# Patient Record
Sex: MALE | Race: WHITE | NOT HISPANIC OR LATINO | Employment: UNEMPLOYED | URBAN - NONMETROPOLITAN AREA
[De-identification: names, ages, dates, MRNs, and addresses within clinical notes are randomized per-mention and may not be internally consistent; named-entity substitution may affect disease eponyms.]

---

## 2017-12-13 ENCOUNTER — APPOINTMENT (OUTPATIENT)
Dept: CT IMAGING | Facility: HOSPITAL | Age: 52
End: 2017-12-13

## 2017-12-13 ENCOUNTER — HOSPITAL ENCOUNTER (EMERGENCY)
Facility: HOSPITAL | Age: 52
Discharge: HOME | End: 2017-12-14
Attending: EMERGENCY MEDICINE | Admitting: EMERGENCY MEDICINE

## 2017-12-13 DIAGNOSIS — G43.009 MIGRAINE WITHOUT AURA AND WITHOUT STATUS MIGRAINOSUS, NOT INTRACTABLE: Primary | ICD-10-CM

## 2017-12-13 LAB
ALBUMIN SERPL-MCNC: 4.4 G/DL (ref 3.5–5)
ALBUMIN/GLOB SERPL: 1.6 G/DL (ref 1.5–2.5)
ALP SERPL-CCNC: 85 U/L (ref 40–129)
ALT SERPL W P-5'-P-CCNC: 19 U/L (ref 10–44)
ANION GAP SERPL CALCULATED.3IONS-SCNC: 6.4 MMOL/L (ref 3.6–11.2)
AST SERPL-CCNC: 19 U/L (ref 10–34)
BASOPHILS # BLD AUTO: 0.06 10*3/MM3 (ref 0–0.3)
BASOPHILS NFR BLD AUTO: 0.5 % (ref 0–2)
BILIRUB SERPL-MCNC: 0.4 MG/DL (ref 0.2–1.8)
BILIRUB UR QL STRIP: NEGATIVE
BUN BLD-MCNC: 6 MG/DL (ref 7–21)
BUN/CREAT SERPL: 5.9 (ref 7–25)
CALCIUM SPEC-SCNC: 9.4 MG/DL (ref 7.7–10)
CHLORIDE SERPL-SCNC: 107 MMOL/L (ref 99–112)
CLARITY UR: CLEAR
CO2 SERPL-SCNC: 26.6 MMOL/L (ref 24.3–31.9)
COLOR UR: YELLOW
CREAT BLD-MCNC: 1.02 MG/DL (ref 0.43–1.29)
DEPRECATED RDW RBC AUTO: 41.5 FL (ref 37–54)
EOSINOPHIL # BLD AUTO: 0.25 10*3/MM3 (ref 0–0.7)
EOSINOPHIL NFR BLD AUTO: 2 % (ref 0–5)
ERYTHROCYTE [DISTWIDTH] IN BLOOD BY AUTOMATED COUNT: 13.4 % (ref 11.5–14.5)
GFR SERPL CREATININE-BSD FRML MDRD: 76 ML/MIN/1.73
GLOBULIN UR ELPH-MCNC: 2.8 GM/DL
GLUCOSE BLD-MCNC: 134 MG/DL (ref 70–110)
GLUCOSE UR STRIP-MCNC: NEGATIVE MG/DL
HCT VFR BLD AUTO: 39.1 % (ref 42–52)
HGB BLD-MCNC: 13.4 G/DL (ref 14–18)
HGB UR QL STRIP.AUTO: NEGATIVE
IMM GRANULOCYTES # BLD: 0.05 10*3/MM3 (ref 0–0.03)
IMM GRANULOCYTES NFR BLD: 0.4 % (ref 0–0.5)
KETONES UR QL STRIP: NEGATIVE
LEUKOCYTE ESTERASE UR QL STRIP.AUTO: NEGATIVE
LYMPHOCYTES # BLD AUTO: 1.83 10*3/MM3 (ref 1–3)
LYMPHOCYTES NFR BLD AUTO: 14.4 % (ref 21–51)
MCH RBC QN AUTO: 29.1 PG (ref 27–33)
MCHC RBC AUTO-ENTMCNC: 34.3 G/DL (ref 33–37)
MCV RBC AUTO: 85 FL (ref 80–94)
MONOCYTES # BLD AUTO: 0.59 10*3/MM3 (ref 0.1–0.9)
MONOCYTES NFR BLD AUTO: 4.6 % (ref 0–10)
NEUTROPHILS # BLD AUTO: 9.95 10*3/MM3 (ref 1.4–6.5)
NEUTROPHILS NFR BLD AUTO: 78.1 % (ref 30–70)
NITRITE UR QL STRIP: NEGATIVE
OSMOLALITY SERPL CALC.SUM OF ELEC: 279 MOSM/KG (ref 273–305)
PH UR STRIP.AUTO: 6.5 [PH] (ref 5–8)
PLATELET # BLD AUTO: 361 10*3/MM3 (ref 130–400)
PMV BLD AUTO: 10.1 FL (ref 6–10)
POTASSIUM BLD-SCNC: 3.1 MMOL/L (ref 3.5–5.3)
PROT SERPL-MCNC: 7.2 G/DL (ref 6–8)
PROT UR QL STRIP: NEGATIVE
RBC # BLD AUTO: 4.6 10*6/MM3 (ref 4.7–6.1)
SODIUM BLD-SCNC: 140 MMOL/L (ref 135–153)
SP GR UR STRIP: 1.01 (ref 1–1.03)
UROBILINOGEN UR QL STRIP: NORMAL
WBC NRBC COR # BLD: 12.73 10*3/MM3 (ref 4.5–12.5)

## 2017-12-13 PROCEDURE — 80053 COMPREHEN METABOLIC PANEL: CPT | Performed by: PHYSICIAN ASSISTANT

## 2017-12-13 PROCEDURE — 96375 TX/PRO/DX INJ NEW DRUG ADDON: CPT

## 2017-12-13 PROCEDURE — 81003 URINALYSIS AUTO W/O SCOPE: CPT | Performed by: PHYSICIAN ASSISTANT

## 2017-12-13 PROCEDURE — 99283 EMERGENCY DEPT VISIT LOW MDM: CPT

## 2017-12-13 PROCEDURE — 96361 HYDRATE IV INFUSION ADD-ON: CPT

## 2017-12-13 PROCEDURE — 25010000002 DEXAMETHASONE PER 1 MG: Performed by: PHYSICIAN ASSISTANT

## 2017-12-13 PROCEDURE — 70450 CT HEAD/BRAIN W/O DYE: CPT | Performed by: RADIOLOGY

## 2017-12-13 PROCEDURE — 70450 CT HEAD/BRAIN W/O DYE: CPT

## 2017-12-13 PROCEDURE — 85025 COMPLETE CBC W/AUTO DIFF WBC: CPT | Performed by: PHYSICIAN ASSISTANT

## 2017-12-13 PROCEDURE — 25010000002 KETOROLAC TROMETHAMINE PER 15 MG: Performed by: PHYSICIAN ASSISTANT

## 2017-12-13 PROCEDURE — 25010000002 PROCHLORPERAZINE EDISYLATE PER 10 MG: Performed by: PHYSICIAN ASSISTANT

## 2017-12-13 PROCEDURE — 25010000002 DIPHENHYDRAMINE PER 50 MG: Performed by: PHYSICIAN ASSISTANT

## 2017-12-13 PROCEDURE — 96365 THER/PROPH/DIAG IV INF INIT: CPT

## 2017-12-13 RX ORDER — ERGOCALCIFEROL (VITAMIN D2) 10 MCG
400 TABLET ORAL DAILY
COMMUNITY
End: 2018-05-18

## 2017-12-13 RX ORDER — TAMSULOSIN HYDROCHLORIDE 0.4 MG/1
1 CAPSULE ORAL NIGHTLY
COMMUNITY
End: 2018-05-18

## 2017-12-13 RX ORDER — FINASTERIDE 5 MG/1
5 TABLET, FILM COATED ORAL DAILY
COMMUNITY
End: 2018-05-18

## 2017-12-13 RX ORDER — LISINOPRIL 10 MG/1
10 TABLET ORAL DAILY
COMMUNITY
End: 2018-05-18

## 2017-12-13 RX ORDER — LABETALOL 100 MG/1
100 TABLET, FILM COATED ORAL 2 TIMES DAILY
COMMUNITY
End: 2018-05-18

## 2017-12-13 RX ORDER — FERROUS SULFATE 325(65) MG
325 TABLET ORAL
COMMUNITY
End: 2018-05-18

## 2017-12-13 RX ORDER — PANTOPRAZOLE SODIUM 40 MG/1
40 TABLET, DELAYED RELEASE ORAL DAILY
COMMUNITY

## 2017-12-13 RX ORDER — KETOROLAC TROMETHAMINE 30 MG/ML
30 INJECTION, SOLUTION INTRAMUSCULAR; INTRAVENOUS ONCE
Status: COMPLETED | OUTPATIENT
Start: 2017-12-13 | End: 2017-12-13

## 2017-12-13 RX ORDER — SPIRONOLACTONE 25 MG/1
25 TABLET ORAL DAILY
COMMUNITY
End: 2018-05-18

## 2017-12-13 RX ORDER — DIPHENHYDRAMINE HYDROCHLORIDE 50 MG/ML
12.5 INJECTION INTRAMUSCULAR; INTRAVENOUS ONCE
Status: COMPLETED | OUTPATIENT
Start: 2017-12-13 | End: 2017-12-13

## 2017-12-13 RX ORDER — POTASSIUM CHLORIDE 20 MEQ/1
20 TABLET, EXTENDED RELEASE ORAL ONCE
Status: COMPLETED | OUTPATIENT
Start: 2017-12-13 | End: 2017-12-13

## 2017-12-13 RX ORDER — SODIUM CHLORIDE 0.9 % (FLUSH) 0.9 %
10 SYRINGE (ML) INJECTION AS NEEDED
Status: DISCONTINUED | OUTPATIENT
Start: 2017-12-13 | End: 2017-12-14 | Stop reason: HOSPADM

## 2017-12-13 RX ORDER — MELOXICAM 7.5 MG/1
7.5 TABLET ORAL DAILY
COMMUNITY
End: 2018-05-18

## 2017-12-13 RX ADMIN — PROCHLORPERAZINE EDISYLATE 10 MG: 5 INJECTION INTRAMUSCULAR; INTRAVENOUS at 21:44

## 2017-12-13 RX ADMIN — SODIUM CHLORIDE 1000 ML: 9 INJECTION, SOLUTION INTRAVENOUS at 21:43

## 2017-12-13 RX ADMIN — POTASSIUM CHLORIDE 20 MEQ: 1500 TABLET, EXTENDED RELEASE ORAL at 23:52

## 2017-12-13 RX ADMIN — DEXAMETHASONE SODIUM PHOSPHATE 10 MG: 4 INJECTION, SOLUTION INTRAMUSCULAR; INTRAVENOUS at 22:23

## 2017-12-13 RX ADMIN — KETOROLAC TROMETHAMINE 30 MG: 30 INJECTION, SOLUTION INTRAMUSCULAR at 21:43

## 2017-12-13 RX ADMIN — DIPHENHYDRAMINE HYDROCHLORIDE 12.5 MG: 50 INJECTION INTRAMUSCULAR; INTRAVENOUS at 21:45

## 2017-12-14 VITALS
RESPIRATION RATE: 18 BRPM | WEIGHT: 175 LBS | OXYGEN SATURATION: 99 % | HEIGHT: 66 IN | HEART RATE: 74 BPM | SYSTOLIC BLOOD PRESSURE: 142 MMHG | DIASTOLIC BLOOD PRESSURE: 80 MMHG | TEMPERATURE: 97.1 F | BODY MASS INDEX: 28.12 KG/M2

## 2017-12-14 NOTE — ED PROVIDER NOTES
Subjective   HPI Comments: This is a 52-year-old male presents with chief complaint headache for the past 2 days.  Patient describes headache as by lateral temporal and frontal region described as sharp, stabbing pain. Patient has had nausea without vomiting. No reported fever, chills.  Patient does have history of migraine headaches, states that this is worse than his usual headaches.  He does report having numbness and tingling down to his fingertips.    Patient is a 52 y.o. male presenting with headaches.   History provided by:  Patient   used: No    Headache   Pain location:  L temporal and R temporal  Quality:  Sharp  Severity at highest:  9/10  Onset quality:  Sudden  Duration:  2 days  Timing:  Constant  Progression:  Worsening  Similar to prior headaches: no    Context: bright light    Context: not activity, not defecating, not eating, not intercourse and not loud noise    Relieved by:  Nothing  Worsened by:  Nothing  Associated symptoms: numbness and weakness    Associated symptoms: no abdominal pain, no back pain, no eye pain, no facial pain, no near-syncope, no neck pain, no neck stiffness, no sore throat, no swollen glands and no syncope        Review of Systems   HENT: Negative for sore throat.    Eyes: Negative for pain.   Cardiovascular: Negative for syncope and near-syncope.   Gastrointestinal: Negative for abdominal pain.   Musculoskeletal: Negative for back pain, neck pain and neck stiffness.   Skin: Negative for rash.   Neurological: Positive for weakness, numbness and headaches.   All other systems reviewed and are negative.      Past Medical History:   Diagnosis Date   • Hypertension        No Known Allergies    History reviewed. No pertinent surgical history.    History reviewed. No pertinent family history.    Social History     Social History   • Marital status:      Spouse name: N/A   • Number of children: N/A   • Years of education: N/A     Social History Main  Topics   • Smoking status: Current Every Day Smoker     Packs/day: 1.50     Types: Cigarettes   • Smokeless tobacco: None   • Alcohol use Yes      Comment: occasionally   • Drug use: No   • Sexual activity: Not Asked     Other Topics Concern   • None     Social History Narrative   • None           Objective   Physical Exam   Constitutional: He is oriented to person, place, and time. He appears well-developed and well-nourished.   HENT:   Head: Normocephalic and atraumatic.   Right Ear: External ear normal.   Left Ear: External ear normal.   Nose: Nose normal.   Mouth/Throat: Oropharynx is clear and moist.   Eyes: Conjunctivae and EOM are normal. Pupils are equal, round, and reactive to light.   Neck: Normal range of motion. Neck supple.   Cardiovascular: Normal rate, regular rhythm and normal heart sounds.    Pulmonary/Chest: Effort normal and breath sounds normal.   Abdominal: Soft. Normal appearance and bowel sounds are normal. He exhibits no mass. There is no tenderness. There is no guarding.   Musculoskeletal: Normal range of motion.   Neurological: He is alert and oriented to person, place, and time.   Skin: Skin is warm and dry.   Psychiatric: He has a normal mood and affect. His behavior is normal. Judgment and thought content normal.   Nursing note and vitals reviewed.      Procedures         ED Course  ED Course   Comment By Time   Endorsed to Felipe Elise PA-C 12/13 2158   CT head interpreted by virtual radiology: Normal head brain CT. FELIPE Kenyon 12/13 8901                  MDM  Number of Diagnoses or Management Options  established and worsening     Amount and/or Complexity of Data Reviewed  Clinical lab tests: reviewed  Decide to obtain previous medical records or to obtain history from someone other than the patient: yes    Risk of Complications, Morbidity, and/or Mortality  Presenting problems: moderate  Diagnostic procedures: moderate  Management options: low    Patient  Progress  Patient progress: stable      Final diagnoses:   Migraine without aura and without status migrainosus, not intractable            FELIPE Kenyon  12/13/17 6376

## 2018-05-12 ENCOUNTER — HOSPITAL ENCOUNTER (EMERGENCY)
Facility: HOSPITAL | Age: 53
Discharge: HOME | End: 2018-05-12
Attending: EMERGENCY MEDICINE | Admitting: EMERGENCY MEDICINE

## 2018-05-12 VITALS
TEMPERATURE: 98 F | BODY MASS INDEX: 28.12 KG/M2 | SYSTOLIC BLOOD PRESSURE: 139 MMHG | HEART RATE: 89 BPM | DIASTOLIC BLOOD PRESSURE: 90 MMHG | RESPIRATION RATE: 18 BRPM | WEIGHT: 175 LBS | HEIGHT: 66 IN | OXYGEN SATURATION: 97 %

## 2018-05-12 DIAGNOSIS — L02.419 CELLULITIS AND ABSCESS OF LEG: Primary | ICD-10-CM

## 2018-05-12 DIAGNOSIS — L03.119 CELLULITIS AND ABSCESS OF LEG: Primary | ICD-10-CM

## 2018-05-12 LAB
ALBUMIN SERPL-MCNC: 4.1 G/DL (ref 3.5–5)
ALBUMIN/GLOB SERPL: 1.5 G/DL (ref 1.5–2.5)
ALP SERPL-CCNC: 78 U/L (ref 40–129)
ALT SERPL W P-5'-P-CCNC: 20 U/L (ref 10–44)
ANION GAP SERPL CALCULATED.3IONS-SCNC: 5 MMOL/L (ref 3.6–11.2)
AST SERPL-CCNC: 14 U/L (ref 10–34)
BASOPHILS # BLD AUTO: 0.07 10*3/MM3 (ref 0–0.3)
BASOPHILS NFR BLD AUTO: 0.6 % (ref 0–2)
BILIRUB SERPL-MCNC: 0.3 MG/DL (ref 0.2–1.8)
BUN BLD-MCNC: 11 MG/DL (ref 7–21)
BUN/CREAT SERPL: 12 (ref 7–25)
CALCIUM SPEC-SCNC: 9.3 MG/DL (ref 7.7–10)
CHLORIDE SERPL-SCNC: 109 MMOL/L (ref 99–112)
CO2 SERPL-SCNC: 29 MMOL/L (ref 24.3–31.9)
CREAT BLD-MCNC: 0.92 MG/DL (ref 0.43–1.29)
CRP SERPL-MCNC: 7.01 MG/DL (ref 0–0.99)
D-LACTATE SERPL-SCNC: 1.2 MMOL/L (ref 0.5–2)
DEPRECATED RDW RBC AUTO: 41.8 FL (ref 37–54)
EOSINOPHIL # BLD AUTO: 0.48 10*3/MM3 (ref 0–0.7)
EOSINOPHIL NFR BLD AUTO: 4.2 % (ref 0–5)
ERYTHROCYTE [DISTWIDTH] IN BLOOD BY AUTOMATED COUNT: 13.4 % (ref 11.5–14.5)
ERYTHROCYTE [SEDIMENTATION RATE] IN BLOOD: 26 MM/HR (ref 0–20)
GFR SERPL CREATININE-BSD FRML MDRD: 86 ML/MIN/1.73
GLOBULIN UR ELPH-MCNC: 2.8 GM/DL
GLUCOSE BLD-MCNC: 89 MG/DL (ref 70–110)
HCT VFR BLD AUTO: 40.1 % (ref 42–52)
HGB BLD-MCNC: 13.6 G/DL (ref 14–18)
IMM GRANULOCYTES # BLD: 0.06 10*3/MM3 (ref 0–0.03)
IMM GRANULOCYTES NFR BLD: 0.5 % (ref 0–0.5)
LYMPHOCYTES # BLD AUTO: 2.89 10*3/MM3 (ref 1–3)
LYMPHOCYTES NFR BLD AUTO: 25.6 % (ref 21–51)
MCH RBC QN AUTO: 29 PG (ref 27–33)
MCHC RBC AUTO-ENTMCNC: 33.9 G/DL (ref 33–37)
MCV RBC AUTO: 85.5 FL (ref 80–94)
MONOCYTES # BLD AUTO: 1.19 10*3/MM3 (ref 0.1–0.9)
MONOCYTES NFR BLD AUTO: 10.5 % (ref 0–10)
NEUTROPHILS # BLD AUTO: 6.62 10*3/MM3 (ref 1.4–6.5)
NEUTROPHILS NFR BLD AUTO: 58.6 % (ref 30–70)
OSMOLALITY SERPL CALC.SUM OF ELEC: 283.9 MOSM/KG (ref 273–305)
PLATELET # BLD AUTO: 399 10*3/MM3 (ref 130–400)
PMV BLD AUTO: 9 FL (ref 6–10)
POTASSIUM BLD-SCNC: 3.7 MMOL/L (ref 3.5–5.3)
PROT SERPL-MCNC: 6.9 G/DL (ref 6–8)
RBC # BLD AUTO: 4.69 10*6/MM3 (ref 4.7–6.1)
SODIUM BLD-SCNC: 143 MMOL/L (ref 135–153)
WBC NRBC COR # BLD: 11.31 10*3/MM3 (ref 4.5–12.5)

## 2018-05-12 PROCEDURE — 83605 ASSAY OF LACTIC ACID: CPT | Performed by: NURSE PRACTITIONER

## 2018-05-12 PROCEDURE — 87070 CULTURE OTHR SPECIMN AEROBIC: CPT | Performed by: NURSE PRACTITIONER

## 2018-05-12 PROCEDURE — 96365 THER/PROPH/DIAG IV INF INIT: CPT

## 2018-05-12 PROCEDURE — 85025 COMPLETE CBC W/AUTO DIFF WBC: CPT | Performed by: NURSE PRACTITIONER

## 2018-05-12 PROCEDURE — 80053 COMPREHEN METABOLIC PANEL: CPT | Performed by: NURSE PRACTITIONER

## 2018-05-12 PROCEDURE — 87147 CULTURE TYPE IMMUNOLOGIC: CPT | Performed by: NURSE PRACTITIONER

## 2018-05-12 PROCEDURE — 87186 SC STD MICRODIL/AGAR DIL: CPT | Performed by: NURSE PRACTITIONER

## 2018-05-12 PROCEDURE — 87040 BLOOD CULTURE FOR BACTERIA: CPT | Performed by: NURSE PRACTITIONER

## 2018-05-12 PROCEDURE — 87205 SMEAR GRAM STAIN: CPT | Performed by: NURSE PRACTITIONER

## 2018-05-12 PROCEDURE — 86140 C-REACTIVE PROTEIN: CPT | Performed by: NURSE PRACTITIONER

## 2018-05-12 PROCEDURE — 87077 CULTURE AEROBIC IDENTIFY: CPT | Performed by: NURSE PRACTITIONER

## 2018-05-12 PROCEDURE — 99283 EMERGENCY DEPT VISIT LOW MDM: CPT

## 2018-05-12 PROCEDURE — 85652 RBC SED RATE AUTOMATED: CPT | Performed by: NURSE PRACTITIONER

## 2018-05-12 PROCEDURE — 87150 DNA/RNA AMPLIFIED PROBE: CPT | Performed by: NURSE PRACTITIONER

## 2018-05-12 RX ORDER — SODIUM CHLORIDE 0.9 % (FLUSH) 0.9 %
10 SYRINGE (ML) INJECTION AS NEEDED
Status: DISCONTINUED | OUTPATIENT
Start: 2018-05-12 | End: 2018-05-13 | Stop reason: HOSPADM

## 2018-05-12 RX ORDER — DOXYCYCLINE 100 MG/1
100 CAPSULE ORAL 2 TIMES DAILY
Qty: 20 CAPSULE | Refills: 0 | Status: SHIPPED | OUTPATIENT
Start: 2018-05-12 | End: 2018-05-18

## 2018-05-12 RX ADMIN — CEFAZOLIN 1 G: 1 INJECTION, POWDER, FOR SOLUTION INTRAMUSCULAR; INTRAVENOUS; PARENTERAL at 22:46

## 2018-05-13 NOTE — ED NOTES
Abscess cleansed with soap and water. Dressed with non-adherent, 4x4, and secured with cloth tape.      Honey Garcia RN  05/12/18 5697

## 2018-05-13 NOTE — ED NOTES
Pt unable to provide urine sample at this time. Provider made aware. Will continue to monitor.      Layo De Jesus  05/12/18 8363

## 2018-05-13 NOTE — ED PROVIDER NOTES
Subjective     History provided by:  Patient  Abscess   Location:  Leg  Leg abscess location:  R upper leg  Size:  2cm x 2cm   Abscess quality: fluctuance, painful, redness and warmth    Red streaking: yes    Progression:  Worsening  Pain details:     Quality:  Throbbing and burning    Timing:  Constant    Progression:  Worsening  Context: not insect bite/sting    Relieved by:  None tried  Worsened by:  Nothing  Ineffective treatments:  None tried  Associated symptoms: no fever        Review of Systems   Constitutional: Negative.  Negative for fever.   HENT: Negative.    Respiratory: Negative.    Cardiovascular: Negative.  Negative for chest pain.   Gastrointestinal: Negative.  Negative for abdominal pain.   Endocrine: Negative.    Genitourinary: Negative.  Negative for dysuria.   Skin: Negative.    Neurological: Negative.    Psychiatric/Behavioral: Negative.    All other systems reviewed and are negative.      Past Medical History:   Diagnosis Date   • Hypertension        No Known Allergies    History reviewed. No pertinent surgical history.    History reviewed. No pertinent family history.    Social History     Social History   • Marital status:      Social History Main Topics   • Smoking status: Current Every Day Smoker     Packs/day: 1.50     Types: Cigarettes   • Alcohol use Yes      Comment: occasionally   • Drug use: No     Other Topics Concern   • Not on file           Objective   Physical Exam   Constitutional: He is oriented to person, place, and time. He appears well-developed and well-nourished. No distress.   HENT:   Head: Normocephalic and atraumatic.   Right Ear: External ear normal.   Left Ear: External ear normal.   Nose: Nose normal.   Eyes: Conjunctivae and EOM are normal. Pupils are equal, round, and reactive to light.   Neck: Normal range of motion. Neck supple. No JVD present. No tracheal deviation present.   Cardiovascular: Normal rate, regular rhythm and normal heart sounds.    No  murmur heard.  Pulmonary/Chest: Effort normal and breath sounds normal. No respiratory distress. He has no wheezes.   Abdominal: Soft. Bowel sounds are normal. There is no tenderness.   Musculoskeletal: Normal range of motion. He exhibits no edema or deformity.   Neurological: He is alert and oriented to person, place, and time. No cranial nerve deficit.   Skin: Skin is warm and dry. No rash noted. He is not diaphoretic. There is erythema. No pallor.   Psychiatric: He has a normal mood and affect. His behavior is normal. Thought content normal.   Nursing note and vitals reviewed.      Procedures           ED Course  ED Course                  MDM  Number of Diagnoses or Management Options  Cellulitis and abscess of leg: new and requires workup     Amount and/or Complexity of Data Reviewed  Clinical lab tests: reviewed    Risk of Complications, Morbidity, and/or Mortality  Presenting problems: low  Diagnostic procedures: low  Management options: low    Patient Progress  Patient progress: stable        Final diagnoses:   Cellulitis and abscess of leg            Marybeth Ralph, JACOBO  05/13/18 0014

## 2018-05-14 LAB — BACTERIA BLD CULT: ABNORMAL

## 2018-05-15 LAB
BACTERIA SPEC AEROBE CULT: ABNORMAL
BACTERIA SPEC AEROBE CULT: ABNORMAL
GRAM STN SPEC: ABNORMAL

## 2018-05-17 ENCOUNTER — TELEPHONE (OUTPATIENT)
Dept: EMERGENCY DEPT | Facility: HOSPITAL | Age: 53
End: 2018-05-17

## 2018-05-17 LAB
BACTERIA SPEC AEROBE CULT: ABNORMAL
BACTERIA SPEC AEROBE CULT: ABNORMAL
GRAM STN SPEC: ABNORMAL
GRAM STN SPEC: ABNORMAL
ISOLATED FROM: ABNORMAL
ISOLATED FROM: ABNORMAL

## 2018-05-18 ENCOUNTER — HOSPITAL ENCOUNTER (INPATIENT)
Facility: HOSPITAL | Age: 53
LOS: 3 days | Discharge: HOME HEALTH | End: 2018-05-21
Attending: EMERGENCY MEDICINE | Admitting: INTERNAL MEDICINE

## 2018-05-18 DIAGNOSIS — R78.81 BACTEREMIA: Primary | ICD-10-CM

## 2018-05-18 LAB
ALBUMIN SERPL-MCNC: 4.4 G/DL (ref 3.5–5)
ALBUMIN/GLOB SERPL: 1.7 G/DL (ref 1.5–2.5)
ALP SERPL-CCNC: 80 U/L (ref 40–129)
ALT SERPL W P-5'-P-CCNC: 19 U/L (ref 10–44)
ANION GAP SERPL CALCULATED.3IONS-SCNC: 4.6 MMOL/L (ref 3.6–11.2)
AST SERPL-CCNC: 16 U/L (ref 10–34)
BASOPHILS # BLD AUTO: 0.07 10*3/MM3 (ref 0–0.3)
BASOPHILS NFR BLD AUTO: 0.7 % (ref 0–2)
BILIRUB SERPL-MCNC: 0.3 MG/DL (ref 0.2–1.8)
BUN BLD-MCNC: 7 MG/DL (ref 7–21)
BUN/CREAT SERPL: 6.8 (ref 7–25)
CALCIUM SPEC-SCNC: 9.5 MG/DL (ref 7.7–10)
CHLORIDE SERPL-SCNC: 107 MMOL/L (ref 99–112)
CO2 SERPL-SCNC: 31.4 MMOL/L (ref 24.3–31.9)
CREAT BLD-MCNC: 1.03 MG/DL (ref 0.43–1.29)
CRP SERPL-MCNC: 0.53 MG/DL (ref 0–0.99)
CRP SERPL-MCNC: 0.76 MG/DL (ref 0–0.99)
D-LACTATE SERPL-SCNC: 0.9 MMOL/L (ref 0.5–2)
D-LACTATE SERPL-SCNC: 1.8 MMOL/L (ref 0.5–2)
D-LACTATE SERPL-SCNC: 2.2 MMOL/L (ref 0.5–2)
D-LACTATE SERPL-SCNC: 2.6 MMOL/L (ref 0.5–2)
DEPRECATED RDW RBC AUTO: 40.9 FL (ref 37–54)
EOSINOPHIL # BLD AUTO: 0.32 10*3/MM3 (ref 0–0.7)
EOSINOPHIL NFR BLD AUTO: 3 % (ref 0–5)
ERYTHROCYTE [DISTWIDTH] IN BLOOD BY AUTOMATED COUNT: 13.3 % (ref 11.5–14.5)
ERYTHROCYTE [SEDIMENTATION RATE] IN BLOOD: 26 MM/HR (ref 0–20)
GFR SERPL CREATININE-BSD FRML MDRD: 75 ML/MIN/1.73
GLOBULIN UR ELPH-MCNC: 2.6 GM/DL
GLUCOSE BLD-MCNC: 94 MG/DL (ref 70–110)
HCT VFR BLD AUTO: 42.6 % (ref 42–52)
HGB BLD-MCNC: 14.8 G/DL (ref 14–18)
HOLD SPECIMEN: NORMAL
IMM GRANULOCYTES # BLD: 0.07 10*3/MM3 (ref 0–0.03)
IMM GRANULOCYTES NFR BLD: 0.7 % (ref 0–0.5)
LYMPHOCYTES # BLD AUTO: 2.33 10*3/MM3 (ref 1–3)
LYMPHOCYTES NFR BLD AUTO: 22 % (ref 21–51)
MCH RBC QN AUTO: 29.5 PG (ref 27–33)
MCHC RBC AUTO-ENTMCNC: 34.7 G/DL (ref 33–37)
MCV RBC AUTO: 84.9 FL (ref 80–94)
MONOCYTES # BLD AUTO: 0.72 10*3/MM3 (ref 0.1–0.9)
MONOCYTES NFR BLD AUTO: 6.8 % (ref 0–10)
NEUTROPHILS # BLD AUTO: 7.07 10*3/MM3 (ref 1.4–6.5)
NEUTROPHILS NFR BLD AUTO: 66.8 % (ref 30–70)
OSMOLALITY SERPL CALC.SUM OF ELEC: 282.7 MOSM/KG (ref 273–305)
PLATELET # BLD AUTO: 472 10*3/MM3 (ref 130–400)
PMV BLD AUTO: 9.3 FL (ref 6–10)
POTASSIUM BLD-SCNC: 3 MMOL/L (ref 3.5–5.3)
PROT SERPL-MCNC: 7 G/DL (ref 6–8)
RBC # BLD AUTO: 5.02 10*6/MM3 (ref 4.7–6.1)
SODIUM BLD-SCNC: 143 MMOL/L (ref 135–153)
WBC NRBC COR # BLD: 10.58 10*3/MM3 (ref 4.5–12.5)

## 2018-05-18 PROCEDURE — 86140 C-REACTIVE PROTEIN: CPT | Performed by: NURSE PRACTITIONER

## 2018-05-18 PROCEDURE — 25010000002 MORPHINE PER 10 MG: Performed by: EMERGENCY MEDICINE

## 2018-05-18 PROCEDURE — 25010000003 POTASSIUM CHLORIDE 10 MEQ/100ML SOLUTION: Performed by: INTERNAL MEDICINE

## 2018-05-18 PROCEDURE — 99222 1ST HOSP IP/OBS MODERATE 55: CPT | Performed by: INTERNAL MEDICINE

## 2018-05-18 PROCEDURE — 85025 COMPLETE CBC W/AUTO DIFF WBC: CPT | Performed by: PHYSICIAN ASSISTANT

## 2018-05-18 PROCEDURE — 80053 COMPREHEN METABOLIC PANEL: CPT | Performed by: PHYSICIAN ASSISTANT

## 2018-05-18 PROCEDURE — 87040 BLOOD CULTURE FOR BACTERIA: CPT | Performed by: PHYSICIAN ASSISTANT

## 2018-05-18 PROCEDURE — 93005 ELECTROCARDIOGRAM TRACING: CPT | Performed by: NURSE PRACTITIONER

## 2018-05-18 PROCEDURE — 83605 ASSAY OF LACTIC ACID: CPT | Performed by: INTERNAL MEDICINE

## 2018-05-18 PROCEDURE — 25010000002 HEPARIN (PORCINE) PER 1000 UNITS: Performed by: NURSE PRACTITIONER

## 2018-05-18 PROCEDURE — 83605 ASSAY OF LACTIC ACID: CPT | Performed by: NURSE PRACTITIONER

## 2018-05-18 PROCEDURE — 99285 EMERGENCY DEPT VISIT HI MDM: CPT

## 2018-05-18 PROCEDURE — 25010000002 VANCOMYCIN PER 500 MG: Performed by: PHYSICIAN ASSISTANT

## 2018-05-18 PROCEDURE — 86140 C-REACTIVE PROTEIN: CPT | Performed by: PHYSICIAN ASSISTANT

## 2018-05-18 PROCEDURE — 94799 UNLISTED PULMONARY SVC/PX: CPT

## 2018-05-18 PROCEDURE — 83605 ASSAY OF LACTIC ACID: CPT | Performed by: PHYSICIAN ASSISTANT

## 2018-05-18 PROCEDURE — 36415 COLL VENOUS BLD VENIPUNCTURE: CPT

## 2018-05-18 PROCEDURE — 85652 RBC SED RATE AUTOMATED: CPT | Performed by: PHYSICIAN ASSISTANT

## 2018-05-18 RX ORDER — POTASSIUM CHLORIDE 750 MG/1
40 CAPSULE, EXTENDED RELEASE ORAL AS NEEDED
Status: DISCONTINUED | OUTPATIENT
Start: 2018-05-18 | End: 2018-05-21 | Stop reason: HOSPADM

## 2018-05-18 RX ORDER — ASPIRIN 325 MG
325 TABLET, DELAYED RELEASE (ENTERIC COATED) ORAL DAILY
Status: DISCONTINUED | OUTPATIENT
Start: 2018-05-19 | End: 2018-05-21 | Stop reason: HOSPADM

## 2018-05-18 RX ORDER — ERGOCALCIFEROL 1.25 MG/1
50000 CAPSULE ORAL WEEKLY
COMMUNITY
End: 2019-10-03

## 2018-05-18 RX ORDER — POTASSIUM CHLORIDE 7.45 MG/ML
10 INJECTION INTRAVENOUS
Status: COMPLETED | OUTPATIENT
Start: 2018-05-18 | End: 2018-05-19

## 2018-05-18 RX ORDER — CHLORAL HYDRATE 500 MG
1000 CAPSULE ORAL
COMMUNITY
End: 2019-12-03

## 2018-05-18 RX ORDER — MELOXICAM 15 MG/1
15 TABLET ORAL DAILY
COMMUNITY
End: 2018-10-10

## 2018-05-18 RX ORDER — CETIRIZINE HYDROCHLORIDE, PSEUDOEPHEDRINE HYDROCHLORIDE 5; 120 MG/1; MG/1
1 TABLET, FILM COATED, EXTENDED RELEASE ORAL 2 TIMES DAILY PRN
Status: CANCELLED | OUTPATIENT
Start: 2018-05-18

## 2018-05-18 RX ORDER — HEPARIN SODIUM 5000 [USP'U]/ML
5000 INJECTION, SOLUTION INTRAVENOUS; SUBCUTANEOUS EVERY 12 HOURS SCHEDULED
Status: DISCONTINUED | OUTPATIENT
Start: 2018-05-18 | End: 2018-05-21 | Stop reason: HOSPADM

## 2018-05-18 RX ORDER — LISINOPRIL 10 MG/1
10 TABLET ORAL DAILY
Status: DISCONTINUED | OUTPATIENT
Start: 2018-05-19 | End: 2018-05-21 | Stop reason: HOSPADM

## 2018-05-18 RX ORDER — SODIUM CHLORIDE 0.9 % (FLUSH) 0.9 %
10 SYRINGE (ML) INJECTION AS NEEDED
Status: DISCONTINUED | OUTPATIENT
Start: 2018-05-18 | End: 2018-05-21 | Stop reason: HOSPADM

## 2018-05-18 RX ORDER — LISINOPRIL 20 MG/1
10 TABLET ORAL DAILY
COMMUNITY
End: 2019-10-03

## 2018-05-18 RX ORDER — MELOXICAM 7.5 MG/1
15 TABLET ORAL DAILY
Status: DISCONTINUED | OUTPATIENT
Start: 2018-05-19 | End: 2018-05-21 | Stop reason: HOSPADM

## 2018-05-18 RX ORDER — PANTOPRAZOLE SODIUM 40 MG/1
40 TABLET, DELAYED RELEASE ORAL
Status: DISCONTINUED | OUTPATIENT
Start: 2018-05-19 | End: 2018-05-21 | Stop reason: HOSPADM

## 2018-05-18 RX ORDER — MORPHINE SULFATE 2 MG/ML
2 INJECTION, SOLUTION INTRAMUSCULAR; INTRAVENOUS ONCE
Status: COMPLETED | OUTPATIENT
Start: 2018-05-18 | End: 2018-05-18

## 2018-05-18 RX ORDER — LABETALOL 300 MG/1
300 TABLET, FILM COATED ORAL 2 TIMES DAILY
COMMUNITY

## 2018-05-18 RX ORDER — SODIUM CHLORIDE 0.9 % (FLUSH) 0.9 %
1-10 SYRINGE (ML) INJECTION AS NEEDED
Status: DISCONTINUED | OUTPATIENT
Start: 2018-05-18 | End: 2018-05-21 | Stop reason: HOSPADM

## 2018-05-18 RX ORDER — POTASSIUM CHLORIDE 7.45 MG/ML
10 INJECTION INTRAVENOUS
Status: DISCONTINUED | OUTPATIENT
Start: 2018-05-18 | End: 2018-05-21 | Stop reason: HOSPADM

## 2018-05-18 RX ORDER — FEXOFENADINE HCL AND PSEUDOEPHEDRINE HCI 180; 240 MG/1; MG/1
1 TABLET, EXTENDED RELEASE ORAL DAILY PRN
COMMUNITY
End: 2018-05-21 | Stop reason: HOSPADM

## 2018-05-18 RX ORDER — LABETALOL 100 MG/1
100 TABLET, FILM COATED ORAL EVERY 12 HOURS SCHEDULED
Status: DISCONTINUED | OUTPATIENT
Start: 2018-05-18 | End: 2018-05-21 | Stop reason: HOSPADM

## 2018-05-18 RX ORDER — POTASSIUM CHLORIDE 1.5 G/1.77G
40 POWDER, FOR SOLUTION ORAL AS NEEDED
Status: DISCONTINUED | OUTPATIENT
Start: 2018-05-18 | End: 2018-05-21 | Stop reason: HOSPADM

## 2018-05-18 RX ORDER — ASPIRIN 325 MG
325 TABLET, DELAYED RELEASE (ENTERIC COATED) ORAL DAILY
COMMUNITY
End: 2018-08-22 | Stop reason: DRUGHIGH

## 2018-05-18 RX ADMIN — POTASSIUM CHLORIDE 10 MEQ: 10 INJECTION, SOLUTION INTRAVENOUS at 18:17

## 2018-05-18 RX ADMIN — POTASSIUM CHLORIDE 10 MEQ: 10 INJECTION, SOLUTION INTRAVENOUS at 23:35

## 2018-05-18 RX ADMIN — LABETALOL HCL 100 MG: 100 TABLET, FILM COATED ORAL at 22:33

## 2018-05-18 RX ADMIN — POTASSIUM CHLORIDE 10 MEQ: 10 INJECTION, SOLUTION INTRAVENOUS at 19:26

## 2018-05-18 RX ADMIN — VANCOMYCIN HYDROCHLORIDE 1500 MG: 5 INJECTION, POWDER, LYOPHILIZED, FOR SOLUTION INTRAVENOUS at 12:06

## 2018-05-18 RX ADMIN — POTASSIUM CHLORIDE 10 MEQ: 10 INJECTION, SOLUTION INTRAVENOUS at 21:44

## 2018-05-18 RX ADMIN — MORPHINE SULFATE 2 MG: 2 INJECTION, SOLUTION INTRAMUSCULAR; INTRAVENOUS at 16:27

## 2018-05-18 RX ADMIN — SODIUM CHLORIDE 1000 ML: 9 INJECTION, SOLUTION INTRAVENOUS at 13:28

## 2018-05-18 RX ADMIN — POTASSIUM CHLORIDE 10 MEQ: 10 INJECTION, SOLUTION INTRAVENOUS at 22:32

## 2018-05-18 RX ADMIN — HEPARIN SODIUM 5000 UNITS: 5000 INJECTION, SOLUTION INTRAVENOUS; SUBCUTANEOUS at 20:26

## 2018-05-18 RX ADMIN — POTASSIUM CHLORIDE 10 MEQ: 10 INJECTION, SOLUTION INTRAVENOUS at 20:26

## 2018-05-18 NOTE — H&P
Patient Identification:  Name:  Marcus Bobo  Age:  52 y.o.  Sex:  male  :  1965  MRN:  [id number]   Visit Number:  [id number]  Primary Care Physician:  JACOBO Grimes    I have seen the patient in conjunction with JACOBO Sutton, and I agree with the following statements:    Assisted by:Kristin CABRERA     Chief complaint: abnormal labs    History of presenting illness:  52 y.o. male, Mr. Bobo was brought back to the ER today for positive blood cultures. He was seen in the ER on 18 for an abscess that had developed 3-4 days prior with erythema and edema of the area. He was started on doxycyline and has been taking that since 18. He reports it is looking and feeling much better, erythema and edema is significantly reduced since being seen in the ER. He denies fever since starting PO antibiotics. He was also found to have a positive lactic acid on todays work up. Will admit to the medical floor for IV antibiotics and further monitoring and treatment.  He states the redness has gone down significantly and he is having really no pain around the right thigh abscess site now.  This spontaneously drained a slight amount.    His medical history includes GERD, BPH, Vitamin D Deficiency, hypertension, and arthritis. He is a current tobacco user, he states he smokes 1-2 ppd for the last 30 years or more.   ---------------------------------------------------------------------------------------------------------------------   Review of Systems   Constitutional: Negative for chills, fatigue and fever.   HENT: Negative for congestion and rhinorrhea.    Eyes: Negative for photophobia and visual disturbance.   Respiratory: Negative for cough, shortness of breath and wheezing.    Cardiovascular: Negative for chest pain and leg swelling.   Gastrointestinal: Negative for abdominal distention and abdominal pain.   Endocrine: Negative for cold intolerance and heat intolerance.   Genitourinary: Negative  for difficulty urinating.   Musculoskeletal: Negative for arthralgias and myalgias.   Skin: Positive for wound (right middle thigh). Negative for color change and pallor.   Neurological: Negative for dizziness, weakness and light-headedness.   Hematological: Negative for adenopathy.   Psychiatric/Behavioral: Negative for agitation, behavioral problems and confusion.      ---------------------------------------------------------------------------------------------------------------------   Past Medical History:   Diagnosis Date   • Hypertension      History reviewed. No pertinent surgical history.  History reviewed. No pertinent family history.  Social History     Social History   • Marital status:      Social History Main Topics   • Smoking status: Current Every Day Smoker     Packs/day: 1.50     Types: Cigarettes   • Alcohol use Yes      Comment: occasionally   • Drug use: No     Other Topics Concern   • Not on file     ---------------------------------------------------------------------------------------------------------------------   Allergies:  Patient has no known allergies.  ---------------------------------------------------------------------------------------------------------------------   Prior to Admission Medications     Prescriptions Last Dose Informant Patient Reported? Taking?    Desloratadine-Pseudoephedrine (CLARINEX-D 12 HOUR PO)   Yes No    Take  by mouth.    doxycycline (MONODOX) 100 MG capsule   No No    Take 1 capsule by mouth 2 (Two) Times a Day for 10 days.    ferrous sulfate 325 (65 FE) MG tablet   Yes No    Take 325 mg by mouth Daily With Breakfast.    finasteride (PROSCAR) 5 MG tablet   Yes No    Take 5 mg by mouth Daily.    labetalol (NORMODYNE) 100 MG tablet   Yes No    Take 100 mg by mouth 2 (Two) Times a Day.    lisinopril (PRINIVIL,ZESTRIL) 10 MG tablet   Yes No    Take 10 mg by mouth Daily.    meloxicam (MOBIC) 7.5 MG tablet   Yes No    Take 7.5 mg by mouth Daily.     pantoprazole (PROTONIX) 40 MG EC tablet   Yes No    Take 40 mg by mouth Daily.    spironolactone (ALDACTONE) 25 MG tablet   Yes No    Take 25 mg by mouth Daily.    tamsulosin (FLOMAX) 0.4 MG capsule 24 hr capsule   Yes No    Take 1 capsule by mouth Every Night.    Vitamin D, Cholecalciferol, (CHOLECALCIFEROL) 400 units tablet   Yes No    Take 400 Units by mouth Daily.        Hospital Scheduled Meds:    sodium chloride 1,000 mL Intravenous Once   vancomycin 20 mg/kg Intravenous Once        ---------------------------------------------------------------------------------------------------------------------   Vital Signs:  Temp:  [97.8 °F (36.6 °C)-98.3 °F (36.8 °C)] 98.3 °F (36.8 °C)  Heart Rate:  [80-82] 80  Resp:  [18] 18  BP: (146-148)/(82-83) 146/83  1    05/18/18  1134   Weight: 79.4 kg (175 lb)     Body mass index is 28.25 kg/m².  ---------------------------------------------------------------------------------------------------------------------       Physical Exam    Physical Exam:  Constitutional:  Well-developed and well-nourished.   No distress  HENT:  Head: Normocephalic and atraumatic.  Mouth:  Moist mucous membranes.    Eyes:  Conjunctivae and EOM are normal.  Pupils are equal, round, and reactive to light.  No scleral icterus.  Neck:  Neck supple.  No JVD present.    Cardiovascular:  Normal rate, regular rhythm.  with no murmur.  Pulmonary/Chest:  No respiratory distress, no wheezes, no crackles, with normal breath sounds and good air movement.  Abdominal:  Soft.  Bowel sounds are present.  No distension and no tenderness.   Musculoskeletal:  No edema, no tenderness, and no deformity.  No red or swollen joints anywhere.    Neurological:  Alert and oriented to person, place, and time.  No cranial nerve deficit.  No tongue deviation.  No facial droop.  No slurred speech.   Skin:  Right middle thigh with abscess, some minimal erythema about the size of a $0.50 piece and slightly open on top about the size  of an eraser head, has been draining at home, non currently, is non-fluctuant    Psychiatric:  Normal mood and affect.   Peripheral vascular:  No edema and strong pulses on all 4 extremities.  ---------------------------------------------------------------------------------------------------------------------  EKG:  Normal, image reviewed    ---------------------------------------------------------------------------------------------------------------------     Results from last 7 days  Lab Units 05/18/18  1207 05/18/18  1155 05/12/18  2245   CRP mg/dL  --  0.76 7.01*   LACTATE mmol/L 2.6*  --  1.2   WBC 10*3/mm3  --  10.58 11.31   HEMOGLOBIN g/dL  --  14.8 13.6*   HEMATOCRIT %  --  42.6 40.1*   MCV fL  --  84.9 85.5   MCHC g/dL  --  34.7 33.9   PLATELETS 10*3/mm3  --  472* 399           Results from last 7 days  Lab Units 05/18/18  1155 05/12/18  2245   SODIUM mmol/L 143 143   POTASSIUM mmol/L 3.0* 3.7   CHLORIDE mmol/L 107 109   CO2 mmol/L 31.4 29.0   BUN mg/dL 7 11   CREATININE mg/dL 1.03 0.92   EGFR IF NONAFRICN AM mL/min/1.73 75 86   CALCIUM mg/dL 9.5 9.3   GLUCOSE mg/dL 94 89   ALBUMIN g/dL 4.40 4.10   BILIRUBIN mg/dL 0.3 0.3   ALK PHOS U/L 80 78   AST (SGOT) U/L 16 14   ALT (SGPT) U/L 19 20   Estimated Creatinine Clearance: 81.2 mL/min (by C-G formula based on SCr of 1.03 mg/dL).  No results found for: AMMONIA          No results found for: HGBA1C  Lab Results   Component Value Date    TSH 1.162 01/20/2015    FREET4 1.19 06/10/2014     No results found for: PREGTESTUR, PREGSERUM, HCG, HCGQUANT  Pain Management Panel     Pain Management Panel Latest Ref Rng & Units 9/6/2014    AMPHETAMINES SCREEN, URINE NEGATIVE Negative    BARBITURATES SCREEN NEGATIVE Negative    BENZODIAZEPINE SCREEN, URINE NEGATIVE Negative    COCAINE SCREEN, URINE NEGATIVE Negative    METHADONE SCREEN, URINE NEGATIVE Negative        Blood Culture   Date Value Ref Range Status   05/12/2018 Staphylococcus epidermidis (A)  Final     Comment:      This isolate is presumed to be clindamyin resistant based on detection of inducible clindamycin resistance. Clindamycin may still be effective in some patients.     05/12/2018 Staphylococcus epidermidis (A)  Final     Comment:     This isolate is presumed to be clindamyin resistant based on detection of inducible clindamycin resistance. Clindamycin may still be effective in some patients.          Wound Culture   Date Value Ref Range Status   05/12/2018 Heavy growth (4+) Staphylococcus aureus (A)  Final     Comment:     This isolate does not demonstrate inducible clindamycin resistance in vitro.            ---------------------------------------------------------------------------------------------------------------------  Imaging Results (last 7 days)     ** No results found for the last 168 hours. **          Cultures:  Blood Culture   Date Value Ref Range Status   05/12/2018 Staphylococcus epidermidis (A)  Final     Comment:     This isolate is presumed to be clindamyin resistant based on detection of inducible clindamycin resistance. Clindamycin may still be effective in some patients.     05/12/2018 Staphylococcus epidermidis (A)  Final     Comment:     This isolate is presumed to be clindamyin resistant based on detection of inducible clindamycin resistance. Clindamycin may still be effective in some patients.       Wound Culture   Date Value Ref Range Status   05/12/2018 Heavy growth (4+) Staphylococcus aureus (A)  Final     Comment:     This isolate does not demonstrate inducible clindamycin resistance in vitro.         I have personally reviewed the radiology images and read the final radiology report.  ---------------------------------------------------------------------------------------------------------------------  Assessment and Plan:    -Bacteremia r/t right middle thigh abscess  -Lactic acidosis   -Acute Hypokalemia  -Hypertension  -Tobacco user    Will admit to the medical floor for IV  antibiotics. ID consulted, blood cultures repeated in the ED today. Vancomycin started in the ED, pharmacy consulted to dose. Lactic acid was 2.6, Now improved.   WBC is 10.58, neutrophils are 66.8, CRP is 0.76, no fevers, no tachypnea or hypotensive episodes. Repeat labs in AM. Potassium level is 3.0, potassium protocol ordered.  Overall the abscess appears be getting better and will improve the abscess certainly is not the same organism that is in the blood.  Overall this would most likely be a contaminant with a low inflammatory markers however it was 2/2.  He has no permanent lines in place no pacemaker.  We have asked infectious disease for his input on whether this should be treated but will continue vancomycin until that time.    Hypertension, will review home medications and adjust as needed    DVT prophylaxis: Heparin SQ BID, SCDs    Tobacco use: patient educated on the dangers of tobacco use and urged to quit, he states he does not need Nicoderm patch at this time.     Irena Garcia, JACOBO  05/18/18  1:37 PM  ---------------------------------------------------------------------------------------------------------------------

## 2018-05-18 NOTE — PLAN OF CARE
Problem: Patient Care Overview  Goal: Plan of Care Review  Outcome: Ongoing (interventions implemented as appropriate)   05/18/18 1645 05/18/18 1744   Plan of Care Review   Progress --  no change   Coping/Psychosocial   Plan of Care Reviewed With patient --      Goal: Individualization and Mutuality  Outcome: Ongoing (interventions implemented as appropriate)    Goal: Discharge Needs Assessment  Outcome: Ongoing (interventions implemented as appropriate)    Goal: Interprofessional Rounds/Family Conf  Outcome: Ongoing (interventions implemented as appropriate)      Problem: Wound (Includes Pressure Injury) (Adult)  Goal: Signs and Symptoms of Listed Potential Problems Will be Absent, Minimized or Managed (Wound)  Outcome: Ongoing (interventions implemented as appropriate)   05/18/18 1744   Goal/Outcome Evaluation   Problems Assessed (Wound) all   Problems Present (Wound) situational response;infection       Problem: Infection, Risk/Actual (Adult)  Goal: Identify Related Risk Factors and Signs and Symptoms  Outcome: Ongoing (interventions implemented as appropriate)   05/18/18 1744   Infection, Risk/Actual (Adult)   Related Risk Factors (Infection, Risk/Actual) exposure to microbes;skin integrity impairment   Signs and Symptoms (Infection, Risk/Actual) cultures positive;weakness     Goal: Infection Prevention/Resolution  Outcome: Ongoing (interventions implemented as appropriate)

## 2018-05-18 NOTE — ED PROVIDER NOTES
Subjective   Pt presented to ED x5 days ago and had blood work and given dose of ancef, d/c home on doxycycline for a right upper leg cellulitis/abscess. Pt was called yesterday d/t having two sets of positive blood culture and informed he needed to return to ED for admission for IV abx. PT states his abscess is improving actually. He has had no chills or fever. No other associated symptoms.         History provided by:  Patient   used: No    Abscess   Location:  Leg  Leg abscess location:  R upper leg  Abscess quality: induration and painful    Red streaking: no    Duration:  6 days  Progression:  Improving  Pain details:     Quality:  Dull    Severity:  Moderate    Timing:  Constant    Progression:  Improving  Chronicity:  New  Context: not injected drug use    Relieved by:  None tried  Worsened by:  Nothing  Ineffective treatments:  None tried  Associated symptoms: no fever, no headaches, no nausea and no vomiting    Risk factors: prior abscess        Review of Systems   Constitutional: Negative for activity change and fever.   HENT: Negative for congestion and sore throat.    Eyes: Negative for pain.   Respiratory: Negative for cough, shortness of breath and wheezing.    Cardiovascular: Negative for chest pain.   Gastrointestinal: Negative for abdominal distention, diarrhea, nausea and vomiting.   Genitourinary: Negative for difficulty urinating and dysuria.   Musculoskeletal: Negative for arthralgias and myalgias.   Skin: Negative for rash and wound.   Neurological: Negative for dizziness and headaches.   Psychiatric/Behavioral: Negative for agitation.   All other systems reviewed and are negative.      Past Medical History:   Diagnosis Date   • Hypertension        No Known Allergies    History reviewed. No pertinent surgical history.    History reviewed. No pertinent family history.    Social History     Social History   • Marital status:      Social History Main Topics   • Smoking  status: Current Every Day Smoker     Packs/day: 1.50     Types: Cigarettes   • Alcohol use Yes      Comment: occasionally   • Drug use: No     Other Topics Concern   • Not on file           Objective   Physical Exam   Constitutional: He is oriented to person, place, and time. He appears well-developed and well-nourished.   HENT:   Head: Normocephalic and atraumatic.   Eyes: EOM are normal. Pupils are equal, round, and reactive to light.   Neck: Normal range of motion. Neck supple.   Cardiovascular: Normal rate, regular rhythm and normal heart sounds.    Pulmonary/Chest: Effort normal and breath sounds normal.   Abdominal: Soft. Bowel sounds are normal.   Musculoskeletal: Normal range of motion.   Neurological: He is alert and oriented to person, place, and time.   Skin: Skin is warm and dry.        Psychiatric: He has a normal mood and affect. His behavior is normal. Judgment and thought content normal.   Nursing note and vitals reviewed.      Procedures           ED Course  ED Course as of May 18 1526   Fri May 18, 2018   1317 Paged hospitalist  [ROSS]   1332 Spoke with Dr. Carr, accepts pt for admission.   [ROSS]      ED Course User Index  [ROSS] FELIPE Lubin                  MDM  Number of Diagnoses or Management Options  Bacteremia:      Amount and/or Complexity of Data Reviewed  Clinical lab tests: ordered and reviewed  Tests in the radiology section of CPT®: ordered and reviewed  Tests in the medicine section of CPT®: reviewed and ordered  Decide to obtain previous medical records or to obtain history from someone other than the patient: yes    Patient Progress  Patient progress: stable        Final diagnoses:   Bacteremia            FELIPE Lubin  05/18/18 4636

## 2018-05-18 NOTE — PROGRESS NOTES
Smoking Cessation Counseling  Pharmacy was consulted for smoking cessation counseling.  Irena Garcia NP noted in a progess note that she educated the dangers of tobacco and urded him to quit.  He stated that does not need a Nicoderm patch at this time.    Estelle Lopez, [facility]  5/18/2018  6:35 PM

## 2018-05-19 LAB
ALBUMIN SERPL-MCNC: 3.4 G/DL (ref 3.5–5)
ALBUMIN/GLOB SERPL: 1.4 G/DL (ref 1.5–2.5)
ALP SERPL-CCNC: 67 U/L (ref 40–129)
ALT SERPL W P-5'-P-CCNC: 15 U/L (ref 10–44)
ANION GAP SERPL CALCULATED.3IONS-SCNC: 2.1 MMOL/L (ref 3.6–11.2)
AST SERPL-CCNC: 14 U/L (ref 10–34)
BASOPHILS # BLD AUTO: 0.12 10*3/MM3 (ref 0–0.3)
BASOPHILS NFR BLD AUTO: 0.9 % (ref 0–2)
BILIRUB SERPL-MCNC: 0.2 MG/DL (ref 0.2–1.8)
BUN BLD-MCNC: 5 MG/DL (ref 7–21)
BUN/CREAT SERPL: 5.7 (ref 7–25)
CALCIUM SPEC-SCNC: 8.5 MG/DL (ref 7.7–10)
CHLORIDE SERPL-SCNC: 112 MMOL/L (ref 99–112)
CO2 SERPL-SCNC: 26.9 MMOL/L (ref 24.3–31.9)
CREAT BLD-MCNC: 0.88 MG/DL (ref 0.43–1.29)
CRP SERPL-MCNC: <0.5 MG/DL (ref 0–0.99)
DEPRECATED RDW RBC AUTO: 42.6 FL (ref 37–54)
EOSINOPHIL # BLD AUTO: 0.58 10*3/MM3 (ref 0–0.7)
EOSINOPHIL NFR BLD AUTO: 4.4 % (ref 0–5)
ERYTHROCYTE [DISTWIDTH] IN BLOOD BY AUTOMATED COUNT: 13.6 % (ref 11.5–14.5)
GFR SERPL CREATININE-BSD FRML MDRD: 90 ML/MIN/1.73
GLOBULIN UR ELPH-MCNC: 2.4 GM/DL
GLUCOSE BLD-MCNC: 106 MG/DL (ref 70–110)
HCT VFR BLD AUTO: 39.2 % (ref 42–52)
HGB BLD-MCNC: 13 G/DL (ref 14–18)
IMM GRANULOCYTES # BLD: 0.12 10*3/MM3 (ref 0–0.03)
IMM GRANULOCYTES NFR BLD: 0.9 % (ref 0–0.5)
LYMPHOCYTES # BLD AUTO: 3.81 10*3/MM3 (ref 1–3)
LYMPHOCYTES NFR BLD AUTO: 28.8 % (ref 21–51)
MCH RBC QN AUTO: 29 PG (ref 27–33)
MCHC RBC AUTO-ENTMCNC: 33.2 G/DL (ref 33–37)
MCV RBC AUTO: 87.5 FL (ref 80–94)
MONOCYTES # BLD AUTO: 1.24 10*3/MM3 (ref 0.1–0.9)
MONOCYTES NFR BLD AUTO: 9.4 % (ref 0–10)
NEUTROPHILS # BLD AUTO: 7.37 10*3/MM3 (ref 1.4–6.5)
NEUTROPHILS NFR BLD AUTO: 55.6 % (ref 30–70)
OSMOLALITY SERPL CALC.SUM OF ELEC: 278.9 MOSM/KG (ref 273–305)
PLATELET # BLD AUTO: 386 10*3/MM3 (ref 130–400)
PMV BLD AUTO: 9.5 FL (ref 6–10)
POTASSIUM BLD-SCNC: 3.4 MMOL/L (ref 3.5–5.3)
POTASSIUM BLD-SCNC: 3.6 MMOL/L (ref 3.5–5.3)
POTASSIUM BLD-SCNC: 3.6 MMOL/L (ref 3.5–5.3)
PROT SERPL-MCNC: 5.8 G/DL (ref 6–8)
RBC # BLD AUTO: 4.48 10*6/MM3 (ref 4.7–6.1)
SODIUM BLD-SCNC: 141 MMOL/L (ref 135–153)
WBC NRBC COR # BLD: 13.24 10*3/MM3 (ref 4.5–12.5)

## 2018-05-19 PROCEDURE — 86666 EHRLICHIA ANTIBODY: CPT | Performed by: PHYSICIAN ASSISTANT

## 2018-05-19 PROCEDURE — 86140 C-REACTIVE PROTEIN: CPT | Performed by: NURSE PRACTITIONER

## 2018-05-19 PROCEDURE — 86618 LYME DISEASE ANTIBODY: CPT | Performed by: PHYSICIAN ASSISTANT

## 2018-05-19 PROCEDURE — 84132 ASSAY OF SERUM POTASSIUM: CPT | Performed by: INTERNAL MEDICINE

## 2018-05-19 PROCEDURE — 99232 SBSQ HOSP IP/OBS MODERATE 35: CPT | Performed by: INTERNAL MEDICINE

## 2018-05-19 PROCEDURE — 94799 UNLISTED PULMONARY SVC/PX: CPT

## 2018-05-19 PROCEDURE — 86757 RICKETTSIA ANTIBODY: CPT | Performed by: PHYSICIAN ASSISTANT

## 2018-05-19 PROCEDURE — 85025 COMPLETE CBC W/AUTO DIFF WBC: CPT | Performed by: NURSE PRACTITIONER

## 2018-05-19 PROCEDURE — 25010000002 VANCOMYCIN PER 500 MG

## 2018-05-19 PROCEDURE — 80053 COMPREHEN METABOLIC PANEL: CPT | Performed by: NURSE PRACTITIONER

## 2018-05-19 PROCEDURE — 86753 PROTOZOA ANTIBODY NOS: CPT | Performed by: PHYSICIAN ASSISTANT

## 2018-05-19 PROCEDURE — 25010000002 HEPARIN (PORCINE) PER 1000 UNITS: Performed by: NURSE PRACTITIONER

## 2018-05-19 RX ORDER — POTASSIUM CHLORIDE 20 MEQ/1
40 TABLET, EXTENDED RELEASE ORAL EVERY 4 HOURS
Status: COMPLETED | OUTPATIENT
Start: 2018-05-19 | End: 2018-05-19

## 2018-05-19 RX ORDER — NICOTINE 21 MG/24HR
1 PATCH, TRANSDERMAL 24 HOURS TRANSDERMAL
Status: DISCONTINUED | OUTPATIENT
Start: 2018-05-19 | End: 2018-05-21 | Stop reason: HOSPADM

## 2018-05-19 RX ADMIN — LABETALOL HCL 100 MG: 100 TABLET, FILM COATED ORAL at 08:50

## 2018-05-19 RX ADMIN — LISINOPRIL 10 MG: 10 TABLET ORAL at 08:50

## 2018-05-19 RX ADMIN — HEPARIN SODIUM 5000 UNITS: 5000 INJECTION, SOLUTION INTRAVENOUS; SUBCUTANEOUS at 20:20

## 2018-05-19 RX ADMIN — POTASSIUM CHLORIDE 40 MEQ: 1500 TABLET, EXTENDED RELEASE ORAL at 08:50

## 2018-05-19 RX ADMIN — VANCOMYCIN HYDROCHLORIDE 1000 MG: 5 INJECTION, POWDER, LYOPHILIZED, FOR SOLUTION INTRAVENOUS at 00:35

## 2018-05-19 RX ADMIN — LABETALOL HCL 100 MG: 100 TABLET, FILM COATED ORAL at 20:20

## 2018-05-19 RX ADMIN — HEPARIN SODIUM 5000 UNITS: 5000 INJECTION, SOLUTION INTRAVENOUS; SUBCUTANEOUS at 08:50

## 2018-05-19 RX ADMIN — PANTOPRAZOLE SODIUM 40 MG: 40 TABLET, DELAYED RELEASE ORAL at 06:05

## 2018-05-19 RX ADMIN — POTASSIUM CHLORIDE 40 MEQ: 1500 TABLET, EXTENDED RELEASE ORAL at 23:34

## 2018-05-19 RX ADMIN — MELOXICAM 15 MG: 7.5 TABLET ORAL at 08:50

## 2018-05-19 RX ADMIN — POTASSIUM CHLORIDE 40 MEQ: 1500 TABLET, EXTENDED RELEASE ORAL at 11:35

## 2018-05-19 RX ADMIN — VANCOMYCIN HYDROCHLORIDE 1000 MG: 5 INJECTION, POWDER, LYOPHILIZED, FOR SOLUTION INTRAVENOUS at 11:35

## 2018-05-19 RX ADMIN — POTASSIUM CHLORIDE 40 MEQ: 1500 TABLET, EXTENDED RELEASE ORAL at 21:00

## 2018-05-19 RX ADMIN — ASPIRIN 325 MG: 325 TABLET, COATED ORAL at 08:50

## 2018-05-19 RX ADMIN — VANCOMYCIN HYDROCHLORIDE 1000 MG: 5 INJECTION, POWDER, LYOPHILIZED, FOR SOLUTION INTRAVENOUS at 23:18

## 2018-05-19 RX ADMIN — NICOTINE 1 PATCH: 21 PATCH TRANSDERMAL at 16:20

## 2018-05-19 NOTE — PROGRESS NOTES
I have seen the patient in conjunction with Courtney CABRERA and wife    History of presenting illness:  Patient states he is doing well he really has no complaints.  States the abscess is healing.  No chest pain or shortness of breath, he is tolerating his diet.    Vital Signs  Temp:  [97.9 °F (36.6 °C)-98.9 °F (37.2 °C)] 98.9 °F (37.2 °C)  Heart Rate:  [72-77] 77  Resp:  [16-18] 18  BP: (116-172)/(67-92) 116/70  Body mass index is 26.62 kg/m².      Intake/Output Summary (Last 24 hours) at 05/19/18 1435  Last data filed at 05/19/18 1426   Gross per 24 hour   Intake             2920 ml   Output              950 ml   Net             1970 ml     Intake & Output (last 3 days)       05/16 0701 - 05/17 0700 05/17 0701 - 05/18 0700 05/18 0701 - 05/19 0700 05/19 0701 - 05/20 0700    P.O.   360 960    IV Piggyback   1600     Total Intake(mL/kg)   1960 (26.2) 960 (12.8)    Urine (mL/kg/hr)   950     Stool   0     Total Output     950      Net     +1010 +960            Unmeasured Stool Occurrence   0 x           Physical exam:  Physical Exam   Constitutional: Well-developed, well-nourished.  Pleasant.  No distress  Head: Normocephalic and atraumatic.  Hearing is intact, mucous membranes are moist.   Eyes:  Pupils are equal, round, and reactive to light. No scleral icterus.   Cardiovascular: Normal rate, regular rhythm and normal heart sounds.  No murmur rub or gallop  Pulmonary/Chest: Bilateral breath sounds no rhonchus rales or wheezing heard  Abdominal: Soft, flat, bowel sounds are active, nondistended nontender.  No peritoneal signs   Musculoskeletal: Right thigh abscess is healing  Neurological: Nonfocal, alert.    Skin: Skin is warm and dry.   Psychiatric:  Normal mood and affect.     Results Review:  Lab Results   Component Value Date    WBC 13.24 (H) 05/19/2018    HGB 13.0 (L) 05/19/2018    HCT 39.2 (L) 05/19/2018    MCV 87.5 05/19/2018     05/19/2018     Lab Results   Component Value Date    GLUCOSE 106 05/19/2018     BUN 5 (L) 05/19/2018    CREATININE 0.88 05/19/2018    EGFRIFNONA 90 05/19/2018    BCR 5.7 (L) 05/19/2018    CO2 26.9 05/19/2018    CALCIUM 8.5 05/19/2018    ALBUMIN 3.40 (L) 05/19/2018    LABIL2 1.4 (L) 05/19/2018    AST 14 05/19/2018    ALT 15 05/19/2018       Imaging Results (last 72 hours)     ** No results found for the last 72 hours. **         Discussed with ID PC      Assessment/Plan     Active Problems:  Bacteremia, Staph epidermidis, 2/2, recommendations for infectious disease noted, will treat with IV vancomycin for 2 weeks.  We'll plan PICC line placement when available and possibly outpatient antibiotics.  Patient's wife who no longer lives with him brought up that he may use drugs, will need to discuss this further with him.  Tick titers pending.  Lactic acid resolved.    Abscess MSSA vancomycin certainly should cover this as well.    Hypokalemia being supplemented by protocol, check magnesium in the a.m.    DVT prophylaxis subcutaneous heparin    Hypertension, controlled    Tobacco abuse, counseled to quit, nicotine patch added      Dusty Carr MD  05/19/18  2:35 PM

## 2018-05-19 NOTE — PLAN OF CARE
Problem: Wound (Includes Pressure Injury) (Adult)  Goal: Signs and Symptoms of Listed Potential Problems Will be Absent, Minimized or Managed (Wound)  Outcome: Ongoing (interventions implemented as appropriate)      Problem: Infection, Risk/Actual (Adult)  Goal: Identify Related Risk Factors and Signs and Symptoms  Outcome: Ongoing (interventions implemented as appropriate)    Goal: Infection Prevention/Resolution  Outcome: Ongoing (interventions implemented as appropriate)

## 2018-05-19 NOTE — PROGRESS NOTES
Discharge Planning Assessment   [place]     Patient Name: Marcus Bobo  MRN: [id number]  Today's Date: 5/19/2018    Admit Date: 5/18/2018          Discharge Needs Assessment     Row Name 05/19/18 1630       Living Environment    Lives With alone    Unique Family Situation Pt stated he is  from his wife.     Current Living Arrangements home/apartment/condo    Primary Care Provided by self    Able to Return to Prior Arrangements yes       Resource/Environmental Concerns    Transportation Concerns car, none       Transition Planning    Patient/Family Anticipates Transition to home    Patient/Family Anticipated Services at Transition home health care    Transportation Anticipated family or friend will provide   His wife Vivien will provide transportation at d/c.        Discharge Needs Assessment    Readmission Within the Last 30 Days no previous admission in last 30 days    Equipment Currently Used at Home none    Anticipated Changes Related to Illness none    Equipment Needed After Discharge none    Outpatient/Agency/Support Group Needs homecare agency   Pt stated he will need Home health for IV antibiotics.     Discharge Facility/Level of Care Needs home with home health            Discharge Plan     Row Name 05/19/18 6530       Plan    Plan He lives at home alone.  He is independent with his care.  He stated he is  from his wife but she will provide transportation at d/c.  His PCP is Sonia CENTENO.  He gets Rx filled at [facility] in [place].  He may need  for long term IV antibiotics.  S/S referral.      Patient/Family in Agreement with Plan yes                     Demographic Summary     Row Name 05/19/18 162       General Information    Admission Type inpatient    Referral Source admission list    Reason for Consult discharge planning     Used During This Interaction yes    General Information Comments PCP is Sonia CENTENO       Contact Information    Contact Information  Comments --            Functional Status     Row Name 05/19/18 1628       Functional Status    Functional Status Comments Pt stated he is independent with his care at home.        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            Legal     Row Name 05/19/18 9072       Financial/Legal    Finance Comments He has [place] [employer] and denies any issues with coverage.  He gets Rx filled at [facility].                   Birgit Yepez RN

## 2018-05-19 NOTE — PLAN OF CARE
Problem: Patient Care Overview  Goal: Plan of Care Review  Outcome: Ongoing (interventions implemented as appropriate)      Problem: Wound (Includes Pressure Injury) (Adult)  Goal: Signs and Symptoms of Listed Potential Problems Will be Absent, Minimized or Managed (Wound)  Outcome: Ongoing (interventions implemented as appropriate)      Problem: Infection, Risk/Actual (Adult)  Goal: Identify Related Risk Factors and Signs and Symptoms  Outcome: Ongoing (interventions implemented as appropriate)

## 2018-05-19 NOTE — PROGRESS NOTES
Patient evaluated for vancomycin dosing to treat bacteremia. Based on his demographics and estimated renal function, will dose empirically at 1000 mg q 12 hrs to target trough of 15-20 mg/L. Pharmacy will follow and obtain trough level when steady state is reached to assess need for dose adjustment. Thank you for consulting.    Andie Faustin [facility]  05/19/18  11:19 AM

## 2018-05-19 NOTE — CONSULTS
INFECTIOUS DISEASE CONSULTATION REPORT      Referring Provider: Dr. Carr  Reason for Consultation: Bacteremia      Principal problem: <principal problem not specified>    Subjective .     History of present illness:    As you well know Dr. Carr, Mr. Marcus Bobo is a 52 y.o. years old male with past medical history significant for hypertension , who initially presented to the ED on 5/12/2018 for an abscess to the right thigh.  Patient was prescribed doxycycline and discharged home.  Brought back to the ED as is found to have 2 sets of positive blood cultures showing growth of staph epi out of 2 from 5/12/2018.  Has any fever or chills within the last few days.  Leukocytosis with normal CRP level and elevated lactic acid of 2.6 now normalized.  Case discussed with patient and his wife at bedside.    Infectious Disease consultation was requested for antimicrobial management.     History taken from: patient chart family RN    Case was discussed with patient, family and nursing staff    Review of Systems     Constitutional: no fever, chills and night sweats. No appetite change or unexpected weight change. No fatigue.  Eyes: no eye drainage, itching or redness.  HEENT: no mouth sores, dysphagia or nose bleed.  Respiratory: no for shortness of breath, cough or production of sputum.  Cardiovascular: no chest pain, no palpitations, no orthopnea.  Gastrointestinal: no nausea, vomiting or diarrhea. No abdominal pain, hematemesis or rectal bleeding.  Genitourinary: no dysuria or polyuria.  Hematologic/lymphatic: no lymph node abnormalities, no easy bruising or easy bleeding.  Musculoskeletal: no muscle or joint pain.  Skin: See HPI  Neurological: no loss of consciousness, no seizure, no headache.  Behavioral/Psych: no depression or suicidal ideation.  Endocrine: no hot flashes.  Immunologic: negative.    Past Medical History    Past Medical History:   Diagnosis Date   • Arthritis    • Elevated cholesterol    • GERD  "(gastroesophageal reflux disease)    • Hypertension        Past Surgical History    Past Surgical History:   Procedure Laterality Date   • VASECTOMY  1993       Family History    History reviewed. No pertinent family history.      Social History    Social History   Substance Use Topics   • Smoking status: Current Every Day Smoker     Packs/day: 1.50     Years: 30.00     Types: Cigarettes   • Smokeless tobacco: Never Used   • Alcohol use No       Allergies    Patient has no known allergies.    Objective     /70 (BP Location: Right arm, Patient Position: Lying)   Pulse 77   Temp 98.9 °F (37.2 °C) (Axillary)   Resp 18   Ht 167.6 cm (66\")   Wt 74.8 kg (164 lb 14.4 oz)   SpO2 97%   BMI 26.62 kg/m²     Temp:  [97.9 °F (36.6 °C)-98.9 °F (37.2 °C)] 98.9 °F (37.2 °C)        Intake/Output Summary (Last 24 hours) at 05/19/18 1226  Last data filed at 05/19/18 0400   Gross per 24 hour   Intake             1960 ml   Output              950 ml   Net             1010 ml         Physical Exam:      General Appearance:    Alert, cooperative, in no acute distress   Head:    Normocephalic, without obvious abnormality, atraumatic   Eyes:            Lids and lashes normal, conjunctivae and sclerae normal, no   icterus, no pallor, corneas clear, PERRLA   Ears:    Ears appear intact with no abnormalities noted   Throat:   No oral lesions, no thrush, oral mucosa moist   Neck:   No adenopathy, supple, trachea midline, no thyromegaly, no   carotid bruit, no JVD   Back:     No tenderness to percussion or palpation, range of motion   normal   Lungs:     Clear to auscultation,respirations regular, even and unlabored. No wheezing, no ronchi and no crackles.    Heart:    Regular rhythm and normal rate, normal S1 and S2, no            murmur, no gallop, no rub, no click   Chest Wall:    No abnormalities observed   Abdomen:     Normal bowel sounds, no masses, no organomegaly, soft        non-tender, non-distended, no guarding, no " rebound                tenderness   Rectal:     Deferred   Extremities:   Moves all extremities well, no edema, no cyanosis, no             redness   Pulses:   Pulses palpable and equal bilaterally   Skin:   Right middle thigh with abscess with very small amount of redness but no drainage or tenderness    Lymph nodes:   No palpable adenopathy   Neurologic:   Awake, alert and oriented x 3. Following commands.       Results:      Results from last 7 days  Lab Units 05/19/18  0232 05/18/18  1155 05/12/18  2245   WBC 10*3/mm3 13.24* 10.58 11.31     Lab Results   Component Value Date    NEUTROABS 7.37 (H) 05/19/2018         Results from last 7 days  Lab Units 05/19/18  0232   CREATININE mg/dL 0.88         Results from last 7 days  Lab Units 05/19/18  0232 05/18/18  1749 05/18/18  1155   CRP mg/dL <0.50 0.53 0.76       Cultures:    Blood Culture   Date Value Ref Range Status   05/18/2018 No growth at less than 24 hours  Preliminary   05/18/2018 No growth at less than 24 hours  Preliminary   05/12/2018 Staphylococcus epidermidis (A)  Final     Comment:     This isolate is presumed to be clindamyin resistant based on detection of inducible clindamycin resistance. Clindamycin may still be effective in some patients.     05/12/2018 Staphylococcus epidermidis (A)  Final     Comment:     This isolate is presumed to be clindamyin resistant based on detection of inducible clindamycin resistance. Clindamycin may still be effective in some patients.       Wound Culture   Date Value Ref Range Status   05/12/2018 Heavy growth (4+) Staphylococcus aureus (A)  Final     Comment:     This isolate does not demonstrate inducible clindamycin resistance in vitro.       Results Review:    I have personally reviewed laboratory data, culture results, radiology studies and antimicrobial therapy.    Hospital Medications (active)       Dose Frequency Start End    aspirin EC tablet 325 mg 325 mg Daily 5/19/2018     Sig - Route: Take 1 tablet by  "mouth Daily. - Oral    cholecalciferol (VITAMIN D3) capsule 50,000 Units 50,000 Units Weekly 5/20/2018     Sig - Route: Take 1 capsule by mouth 1 (One) Time Per Week. - Oral    heparin (porcine) 5000 UNIT/ML injection 5,000 Units 5,000 Units Every 12 Hours Scheduled 5/18/2018     Sig - Route: Inject 1 mL under the skin Every 12 (Twelve) Hours. - Subcutaneous    labetalol (NORMODYNE) tablet 100 mg 100 mg Every 12 Hours Scheduled 5/18/2018     Sig - Route: Take 1 tablet by mouth Every 12 (Twelve) Hours. - Oral    lisinopril (PRINIVIL,ZESTRIL) tablet 10 mg 10 mg Daily 5/19/2018     Sig - Route: Take 1 tablet by mouth Daily. - Oral    meloxicam (MOBIC) tablet 15 mg 15 mg Daily 5/19/2018     Sig - Route: Take 2 tablets by mouth Daily. - Oral    morphine injection 2 mg 2 mg Once 5/18/2018 5/18/2018    Sig - Route: Infuse 1 mL into a venous catheter 1 (One) Time. - Intravenous    pantoprazole (PROTONIX) EC tablet 40 mg 40 mg Every Early Morning 5/19/2018     Sig - Route: Take 1 tablet by mouth Every Morning. - Oral    potassium chloride (K-DUR,KLOR-CON) CR tablet 40 mEq 40 mEq Every 4 Hours 5/19/2018 5/19/2018    Sig - Route: Take 2 tablets by mouth Every 4 (Four) Hours. - Oral    potassium chloride (KLOR-CON) packet 40 mEq 40 mEq As Needed 5/18/2018     Sig - Route: Take 40 mEq by mouth As Needed (potassium replacement, see admin instructions). - Oral    Linked Group 1:  \"Or\" Linked Group Details        potassium chloride (MICRO-K) CR capsule 40 mEq 40 mEq As Needed 5/18/2018     Sig - Route: Take 4 capsules by mouth As Needed (potassium replacement.  see admin instructions). - Oral    Linked Group 1:  \"Or\" Linked Group Details        potassium chloride 10 mEq in 100 mL IVPB 10 mEq Every 1 Hour PRN 5/18/2018     Sig - Route: Infuse 100 mL into a venous catheter Every 1 (One) Hour As Needed (potassium protocol PERIPHERAL - see admin instructions). - Intravenous    Linked Group 1:  \"Or\" Linked Group Details        potassium " "chloride 10 mEq in 100 mL IVPB 10 mEq Every 1 Hour 5/18/2018 5/19/2018    Sig - Route: Infuse 100 mL into a venous catheter Every 1 (One) Hour. - Intravenous    sodium chloride 0.9 % bolus 1,000 mL 1,000 mL Once 5/18/2018 5/18/2018    Sig - Route: Infuse 1,000 mL into a venous catheter 1 (One) Time. - Intravenous    Cosign for Ordering: Required by Jah Guzman MD    sodium chloride 0.9 % flush 1-10 mL 1-10 mL As Needed 5/18/2018     Sig - Route: Infuse 1-10 mL into a venous catheter As Needed for Line Care. - Intravenous    sodium chloride 0.9 % flush 10 mL 10 mL As Needed 5/18/2018     Sig - Route: Infuse 10 mL into a venous catheter As Needed for Line Care. - Intravenous    Cosign for Ordering: Required by Jah Guzman MD    Linked Group 2:  \"And\" Linked Group Details        vancomycin (VANCOCIN) 1,000 mg in sodium chloride 0.9 % 250 mL IVPB 1,000 mg Every 12 Hours 5/19/2018 5/25/2018    Sig - Route: Infuse 1,000 mg into a venous catheter Every 12 (Twelve) Hours. - Intravenous    vancomycin (VANCOCIN) 1,500 mg in sodium chloride 0.9 % 500 mL IVPB 20 mg/kg × 79.4 kg Once 5/18/2018 5/18/2018    Sig - Route: Infuse 1,500 mg into a venous catheter 1 (One) Time. - Intravenous    Cosign for Ordering: Required by Jah Guzman MD    Pharmacy to dose vancomycin (Discontinued)  Continuous PRN 5/18/2018 5/18/2018    Sig - Route: Continuous As Needed for Consult. - Does not apply    Reason for Discontinue: *Therapy completed              Assessment/Plan     ASSESSMENT:    1. Bacteremia    PLAN:    In the setting of 2 sets out of 2 positive blood cultures for staph epi would recommend to continue vancomycin therapy for now.  Patient's culture from his abscess finalizes MSSA.  We will follow repeat blood cultures and adjust antibiotic therapy appropriately. CRP ordered for am. Abscess site with no erythema or drainage currently.  Rickettsial serologies ordered as patient reports he " believes the initial cause of the abscess was from a tick bite that he found a few days prior.       Patient's findings and recommendations were discussed with patient, family and nursing staff    Code Status: Full Code    Mackenzie Platt PA-C  05/19/18  12:26 PM

## 2018-05-20 LAB
ANION GAP SERPL CALCULATED.3IONS-SCNC: 0.8 MMOL/L (ref 3.6–11.2)
BASOPHILS # BLD AUTO: 0.08 10*3/MM3 (ref 0–0.3)
BASOPHILS NFR BLD AUTO: 0.6 % (ref 0–2)
BUN BLD-MCNC: 6 MG/DL (ref 7–21)
BUN/CREAT SERPL: 6.7 (ref 7–25)
CALCIUM SPEC-SCNC: 9 MG/DL (ref 7.7–10)
CHLORIDE SERPL-SCNC: 112 MMOL/L (ref 99–112)
CO2 SERPL-SCNC: 28.2 MMOL/L (ref 24.3–31.9)
CREAT BLD-MCNC: 0.9 MG/DL (ref 0.43–1.29)
DEPRECATED RDW RBC AUTO: 42.2 FL (ref 37–54)
EOSINOPHIL # BLD AUTO: 0.5 10*3/MM3 (ref 0–0.7)
EOSINOPHIL NFR BLD AUTO: 3.9 % (ref 0–5)
ERYTHROCYTE [DISTWIDTH] IN BLOOD BY AUTOMATED COUNT: 13.5 % (ref 11.5–14.5)
GFR SERPL CREATININE-BSD FRML MDRD: 88 ML/MIN/1.73
GLUCOSE BLD-MCNC: 106 MG/DL (ref 70–110)
HCT VFR BLD AUTO: 38.8 % (ref 42–52)
HGB BLD-MCNC: 12.7 G/DL (ref 14–18)
IMM GRANULOCYTES # BLD: 0.13 10*3/MM3 (ref 0–0.03)
IMM GRANULOCYTES NFR BLD: 1 % (ref 0–0.5)
LYMPHOCYTES # BLD AUTO: 4.35 10*3/MM3 (ref 1–3)
LYMPHOCYTES NFR BLD AUTO: 33.7 % (ref 21–51)
MAGNESIUM SERPL-MCNC: 1.8 MG/DL (ref 1.7–2.6)
MCH RBC QN AUTO: 28.6 PG (ref 27–33)
MCHC RBC AUTO-ENTMCNC: 32.7 G/DL (ref 33–37)
MCV RBC AUTO: 87.4 FL (ref 80–94)
MONOCYTES # BLD AUTO: 1.07 10*3/MM3 (ref 0.1–0.9)
MONOCYTES NFR BLD AUTO: 8.3 % (ref 0–10)
NEUTROPHILS # BLD AUTO: 6.76 10*3/MM3 (ref 1.4–6.5)
NEUTROPHILS NFR BLD AUTO: 52.5 % (ref 30–70)
OSMOLALITY SERPL CALC.SUM OF ELEC: 279.3 MOSM/KG (ref 273–305)
PLATELET # BLD AUTO: 390 10*3/MM3 (ref 130–400)
PMV BLD AUTO: 9.2 FL (ref 6–10)
POTASSIUM BLD-SCNC: 4.1 MMOL/L (ref 3.5–5.3)
POTASSIUM BLD-SCNC: 4.1 MMOL/L (ref 3.5–5.3)
RBC # BLD AUTO: 4.44 10*6/MM3 (ref 4.7–6.1)
SODIUM BLD-SCNC: 141 MMOL/L (ref 135–153)
VANCOMYCIN TROUGH SERPL-MCNC: 12.5 MCG/ML (ref 5–15)
WBC NRBC COR # BLD: 12.89 10*3/MM3 (ref 4.5–12.5)

## 2018-05-20 PROCEDURE — 85025 COMPLETE CBC W/AUTO DIFF WBC: CPT | Performed by: INTERNAL MEDICINE

## 2018-05-20 PROCEDURE — 80202 ASSAY OF VANCOMYCIN: CPT

## 2018-05-20 PROCEDURE — 83735 ASSAY OF MAGNESIUM: CPT | Performed by: INTERNAL MEDICINE

## 2018-05-20 PROCEDURE — 99232 SBSQ HOSP IP/OBS MODERATE 35: CPT | Performed by: INTERNAL MEDICINE

## 2018-05-20 PROCEDURE — 25010000002 VANCOMYCIN PER 500 MG

## 2018-05-20 PROCEDURE — 25010000002 HEPARIN (PORCINE) PER 1000 UNITS: Performed by: NURSE PRACTITIONER

## 2018-05-20 PROCEDURE — 84132 ASSAY OF SERUM POTASSIUM: CPT | Performed by: INTERNAL MEDICINE

## 2018-05-20 PROCEDURE — 80048 BASIC METABOLIC PNL TOTAL CA: CPT | Performed by: INTERNAL MEDICINE

## 2018-05-20 PROCEDURE — 94799 UNLISTED PULMONARY SVC/PX: CPT

## 2018-05-20 RX ADMIN — LISINOPRIL 10 MG: 10 TABLET ORAL at 08:15

## 2018-05-20 RX ADMIN — PANTOPRAZOLE SODIUM 40 MG: 40 TABLET, DELAYED RELEASE ORAL at 05:32

## 2018-05-20 RX ADMIN — OFLOXACIN 50000 UNITS: 300 TABLET, COATED ORAL at 08:15

## 2018-05-20 RX ADMIN — LABETALOL HCL 100 MG: 100 TABLET, FILM COATED ORAL at 08:15

## 2018-05-20 RX ADMIN — VANCOMYCIN HYDROCHLORIDE 1250 MG: 5 INJECTION, POWDER, LYOPHILIZED, FOR SOLUTION INTRAVENOUS at 20:20

## 2018-05-20 RX ADMIN — LABETALOL HCL 100 MG: 100 TABLET, FILM COATED ORAL at 20:18

## 2018-05-20 RX ADMIN — ASPIRIN 325 MG: 325 TABLET, COATED ORAL at 08:15

## 2018-05-20 RX ADMIN — HEPARIN SODIUM 5000 UNITS: 5000 INJECTION, SOLUTION INTRAVENOUS; SUBCUTANEOUS at 08:15

## 2018-05-20 RX ADMIN — NICOTINE 1 PATCH: 21 PATCH TRANSDERMAL at 08:15

## 2018-05-20 RX ADMIN — HEPARIN SODIUM 5000 UNITS: 5000 INJECTION, SOLUTION INTRAVENOUS; SUBCUTANEOUS at 20:18

## 2018-05-20 RX ADMIN — MELOXICAM 15 MG: 7.5 TABLET ORAL at 08:15

## 2018-05-20 RX ADMIN — VANCOMYCIN HYDROCHLORIDE 1000 MG: 5 INJECTION, POWDER, LYOPHILIZED, FOR SOLUTION INTRAVENOUS at 13:48

## 2018-05-20 NOTE — PROGRESS NOTES
I have seen the patient in conjunction with Breann and wife    History of presenting illness:  Patient states he is doing well .  No chest pain or shortness breath no abdominal pain.  He states his abscess is healing.  He denies any IV drug use.  Risk of using align outside of its intended use discussed.    Vital Signs  Temp:  [98 °F (36.7 °C)-98.9 °F (37.2 °C)] 98 °F (36.7 °C)  Heart Rate:  [69-85] 85  Resp:  [18-20] 20  BP: (123-150)/(69-77) 150/74  Body mass index is 26.62 kg/m².      Intake/Output Summary (Last 24 hours) at 05/20/18 1455  Last data filed at 05/20/18 1332   Gross per 24 hour   Intake             1200 ml   Output             2500 ml   Net            -1300 ml     Intake & Output (last 3 days)       05/17 0701 - 05/18 0700 05/18 0701 - 05/19 0700 05/19 0701 - 05/20 0700 05/20 0701 - 05/21 0700    P.O.  360 1320 840    IV Piggyback  1600      Total Intake(mL/kg)  1960 (26.2) 1320 (17.6) 840 (11.2)    Urine (mL/kg/hr)  950 1000 (0.6) 1500 (2.5)    Stool  0      Total Output   950 1000 1500    Net   +1010 +320 -660            Unmeasured Urine Occurrence   3 x     Unmeasured Stool Occurrence  0 x 0 x           Physical exam:  Physical Exam   Constitutional: Well-developed, well-nourished.  Pleasant.  No distress  Head: Normocephalic and atraumatic.  Hearing is intact, mucous membranes are moist.   Cardiovascular: Normal rate, regular rhythm and normal heart sounds.  No murmur rub or gallop  Pulmonary/Chest: Bilateral breath sounds no rhonchus rales or wheezing heard  Abdominal: Soft, flat, bowel sounds are active, nondistended nontender.  No peritoneal signs   Musculoskeletal: Right thigh abscess No change, healing  Neurological: Nonfocal, alert.    Skin: Skin is warm and dry.   Psychiatric:  Normal mood and affect.     Results Review:  Lab Results   Component Value Date    WBC 12.89 (H) 05/20/2018    HGB 12.7 (L) 05/20/2018    HCT 38.8 (L) 05/20/2018    MCV 87.4 05/20/2018     05/20/2018      Lab Results   Component Value Date    GLUCOSE 106 05/20/2018    BUN 6 (L) 05/20/2018    CREATININE 0.90 05/20/2018    EGFRIFNONA 88 05/20/2018    BCR 6.7 (L) 05/20/2018    CO2 28.2 05/20/2018    CALCIUM 9.0 05/20/2018    ALBUMIN 3.40 (L) 05/19/2018    LABIL2 1.4 (L) 05/19/2018    AST 14 05/19/2018    ALT 15 05/19/2018       Imaging Results (last 72 hours)     ** No results found for the last 72 hours. **            Assessment/Plan     Active Problems:  Bacteremia, Staph epidermidis, 2/2, recommendations for infectious disease noted, will treat with IV vancomycin for 2 weeks.  We'll plan PICC line placement when available and possibly outpatient antibiotics.  Patient's wife who no longer lives with him brought up that he may use drugs,He however denies this.  CRP has normalized, white count is coming down.    Abscess MSSA vancomycin will cover this as well    Hypokalemia improved    DVT prophylaxis subcutaneous heparin    Hypertension, overall controlled    Tobacco abuse, counseled to quit, nicotine patch       Dusty Carr MD  05/20/18  2:55 PM

## 2018-05-20 NOTE — PLAN OF CARE
Problem: Patient Care Overview  Goal: Plan of Care Review  Outcome: Ongoing (interventions implemented as appropriate)   05/18/18 1744 05/19/18 2000   Plan of Care Review   Progress no change --    Coping/Psychosocial   Plan of Care Reviewed With --  patient     Goal: Discharge Needs Assessment  Outcome: Ongoing (interventions implemented as appropriate)   05/18/18 1744 05/19/18 1630   Discharge Needs Assessment   Readmission Within the Last 30 Days --  no previous admission in last 30 days   Concerns to be Addressed no discharge needs identified --    Patient/Family Anticipates Transition to --  home   Patient/Family Anticipated Services at Transition --  home health care   Transportation Concerns --  car, none   Transportation Anticipated --  family or friend will provide  (His wife Vivien will provide transportation at d/c. )   Anticipated Changes Related to Illness --  none   Equipment Needed After Discharge --  none   Outpatient/Agency/Support Group Needs --  homecare agency  (Pt stated he will need Home health for IV antibiotics. )   Discharge Facility/Level of Care Needs --  home with home health   Disability   Equipment Currently Used at Home --  none     Goal: Interprofessional Rounds/Family Conf  Outcome: Ongoing (interventions implemented as appropriate)   05/19/18 2222   Interdisciplinary Rounds/Family Conf   Participants nursing       Problem: Wound (Includes Pressure Injury) (Adult)  Goal: Signs and Symptoms of Listed Potential Problems Will be Absent, Minimized or Managed (Wound)  Outcome: Ongoing (interventions implemented as appropriate)   05/18/18 1744   Goal/Outcome Evaluation   Problems Assessed (Wound) all   Problems Present (Wound) situational response;infection       Problem: Infection, Risk/Actual (Adult)  Goal: Identify Related Risk Factors and Signs and Symptoms  Outcome: Ongoing (interventions implemented as appropriate)   05/18/18 1744   Infection, Risk/Actual (Adult)   Related Risk  Factors (Infection, Risk/Actual) exposure to microbes;skin integrity impairment   Signs and Symptoms (Infection, Risk/Actual) cultures positive;weakness     Goal: Infection Prevention/Resolution  Outcome: Ongoing (interventions implemented as appropriate)   05/18/18 2894   Infection, Risk/Actual (Adult)   Infection Prevention/Resolution making progress toward outcome

## 2018-05-20 NOTE — PROGRESS NOTES
Vancomycin trough was subtherapeutic at 12.5 mg/L. Dose has been increased to 1250 mg q 12 hrs to target trough of 15-20 mg/L. Pharmacy will follow and repeat trough at steady state of new dose. Thank you.    Andie Faustin, [facility]  05/20/18  2:55 PM

## 2018-05-20 NOTE — PROGRESS NOTES
"  I have personally seen and examined the patient today and discussed overnight interval progress and pertinent issues with nursing staff.    Subjective:    Leukocytosis showing improvement. No change to right thigh abscess. No fever or diarrhea. Patient reports he feels fine today with no complaints. Wife at bedside and case discussed at length.       History taken from: patient chart family RN      Vital Signs    /71 (BP Location: Right arm, Patient Position: Lying)   Pulse 75   Temp 98.7 °F (37.1 °C) (Axillary)   Resp 20   Ht 167.6 cm (66\")   Wt 74.8 kg (164 lb 14.4 oz)   SpO2 96%   BMI 26.62 kg/m²     Temp:  [98.2 °F (36.8 °C)-98.9 °F (37.2 °C)] 98.7 °F (37.1 °C)      Intake/Output Summary (Last 24 hours) at 05/20/18 1138  Last data filed at 05/20/18 0900   Gross per 24 hour   Intake             1320 ml   Output             1000 ml   Net              320 ml     Intake & Output (last 3 days)       05/17 0701 - 05/18 0700 05/18 0701 - 05/19 0700 05/19 0701 - 05/20 0700 05/20 0701 - 05/21 0700    P.O.  360 1320 480    IV Piggyback  1600      Total Intake(mL/kg)  1960 (26.2) 1320 (17.6) 480 (6.4)    Urine (mL/kg/hr)  950 1000 (0.6)     Stool  0      Total Output   950 1000      Net   +1010 +320 +480            Unmeasured Urine Occurrence   3 x     Unmeasured Stool Occurrence  0 x 0 x           Physical Exam:                 General Appearance:    Alert, cooperative, in no acute distress   Head:    Normocephalic, without obvious abnormality, atraumatic   Eyes:            Lids and lashes normal, conjunctivae and sclerae normal, no   icterus, no pallor, corneas clear, PERRLA   Ears:    Ears appear intact with no abnormalities noted   Throat:   No oral lesions, no thrush, oral mucosa moist   Neck:   No adenopathy, supple, trachea midline, no thyromegaly, no   carotid bruit, no JVD   Back:     No tenderness to percussion or palpation, range of motion   normal   Lungs:     Clear to auscultation,respirations " regular, even and unlabored. No wheezing, no ronchi and no crackles.    Heart:    Regular rhythm and normal rate, normal S1 and S2, no            murmur, no gallop, no rub, no click   Chest Wall:    No abnormalities observed   Abdomen:     Normal bowel sounds, no masses, no organomegaly, soft        non-tender, non-distended, no guarding, no rebound                tenderness   Rectal:     Deferred   Extremities:   Moves all extremities well, no edema, no cyanosis, no             redness   Pulses:   Pulses palpable and equal bilaterally   Skin:   Right middle thigh with abscess with very small amount of redness but no drainage or tenderness    Lymph nodes:   No palpable adenopathy   Neurologic:   Awake, alert and oriented x 3. Following commands.         Results:      Results from last 7 days  Lab Units 05/20/18  0335 05/19/18  0232 05/18/18  1155   WBC 10*3/mm3 12.89* 13.24* 10.58     Lab Results   Component Value Date    NEUTROABS 6.76 (H) 05/20/2018         Results from last 7 days  Lab Units 05/20/18  0335   CREATININE mg/dL 0.90         Results from last 7 days  Lab Units 05/19/18  0232 05/18/18  1749 05/18/18  1155   CRP mg/dL <0.50 0.53 0.76     Results Review:    I have personally reviewed laboratory data, culture results, radiology studies and antimicrobial therapy.    Hospital Medications (active)       Dose Frequency Start End    aspirin EC tablet 325 mg 325 mg Daily 5/19/2018     Sig - Route: Take 1 tablet by mouth Daily. - Oral    cholecalciferol (VITAMIN D3) capsule 50,000 Units 50,000 Units Weekly 5/20/2018     Sig - Route: Take 1 capsule by mouth 1 (One) Time Per Week. - Oral    heparin (porcine) 5000 UNIT/ML injection 5,000 Units 5,000 Units Every 12 Hours Scheduled 5/18/2018     Sig - Route: Inject 1 mL under the skin Every 12 (Twelve) Hours. - Subcutaneous    labetalol (NORMODYNE) tablet 100 mg 100 mg Every 12 Hours Scheduled 5/18/2018     Sig - Route: Take 1 tablet by mouth Every 12 (Twelve)  "Hours. - Oral    lisinopril (PRINIVIL,ZESTRIL) tablet 10 mg 10 mg Daily 5/19/2018     Sig - Route: Take 1 tablet by mouth Daily. - Oral    meloxicam (MOBIC) tablet 15 mg 15 mg Daily 5/19/2018     Sig - Route: Take 2 tablets by mouth Daily. - Oral    nicotine (NICODERM CQ) 21 MG/24HR patch 1 patch 1 patch Every 24 Hours Scheduled 5/19/2018     Sig - Route: Place 1 patch on the skin Daily. - Transdermal    pantoprazole (PROTONIX) EC tablet 40 mg 40 mg Every Early Morning 5/19/2018     Sig - Route: Take 1 tablet by mouth Every Morning. - Oral    potassium chloride (K-DUR,KLOR-CON) CR tablet 40 mEq 40 mEq Every 4 Hours 5/19/2018 5/19/2018    Sig - Route: Take 2 tablets by mouth Every 4 (Four) Hours. - Oral    potassium chloride (KLOR-CON) packet 40 mEq 40 mEq As Needed 5/18/2018     Sig - Route: Take 40 mEq by mouth As Needed (potassium replacement, see admin instructions). - Oral    Linked Group 1:  \"Or\" Linked Group Details        potassium chloride (MICRO-K) CR capsule 40 mEq 40 mEq As Needed 5/18/2018     Sig - Route: Take 4 capsules by mouth As Needed (potassium replacement.  see admin instructions). - Oral    Linked Group 1:  \"Or\" Linked Group Details        potassium chloride 10 mEq in 100 mL IVPB 10 mEq Every 1 Hour PRN 5/18/2018     Sig - Route: Infuse 100 mL into a venous catheter Every 1 (One) Hour As Needed (potassium protocol PERIPHERAL - see admin instructions). - Intravenous    Linked Group 1:  \"Or\" Linked Group Details        sodium chloride 0.9 % flush 1-10 mL 1-10 mL As Needed 5/18/2018     Sig - Route: Infuse 1-10 mL into a venous catheter As Needed for Line Care. - Intravenous    sodium chloride 0.9 % flush 10 mL 10 mL As Needed 5/18/2018     Sig - Route: Infuse 10 mL into a venous catheter As Needed for Line Care. - Intravenous    Cosign for Ordering: Required by Jah Guzman MD    Linked Group 2:  \"And\" Linked Group Details        vancomycin (VANCOCIN) 1,000 mg in sodium chloride 0.9 " % 250 mL IVPB 1,000 mg Every 12 Hours 5/19/2018 5/25/2018    Sig - Route: Infuse 1,000 mg into a venous catheter Every 12 (Twelve) Hours. - Intravenous            Cultures:    Blood Culture   Date Value Ref Range Status   05/18/2018 No growth at 24 hours  Preliminary   05/18/2018 No growth at 24 hours  Preliminary           Assessment/Plan     ASSESSMENT:    1. Bacteremia     PLAN:     Leukocytosis showing improvement. No change to right thigh abscess. No fever or diarrhea. Patient reports he feels fine today with no complaints. Wife at bedside and case discussed at length. Rickettsial serologies pending. Repeat blood cultures with no growth so far.     In the setting of 2 sets out of 2 positive blood cultures for staph epi would recommend to continue vancomycin therapy for a two weeks course.  Patient's culture from his abscess finalizes MSSA.     Current Antimicrobials:    Vancomycin per pharmacy dosing x 2 weeks    Patient's findings and recommendations were discussed with patient and nursing staff    Code Status: Full Code    Mackenzie Platt PA-C  05/20/18  11:38 AM

## 2018-05-21 VITALS
OXYGEN SATURATION: 100 % | HEART RATE: 80 BPM | WEIGHT: 164.9 LBS | BODY MASS INDEX: 26.5 KG/M2 | DIASTOLIC BLOOD PRESSURE: 74 MMHG | SYSTOLIC BLOOD PRESSURE: 130 MMHG | RESPIRATION RATE: 20 BRPM | HEIGHT: 66 IN | TEMPERATURE: 97.8 F

## 2018-05-21 PROBLEM — R78.81 BACTEREMIA: Status: RESOLVED | Noted: 2018-05-18 | Resolved: 2018-05-21

## 2018-05-21 LAB
ANION GAP SERPL CALCULATED.3IONS-SCNC: 4.8 MMOL/L (ref 3.6–11.2)
B BURGDOR IGG+IGM SER-ACNC: <0.91 ISR (ref 0–0.9)
B BURGDOR IGM SER-ACNC: <0.8 INDEX (ref 0–0.79)
BASOPHILS # BLD AUTO: 0.11 10*3/MM3 (ref 0–0.3)
BASOPHILS NFR BLD AUTO: 0.9 % (ref 0–2)
BUN BLD-MCNC: 7 MG/DL (ref 7–21)
BUN/CREAT SERPL: 7.6 (ref 7–25)
CALCIUM SPEC-SCNC: 9 MG/DL (ref 7.7–10)
CHLORIDE SERPL-SCNC: 109 MMOL/L (ref 99–112)
CO2 SERPL-SCNC: 27.2 MMOL/L (ref 24.3–31.9)
CREAT BLD-MCNC: 0.92 MG/DL (ref 0.43–1.29)
CRP SERPL-MCNC: <0.5 MG/DL (ref 0–0.99)
DEPRECATED RDW RBC AUTO: 41.5 FL (ref 37–54)
EOSINOPHIL # BLD AUTO: 0.4 10*3/MM3 (ref 0–0.7)
EOSINOPHIL NFR BLD AUTO: 3.3 % (ref 0–5)
ERYTHROCYTE [DISTWIDTH] IN BLOOD BY AUTOMATED COUNT: 13.3 % (ref 11.5–14.5)
GFR SERPL CREATININE-BSD FRML MDRD: 86 ML/MIN/1.73
GLUCOSE BLD-MCNC: 86 MG/DL (ref 70–110)
HCT VFR BLD AUTO: 38.3 % (ref 42–52)
HGB BLD-MCNC: 12.6 G/DL (ref 14–18)
IMM GRANULOCYTES # BLD: 0.21 10*3/MM3 (ref 0–0.03)
IMM GRANULOCYTES NFR BLD: 1.7 % (ref 0–0.5)
LYMPHOCYTES # BLD AUTO: 3.79 10*3/MM3 (ref 1–3)
LYMPHOCYTES NFR BLD AUTO: 30.9 % (ref 21–51)
MCH RBC QN AUTO: 28.6 PG (ref 27–33)
MCHC RBC AUTO-ENTMCNC: 32.9 G/DL (ref 33–37)
MCV RBC AUTO: 86.8 FL (ref 80–94)
MONOCYTES # BLD AUTO: 1.12 10*3/MM3 (ref 0.1–0.9)
MONOCYTES NFR BLD AUTO: 9.1 % (ref 0–10)
NEUTROPHILS # BLD AUTO: 6.64 10*3/MM3 (ref 1.4–6.5)
NEUTROPHILS NFR BLD AUTO: 54.1 % (ref 30–70)
OSMOLALITY SERPL CALC.SUM OF ELEC: 278.5 MOSM/KG (ref 273–305)
PLATELET # BLD AUTO: 406 10*3/MM3 (ref 130–400)
PMV BLD AUTO: 9.4 FL (ref 6–10)
POTASSIUM BLD-SCNC: 3.8 MMOL/L (ref 3.5–5.3)
RBC # BLD AUTO: 4.41 10*6/MM3 (ref 4.7–6.1)
SODIUM BLD-SCNC: 141 MMOL/L (ref 135–153)
WBC NRBC COR # BLD: 12.27 10*3/MM3 (ref 4.5–12.5)

## 2018-05-21 PROCEDURE — 25010000002 VANCOMYCIN PER 500 MG

## 2018-05-21 PROCEDURE — 99239 HOSP IP/OBS DSCHRG MGMT >30: CPT | Performed by: INTERNAL MEDICINE

## 2018-05-21 PROCEDURE — 85025 COMPLETE CBC W/AUTO DIFF WBC: CPT | Performed by: INTERNAL MEDICINE

## 2018-05-21 PROCEDURE — C1751 CATH, INF, PER/CENT/MIDLINE: HCPCS

## 2018-05-21 PROCEDURE — 86140 C-REACTIVE PROTEIN: CPT | Performed by: INTERNAL MEDICINE

## 2018-05-21 PROCEDURE — 25010000002 HEPARIN (PORCINE) PER 1000 UNITS: Performed by: NURSE PRACTITIONER

## 2018-05-21 PROCEDURE — 02HV33Z INSERTION OF INFUSION DEVICE INTO SUPERIOR VENA CAVA, PERCUTANEOUS APPROACH: ICD-10-PCS | Performed by: INTERNAL MEDICINE

## 2018-05-21 PROCEDURE — 80048 BASIC METABOLIC PNL TOTAL CA: CPT | Performed by: INTERNAL MEDICINE

## 2018-05-21 RX ORDER — SODIUM CHLORIDE 0.9 % (FLUSH) 0.9 %
10 SYRINGE (ML) INJECTION AS NEEDED
Status: DISCONTINUED | OUTPATIENT
Start: 2018-05-21 | End: 2018-05-21 | Stop reason: HOSPADM

## 2018-05-21 RX ORDER — SODIUM CHLORIDE 0.9 % (FLUSH) 0.9 %
10 SYRINGE (ML) INJECTION EVERY 12 HOURS SCHEDULED
Status: DISCONTINUED | OUTPATIENT
Start: 2018-05-21 | End: 2018-05-21 | Stop reason: HOSPADM

## 2018-05-21 RX ORDER — NICOTINE 21 MG/24HR
PATCH, TRANSDERMAL 24 HOURS TRANSDERMAL
Qty: 58 PATCH | Refills: 0 | Status: SHIPPED | OUTPATIENT
Start: 2018-05-21 | End: 2018-08-22 | Stop reason: ALTCHOICE

## 2018-05-21 RX ADMIN — NICOTINE 1 PATCH: 21 PATCH TRANSDERMAL at 08:26

## 2018-05-21 RX ADMIN — LABETALOL HCL 100 MG: 100 TABLET, FILM COATED ORAL at 08:26

## 2018-05-21 RX ADMIN — HEPARIN SODIUM 5000 UNITS: 5000 INJECTION, SOLUTION INTRAVENOUS; SUBCUTANEOUS at 08:26

## 2018-05-21 RX ADMIN — PANTOPRAZOLE SODIUM 40 MG: 40 TABLET, DELAYED RELEASE ORAL at 05:17

## 2018-05-21 RX ADMIN — VANCOMYCIN HYDROCHLORIDE 1250 MG: 5 INJECTION, POWDER, LYOPHILIZED, FOR SOLUTION INTRAVENOUS at 09:14

## 2018-05-21 RX ADMIN — ASPIRIN 325 MG: 325 TABLET, COATED ORAL at 08:26

## 2018-05-21 RX ADMIN — MELOXICAM 15 MG: 7.5 TABLET ORAL at 08:26

## 2018-05-21 RX ADMIN — LISINOPRIL 10 MG: 10 TABLET ORAL at 08:26

## 2018-05-21 NOTE — PROGRESS NOTES
Discharge Planning Assessment  [facility]     Patient Name: Marcus Bobo  MRN: [id number]  Today's Date: 5/21/2018    Admit Date: 5/18/2018      Discharge Plan     Row Name 05/21/18 1139       Plan    Plan SS received consult Patient will need home vancomycin as recommended by infectious disease.  PICC line being placed on Monday.  Need to find some way to arrange his home antibiotics.  Wife is willing to give the antibiotics if he goes home to live with her.  This may be a s. SS spoke to pt who has no preference for home health agency or infusion pharmacy. SS contacted [facility] [phone] per Allison. SS faxed information including order to [facility] [phone]. [facility] to call SS with copay for IV Vanco. SS contacted [facility] [phone] per Rika. SS faxed referral including order to [facility] [phone]. Nurse to call report to [facility] at discharge. SS to follow.     12:30 Pt is being discharged home today. SS received call from [facility] per Allison who states pt's insurance will have to billed before copay will be determined. Pt has an out of pocket amount that will have to be met before IV Vanco will be covered at 100%. SS faxed AVS to [facility] and [facility]. Pt has a order for PICC line. No other needs identified.               Dee Whitney

## 2018-05-21 NOTE — DISCHARGE PLACEMENT REQUEST
"Marcus Noguera (52 y.o. Male)     Date of Birth Social Security Number Address Home Phone MRN    1965  [address] [place], [place] [id number] [phone] [id number]    Catholic Marital Status          None        Admission Date Admission Type Admitting Provider Attending Provider Department, Room/Bed    18 Emergency Dusty Carr MD Grace, Georgia Gonzalez DO [address], 3347/1S    Discharge Date Discharge Disposition Discharge Destination                       Attending Provider:  Georgia Newell DO    Allergies:  No Known Allergies    Isolation:  None   Infection:  None   Code Status:  FULL    Ht:  167.6 cm (66\")   Wt:  74.8 kg (164 lb 14.4 oz)    Admission Cmt:  None   Principal Problem:  None                Active Insurance as of 2018     Primary Coverage     Payor Plan Insurance Group Employer/Plan Group    ANTHEM BLUE CROSS ANTHEM BLUE CROSS BLUE SHIELD PPO [id number]     Payor Plan Address Payor Plan Phone Number Effective From Effective To    [address] [phone] 2016     [facility] [address]       Subscriber Name Subscriber Birth Date Member ID       VIVIEN NOGUERA 1969 [id number]                 Emergency Contacts      (Rel.) Home Phone Work Phone Mobile Phone    Vivien Noguera (Spouse) [phone] -- --           [address] [address]  Phone:  [phone]  Fax:          Patient:     Marcus Noguera MRN:  [id number]   [address] [facility] [address] :  1965  SSN:    Phone: [phone] Sex:  M      INSURANCE PAYOR PLAN GROUP # SUBSCRIBER ID   Primary:    ANTHEM BLUE CROSS [id number]   Admitting Diagnosis: Bacteremia [R78.81]  Order Date:  May 20, 2018         Inpatient Case Management  Consult       (Order ID: [id number])     Diagnosis:         Priority:  Routine Expected Date:   Expiration Date:        Interval:   " Count:    Reason for Consult? Patient will need home vancomycin as recommended by infectious disease.  PICC line being placed on Monday.  Need to find some way to arrange his home antibiotics.  Wife is willing to give the antibiotics if he goes home to live with her.  This may be a s     Specimen Type:   Specimen Source:   Specimen Taken Date:   Specimen Taken Time:                   Authorizing Provider:Dusty aCrr MD  Authorizing Provider's NPI: [id number]  Order Entered By: Dusty Carr MD 2018  2:57 PM     Electronically signed by: Dusty Carr MD 2018  2:57 PM         [facility] Pharmacist Signed Pharmacy  Progress Notes Date of Service: 2018  2:55 PM         ManualTemplate   Copied  Vancomycin trough was subtherapeutic at 12.5 mg/L. Dose has been increased to 1250 mg q 12 hrs to target trough of 15-20 mg/L. Pharmacy will follow and repeat trough at steady state of new dose. Thank you.     [facility]ust[facility]  18  2:55 PM                     History & Physical      Dusty Carr MD at 2018  1:37 PM          Patient Identification:  Name:  Marcus Bobo  Age:  52 y.o.  Sex:  male  :  1965  MRN:  [id number]   Visit Number:  [id number]  Primary Care Physician:  JACOBO Grimes    I have seen the patient in conjunction with JACOBO Sutton, and I agree with the following statements:    Assisted by:Kristin CABRERA     Chief complaint: abnormal labs    History of presenting illness:  52 y.o. male, Mr. Bobo was brought back to the ER today for positive blood cultures. He was seen in the ER on 18 for an abscess that had developed 3-4 days prior with erythema and edema of the area. He was started on doxycyline and has been taking that since 18. He reports it is looking and feeling much better, erythema and edema is significantly reduced since being seen in the ER. He denies fever since starting PO antibiotics. He was also found to  have a positive lactic acid on todays work up. Will admit to the medical floor for IV antibiotics and further monitoring and treatment.  He states the redness has gone down significantly and he is having really no pain around the right thigh abscess site now.  This spontaneously drained a slight amount.    His medical history includes GERD, BPH, Vitamin D Deficiency, hypertension, and arthritis. He is a current tobacco user, he states he smokes 1-2 ppd for the last 30 years or more.   ---------------------------------------------------------------------------------------------------------------------   Review of Systems   Constitutional: Negative for chills, fatigue and fever.   HENT: Negative for congestion and rhinorrhea.    Eyes: Negative for photophobia and visual disturbance.   Respiratory: Negative for cough, shortness of breath and wheezing.    Cardiovascular: Negative for chest pain and leg swelling.   Gastrointestinal: Negative for abdominal distention and abdominal pain.   Endocrine: Negative for cold intolerance and heat intolerance.   Genitourinary: Negative for difficulty urinating.   Musculoskeletal: Negative for arthralgias and myalgias.   Skin: Positive for wound (right middle thigh). Negative for color change and pallor.   Neurological: Negative for dizziness, weakness and light-headedness.   Hematological: Negative for adenopathy.   Psychiatric/Behavioral: Negative for agitation, behavioral problems and confusion.      ---------------------------------------------------------------------------------------------------------------------   Past Medical History:   Diagnosis Date   • Hypertension      History reviewed. No pertinent surgical history.  History reviewed. No pertinent family history.  Social History     Social History   • Marital status:      Social History Main Topics   • Smoking status: Current Every Day Smoker     Packs/day: 1.50     Types: Cigarettes   • Alcohol use Yes       Comment: occasionally   • Drug use: No     Other Topics Concern   • Not on file     ---------------------------------------------------------------------------------------------------------------------   Allergies:  Patient has no known allergies.  ---------------------------------------------------------------------------------------------------------------------   Prior to Admission Medications     Prescriptions Last Dose Informant Patient Reported? Taking?    Desloratadine-Pseudoephedrine (CLARINEX-D 12 HOUR PO)   Yes No    Take  by mouth.    doxycycline (MONODOX) 100 MG capsule   No No    Take 1 capsule by mouth 2 (Two) Times a Day for 10 days.    ferrous sulfate 325 (65 FE) MG tablet   Yes No    Take 325 mg by mouth Daily With Breakfast.    finasteride (PROSCAR) 5 MG tablet   Yes No    Take 5 mg by mouth Daily.    labetalol (NORMODYNE) 100 MG tablet   Yes No    Take 100 mg by mouth 2 (Two) Times a Day.    lisinopril (PRINIVIL,ZESTRIL) 10 MG tablet   Yes No    Take 10 mg by mouth Daily.    meloxicam (MOBIC) 7.5 MG tablet   Yes No    Take 7.5 mg by mouth Daily.    pantoprazole (PROTONIX) 40 MG EC tablet   Yes No    Take 40 mg by mouth Daily.    spironolactone (ALDACTONE) 25 MG tablet   Yes No    Take 25 mg by mouth Daily.    tamsulosin (FLOMAX) 0.4 MG capsule 24 hr capsule   Yes No    Take 1 capsule by mouth Every Night.    Vitamin D, Cholecalciferol, (CHOLECALCIFEROL) 400 units tablet   Yes No    Take 400 Units by mouth Daily.        Hospital Scheduled Meds:    sodium chloride 1,000 mL Intravenous Once   vancomycin 20 mg/kg Intravenous Once        ---------------------------------------------------------------------------------------------------------------------   Vital Signs:  Temp:  [97.8 °F (36.6 °C)-98.3 °F (36.8 °C)] 98.3 °F (36.8 °C)  Heart Rate:  [80-82] 80  Resp:  [18] 18  BP: (146-148)/(82-83) 146/83  1    05/18/18  1134   Weight: 79.4 kg (175 lb)     Body mass index is 28.25  kg/m².  ---------------------------------------------------------------------------------------------------------------------       Physical Exam    Physical Exam:  Constitutional:  Well-developed and well-nourished.   No distress  HENT:  Head: Normocephalic and atraumatic.  Mouth:  Moist mucous membranes.    Eyes:  Conjunctivae and EOM are normal.  Pupils are equal, round, and reactive to light.  No scleral icterus.  Neck:  Neck supple.  No JVD present.    Cardiovascular:  Normal rate, regular rhythm.  with no murmur.  Pulmonary/Chest:  No respiratory distress, no wheezes, no crackles, with normal breath sounds and good air movement.  Abdominal:  Soft.  Bowel sounds are present.  No distension and no tenderness.   Musculoskeletal:  No edema, no tenderness, and no deformity.  No red or swollen joints anywhere.    Neurological:  Alert and oriented to person, place, and time.  No cranial nerve deficit.  No tongue deviation.  No facial droop.  No slurred speech.   Skin:  Right middle thigh with abscess, some minimal erythema about the size of a $0.50 piece and slightly open on top about the size of an eraser head, has been draining at home, non currently, is non-fluctuant    Psychiatric:  Normal mood and affect.   Peripheral vascular:  No edema and strong pulses on all 4 extremities.  ---------------------------------------------------------------------------------------------------------------------  EKG:  Normal, image reviewed    ---------------------------------------------------------------------------------------------------------------------     Results from last 7 days  Lab Units 05/18/18  1207 05/18/18  1155 05/12/18  2245   CRP mg/dL  --  0.76 7.01*   LACTATE mmol/L 2.6*  --  1.2   WBC 10*3/mm3  --  10.58 11.31   HEMOGLOBIN g/dL  --  14.8 13.6*   HEMATOCRIT %  --  42.6 40.1*   MCV fL  --  84.9 85.5   MCHC g/dL  --  34.7 33.9   PLATELETS 10*3/mm3  --  472* 399           Results from last 7 days  Lab Units  05/18/18  1155 05/12/18  2245   SODIUM mmol/L 143 143   POTASSIUM mmol/L 3.0* 3.7   CHLORIDE mmol/L 107 109   CO2 mmol/L 31.4 29.0   BUN mg/dL 7 11   CREATININE mg/dL 1.03 0.92   EGFR IF NONAFRICN AM mL/min/1.73 75 86   CALCIUM mg/dL 9.5 9.3   GLUCOSE mg/dL 94 89   ALBUMIN g/dL 4.40 4.10   BILIRUBIN mg/dL 0.3 0.3   ALK PHOS U/L 80 78   AST (SGOT) U/L 16 14   ALT (SGPT) U/L 19 20   Estimated Creatinine Clearance: 81.2 mL/min (by C-G formula based on SCr of 1.03 mg/dL).  No results found for: AMMONIA          No results found for: HGBA1C  Lab Results   Component Value Date    TSH 1.162 01/20/2015    FREET4 1.19 06/10/2014     No results found for: PREGTESTUR, PREGSERUM, HCG, HCGQUANT  Pain Management Panel     Pain Management Panel Latest Ref Rng & Units 9/6/2014    AMPHETAMINES SCREEN, URINE NEGATIVE Negative    BARBITURATES SCREEN NEGATIVE Negative    BENZODIAZEPINE SCREEN, URINE NEGATIVE Negative    COCAINE SCREEN, URINE NEGATIVE Negative    METHADONE SCREEN, URINE NEGATIVE Negative        Blood Culture   Date Value Ref Range Status   05/12/2018 Staphylococcus epidermidis (A)  Final     Comment:     This isolate is presumed to be clindamyin resistant based on detection of inducible clindamycin resistance. Clindamycin may still be effective in some patients.     05/12/2018 Staphylococcus epidermidis (A)  Final     Comment:     This isolate is presumed to be clindamyin resistant based on detection of inducible clindamycin resistance. Clindamycin may still be effective in some patients.          Wound Culture   Date Value Ref Range Status   05/12/2018 Heavy growth (4+) Staphylococcus aureus (A)  Final     Comment:     This isolate does not demonstrate inducible clindamycin resistance in vitro.            ---------------------------------------------------------------------------------------------------------------------  Imaging Results (last 7 days)     ** No results found for the last 168 hours. **           Cultures:  Blood Culture   Date Value Ref Range Status   05/12/2018 Staphylococcus epidermidis (A)  Final     Comment:     This isolate is presumed to be clindamyin resistant based on detection of inducible clindamycin resistance. Clindamycin may still be effective in some patients.     05/12/2018 Staphylococcus epidermidis (A)  Final     Comment:     This isolate is presumed to be clindamyin resistant based on detection of inducible clindamycin resistance. Clindamycin may still be effective in some patients.       Wound Culture   Date Value Ref Range Status   05/12/2018 Heavy growth (4+) Staphylococcus aureus (A)  Final     Comment:     This isolate does not demonstrate inducible clindamycin resistance in vitro.         I have personally reviewed the radiology images and read the final radiology report.  ---------------------------------------------------------------------------------------------------------------------  Assessment and Plan:    -Bacteremia r/t right middle thigh abscess  -Lactic acidosis   -Acute Hypokalemia  -Hypertension  -Tobacco user    Will admit to the medical floor for IV antibiotics. ID consulted, blood cultures repeated in the ED today. Vancomycin started in the ED, pharmacy consulted to dose. Lactic acid was 2.6, Now improved.   WBC is 10.58, neutrophils are 66.8, CRP is 0.76, no fevers, no tachypnea or hypotensive episodes. Repeat labs in AM. Potassium level is 3.0, potassium protocol ordered.  Overall the abscess appears be getting better and will improve the abscess certainly is not the same organism that is in the blood.  Overall this would most likely be a contaminant with a low inflammatory markers however it was 2/2.  He has no permanent lines in place no pacemaker.  We have asked infectious disease for his input on whether this should be treated but will continue vancomycin until that time.    Hypertension, will review home medications and adjust as needed    DVT  "prophylaxis: Heparin SQ BID, SCDs    Tobacco use: patient educated on the dangers of tobacco use and urged to quit, he states he does not need Nicoderm patch at this time.     Irena HORN Radha, APRN  05/18/18  1:37 PM  ---------------------------------------------------------------------------------------------------------------------       Electronically signed by Dusty Carr MD at 5/18/2018  6:49 PM       Hospital Medications (active)       Dose Frequency Start End    aspirin EC tablet 325 mg 325 mg Daily 5/19/2018     Sig - Route: Take 1 tablet by mouth Daily. - Oral    cholecalciferol (VITAMIN D3) capsule 50,000 Units 50,000 Units Weekly 5/20/2018     Sig - Route: Take 1 capsule by mouth 1 (One) Time Per Week. - Oral    heparin (porcine) 5000 UNIT/ML injection 5,000 Units 5,000 Units Every 12 Hours Scheduled 5/18/2018     Sig - Route: Inject 1 mL under the skin Every 12 (Twelve) Hours. - Subcutaneous    labetalol (NORMODYNE) tablet 100 mg 100 mg Every 12 Hours Scheduled 5/18/2018     Sig - Route: Take 1 tablet by mouth Every 12 (Twelve) Hours. - Oral    lisinopril (PRINIVIL,ZESTRIL) tablet 10 mg 10 mg Daily 5/19/2018     Sig - Route: Take 1 tablet by mouth Daily. - Oral    meloxicam (MOBIC) tablet 15 mg 15 mg Daily 5/19/2018     Sig - Route: Take 2 tablets by mouth Daily. - Oral    nicotine (NICODERM CQ) 21 MG/24HR patch 1 patch 1 patch Every 24 Hours Scheduled 5/19/2018     Sig - Route: Place 1 patch on the skin Daily. - Transdermal    pantoprazole (PROTONIX) EC tablet 40 mg 40 mg Every Early Morning 5/19/2018     Sig - Route: Take 1 tablet by mouth Every Morning. - Oral    potassium chloride (KLOR-CON) packet 40 mEq 40 mEq As Needed 5/18/2018     Sig - Route: Take 40 mEq by mouth As Needed (potassium replacement, see admin instructions). - Oral    Linked Group 1:  \"Or\" Linked Group Details        potassium chloride (MICRO-K) CR capsule 40 mEq 40 mEq As Needed 5/18/2018     Sig - Route: Take 4 capsules by " "mouth As Needed (potassium replacement.  see admin instructions). - Oral    Linked Group 1:  \"Or\" Linked Group Details        potassium chloride 10 mEq in 100 mL IVPB 10 mEq Every 1 Hour PRN 5/18/2018     Sig - Route: Infuse 100 mL into a venous catheter Every 1 (One) Hour As Needed (potassium protocol PERIPHERAL - see admin instructions). - Intravenous    Linked Group 1:  \"Or\" Linked Group Details        sodium chloride 0.9 % flush 1-10 mL 1-10 mL As Needed 5/18/2018     Sig - Route: Infuse 1-10 mL into a venous catheter As Needed for Line Care. - Intravenous    sodium chloride 0.9 % flush 10 mL 10 mL As Needed 5/18/2018     Sig - Route: Infuse 10 mL into a venous catheter As Needed for Line Care. - Intravenous    Cosign for Ordering: Accepted by Jah Guzman MD on 5/20/2018  4:33 PM    Linked Group 2:  \"And\" Linked Group Details        vancomycin (VANCOCIN) 1,250 mg in sodium chloride 0.9 % 250 mL IVPB 1,250 mg Every 12 Hours 5/20/2018 6/1/2018    Sig - Route: Infuse 1,250 mg into a venous catheter Every 12 (Twelve) Hours. - Intravenous    vancomycin (VANCOCIN) 1,000 mg in sodium chloride 0.9 % 250 mL IVPB (Discontinued) 1,000 mg Every 12 Hours 5/19/2018 5/20/2018    Sig - Route: Infuse 1,000 mg into a venous catheter Every 12 (Twelve) Hours. - Intravenous            Lab Results (last 24 hours)     Procedure Component Value Units Date/Time    Osmolality, Calculated [[id number]]  (Normal) Collected:  05/21/18 0506    Specimen:  Blood Updated:  05/21/18 0618     Osmolality Calc 278.5 mOsm/kg     Basic Metabolic Panel [[id number]]  (Normal) Collected:  05/21/18 0506    Specimen:  Blood Updated:  05/21/18 0613     Glucose 86 mg/dL      BUN 7 mg/dL      Creatinine 0.92 mg/dL      Sodium 141 mmol/L      Potassium 3.8 mmol/L      Chloride 109 mmol/L      CO2 27.2 mmol/L      Calcium 9.0 mg/dL      eGFR Non African Amer 86 mL/min/1.73      BUN/Creatinine Ratio 7.6     Anion Gap 4.8 mmol/L     Narrative:       " GFR Normal >60  Chronic Kidney Disease <60  Kidney Failure <15    C-reactive Protein [[id number]]  (Normal) Collected:  05/21/18 0506    Specimen:  Blood Updated:  05/21/18 0613     C-Reactive Protein <0.50 mg/dL     CBC & Differential [[id number]] Collected:  05/21/18 0506    Specimen:  Blood Updated:  05/21/18 0541    Narrative:       The following orders were created for panel order CBC & Differential.  Procedure                               Abnormality         Status                     ---------                               -----------         ------                     CBC Auto Differential[[id number]]        Abnormal            Final result                 Please view results for these tests on the individual orders.    CBC Auto Differential [[id number]]  (Abnormal) Collected:  05/21/18 0506    Specimen:  Blood Updated:  05/21/18 0541     WBC 12.27 10*3/mm3      RBC 4.41 (L) 10*6/mm3      Hemoglobin 12.6 (L) g/dL      Hematocrit 38.3 (L) %      MCV 86.8 fL      MCH 28.6 pg      MCHC 32.9 (L) g/dL      RDW 13.3 %      RDW-SD 41.5 fl      MPV 9.4 fL      Platelets 406 (H) 10*3/mm3      Neutrophil % 54.1 %      Lymphocyte % 30.9 %      Monocyte % 9.1 %      Eosinophil % 3.3 %      Basophil % 0.9 %      Immature Grans % 1.7 (H) %      Neutrophils, Absolute 6.64 (H) 10*3/mm3      Lymphocytes, Absolute 3.79 (H) 10*3/mm3      Monocytes, Absolute 1.12 (H) 10*3/mm3      Eosinophils, Absolute 0.40 10*3/mm3      Basophils, Absolute 0.11 10*3/mm3      Immature Grans, Absolute 0.21 (H) 10*3/mm3     Blood Culture - Blood, [[id number]]  (Normal) Collected:  05/18/18 1155    Specimen:  Blood from Arm, Right Updated:  05/20/18 1230     Blood Culture No growth at 2 days    Blood Culture - Blood, [[id number]]  (Normal) Collected:  05/18/18 1207    Specimen:  Blood from Arm, Left Updated:  05/20/18 1230     Blood Culture No growth at 2 days    Vancomycin, Trough [[id number]]  (Normal) Collected:  05/20/18 1141    Specimen:  Blood  Updated:  05/20/18 1228     Vancomycin Trough 12.50 mcg/mL         Operative/Procedure Notes (most recent note)     No notes of this type exist for this encounter.           Physician Progress Notes (most recent note)      Dusty Carr MD at 5/20/2018  2:55 PM            I have seen the patient in conjunction with Breann and wife    History of presenting illness:  Patient states he is doing well .  No chest pain or shortness breath no abdominal pain.  He states his abscess is healing.  He denies any IV drug use.  Risk of using align outside of its intended use discussed.    Vital Signs  Temp:  [98 °F (36.7 °C)-98.9 °F (37.2 °C)] 98 °F (36.7 °C)  Heart Rate:  [69-85] 85  Resp:  [18-20] 20  BP: (123-150)/(69-77) 150/74  Body mass index is 26.62 kg/m².      Intake/Output Summary (Last 24 hours) at 05/20/18 1455  Last data filed at 05/20/18 1332   Gross per 24 hour   Intake             1200 ml   Output             2500 ml   Net            -1300 ml     Intake & Output (last 3 days)       05/17 0701 - 05/18 0700 05/18 0701 - 05/19 0700 05/19 0701 - 05/20 0700 05/20 0701 - 05/21 0700    P.O.  360 1320 840    IV Piggyback  1600      Total Intake(mL/kg)  1960 (26.2) 1320 (17.6) 840 (11.2)    Urine (mL/kg/hr)  950 1000 (0.6) 1500 (2.5)    Stool  0      Total Output   950 1000 1500    Net   +1010 +320 -660            Unmeasured Urine Occurrence   3 x     Unmeasured Stool Occurrence  0 x 0 x           Physical exam:  Physical Exam   Constitutional: Well-developed, well-nourished.  Pleasant.  No distress  Head: Normocephalic and atraumatic.  Hearing is intact, mucous membranes are moist.   Cardiovascular: Normal rate, regular rhythm and normal heart sounds.  No murmur rub or gallop  Pulmonary/Chest: Bilateral breath sounds no rhonchus rales or wheezing heard  Abdominal: Soft, flat, bowel sounds are active, nondistended nontender.  No peritoneal signs   Musculoskeletal: Right thigh abscess No change, healing  Neurological:  Nonfocal, alert.    Skin: Skin is warm and dry.   Psychiatric:  Normal mood and affect.     Results Review:  Lab Results   Component Value Date    WBC 12.89 (H) 05/20/2018    HGB 12.7 (L) 05/20/2018    HCT 38.8 (L) 05/20/2018    MCV 87.4 05/20/2018     05/20/2018     Lab Results   Component Value Date    GLUCOSE 106 05/20/2018    BUN 6 (L) 05/20/2018    CREATININE 0.90 05/20/2018    EGFRIFNONA 88 05/20/2018    BCR 6.7 (L) 05/20/2018    CO2 28.2 05/20/2018    CALCIUM 9.0 05/20/2018    ALBUMIN 3.40 (L) 05/19/2018    LABIL2 1.4 (L) 05/19/2018    AST 14 05/19/2018    ALT 15 05/19/2018       Imaging Results (last 72 hours)     ** No results found for the last 72 hours. **            Assessment/Plan     Active Problems:  Bacteremia, Staph epidermidis, 2/2, recommendations for infectious disease noted, will treat with IV vancomycin for 2 weeks.  We'll plan PICC line placement when available and possibly outpatient antibiotics.  Patient's wife who no longer lives with him brought up that he may use drugs,He however denies this.  CRP has normalized, white count is coming down.    Abscess MSSA vancomycin will cover this as well    Hypokalemia improved    DVT prophylaxis subcutaneous heparin    Hypertension, overall controlled    Tobacco abuse, counseled to quit, nicotine patch       Dusty Carr MD  05/20/18  2:55 PM      Electronically signed by Dusty Carr MD at 5/20/2018  3:00 PM

## 2018-05-21 NOTE — DISCHARGE PLACEMENT REQUEST
"Marcus Noguera (52 y.o. Male)     Date of Birth Social Security Number Address Home Phone MRN    1965  [address] [place] [id number] [phone] [id number]    Sikh Marital Status          None        Admission Date Admission Type Admitting Provider Attending Provider Department, Room/Bed    18 Emergency Dusty Carr MD Grace, Georgia Gonzalez DO [address], 3347/1S    Discharge Date Discharge Disposition Discharge Destination                       Attending Provider:  Georgia Newell DO    Allergies:  No Known Allergies    Isolation:  None   Infection:  None   Code Status:  FULL    Ht:  167.6 cm (66\")   Wt:  74.8 kg (164 lb 14.4 oz)    Admission Cmt:  None   Principal Problem:  None                Active Insurance as of 2018     Primary Coverage     Payor Plan Insurance Group Employer/Plan Group    [employer]HE[employer] BLUE CROSS ANTHEM BLUE CROSS BLUE SHIELD PPO [id number]     Payor Plan Address Payor Plan Phone Number Effective From Effective To    [address] [phone] 2016     [facility] [address]       Subscriber Name Subscriber Birth Date Member ID       VIVIEN NOGUERA 1969 [id number]                 Emergency Contacts      (Rel.) Home Phone Work Phone Mobile Phone    Vivien Noguera (Spouse) [phone] -- --        [address] [address]  Phone:  [phone]  Fax:   Date: May 21, 2018      Ambulatory Referral to Home Health     Patient:  Marcus Noguera MRN:  [id number]   [address] [facility] [address] :  1965  SSN:    Phone: [phone] Sex:  M      INSURANCE PAYOR PLAN GROUP # SUBSCRIBER ID   Primary:    [employer] BLUE CROSS [id number]      Referring Provider Information:  CAREN NUNEZ Phone: [phone] Fax:       Referral Information:   # Visits:  1 Referral Type: Home Health [42]   Urgency:  Routine Referral Reason: " Specialty Services Required   Start Date: May 21, 2018 End Date:  To be determined by Insurer   Diagnosis: Bacteremia (R78.81 [ICD-10-CM] 790.7 [ICD-9-CM])      Refer to Dept:   Refer to Provider:   Refer to Facility:       Face to Face Visit Date: 2018  Follow-up Provider for Plan of Care? I treated the patient in an acute care facility and will not continue treatment after discharge.  Follow-up Provider: ELVIRA PUCKETT [[id number]]  Reason/Clinical Findings: IV antibiotics for positive blood cultures needing vanco infusion  Describe mobility limitations that make leaving home difficult: Frequent IV antibiotics infusion  Nursing/Therapeutic Services Requested: Skilled Nursing  Skilled nursing orders: Infusion therapy  Skilled nursing orders: PICC line care/instruction  Frequency: 1 Week 1     This document serves as a request of services and does not constitute Insurance authorization or approval of services.  To determine eligibility, please contact the members Insurance carrier to verify and review coverage.     If you have medical questions regarding this request for services. Please contact [address] at [phone] between the hours of 8:00am - 5:00pm (Mon-Fri).             Authorizing Provider:JACOBO Bellamy  Authorizing Provider's NPI: [id number]  Order Entered By: JACOBO Bellamy 2018 10:49 AM     Electronically signed by: JACOBO Bellamy 2018 10:49 AM                History & Physical      Dusty Carr MD at 2018  1:37 PM          Patient Identification:  Name:  Marcus Bobo  Age:  52 y.o.  Sex:  male  :  1965  MRN:  [id number]   Visit Number:  [id number]  Primary Care Physician:  JACOBO Grimes    I have seen the patient in conjunction with JACOBO Sutton, and I agree with the following statements:    Assisted by:Kristin CABRERA     Chief complaint: abnormal labs    History of presenting illness:  52 y.o. male,  Mr. Bobo was brought back to the ER today for positive blood cultures. He was seen in the ER on 5/12/18 for an abscess that had developed 3-4 days prior with erythema and edema of the area. He was started on doxycyline and has been taking that since 5/12/18. He reports it is looking and feeling much better, erythema and edema is significantly reduced since being seen in the ER. He denies fever since starting PO antibiotics. He was also found to have a positive lactic acid on todays work up. Will admit to the medical floor for IV antibiotics and further monitoring and treatment.  He states the redness has gone down significantly and he is having really no pain around the right thigh abscess site now.  This spontaneously drained a slight amount.    His medical history includes GERD, BPH, Vitamin D Deficiency, hypertension, and arthritis. He is a current tobacco user, he states he smokes 1-2 ppd for the last 30 years or more.   ---------------------------------------------------------------------------------------------------------------------   Review of Systems   Constitutional: Negative for chills, fatigue and fever.   HENT: Negative for congestion and rhinorrhea.    Eyes: Negative for photophobia and visual disturbance.   Respiratory: Negative for cough, shortness of breath and wheezing.    Cardiovascular: Negative for chest pain and leg swelling.   Gastrointestinal: Negative for abdominal distention and abdominal pain.   Endocrine: Negative for cold intolerance and heat intolerance.   Genitourinary: Negative for difficulty urinating.   Musculoskeletal: Negative for arthralgias and myalgias.   Skin: Positive for wound (right middle thigh). Negative for color change and pallor.   Neurological: Negative for dizziness, weakness and light-headedness.   Hematological: Negative for adenopathy.   Psychiatric/Behavioral: Negative for agitation, behavioral problems and confusion.       ---------------------------------------------------------------------------------------------------------------------   Past Medical History:   Diagnosis Date   • Hypertension      History reviewed. No pertinent surgical history.  History reviewed. No pertinent family history.  Social History     Social History   • Marital status:      Social History Main Topics   • Smoking status: Current Every Day Smoker     Packs/day: 1.50     Types: Cigarettes   • Alcohol use Yes      Comment: occasionally   • Drug use: No     Other Topics Concern   • Not on file     ---------------------------------------------------------------------------------------------------------------------   Allergies:  Patient has no known allergies.  ---------------------------------------------------------------------------------------------------------------------   Prior to Admission Medications     Prescriptions Last Dose Informant Patient Reported? Taking?    Desloratadine-Pseudoephedrine (CLARINEX-D 12 HOUR PO)   Yes No    Take  by mouth.    doxycycline (MONODOX) 100 MG capsule   No No    Take 1 capsule by mouth 2 (Two) Times a Day for 10 days.    ferrous sulfate 325 (65 FE) MG tablet   Yes No    Take 325 mg by mouth Daily With Breakfast.    finasteride (PROSCAR) 5 MG tablet   Yes No    Take 5 mg by mouth Daily.    labetalol (NORMODYNE) 100 MG tablet   Yes No    Take 100 mg by mouth 2 (Two) Times a Day.    lisinopril (PRINIVIL,ZESTRIL) 10 MG tablet   Yes No    Take 10 mg by mouth Daily.    meloxicam (MOBIC) 7.5 MG tablet   Yes No    Take 7.5 mg by mouth Daily.    pantoprazole (PROTONIX) 40 MG EC tablet   Yes No    Take 40 mg by mouth Daily.    spironolactone (ALDACTONE) 25 MG tablet   Yes No    Take 25 mg by mouth Daily.    tamsulosin (FLOMAX) 0.4 MG capsule 24 hr capsule   Yes No    Take 1 capsule by mouth Every Night.    Vitamin D, Cholecalciferol, (CHOLECALCIFEROL) 400 units tablet   Yes No    Take 400 Units by mouth Daily.         Hospital Scheduled Meds:    sodium chloride 1,000 mL Intravenous Once   vancomycin 20 mg/kg Intravenous Once        ---------------------------------------------------------------------------------------------------------------------   Vital Signs:  Temp:  [97.8 °F (36.6 °C)-98.3 °F (36.8 °C)] 98.3 °F (36.8 °C)  Heart Rate:  [80-82] 80  Resp:  [18] 18  BP: (146-148)/(82-83) 146/83  1    05/18/18  1134   Weight: 79.4 kg (175 lb)     Body mass index is 28.25 kg/m².  ---------------------------------------------------------------------------------------------------------------------       Physical Exam    Physical Exam:  Constitutional:  Well-developed and well-nourished.   No distress  HENT:  Head: Normocephalic and atraumatic.  Mouth:  Moist mucous membranes.    Eyes:  Conjunctivae and EOM are normal.  Pupils are equal, round, and reactive to light.  No scleral icterus.  Neck:  Neck supple.  No JVD present.    Cardiovascular:  Normal rate, regular rhythm.  with no murmur.  Pulmonary/Chest:  No respiratory distress, no wheezes, no crackles, with normal breath sounds and good air movement.  Abdominal:  Soft.  Bowel sounds are present.  No distension and no tenderness.   Musculoskeletal:  No edema, no tenderness, and no deformity.  No red or swollen joints anywhere.    Neurological:  Alert and oriented to person, place, and time.  No cranial nerve deficit.  No tongue deviation.  No facial droop.  No slurred speech.   Skin:  Right middle thigh with abscess, some minimal erythema about the size of a $0.50 piece and slightly open on top about the size of an eraser head, has been draining at home, non currently, is non-fluctuant    Psychiatric:  Normal mood and affect.   Peripheral vascular:  No edema and strong pulses on all 4 extremities.  ---------------------------------------------------------------------------------------------------------------------  EKG:  Normal, image  reviewed    ---------------------------------------------------------------------------------------------------------------------     Results from last 7 days  Lab Units 05/18/18  1207 05/18/18  1155 05/12/18  2245   CRP mg/dL  --  0.76 7.01*   LACTATE mmol/L 2.6*  --  1.2   WBC 10*3/mm3  --  10.58 11.31   HEMOGLOBIN g/dL  --  14.8 13.6*   HEMATOCRIT %  --  42.6 40.1*   MCV fL  --  84.9 85.5   MCHC g/dL  --  34.7 33.9   PLATELETS 10*3/mm3  --  472* 399           Results from last 7 days  Lab Units 05/18/18  1155 05/12/18  2245   SODIUM mmol/L 143 143   POTASSIUM mmol/L 3.0* 3.7   CHLORIDE mmol/L 107 109   CO2 mmol/L 31.4 29.0   BUN mg/dL 7 11   CREATININE mg/dL 1.03 0.92   EGFR IF NONAFRICN AM mL/min/1.73 75 86   CALCIUM mg/dL 9.5 9.3   GLUCOSE mg/dL 94 89   ALBUMIN g/dL 4.40 4.10   BILIRUBIN mg/dL 0.3 0.3   ALK PHOS U/L 80 78   AST (SGOT) U/L 16 14   ALT (SGPT) U/L 19 20   Estimated Creatinine Clearance: 81.2 mL/min (by C-G formula based on SCr of 1.03 mg/dL).  No results found for: AMMONIA          No results found for: HGBA1C  Lab Results   Component Value Date    TSH 1.162 01/20/2015    FREET4 1.19 06/10/2014     No results found for: PREGTESTUR, PREGSERUM, HCG, HCGQUANT  Pain Management Panel     Pain Management Panel Latest Ref Rng & Units 9/6/2014    AMPHETAMINES SCREEN, URINE NEGATIVE Negative    BARBITURATES SCREEN NEGATIVE Negative    BENZODIAZEPINE SCREEN, URINE NEGATIVE Negative    COCAINE SCREEN, URINE NEGATIVE Negative    METHADONE SCREEN, URINE NEGATIVE Negative        Blood Culture   Date Value Ref Range Status   05/12/2018 Staphylococcus epidermidis (A)  Final     Comment:     This isolate is presumed to be clindamyin resistant based on detection of inducible clindamycin resistance. Clindamycin may still be effective in some patients.     05/12/2018 Staphylococcus epidermidis (A)  Final     Comment:     This isolate is presumed to be clindamyin resistant based on detection of inducible clindamycin  resistance. Clindamycin may still be effective in some patients.          Wound Culture   Date Value Ref Range Status   05/12/2018 Heavy growth (4+) Staphylococcus aureus (A)  Final     Comment:     This isolate does not demonstrate inducible clindamycin resistance in vitro.            ---------------------------------------------------------------------------------------------------------------------  Imaging Results (last 7 days)     ** No results found for the last 168 hours. **          Cultures:  Blood Culture   Date Value Ref Range Status   05/12/2018 Staphylococcus epidermidis (A)  Final     Comment:     This isolate is presumed to be clindamyin resistant based on detection of inducible clindamycin resistance. Clindamycin may still be effective in some patients.     05/12/2018 Staphylococcus epidermidis (A)  Final     Comment:     This isolate is presumed to be clindamyin resistant based on detection of inducible clindamycin resistance. Clindamycin may still be effective in some patients.       Wound Culture   Date Value Ref Range Status   05/12/2018 Heavy growth (4+) Staphylococcus aureus (A)  Final     Comment:     This isolate does not demonstrate inducible clindamycin resistance in vitro.         I have personally reviewed the radiology images and read the final radiology report.  ---------------------------------------------------------------------------------------------------------------------  Assessment and Plan:    -Bacteremia r/t right middle thigh abscess  -Lactic acidosis   -Acute Hypokalemia  -Hypertension  -Tobacco user    Will admit to the medical floor for IV antibiotics. ID consulted, blood cultures repeated in the ED today. Vancomycin started in the ED, pharmacy consulted to dose. Lactic acid was 2.6, Now improved.   WBC is 10.58, neutrophils are 66.8, CRP is 0.76, no fevers, no tachypnea or hypotensive episodes. Repeat labs in AM. Potassium level is 3.0, potassium protocol ordered.   Overall the abscess appears be getting better and will improve the abscess certainly is not the same organism that is in the blood.  Overall this would most likely be a contaminant with a low inflammatory markers however it was 2/2.  He has no permanent lines in place no pacemaker.  We have asked infectious disease for his input on whether this should be treated but will continue vancomycin until that time.    Hypertension, will review home medications and adjust as needed    DVT prophylaxis: Heparin SQ BID, SCDs    Tobacco use: patient educated on the dangers of tobacco use and urged to quit, he states he does not need Nicoderm patch at this time.     Irena Garcia, APRN  05/18/18  1:37 PM  ---------------------------------------------------------------------------------------------------------------------       Electronically signed by Dusty Carr MD at 5/18/2018  6:49 PM             ICU Vital Signs     Row Name 05/21/18 1110 05/21/18 0825 05/21/18 0624 05/21/18 0000 05/20/18 2014       Vitals    Temp 97.8 °F (36.6 °C)  -- 97.6 °F (36.4 °C)  --  --    Temp src Oral  -- Oral  --  --    Pulse 80  -- 77 72  --    Heart Rate Source Monitor  -- Monitor Monitor  --    Resp 20  -- 18 20  --    Resp Rate Source Visual  -- Visual Visual  --    /74  -- 145/85 129/69  --    BP Location Right arm  -- Right arm Right arm  --    BP Method Automatic  -- Automatic Automatic  --    Patient Position Lying  -- Lying Lying  --       Oxygen Therapy    SpO2 100 %  -- 100 %  -- 95 %    Pulse Oximetry Type Intermittent  -- Intermittent  -- Intermittent    Device (Oxygen Therapy) room air room air room air  -- room air    Row Name 05/20/18 2000 05/20/18 1900 05/20/18 1526 05/20/18 1148          Vitals    Temp  -- 98.4 °F (36.9 °C)  -- 98 °F (36.7 °C)     Temp src  -- Oral  -- Axillary     Pulse  -- 86  -- 85     Heart Rate Source  -- Monitor  -- Monitor     Resp  -- 20  -- 20     Resp Rate Source  -- Visual  -- Visual      "BP  -- 149/79  -- 150/74     BP Location  -- Right arm  -- Right arm     BP Method  -- Automatic  -- Automatic     Patient Position  -- Lying  -- Lying        Oxygen Therapy    SpO2  -- 95 % 95 %  --     Device (Oxygen Therapy) room air  -- room air  --         Intake & Output (last day)       05/20 0701 - 05/21 0700 05/21 0701 - 05/22 0700    P.O. 1200 360    Total Intake(mL/kg) 1200 (16) 360 (4.8)    Urine (mL/kg/hr) 1500 (0.8)     Total Output 1500      Net -300 +360          Unmeasured Urine Occurrence 3 x         Hospital Medications (active)       Dose Frequency Start End    aspirin EC tablet 325 mg 325 mg Daily 5/19/2018     Sig - Route: Take 1 tablet by mouth Daily. - Oral    cholecalciferol (VITAMIN D3) capsule 50,000 Units 50,000 Units Weekly 5/20/2018     Sig - Route: Take 1 capsule by mouth 1 (One) Time Per Week. - Oral    heparin (porcine) 5000 UNIT/ML injection 5,000 Units 5,000 Units Every 12 Hours Scheduled 5/18/2018     Sig - Route: Inject 1 mL under the skin Every 12 (Twelve) Hours. - Subcutaneous    labetalol (NORMODYNE) tablet 100 mg 100 mg Every 12 Hours Scheduled 5/18/2018     Sig - Route: Take 1 tablet by mouth Every 12 (Twelve) Hours. - Oral    lisinopril (PRINIVIL,ZESTRIL) tablet 10 mg 10 mg Daily 5/19/2018     Sig - Route: Take 1 tablet by mouth Daily. - Oral    meloxicam (MOBIC) tablet 15 mg 15 mg Daily 5/19/2018     Sig - Route: Take 2 tablets by mouth Daily. - Oral    nicotine (NICODERM CQ) 21 MG/24HR patch 1 patch 1 patch Every 24 Hours Scheduled 5/19/2018     Sig - Route: Place 1 patch on the skin Daily. - Transdermal    pantoprazole (PROTONIX) EC tablet 40 mg 40 mg Every Early Morning 5/19/2018     Sig - Route: Take 1 tablet by mouth Every Morning. - Oral    potassium chloride (KLOR-CON) packet 40 mEq 40 mEq As Needed 5/18/2018     Sig - Route: Take 40 mEq by mouth As Needed (potassium replacement, see admin instructions). - Oral    Linked Group 1:  \"Or\" Linked Group Details        " "potassium chloride (MICRO-K) CR capsule 40 mEq 40 mEq As Needed 5/18/2018     Sig - Route: Take 4 capsules by mouth As Needed (potassium replacement.  see admin instructions). - Oral    Linked Group 1:  \"Or\" Linked Group Details        potassium chloride 10 mEq in 100 mL IVPB 10 mEq Every 1 Hour PRN 5/18/2018     Sig - Route: Infuse 100 mL into a venous catheter Every 1 (One) Hour As Needed (potassium protocol PERIPHERAL - see admin instructions). - Intravenous    Linked Group 1:  \"Or\" Linked Group Details        sodium chloride 0.9 % flush 1-10 mL 1-10 mL As Needed 5/18/2018     Sig - Route: Infuse 1-10 mL into a venous catheter As Needed for Line Care. - Intravenous    sodium chloride 0.9 % flush 10 mL 10 mL As Needed 5/18/2018     Sig - Route: Infuse 10 mL into a venous catheter As Needed for Line Care. - Intravenous    Cosign for Ordering: Accepted by Jah Guzman MD on 5/20/2018  4:33 PM    Linked Group 2:  \"And\" Linked Group Details        vancomycin (VANCOCIN) 1,250 mg in sodium chloride 0.9 % 250 mL IVPB 1,250 mg Every 12 Hours 5/20/2018 6/1/2018    Sig - Route: Infuse 1,250 mg into a venous catheter Every 12 (Twelve) Hours. - Intravenous    vancomycin (VANCOCIN) 1,000 mg in sodium chloride 0.9 % 250 mL IVPB (Discontinued) 1,000 mg Every 12 Hours 5/19/2018 5/20/2018    Sig - Route: Infuse 1,000 mg into a venous catheter Every 12 (Twelve) Hours. - Intravenous            Lab Results (last 24 hours)     Procedure Component Value Units Date/Time    Osmolality, Calculated [[id number]]  (Normal) Collected:  05/21/18 0506    Specimen:  Blood Updated:  05/21/18 0618     Osmolality Calc 278.5 mOsm/kg     Basic Metabolic Panel [[id number]]  (Normal) Collected:  05/21/18 0506    Specimen:  Blood Updated:  05/21/18 0613     Glucose 86 mg/dL      BUN 7 mg/dL      Creatinine 0.92 mg/dL      Sodium 141 mmol/L      Potassium 3.8 mmol/L      Chloride 109 mmol/L      CO2 27.2 mmol/L      Calcium 9.0 mg/dL      " eGFR Non African Amer 86 mL/min/1.73      BUN/Creatinine Ratio 7.6     Anion Gap 4.8 mmol/L     Narrative:       GFR Normal >60  Chronic Kidney Disease <60  Kidney Failure <15    C-reactive Protein [[id number]]  (Normal) Collected:  05/21/18 0506    Specimen:  Blood Updated:  05/21/18 0613     C-Reactive Protein <0.50 mg/dL     CBC & Differential [[id number]] Collected:  05/21/18 0506    Specimen:  Blood Updated:  05/21/18 0541    Narrative:       The following orders were created for panel order CBC & Differential.  Procedure                               Abnormality         Status                     ---------                               -----------         ------                     CBC Auto Differential[[id number]]        Abnormal            Final result                 Please view results for these tests on the individual orders.    CBC Auto Differential [[id number]]  (Abnormal) Collected:  05/21/18 0506    Specimen:  Blood Updated:  05/21/18 0541     WBC 12.27 10*3/mm3      RBC 4.41 (L) 10*6/mm3      Hemoglobin 12.6 (L) g/dL      Hematocrit 38.3 (L) %      MCV 86.8 fL      MCH 28.6 pg      MCHC 32.9 (L) g/dL      RDW 13.3 %      RDW-SD 41.5 fl      MPV 9.4 fL      Platelets 406 (H) 10*3/mm3      Neutrophil % 54.1 %      Lymphocyte % 30.9 %      Monocyte % 9.1 %      Eosinophil % 3.3 %      Basophil % 0.9 %      Immature Grans % 1.7 (H) %      Neutrophils, Absolute 6.64 (H) 10*3/mm3      Lymphocytes, Absolute 3.79 (H) 10*3/mm3      Monocytes, Absolute 1.12 (H) 10*3/mm3      Eosinophils, Absolute 0.40 10*3/mm3      Basophils, Absolute 0.11 10*3/mm3      Immature Grans, Absolute 0.21 (H) 10*3/mm3     Blood Culture - Blood, [[id number]]  (Normal) Collected:  05/18/18 1155    Specimen:  Blood from Arm, Right Updated:  05/20/18 1230     Blood Culture No growth at 2 days    Blood Culture - Blood, [[id number]]  (Normal) Collected:  05/18/18 1207    Specimen:  Blood from Arm, Left Updated:  05/20/18 1230     Blood Culture  No growth at 2 days    Vancomycin, Trough [[id number]]  (Normal) Collected:  05/20/18 1141    Specimen:  Blood Updated:  05/20/18 1228     Vancomycin Trough 12.50 mcg/mL         Imaging Results (last 24 hours)     ** No results found for the last 24 hours. **        Orders (last 24 hrs)     Start     Ordered    05/22/18 0900  Vancomycin, Trough  Timed      05/21/18 1101    05/22/18 0800  A vancomycin trough level has been ordered prior to the 10:00 dose 5/22. Draw level at 09:00 on 5/22.  Hold dose if trough level is greater than 20 mg/L.  Nursing Communication  Once     Comments:  A vancomycin trough level has been ordered prior to the 10:00 dose 5/22. Draw level at 09:00 on 5/22.  Hold dose if trough level is greater than 20 mg/L.    05/21/18 1101    05/21/18 0601  Osmolality, Calculated  Once      05/21/18 0600    05/21/18 0600  CBC & Differential  Morning Draw      05/20/18 1457    05/21/18 0600  Basic Metabolic Panel  Morning Draw      05/20/18 1457    05/21/18 0600  C-reactive Protein  Morning Draw      05/20/18 1457    05/21/18 0600  CBC Auto Differential  PROCEDURE ONCE      05/21/18 0002    05/21/18 0000  Ambulatory Referral to Home Health      05/21/18 1049    05/20/18 2200  vancomycin (VANCOCIN) 1,250 mg in sodium chloride 0.9 % 250 mL IVPB  Every 12 Hours      05/20/18 1455    05/20/18 1457  Inpatient PICC Consult  Once     Comments:  After Line Placement, Flushes and Line Care Should Be Ordered Using the Line Care (Flushes & Dressing Changes) - All Line Types Order Set   Provider:  (Not yet assigned)    05/20/18 1457    05/20/18 1457  Inpatient Case Management  Consult  Once     Provider:  (Not yet assigned)    05/20/18 1457    05/20/18 0900  cholecalciferol (VITAMIN D3) capsule 50,000 Units  Weekly      05/18/18 2130    05/19/18 1600  nicotine (NICODERM CQ) 21 MG/24HR patch 1 patch  Every 24 Hours Scheduled      05/19/18 1445    05/19/18 0900  meloxicam (MOBIC) tablet 15 mg  Daily       05/18/18 2130 05/19/18 0900  lisinopril (PRINIVIL,ZESTRIL) tablet 10 mg  Daily      05/18/18 2130 05/19/18 0900  aspirin EC tablet 325 mg  Daily      05/18/18 2130 05/19/18 0600  pantoprazole (PROTONIX) EC tablet 40 mg  Every Early Morning      05/18/18 2130 05/19/18 0000  vancomycin (VANCOCIN) 1,000 mg in sodium chloride 0.9 % 250 mL IVPB  Every 12 Hours,   Status:  Discontinued      05/18/18 1819 05/18/18 2300  labetalol (NORMODYNE) tablet 100 mg  Every 12 Hours Scheduled      05/18/18 2130 05/18/18 2100  heparin (porcine) 5000 UNIT/ML injection 5,000 Units  Every 12 Hours Scheduled      05/18/18 1728 05/18/18 1728  potassium chloride (MICRO-K) CR capsule 40 mEq  As Needed      05/18/18 1728    05/18/18 1728  potassium chloride (KLOR-CON) packet 40 mEq  As Needed      05/18/18 1728    05/18/18 1728  potassium chloride 10 mEq in 100 mL IVPB  Every 1 Hour PRN      05/18/18 1728    05/18/18 1728  sodium chloride 0.9 % flush 1-10 mL  As Needed      05/18/18 1728    05/18/18 1138  sodium chloride 0.9 % flush 10 mL  As Needed      05/18/18 1138    Unscheduled  Magnesium  As Needed      05/18/18 1728    Unscheduled  Potassium  As Needed      05/18/18 1728    --  vitamin D (ERGOCALCIFEROL) 50545 units capsule capsule  Weekly      05/18/18 1618    --  labetalol (NORMODYNE) 300 MG tablet  2 Times Daily      05/18/18 1618    --  lisinopril (PRINIVIL,ZESTRIL) 20 MG tablet  Daily      05/18/18 1618    --  meloxicam (MOBIC) 15 MG tablet  Daily      05/18/18 1618    --  fexofenadine-pseudoephedrine (ALLEGRA-D 24) 180-240 MG per 24 hr tablet  Daily PRN      05/18/18 1618    --  Omega-3 Fatty Acids (FISH OIL) 1000 MG capsule capsule  Daily With Breakfast      05/18/18 1618    --  aspirin  MG tablet  Daily      05/18/18 1618          Operative/Procedure Notes (most recent note)     No notes of this type exist for this encounter.           Physician Progress Notes (most recent note)      Dusty Carr MD  at 5/20/2018  2:55 PM            I have seen the patient in conjunction with Breann and wife    History of presenting illness:  Patient states he is doing well .  No chest pain or shortness breath no abdominal pain.  He states his abscess is healing.  He denies any IV drug use.  Risk of using align outside of its intended use discussed.    Vital Signs  Temp:  [98 °F (36.7 °C)-98.9 °F (37.2 °C)] 98 °F (36.7 °C)  Heart Rate:  [69-85] 85  Resp:  [18-20] 20  BP: (123-150)/(69-77) 150/74  Body mass index is 26.62 kg/m².      Intake/Output Summary (Last 24 hours) at 05/20/18 1455  Last data filed at 05/20/18 1332   Gross per 24 hour   Intake             1200 ml   Output             2500 ml   Net            -1300 ml     Intake & Output (last 3 days)       05/17 0701 - 05/18 0700 05/18 0701 - 05/19 0700 05/19 0701 - 05/20 0700 05/20 0701 - 05/21 0700    P.O.  360 1320 840    IV Piggyback  1600      Total Intake(mL/kg)  1960 (26.2) 1320 (17.6) 840 (11.2)    Urine (mL/kg/hr)  950 1000 (0.6) 1500 (2.5)    Stool  0      Total Output   950 1000 1500    Net   +1010 +320 -660            Unmeasured Urine Occurrence   3 x     Unmeasured Stool Occurrence  0 x 0 x           Physical exam:  Physical Exam   Constitutional: Well-developed, well-nourished.  Pleasant.  No distress  Head: Normocephalic and atraumatic.  Hearing is intact, mucous membranes are moist.   Cardiovascular: Normal rate, regular rhythm and normal heart sounds.  No murmur rub or gallop  Pulmonary/Chest: Bilateral breath sounds no rhonchus rales or wheezing heard  Abdominal: Soft, flat, bowel sounds are active, nondistended nontender.  No peritoneal signs   Musculoskeletal: Right thigh abscess No change, healing  Neurological: Nonfocal, alert.    Skin: Skin is warm and dry.   Psychiatric:  Normal mood and affect.     Results Review:  Lab Results   Component Value Date    WBC 12.89 (H) 05/20/2018    HGB 12.7 (L) 05/20/2018    HCT 38.8 (L) 05/20/2018    MCV 87.4  05/20/2018     05/20/2018     Lab Results   Component Value Date    GLUCOSE 106 05/20/2018    BUN 6 (L) 05/20/2018    CREATININE 0.90 05/20/2018    EGFRIFNONA 88 05/20/2018    BCR 6.7 (L) 05/20/2018    CO2 28.2 05/20/2018    CALCIUM 9.0 05/20/2018    ALBUMIN 3.40 (L) 05/19/2018    LABIL2 1.4 (L) 05/19/2018    AST 14 05/19/2018    ALT 15 05/19/2018       Imaging Results (last 72 hours)     ** No results found for the last 72 hours. **            Assessment/Plan     Active Problems:  Bacteremia, Staph epidermidis, 2/2, recommendations for infectious disease noted, will treat with IV vancomycin for 2 weeks.  We'll plan PICC line placement when available and possibly outpatient antibiotics.  Patient's wife who no longer lives with him brought up that he may use drugs,He however denies this.  CRP has normalized, white count is coming down.    Abscess MSSA vancomycin will cover this as well    Hypokalemia improved    DVT prophylaxis subcutaneous heparin    Hypertension, overall controlled    Tobacco abuse, counseled to quit, nicotine patch       Dusty Carr MD  05/20/18  2:55 PM      Electronically signed by Dusty Carr MD at 5/20/2018  3:00 PM          Consult Notes (most recent note)      Mackenzie Platt PA-C at 5/19/2018 12:26 PM      Consult Orders:    1. Inpatient Infectious Diseases Consult [[id number]] ordered by JACOBO Bellamy at 05/18/18 1357                  INFECTIOUS DISEASE CONSULTATION REPORT      Referring Provider: Dr. Carr  Reason for Consultation: Bacteremia      Principal problem: <principal problem not specified>    Subjective .     History of present illness:    As you well know Dr. Carr, Mr. Marcus Bobo is a 52 y.o. years old male with past medical history significant for hypertension , who initially presented to the ED on 5/12/2018 for an abscess to the right thigh.  Patient was prescribed doxycycline and discharged home.  Brought back to the ED as is found to have 2  sets of positive blood cultures showing growth of staph epi out of 2 from 5/12/2018.  Has any fever or chills within the last few days.  Leukocytosis with normal CRP level and elevated lactic acid of 2.6 now normalized.  Case discussed with patient and his wife at bedside.    Infectious Disease consultation was requested for antimicrobial management.     History taken from: patient chart family RN    Case was discussed with patient, family and nursing staff    Review of Systems     Constitutional: no fever, chills and night sweats. No appetite change or unexpected weight change. No fatigue.  Eyes: no eye drainage, itching or redness.  HEENT: no mouth sores, dysphagia or nose bleed.  Respiratory: no for shortness of breath, cough or production of sputum.  Cardiovascular: no chest pain, no palpitations, no orthopnea.  Gastrointestinal: no nausea, vomiting or diarrhea. No abdominal pain, hematemesis or rectal bleeding.  Genitourinary: no dysuria or polyuria.  Hematologic/lymphatic: no lymph node abnormalities, no easy bruising or easy bleeding.  Musculoskeletal: no muscle or joint pain.  Skin: See HPI  Neurological: no loss of consciousness, no seizure, no headache.  Behavioral/Psych: no depression or suicidal ideation.  Endocrine: no hot flashes.  Immunologic: negative.    Past Medical History    Past Medical History:   Diagnosis Date   • Arthritis    • Elevated cholesterol    • GERD (gastroesophageal reflux disease)    • Hypertension        Past Surgical History    Past Surgical History:   Procedure Laterality Date   • VASECTOMY  1993       Family History    History reviewed. No pertinent family history.      Social History    Social History   Substance Use Topics   • Smoking status: Current Every Day Smoker     Packs/day: 1.50     Years: 30.00     Types: Cigarettes   • Smokeless tobacco: Never Used   • Alcohol use No       Allergies    Patient has no known allergies.    Objective     /70 (BP Location: Right  "arm, Patient Position: Lying)   Pulse 77   Temp 98.9 °F (37.2 °C) (Axillary)   Resp 18   Ht 167.6 cm (66\")   Wt 74.8 kg (164 lb 14.4 oz)   SpO2 97%   BMI 26.62 kg/m²      Temp:  [97.9 °F (36.6 °C)-98.9 °F (37.2 °C)] 98.9 °F (37.2 °C)        Intake/Output Summary (Last 24 hours) at 05/19/18 1226  Last data filed at 05/19/18 0400   Gross per 24 hour   Intake             1960 ml   Output              950 ml   Net             1010 ml         Physical Exam:      General Appearance:    Alert, cooperative, in no acute distress   Head:    Normocephalic, without obvious abnormality, atraumatic   Eyes:            Lids and lashes normal, conjunctivae and sclerae normal, no   icterus, no pallor, corneas clear, PERRLA   Ears:    Ears appear intact with no abnormalities noted   Throat:   No oral lesions, no thrush, oral mucosa moist   Neck:   No adenopathy, supple, trachea midline, no thyromegaly, no   carotid bruit, no JVD   Back:     No tenderness to percussion or palpation, range of motion   normal   Lungs:     Clear to auscultation,respirations regular, even and unlabored. No wheezing, no ronchi and no crackles.    Heart:    Regular rhythm and normal rate, normal S1 and S2, no            murmur, no gallop, no rub, no click   Chest Wall:    No abnormalities observed   Abdomen:     Normal bowel sounds, no masses, no organomegaly, soft        non-tender, non-distended, no guarding, no rebound                tenderness   Rectal:     Deferred   Extremities:   Moves all extremities well, no edema, no cyanosis, no             redness   Pulses:   Pulses palpable and equal bilaterally   Skin:   Right middle thigh with abscess with very small amount of redness but no drainage or tenderness    Lymph nodes:   No palpable adenopathy   Neurologic:   Awake, alert and oriented x 3. Following commands.       Results:      Results from last 7 days  Lab Units 05/19/18  0232 05/18/18  1155 05/12/18  2245   WBC 10*3/mm3 13.24* 10.58 11.31 "     Lab Results   Component Value Date    NEUTROABS 7.37 (H) 05/19/2018         Results from last 7 days  Lab Units 05/19/18  0232   CREATININE mg/dL 0.88         Results from last 7 days  Lab Units 05/19/18  0232 05/18/18  1749 05/18/18  1155   CRP mg/dL <0.50 0.53 0.76       Cultures:    Blood Culture   Date Value Ref Range Status   05/18/2018 No growth at less than 24 hours  Preliminary   05/18/2018 No growth at less than 24 hours  Preliminary   05/12/2018 Staphylococcus epidermidis (A)  Final     Comment:     This isolate is presumed to be clindamyin resistant based on detection of inducible clindamycin resistance. Clindamycin may still be effective in some patients.     05/12/2018 Staphylococcus epidermidis (A)  Final     Comment:     This isolate is presumed to be clindamyin resistant based on detection of inducible clindamycin resistance. Clindamycin may still be effective in some patients.       Wound Culture   Date Value Ref Range Status   05/12/2018 Heavy growth (4+) Staphylococcus aureus (A)  Final     Comment:     This isolate does not demonstrate inducible clindamycin resistance in vitro.       Results Review:    I have personally reviewed laboratory data, culture results, radiology studies and antimicrobial therapy.    Hospital Medications (active)       Dose Frequency Start End    aspirin EC tablet 325 mg 325 mg Daily 5/19/2018     Sig - Route: Take 1 tablet by mouth Daily. - Oral    cholecalciferol (VITAMIN D3) capsule 50,000 Units 50,000 Units Weekly 5/20/2018     Sig - Route: Take 1 capsule by mouth 1 (One) Time Per Week. - Oral    heparin (porcine) 5000 UNIT/ML injection 5,000 Units 5,000 Units Every 12 Hours Scheduled 5/18/2018     Sig - Route: Inject 1 mL under the skin Every 12 (Twelve) Hours. - Subcutaneous    labetalol (NORMODYNE) tablet 100 mg 100 mg Every 12 Hours Scheduled 5/18/2018     Sig - Route: Take 1 tablet by mouth Every 12 (Twelve) Hours. - Oral    lisinopril (PRINIVIL,ZESTRIL)  "tablet 10 mg 10 mg Daily 5/19/2018     Sig - Route: Take 1 tablet by mouth Daily. - Oral    meloxicam (MOBIC) tablet 15 mg 15 mg Daily 5/19/2018     Sig - Route: Take 2 tablets by mouth Daily. - Oral    morphine injection 2 mg 2 mg Once 5/18/2018 5/18/2018    Sig - Route: Infuse 1 mL into a venous catheter 1 (One) Time. - Intravenous    pantoprazole (PROTONIX) EC tablet 40 mg 40 mg Every Early Morning 5/19/2018     Sig - Route: Take 1 tablet by mouth Every Morning. - Oral    potassium chloride (K-DUR,KLOR-CON) CR tablet 40 mEq 40 mEq Every 4 Hours 5/19/2018 5/19/2018    Sig - Route: Take 2 tablets by mouth Every 4 (Four) Hours. - Oral    potassium chloride (KLOR-CON) packet 40 mEq 40 mEq As Needed 5/18/2018     Sig - Route: Take 40 mEq by mouth As Needed (potassium replacement, see admin instructions). - Oral    Linked Group 1:  \"Or\" Linked Group Details        potassium chloride (MICRO-K) CR capsule 40 mEq 40 mEq As Needed 5/18/2018     Sig - Route: Take 4 capsules by mouth As Needed (potassium replacement.  see admin instructions). - Oral    Linked Group 1:  \"Or\" Linked Group Details        potassium chloride 10 mEq in 100 mL IVPB 10 mEq Every 1 Hour PRN 5/18/2018     Sig - Route: Infuse 100 mL into a venous catheter Every 1 (One) Hour As Needed (potassium protocol PERIPHERAL - see admin instructions). - Intravenous    Linked Group 1:  \"Or\" Linked Group Details        potassium chloride 10 mEq in 100 mL IVPB 10 mEq Every 1 Hour 5/18/2018 5/19/2018    Sig - Route: Infuse 100 mL into a venous catheter Every 1 (One) Hour. - Intravenous    sodium chloride 0.9 % bolus 1,000 mL 1,000 mL Once 5/18/2018 5/18/2018    Sig - Route: Infuse 1,000 mL into a venous catheter 1 (One) Time. - Intravenous    Cosign for Ordering: Required by Jah Guzman MD    sodium chloride 0.9 % flush 1-10 mL 1-10 mL As Needed 5/18/2018     Sig - Route: Infuse 1-10 mL into a venous catheter As Needed for Line Care. - Intravenous    " "sodium chloride 0.9 % flush 10 mL 10 mL As Needed 5/18/2018     Sig - Route: Infuse 10 mL into a venous catheter As Needed for Line Care. - Intravenous    Cosign for Ordering: Required by Jah Guzman MD    Linked Group 2:  \"And\" Linked Group Details        vancomycin (VANCOCIN) 1,000 mg in sodium chloride 0.9 % 250 mL IVPB 1,000 mg Every 12 Hours 5/19/2018 5/25/2018    Sig - Route: Infuse 1,000 mg into a venous catheter Every 12 (Twelve) Hours. - Intravenous    vancomycin (VANCOCIN) 1,500 mg in sodium chloride 0.9 % 500 mL IVPB 20 mg/kg × 79.4 kg Once 5/18/2018 5/18/2018    Sig - Route: Infuse 1,500 mg into a venous catheter 1 (One) Time. - Intravenous    Cosign for Ordering: Required by Jah Guzman MD    Pharmacy to dose vancomycin (Discontinued)  Continuous PRN 5/18/2018 5/18/2018    Sig - Route: Continuous As Needed for Consult. - Does not apply    Reason for Discontinue: *Therapy completed              Assessment/Plan     ASSESSMENT:    1. Bacteremia    PLAN:    In the setting of 2 sets out of 2 positive blood cultures for staph epi would recommend to continue vancomycin therapy for now.  Patient's culture from his abscess finalizes MSSA.  We will follow repeat blood cultures and adjust antibiotic therapy appropriately. CRP ordered for am. Abscess site with no erythema or drainage currently.  Rickettsial serologies ordered as patient reports he believes the initial cause of the abscess was from a tick bite that he found a few days prior.       Patient's findings and recommendations were discussed with patient, family and nursing staff    Code Status: Full Code    Mackenzie Platt PA-C  05/19/18  12:26 PM      Electronically signed by Mackenzie Platt PA-C at 5/19/2018 12:41 PM       Nutrition Notes (most recent note)     No notes of this type exist for this encounter.        Physical Therapy Notes (most recent note)     No notes of this type exist for this encounter.    "     Occupational Therapy Notes (most recent note)     No notes of this type exist for this encounter.        Speech Language Pathology Notes (most recent note)     No notes of this type exist for this encounter.        Respiratory Therapy Notes (most recent note)     No notes of this type exist for this encounter.        ADL Documentation (most recent)      Most Recent Value   Presence of Pain  denies pain/discomfort   Transferring  0 - independent   Toileting  0 - independent   Bathing  0 - independent   Dressing  0 - independent   Eating  0 - independent   Communication  0 - understands/communicates without difficulty   Swallowing  0 - swallows foods/liquids without difficulty   Equipment Currently Used at Home  none               Discharge Summary      JACOBO Bellamy at 2018 10:50 AM              [facility] DISCHARGE SUMMARY    Patient Identification:  Name:  Marcus Bobo  Age:  52 y.o.  Sex:  male  :  1965  MRN:  [id number]  Visit Number:  [id number]    Date of Admission: 2018  Date of Discharge:  2018     PCP: JACOBO Grimes    DISCHARGE DIAGNOSIS    Primary:  Right thigh abscess with cellulitis  Bacteremia  Questionable tick bite  Essential Hypertension   Tobacco use      CONSULTS     Dr. Ramos, Infectious Disease     PROCEDURES PERFORMED    None    HOSPITAL COURSE  Patient is a 52 y.o. male presented to [facility] complaining of abnormal labs from previous ER visit, positive blood cultures showing staph epi. Please see the admitting history and physical for further details.      Mr. Bobo presented to the ER for a right thigh abscess initially a few days prior to admission, he was treated with doxycycline at that time and sent home, he overall did have improvement of the abscess during that time but his blood cultures from 18 grew staph epi, and his wound culture from 18 also grew staph aureus, he was called back to the  hospital to be admitted for IV antibiotics. On evaluation he admitted to some possible tick bites. He stated on admission he had not had any fever since starting the doxycycline, he remained fever free during his hospitalization. Initially he had a positive lactic acid which resolved quickly, also had leukocytosis at 13.24 on admission, today this is 12.27.  Infectious disease was consulted and have managed IV vancomycin. Tick serology labs have not resulted at this time. ID recommended yesterday to continue IV antibiotics for 2 weeks. He is agreeable to have home health and administer these at home. He is willing to try Nicoderm patches at home, he has been a heavy smoker for 30 years around 1-2 ppd. He has used them here and only smoked one cigarette a day.         VITAL SIGNS:  Temp:  [97.6 °F (36.4 °C)-98.4 °F (36.9 °C)] 97.6 °F (36.4 °C)  Heart Rate:  [72-86] 77  Resp:  [18-20] 18  BP: (129-150)/(69-85) 145/85  SpO2:  [95 %-100 %] 100 %  on   ;   Device (Oxygen Therapy): room air  Body mass index is 26.62 kg/m².  Wt Readings from Last 3 Encounters:   05/18/18 74.8 kg (164 lb 14.4 oz)   05/12/18 79.4 kg (175 lb)   12/13/17 79.4 kg (175 lb)       PHYSICAL EXAM:    Constitutional:  Well-developed and well-nourished.  No respiratory distress.      HENT:  Head: Normocephalic and atraumatic.  Mouth:  Moist mucous membranes.    Eyes:  Conjunctivae and EOM are normal.  Pupils are equal, round, and reactive to light.  No scleral icterus.    Neck:  Neck supple.  No JVD present.    Cardiovascular:  Normal rate, regular rhythm and normal heart sounds with no murmur.  Pulmonary/Chest:  No respiratory distress, no wheezes, no crackles, with normal breath sounds and good air movement.  Abdominal:  Soft.  Bowel sounds are normal.  No distension and no tenderness.   Musculoskeletal:  No edema, no tenderness, and no deformity.  No red or swollen joints anywhere.    Neurological:  Alert and oriented to person, place, and time.   No cranial nerve deficit.  No tongue deviation.  No facial droop.  No slurred speech.   Skin: Skin is warm and dry. No rash noted. No pallor. Right thigh abscess no drainage no warmth or redness noted.   Psychiatric:  Normal mood and affect.  Behavior is normal.  Judgment and thought content normal.   Peripheral vascular:  strong pulses on all 4 extremities with no clubbing, no cyanosis, and no edema.    DISCHARGE DISPOSITION   Stable    DISCHARGE MEDICATIONS:   Jeramie Marcus L   Home Medication Instructions SHUN:[id number]    Printed on:05/21/18 1057   Medication Information                      aspirin  MG tablet  Take 325 mg by mouth Daily.             fexofenadine-pseudoephedrine (ALLEGRA-D 24) 180-240 MG per 24 hr tablet  Take 1 tablet by mouth Daily As Needed for Allergies.             labetalol (NORMODYNE) 300 MG tablet  Take 300 mg by mouth 2 (Two) Times a Day.             lisinopril (PRINIVIL,ZESTRIL) 20 MG tablet  Take 20 mg by mouth Daily.             meloxicam (MOBIC) 15 MG tablet  Take 15 mg by mouth Daily.             Omega-3 Fatty Acids (FISH OIL) 1000 MG capsule capsule  Take 1,000 mg by mouth Daily With Breakfast.             pantoprazole (PROTONIX) 40 MG EC tablet  Take 40 mg by mouth Daily.             vitamin D (ERGOCALCIFEROL) 09067 units capsule capsule  Take 50,000 Units by mouth 1 (One) Time Per Week. Prior to [facility] Admission, Patient was on: takes on sundays                   No future appointments.    Additional Instructions for the Follow-ups that You Need to Schedule     Ambulatory Referral to Home Health    As directed      Face to Face Visit Date:  5/21/2018    Follow-up Provider for Plan of Care?:  I treated the patient in an acute care facility and will not continue treatment after discharge.    Follow-up Provider:  ELVIRA PUCKETT [[id number]]    Reason/Clinical Findings:  IV antibiotics for positive blood cultures needing vanco infusion    Describe mobility limitations that  make leaving home difficult:  Frequent IV antibiotics infusion    Nursing/Therapeutic Services Requested:  Skilled Nursing    Skilled nursing orders:  Infusion therapy PICC line care/instruction    Frequency:  1 Week 1                TEST  RESULTS PENDING AT DISCHARGE   Order Current Status    B. Burgdorferi Antibodies, WB Reflex In process    Babesia Microti Antibody Panel In process    Ehrlichia Antibody Panel In process    [place] Spotted Fever, IgM In process    [facility] Spotted Fever, IgG In process    Blood Culture - Blood, Preliminary result    Blood Culture - Blood, Preliminary result           CODE STATUS  Full Code    JACOBO Bellamy  05/21/18  10:50 AM    Please note that this discharge summary required more than 30 minutes to complete.    Please send a copy of this dictation to the following providers:  JACOBO Grimes    Electronically signed by JACOBO Bellamy at 5/21/2018 11:13 AM       Discharge Order     None

## 2018-05-21 NOTE — DISCHARGE SUMMARY
[facility] HOSPITALISTS DISCHARGE SUMMARY    Patient Identification:  Name:  Marcus Bobo  Age:  52 y.o.  Sex:  male  :  1965  MRN:  [id number]  Visit Number:  [id number]    Date of Admission: 2018  Date of Discharge:  2018     PCP: Sonia Acosta, APRN    DISCHARGE DIAGNOSIS    Primary:  Right thigh abscess with cellulitis, MSSA  MSSA Bacteremia  Questionable tick bite  Essential Hypertension, currently controlled  Hypokalemia, resolved  Tobacco use    CONSULTS     Dr. Ramos, Infectious Disease     PROCEDURES PERFORMED    None    HOSPITAL COURSE  Patient is a 52 y.o. male presented to [facility] complaining of abnormal labs from previous ER visit, positive blood cultures showing staph epi. Please see the admitting history and physical for further details.      Mr. Bobo presented to the ER for a right thigh abscess initially a few days prior to admission, he was treated with doxycycline at that time and sent home, he overall did have improvement of the abscess during that time but his blood cultures from 18 grew staph epidermidis, and his wound culture from 18 also grew staph aureus (MSSA). He was called back to the hospital to be admitted for IV antibiotics. On evaluation he admitted to some possible tick bites. He stated on admission he had not had any fever since starting the doxycycline, he remained fever free during his hospitalization. Initially he had a positive lactic acid which resolved quickly, also had leukocytosis at 13.24 on admission, today this is 12.27.  Infectious disease was consulted and have managed IV vancomycin. Tick serology labs have not resulted at this time. ID recommended yesterday to continue IV antibiotics for 2 weeks. He is agreeable to have home health and administer these at home. He is willing to try Nicoderm patches at home, he has been a heavy smoker for 30 years around 1-2 ppd. He has used them here and only smoked one  cigarette a day.     The patient is awaiting PICC line placement. The patient, his wife and myself discussed appropriate handling and management of his PICC line. Home health for IV antibiotics and PICC line management has been arranged.    VITAL SIGNS:  Temp:  [97.6 °F (36.4 °C)-98.4 °F (36.9 °C)] 97.6 °F (36.4 °C)  Heart Rate:  [72-86] 77  Resp:  [18-20] 18  BP: (129-150)/(69-85) 145/85  SpO2:  [95 %-100 %] 100 %  on   ;   Device (Oxygen Therapy): room air  Body mass index is 26.62 kg/m².  Wt Readings from Last 3 Encounters:   05/18/18 74.8 kg (164 lb 14.4 oz)   05/12/18 79.4 kg (175 lb)   12/13/17 79.4 kg (175 lb)       PHYSICAL EXAM:    Constitutional:  Well-developed and well-nourished.  No respiratory distress.      HENT:  Head: Normocephalic and atraumatic.  Mouth:  Moist mucous membranes.    Eyes:  Conjunctivae and EOM are normal.  Pupils are equal, round, and reactive to light.  No scleral icterus.    Neck:  Neck supple.  No JVD present.    Cardiovascular:  Normal rate, regular rhythm and normal heart sounds with no murmur.  Pulmonary/Chest:  No respiratory distress, no wheezes, no crackles, with normal breath sounds and good air movement.  Abdominal:  Soft.  Bowel sounds are normal.  No distension and no tenderness.   Musculoskeletal:  No edema, no tenderness, and no deformity.  No red or swollen joints anywhere.    Neurological:  Alert and oriented to person, place, and time.  No cranial nerve deficit.  No tongue deviation.  No facial droop.  No slurred speech.   Skin: Skin is warm and dry. No rash noted. No pallor. Right thigh abscess no drainage no warmth or redness noted. Small, healed/scabbed lesion with no streaking.  Psychiatric:  Normal mood and affect.  Behavior is normal.  Judgment and thought content normal.   Peripheral vascular:  strong pulses on all 4 extremities with no clubbing, no cyanosis, and no edema.    Present during visit: Patient's wife and RICK Brooks    DISCHARGE DISPOSITION    Stable    DISCHARGE MEDICATIONS:   Jeramie Marcus L   Home Medication Instructions SHUN:[id number]    Printed on:05/21/18 1050   Medication Information                      aspirin  MG tablet  Take 325 mg by mouth Daily.             fexofenadine-pseudoephedrine (ALLEGRA-D 24) 180-240 MG per 24 hr tablet  Take 1 tablet by mouth Daily As Needed for Allergies.             labetalol (NORMODYNE) 300 MG tablet  Take 300 mg by mouth 2 (Two) Times a Day.             lisinopril (PRINIVIL,ZESTRIL) 20 MG tablet  Take 20 mg by mouth Daily.             meloxicam (MOBIC) 15 MG tablet  Take 15 mg by mouth Daily.             Omega-3 Fatty Acids (FISH OIL) 1000 MG capsule capsule  Take 1,000 mg by mouth Daily With Breakfast.             pantoprazole (PROTONIX) 40 MG EC tablet  Take 40 mg by mouth Daily.             vitamin D (ERGOCALCIFEROL) 83083 units capsule capsule  Take 50,000 Units by mouth 1 (One) Time Per Week. Prior to Denominational Admission, Patient was on: takes on sundays                   No future appointments.    Additional Instructions for the Follow-ups that You Need to Schedule     Ambulatory Referral to Home Health    As directed      Face to Face Visit Date:  5/21/2018    Follow-up Provider for Plan of Care?:  I treated the patient in an acute care facility and will not continue treatment after discharge.    Follow-up Provider:  ELVIRA PUCKETT [[id number]]    Reason/Clinical Findings:  IV antibiotics for positive blood cultures needing vanco infusion    Describe mobility limitations that make leaving home difficult:  Frequent IV antibiotics infusion    Nursing/Therapeutic Services Requested:  Skilled Nursing    Skilled nursing orders:  Infusion therapy PICC line care/instruction    Frequency:  1 Week 1                TEST  RESULTS PENDING AT DISCHARGE   Order Current Status    B. Burgdorferi Antibodies, WB Reflex In process    Babesia Microti Antibody Panel In process    Ehrlichia Antibody Panel In process     [place] Spotted Fever, IgM In process    [facility] Spotted Fever, IgG In process    Blood Culture - Blood, Preliminary result    Blood Culture - Blood, Preliminary result           CODE STATUS  Full Code    JACOBO Bellamy  05/21/18  10:50 AM    Please note that this discharge summary required more than 30 minutes to complete.    Please send a copy of this dictation to the following providers:  JACOBO Grimes

## 2018-05-21 NOTE — PROGRESS NOTES
Pharmacy was consulted for smoking cessation.  According to his chart, Irena Garcia noted that he has been talked to about smoking cessation and agreed to try nicotine patches which has been ordered at discharge.    Thank You;  Maria Luisa Quintana [facility]  05/21/18  12:16 PM

## 2018-05-21 NOTE — CONSULTS
Assessment:  Diagnosis: bacteremia    No Known Allergies    Order Date/Time05/20/2018 1457  Indications: Long Term Antibiotics   LABS:  No results found for: INR, PROTIME  No results found for: PTT  Lab Results   Component Value Date    WBC 12.27 05/21/2018    HGB 12.6 (L) 05/21/2018    HCT 38.3 (L) 05/21/2018    MCV 86.8 05/21/2018     (H) 05/21/2018     Lab Results   Component Value Date    BUN 7 05/21/2018     Lab Results   Component Value Date    CREATININE 0.92 05/21/2018     Lab Results   Component Value Date    EGFRIFNONA 86 05/21/2018     Labs Reviewed: WNL    Contraindications for PICC/Midline:  No Contraindication      Recommendations:  Peripherally inserted central catheter    Procedure Time Out:  Time out Time 05/21/18 1300  Correct Patient Identity: Yes  Correct Surgical Side and Site Are Marked: Yes  Agreement on Procedure to be done: Yes  Antibiotic Given: N/A  RN: tiarra bingham RN  RN: Cecilia Alfred RN  Other: N/A      Tiarra Bingham RN

## 2018-05-21 NOTE — PLAN OF CARE
Problem: Patient Care Overview  Goal: Plan of Care Review  Outcome: Ongoing (interventions implemented as appropriate)   05/18/18 1744 05/20/18 2000   Plan of Care Review   Progress no change --    Coping/Psychosocial   Plan of Care Reviewed With --  patient     Goal: Discharge Needs Assessment  Outcome: Ongoing (interventions implemented as appropriate)   05/18/18 1744 05/19/18 1630   Discharge Needs Assessment   Readmission Within the Last 30 Days --  no previous admission in last 30 days   Concerns to be Addressed no discharge needs identified --    Patient/Family Anticipates Transition to --  home   Patient/Family Anticipated Services at Transition --  home health care   Transportation Concerns --  car, none   Transportation Anticipated --  family or friend will provide  (His wife Vivien will provide transportation at d/c. )   Anticipated Changes Related to Illness --  none   Equipment Needed After Discharge --  none   Outpatient/Agency/Support Group Needs --  homecare agency  (Pt stated he will need Home health for IV antibiotics. )   Discharge Facility/Level of Care Needs --  home with home health   Disability   Equipment Currently Used at Home --  none     Goal: Interprofessional Rounds/Family Conf  Outcome: Ongoing (interventions implemented as appropriate)   05/19/18 2222   Interdisciplinary Rounds/Family Conf   Participants nursing       Problem: Wound (Includes Pressure Injury) (Adult)  Goal: Signs and Symptoms of Listed Potential Problems Will be Absent, Minimized or Managed (Wound)  Outcome: Ongoing (interventions implemented as appropriate)   05/18/18 1744   Goal/Outcome Evaluation   Problems Assessed (Wound) all   Problems Present (Wound) situational response;infection       Problem: Infection, Risk/Actual (Adult)  Goal: Identify Related Risk Factors and Signs and Symptoms  Outcome: Ongoing (interventions implemented as appropriate)   05/18/18 1744   Infection, Risk/Actual (Adult)   Related Risk  Factors (Infection, Risk/Actual) exposure to microbes;skin integrity impairment   Signs and Symptoms (Infection, Risk/Actual) cultures positive;weakness     Goal: Infection Prevention/Resolution  Outcome: Ongoing (interventions implemented as appropriate)   05/18/18 8686   Infection, Risk/Actual (Adult)   Infection Prevention/Resolution making progress toward outcome

## 2018-05-21 NOTE — PROGRESS NOTES
"  I have personally seen and examined the patient today and discussed overnight interval progress and pertinent issues with nursing staff.    Subjective:    Comfortable with no complaints and scheduled for discharge today.  CRP less then 0.5 with normal white count.  Repeat blood culture showing no growth so far.      History taken from: patient chart family RN      Vital Signs    /74 (BP Location: Right arm, Patient Position: Lying)   Pulse 80   Temp 97.8 °F (36.6 °C) (Oral)   Resp 20   Ht 167.6 cm (66\")   Wt 74.8 kg (164 lb 14.4 oz)   SpO2 100%   BMI 26.62 kg/m²     Temp:  [97.6 °F (36.4 °C)-98.4 °F (36.9 °C)] 97.8 °F (36.6 °C)      Intake/Output Summary (Last 24 hours) at 05/21/18 1304  Last data filed at 05/21/18 0753   Gross per 24 hour   Intake             1080 ml   Output             1500 ml   Net             -420 ml     Intake & Output (last 3 days)       05/18 0701 - 05/19 0700 05/19 0701 - 05/20 0700 05/20 0701 - 05/21 0700 05/21 0701 - 05/22 0700    P.O. 360 1320 1200 360    IV Piggyback 1600       Total Intake(mL/kg) 1960 (26.2) 1320 (17.6) 1200 (16) 360 (4.8)    Urine (mL/kg/hr) 950 1000 (0.6) 1500 (0.8)     Stool 0       Total Output 950 1000 1500      Net +1010 +320 -300 +360            Unmeasured Urine Occurrence  3 x 3 x     Unmeasured Stool Occurrence 0 x 0 x            Physical Exam:                 General Appearance:    Alert, cooperative, in no acute distress   Head:    Normocephalic, without obvious abnormality, atraumatic   Eyes:            Lids and lashes normal, conjunctivae and sclerae normal, no   icterus, no pallor, corneas clear, PERRLA   Ears:    Ears appear intact with no abnormalities noted   Throat:   No oral lesions, no thrush, oral mucosa moist   Neck:   No adenopathy, supple, trachea midline, no thyromegaly, no   carotid bruit, no JVD   Back:     No tenderness to percussion or palpation, range of motion   normal   Lungs:     Clear to auscultation,respirations " regular, even and unlabored. No wheezing, no ronchi and no crackles.    Heart:    Regular rhythm and normal rate, normal S1 and S2, no            murmur, no gallop, no rub, no click   Chest Wall:    No abnormalities observed   Abdomen:     Normal bowel sounds, no masses, no organomegaly, soft        non-tender, non-distended, no guarding, no rebound                tenderness   Rectal:     Deferred   Extremities:   Moves all extremities well, no edema, no cyanosis, no             redness   Pulses:   Pulses palpable and equal bilaterally   Skin:   Right middle thigh with abscess with very small amount of redness but no drainage or tenderness    Lymph nodes:   No palpable adenopathy   Neurologic:   Awake, alert and oriented x 3. Following commands.         Results:      Results from last 7 days  Lab Units 05/21/18  0506 05/20/18  0335 05/19/18  0232 05/18/18  1155   WBC 10*3/mm3 12.27 12.89* 13.24* 10.58     Lab Results   Component Value Date    NEUTROABS 6.64 (H) 05/21/2018         Results from last 7 days  Lab Units 05/21/18  0506   CREATININE mg/dL 0.92         Results from last 7 days  Lab Units 05/21/18  0506 05/19/18  0232 05/18/18  1749   CRP mg/dL <0.50 <0.50 0.53     Results Review:    I have personally reviewed laboratory data, culture results, radiology studies and antimicrobial therapy.    Hospital Medications (active)       Dose Frequency Start End    aspirin EC tablet 325 mg 325 mg Daily 5/19/2018     Sig - Route: Take 1 tablet by mouth Daily. - Oral    cholecalciferol (VITAMIN D3) capsule 50,000 Units 50,000 Units Weekly 5/20/2018     Sig - Route: Take 1 capsule by mouth 1 (One) Time Per Week. - Oral    heparin (porcine) 5000 UNIT/ML injection 5,000 Units 5,000 Units Every 12 Hours Scheduled 5/18/2018     Sig - Route: Inject 1 mL under the skin Every 12 (Twelve) Hours. - Subcutaneous    labetalol (NORMODYNE) tablet 100 mg 100 mg Every 12 Hours Scheduled 5/18/2018     Sig - Route: Take 1 tablet by mouth  "Every 12 (Twelve) Hours. - Oral    lisinopril (PRINIVIL,ZESTRIL) tablet 10 mg 10 mg Daily 5/19/2018     Sig - Route: Take 1 tablet by mouth Daily. - Oral    meloxicam (MOBIC) tablet 15 mg 15 mg Daily 5/19/2018     Sig - Route: Take 2 tablets by mouth Daily. - Oral    nicotine (NICODERM CQ) 21 MG/24HR patch 1 patch 1 patch Every 24 Hours Scheduled 5/19/2018     Sig - Route: Place 1 patch on the skin Daily. - Transdermal    pantoprazole (PROTONIX) EC tablet 40 mg 40 mg Every Early Morning 5/19/2018     Sig - Route: Take 1 tablet by mouth Every Morning. - Oral    potassium chloride (K-DUR,KLOR-CON) CR tablet 40 mEq 40 mEq Every 4 Hours 5/19/2018 5/19/2018    Sig - Route: Take 2 tablets by mouth Every 4 (Four) Hours. - Oral    potassium chloride (KLOR-CON) packet 40 mEq 40 mEq As Needed 5/18/2018     Sig - Route: Take 40 mEq by mouth As Needed (potassium replacement, see admin instructions). - Oral    Linked Group 1:  \"Or\" Linked Group Details        potassium chloride (MICRO-K) CR capsule 40 mEq 40 mEq As Needed 5/18/2018     Sig - Route: Take 4 capsules by mouth As Needed (potassium replacement.  see admin instructions). - Oral    Linked Group 1:  \"Or\" Linked Group Details        potassium chloride 10 mEq in 100 mL IVPB 10 mEq Every 1 Hour PRN 5/18/2018     Sig - Route: Infuse 100 mL into a venous catheter Every 1 (One) Hour As Needed (potassium protocol PERIPHERAL - see admin instructions). - Intravenous    Linked Group 1:  \"Or\" Linked Group Details        sodium chloride 0.9 % flush 1-10 mL 1-10 mL As Needed 5/18/2018     Sig - Route: Infuse 1-10 mL into a venous catheter As Needed for Line Care. - Intravenous    sodium chloride 0.9 % flush 10 mL 10 mL As Needed 5/18/2018     Sig - Route: Infuse 10 mL into a venous catheter As Needed for Line Care. - Intravenous    Cosign for Ordering: Required by Jah Guzman MD    Linked Group 2:  \"And\" Linked Group Details        vancomycin (VANCOCIN) 1,000 mg in " sodium chloride 0.9 % 250 mL IVPB 1,000 mg Every 12 Hours 5/19/2018 5/25/2018    Sig - Route: Infuse 1,000 mg into a venous catheter Every 12 (Twelve) Hours. - Intravenous            Cultures:    Blood Culture   Date Value Ref Range Status   05/18/2018 No growth at 24 hours  Preliminary   05/18/2018 No growth at 24 hours  Preliminary           Assessment/Plan     ASSESSMENT:    1. Bacteremia     PLAN:    Comfortable with no complaints and scheduled for discharge today.  CRP less then 0.5 with normal white count.  Repeat blood culture showing no growth so far.     No change to right thigh abscess. No fever or diarrhea. Rickettsial serologies pending.     In the setting of 2 sets out of 2 positive blood cultures for staph epi would recommend to continue vancomycin therapy for a two weeks course.  Patient's culture from his abscess finalizes MSSA.     Current Antimicrobials:    Vancomycin per pharmacy dosing x 2 weeks    Patient's findings and recommendations were discussed with patient and nursing staff    Code Status: Full Code    Mackenzie Platt PA-C  05/21/18  1:04 PM

## 2018-05-22 LAB
A PHAGOCYTOPH IGM TITR SER IF: NEGATIVE {TITER}
B MICROTI IGG TITR SER: NORMAL {TITER}
B MICROTI IGM TITR SER: NORMAL {TITER}
CONV HGE IGG TITER: NEGATIVE
E CHAFFEENSIS IGG TITR SER IF: NEGATIVE {TITER}
E. CHAFFEENSIS (HME) IGM TITER: NEGATIVE
R RICKETTSI IGG SER QL IA: NEGATIVE

## 2018-05-23 ENCOUNTER — LAB REQUISITION (OUTPATIENT)
Dept: LAB | Facility: HOSPITAL | Age: 53
End: 2018-05-23

## 2018-05-23 DIAGNOSIS — R78.81 BACTEREMIA: ICD-10-CM

## 2018-05-23 DIAGNOSIS — Z51.81 ENCOUNTER FOR THERAPEUTIC DRUG LEVEL MONITORING: ICD-10-CM

## 2018-05-23 LAB
ALBUMIN SERPL-MCNC: 3.8 G/DL (ref 3.5–5)
ALBUMIN/GLOB SERPL: 1.7 G/DL (ref 1.5–2.5)
ALP SERPL-CCNC: 72 U/L (ref 40–129)
ALT SERPL W P-5'-P-CCNC: 83 U/L (ref 10–44)
ANION GAP SERPL CALCULATED.3IONS-SCNC: 3 MMOL/L (ref 3.6–11.2)
AST SERPL-CCNC: 62 U/L (ref 10–34)
BACTERIA SPEC AEROBE CULT: NORMAL
BACTERIA SPEC AEROBE CULT: NORMAL
BASOPHILS # BLD AUTO: 0.07 10*3/MM3 (ref 0–0.3)
BASOPHILS NFR BLD AUTO: 0.7 % (ref 0–2)
BILIRUB SERPL-MCNC: 0.2 MG/DL (ref 0.2–1.8)
BUN BLD-MCNC: 8 MG/DL (ref 7–21)
BUN/CREAT SERPL: 9.4 (ref 7–25)
CALCIUM SPEC-SCNC: 9.2 MG/DL (ref 7.7–10)
CHLORIDE SERPL-SCNC: 107 MMOL/L (ref 99–112)
CO2 SERPL-SCNC: 31 MMOL/L (ref 24.3–31.9)
CREAT BLD-MCNC: 0.85 MG/DL (ref 0.43–1.29)
DEPRECATED RDW RBC AUTO: 41.3 FL (ref 37–54)
EOSINOPHIL # BLD AUTO: 0.53 10*3/MM3 (ref 0–0.7)
EOSINOPHIL NFR BLD AUTO: 4.9 % (ref 0–5)
ERYTHROCYTE [DISTWIDTH] IN BLOOD BY AUTOMATED COUNT: 13.5 % (ref 11.5–14.5)
GFR SERPL CREATININE-BSD FRML MDRD: 94 ML/MIN/1.73
GLOBULIN UR ELPH-MCNC: 2.2 GM/DL
GLUCOSE BLD-MCNC: 84 MG/DL (ref 70–110)
HCT VFR BLD AUTO: 38.8 % (ref 42–52)
HGB BLD-MCNC: 12.9 G/DL (ref 14–18)
IMM GRANULOCYTES # BLD: 0.15 10*3/MM3 (ref 0–0.03)
IMM GRANULOCYTES NFR BLD: 1.4 % (ref 0–0.5)
LYMPHOCYTES # BLD AUTO: 2.42 10*3/MM3 (ref 1–3)
LYMPHOCYTES NFR BLD AUTO: 22.5 % (ref 21–51)
MCH RBC QN AUTO: 28.4 PG (ref 27–33)
MCHC RBC AUTO-ENTMCNC: 33.2 G/DL (ref 33–37)
MCV RBC AUTO: 85.5 FL (ref 80–94)
MONOCYTES # BLD AUTO: 0.83 10*3/MM3 (ref 0.1–0.9)
MONOCYTES NFR BLD AUTO: 7.7 % (ref 0–10)
NEUTROPHILS # BLD AUTO: 6.74 10*3/MM3 (ref 1.4–6.5)
NEUTROPHILS NFR BLD AUTO: 62.8 % (ref 30–70)
OSMOLALITY SERPL CALC.SUM OF ELEC: 278.8 MOSM/KG (ref 273–305)
PLATELET # BLD AUTO: 326 10*3/MM3 (ref 130–400)
PMV BLD AUTO: 9.8 FL (ref 6–10)
POTASSIUM BLD-SCNC: 5.5 MMOL/L (ref 3.5–5.3)
PROT SERPL-MCNC: 6 G/DL (ref 6–8)
R RICKETTSI IGM TITR SER: 0.3 INDEX (ref 0–0.89)
RBC # BLD AUTO: 4.54 10*6/MM3 (ref 4.7–6.1)
SODIUM BLD-SCNC: 141 MMOL/L (ref 135–153)
VANCOMYCIN TROUGH SERPL-MCNC: 17 MCG/ML (ref 5–15)
WBC NRBC COR # BLD: 10.74 10*3/MM3 (ref 4.5–12.5)

## 2018-05-23 PROCEDURE — 85025 COMPLETE CBC W/AUTO DIFF WBC: CPT | Performed by: INTERNAL MEDICINE

## 2018-05-23 PROCEDURE — 80202 ASSAY OF VANCOMYCIN: CPT | Performed by: INTERNAL MEDICINE

## 2018-05-23 PROCEDURE — 80053 COMPREHEN METABOLIC PANEL: CPT | Performed by: INTERNAL MEDICINE

## 2018-05-23 NOTE — PAYOR COMM NOTE
"Contact: Amrita Osborn RN @ [facility]  Phone: [phone]  Fax: [phone]    Auth# [id number]  [facility] d/[facility] on 5/21/18    Marcus Noguera (52 y.o. Male)     Date of Birth Social Security Number Address Home Phone MRN    1965  [address] [place], [place] [address] [phone] [id number]    Gnosticist Marital Status          None        Admission Date Admission Type Admitting Provider Attending Provider Department, Room/Bed    5/18/18 Emergency Dusty Carr MD  [address], 3347/1S    Discharge Date Discharge Disposition Discharge Destination        5/21/2018 Home-Health Care Svc              Attending Provider:  (none)   Allergies:  No Known Allergies    Isolation:  None   Infection:  None   Code Status:  Prior    Ht:  167.6 cm (66\")   Wt:  74.8 kg (164 lb 14.4 oz)    Admission Cmt:  None   Principal Problem:  None                Active Insurance as of 5/18/2018     Primary Coverage     Payor Plan Insurance Group Employer/Plan Group    [employer] BLUE [employer] PPO [id number]     Payor Plan Address Payor Plan Phone Number Effective From Effective To    [id number] [phone] 7/1/2016     [facility] [address]       Subscriber Name Subscriber Birth Date Member ID       VIVIEN NOGUERA 2/20/1967 [id number]                 Emergency Contacts      (Rel.) Home Phone Work Phone Mobile Phone    Vivien Noguera (Spouse) [phone] -- --                "

## 2018-05-27 ENCOUNTER — HOSPITAL ENCOUNTER (EMERGENCY)
Facility: HOSPITAL | Age: 53
Discharge: HOME | End: 2018-05-27
Attending: FAMILY MEDICINE | Admitting: NURSE PRACTITIONER

## 2018-05-27 VITALS
HEART RATE: 88 BPM | TEMPERATURE: 97.9 F | WEIGHT: 175 LBS | HEIGHT: 66 IN | SYSTOLIC BLOOD PRESSURE: 169 MMHG | BODY MASS INDEX: 28.12 KG/M2 | RESPIRATION RATE: 16 BRPM | OXYGEN SATURATION: 100 % | DIASTOLIC BLOOD PRESSURE: 88 MMHG

## 2018-05-27 DIAGNOSIS — L27.0 RED MAN SYNDROME: Primary | ICD-10-CM

## 2018-05-27 LAB
BASOPHILS # BLD AUTO: 0.06 10*3/MM3 (ref 0–0.3)
BASOPHILS NFR BLD AUTO: 0.6 % (ref 0–2)
CRP SERPL-MCNC: <0.5 MG/DL (ref 0–0.99)
DEPRECATED RDW RBC AUTO: 41.6 FL (ref 37–54)
EOSINOPHIL # BLD AUTO: 1.01 10*3/MM3 (ref 0–0.7)
EOSINOPHIL NFR BLD AUTO: 9.3 % (ref 0–5)
ERYTHROCYTE [DISTWIDTH] IN BLOOD BY AUTOMATED COUNT: 13.6 % (ref 11.5–14.5)
HCT VFR BLD AUTO: 36.9 % (ref 42–52)
HGB BLD-MCNC: 12.5 G/DL (ref 14–18)
IMM GRANULOCYTES # BLD: 0.1 10*3/MM3 (ref 0–0.03)
IMM GRANULOCYTES NFR BLD: 0.9 % (ref 0–0.5)
LYMPHOCYTES # BLD AUTO: 2.84 10*3/MM3 (ref 1–3)
LYMPHOCYTES NFR BLD AUTO: 26.2 % (ref 21–51)
MCH RBC QN AUTO: 28.9 PG (ref 27–33)
MCHC RBC AUTO-ENTMCNC: 33.9 G/DL (ref 33–37)
MCV RBC AUTO: 85.4 FL (ref 80–94)
MONOCYTES # BLD AUTO: 0.88 10*3/MM3 (ref 0.1–0.9)
MONOCYTES NFR BLD AUTO: 8.1 % (ref 0–10)
NEUTROPHILS # BLD AUTO: 5.97 10*3/MM3 (ref 1.4–6.5)
NEUTROPHILS NFR BLD AUTO: 54.9 % (ref 30–70)
PLATELET # BLD AUTO: 362 10*3/MM3 (ref 130–400)
PMV BLD AUTO: 9 FL (ref 6–10)
RBC # BLD AUTO: 4.32 10*6/MM3 (ref 4.7–6.1)
VANCOMYCIN TROUGH SERPL-MCNC: 46.4 MCG/ML (ref 5–15)
WBC NRBC COR # BLD: 10.86 10*3/MM3 (ref 4.5–12.5)

## 2018-05-27 PROCEDURE — 25010000002 DIPHENHYDRAMINE PER 50 MG: Performed by: NURSE PRACTITIONER

## 2018-05-27 PROCEDURE — 99283 EMERGENCY DEPT VISIT LOW MDM: CPT

## 2018-05-27 PROCEDURE — 86140 C-REACTIVE PROTEIN: CPT | Performed by: NURSE PRACTITIONER

## 2018-05-27 PROCEDURE — 85025 COMPLETE CBC W/AUTO DIFF WBC: CPT | Performed by: NURSE PRACTITIONER

## 2018-05-27 PROCEDURE — 80202 ASSAY OF VANCOMYCIN: CPT | Performed by: NURSE PRACTITIONER

## 2018-05-27 PROCEDURE — 96374 THER/PROPH/DIAG INJ IV PUSH: CPT

## 2018-05-27 RX ORDER — DIPHENHYDRAMINE HYDROCHLORIDE 50 MG/ML
12.5 INJECTION INTRAMUSCULAR; INTRAVENOUS ONCE
Status: COMPLETED | OUTPATIENT
Start: 2018-05-27 | End: 2018-05-27

## 2018-05-27 RX ADMIN — DIPHENHYDRAMINE HYDROCHLORIDE 12.5 MG: 50 INJECTION, SOLUTION INTRAMUSCULAR; INTRAVENOUS at 04:21

## 2018-05-28 ENCOUNTER — LAB REQUISITION (OUTPATIENT)
Dept: LAB | Facility: HOSPITAL | Age: 53
End: 2018-05-28

## 2018-05-28 DIAGNOSIS — Z79.2 LONG TERM CURRENT USE OF ANTIBIOTICS: ICD-10-CM

## 2018-05-28 DIAGNOSIS — I15.9 SECONDARY HYPERTENSION: ICD-10-CM

## 2018-05-28 DIAGNOSIS — D64.9 ANEMIA: ICD-10-CM

## 2018-05-28 LAB
ALBUMIN SERPL-MCNC: 4 G/DL (ref 3.5–5)
ALBUMIN/GLOB SERPL: 1.9 G/DL (ref 1.5–2.5)
ALP SERPL-CCNC: 71 U/L (ref 40–129)
ALT SERPL W P-5'-P-CCNC: 49 U/L (ref 10–44)
ANION GAP SERPL CALCULATED.3IONS-SCNC: 7.4 MMOL/L (ref 3.6–11.2)
AST SERPL-CCNC: 21 U/L (ref 10–34)
BASOPHILS # BLD AUTO: 0.05 10*3/MM3 (ref 0–0.3)
BASOPHILS NFR BLD AUTO: 0.4 % (ref 0–2)
BILIRUB SERPL-MCNC: 0.1 MG/DL (ref 0.2–1.8)
BUN BLD-MCNC: 11 MG/DL (ref 7–21)
BUN/CREAT SERPL: 13.6 (ref 7–25)
CALCIUM SPEC-SCNC: 8.6 MG/DL (ref 7.7–10)
CHLORIDE SERPL-SCNC: 109 MMOL/L (ref 99–112)
CO2 SERPL-SCNC: 25.6 MMOL/L (ref 24.3–31.9)
CREAT BLD-MCNC: 0.81 MG/DL (ref 0.43–1.29)
DEPRECATED RDW RBC AUTO: 42.5 FL (ref 37–54)
EOSINOPHIL # BLD AUTO: 1.03 10*3/MM3 (ref 0–0.7)
EOSINOPHIL NFR BLD AUTO: 9.2 % (ref 0–5)
ERYTHROCYTE [DISTWIDTH] IN BLOOD BY AUTOMATED COUNT: 13.7 % (ref 11.5–14.5)
GFR SERPL CREATININE-BSD FRML MDRD: 99 ML/MIN/1.73
GLOBULIN UR ELPH-MCNC: 2.1 GM/DL
GLUCOSE BLD-MCNC: 79 MG/DL (ref 70–110)
HCT VFR BLD AUTO: 38.6 % (ref 42–52)
HGB BLD-MCNC: 12.9 G/DL (ref 14–18)
IMM GRANULOCYTES # BLD: 0.08 10*3/MM3 (ref 0–0.03)
IMM GRANULOCYTES NFR BLD: 0.7 % (ref 0–0.5)
LYMPHOCYTES # BLD AUTO: 2.08 10*3/MM3 (ref 1–3)
LYMPHOCYTES NFR BLD AUTO: 18.5 % (ref 21–51)
MCH RBC QN AUTO: 28.8 PG (ref 27–33)
MCHC RBC AUTO-ENTMCNC: 33.4 G/DL (ref 33–37)
MCV RBC AUTO: 86.2 FL (ref 80–94)
MONOCYTES # BLD AUTO: 0.99 10*3/MM3 (ref 0.1–0.9)
MONOCYTES NFR BLD AUTO: 8.8 % (ref 0–10)
NEUTROPHILS # BLD AUTO: 7 10*3/MM3 (ref 1.4–6.5)
NEUTROPHILS NFR BLD AUTO: 62.4 % (ref 30–70)
OSMOLALITY SERPL CALC.SUM OF ELEC: 281.4 MOSM/KG (ref 273–305)
PLATELET # BLD AUTO: 370 10*3/MM3 (ref 130–400)
PMV BLD AUTO: 10.3 FL (ref 6–10)
POTASSIUM BLD-SCNC: 3.7 MMOL/L (ref 3.5–5.3)
PROT SERPL-MCNC: 6.1 G/DL (ref 6–8)
RBC # BLD AUTO: 4.48 10*6/MM3 (ref 4.7–6.1)
SODIUM BLD-SCNC: 142 MMOL/L (ref 135–153)
VANCOMYCIN TROUGH SERPL-MCNC: 22.4 MCG/ML (ref 5–15)
WBC NRBC COR # BLD: 11.23 10*3/MM3 (ref 4.5–12.5)

## 2018-05-28 PROCEDURE — 80053 COMPREHEN METABOLIC PANEL: CPT | Performed by: FAMILY MEDICINE

## 2018-05-28 PROCEDURE — 80202 ASSAY OF VANCOMYCIN: CPT | Performed by: FAMILY MEDICINE

## 2018-05-28 PROCEDURE — 85025 COMPLETE CBC W/AUTO DIFF WBC: CPT | Performed by: FAMILY MEDICINE

## 2018-06-01 ENCOUNTER — LAB REQUISITION (OUTPATIENT)
Dept: LAB | Facility: HOSPITAL | Age: 53
End: 2018-06-01

## 2018-06-01 DIAGNOSIS — R78.81 BACTEREMIA: ICD-10-CM

## 2018-06-01 LAB
BASOPHILS # BLD AUTO: 0.12 10*3/MM3 (ref 0–0.3)
BASOPHILS NFR BLD AUTO: 1.2 % (ref 0–2)
DEPRECATED RDW RBC AUTO: 40.1 FL (ref 37–54)
EOSINOPHIL # BLD AUTO: 1.23 10*3/MM3 (ref 0–0.7)
EOSINOPHIL NFR BLD AUTO: 11.9 % (ref 0–5)
ERYTHROCYTE [DISTWIDTH] IN BLOOD BY AUTOMATED COUNT: 13.5 % (ref 11.5–14.5)
HCT VFR BLD AUTO: 38.9 % (ref 42–52)
HGB BLD-MCNC: 13.5 G/DL (ref 14–18)
IMM GRANULOCYTES # BLD: 0.03 10*3/MM3 (ref 0–0.03)
IMM GRANULOCYTES NFR BLD: 0.3 % (ref 0–0.5)
LYMPHOCYTES # BLD AUTO: 1.85 10*3/MM3 (ref 1–3)
LYMPHOCYTES NFR BLD AUTO: 17.9 % (ref 21–51)
MCH RBC QN AUTO: 29 PG (ref 27–33)
MCHC RBC AUTO-ENTMCNC: 34.7 G/DL (ref 33–37)
MCV RBC AUTO: 83.7 FL (ref 80–94)
MONOCYTES # BLD AUTO: 1.04 10*3/MM3 (ref 0.1–0.9)
MONOCYTES NFR BLD AUTO: 10.1 % (ref 0–10)
NEUTROPHILS # BLD AUTO: 6.06 10*3/MM3 (ref 1.4–6.5)
NEUTROPHILS NFR BLD AUTO: 58.6 % (ref 30–70)
PLATELET # BLD AUTO: 279 10*3/MM3 (ref 130–400)
PMV BLD AUTO: 9.9 FL (ref 6–10)
RBC # BLD AUTO: 4.65 10*6/MM3 (ref 4.7–6.1)
WBC NRBC COR # BLD: 10.33 10*3/MM3 (ref 4.5–12.5)

## 2018-06-01 PROCEDURE — 85025 COMPLETE CBC W/AUTO DIFF WBC: CPT | Performed by: FAMILY MEDICINE

## 2018-06-18 ENCOUNTER — TRANSCRIBE ORDERS (OUTPATIENT)
Dept: ADMINISTRATIVE | Facility: HOSPITAL | Age: 53
End: 2018-06-18

## 2018-06-18 ENCOUNTER — LAB (OUTPATIENT)
Dept: LAB | Facility: HOSPITAL | Age: 53
End: 2018-06-18

## 2018-06-18 DIAGNOSIS — L02.91 ABSCESS: Primary | ICD-10-CM

## 2018-06-18 DIAGNOSIS — L02.91 ABSCESS: ICD-10-CM

## 2018-06-18 LAB
ALBUMIN SERPL-MCNC: 3.9 G/DL (ref 3.5–5)
ALBUMIN/GLOB SERPL: 1.6 G/DL (ref 1.5–2.5)
ALP SERPL-CCNC: 71 U/L (ref 40–129)
ALT SERPL W P-5'-P-CCNC: 17 U/L (ref 10–44)
ANION GAP SERPL CALCULATED.3IONS-SCNC: 2.7 MMOL/L (ref 3.6–11.2)
AST SERPL-CCNC: 11 U/L (ref 10–34)
BASOPHILS # BLD AUTO: 0.15 10*3/MM3 (ref 0–0.3)
BASOPHILS NFR BLD AUTO: 1.4 % (ref 0–2)
BILIRUB SERPL-MCNC: 0.2 MG/DL (ref 0.2–1.8)
BUN BLD-MCNC: 10 MG/DL (ref 7–21)
BUN/CREAT SERPL: 9.3 (ref 7–25)
C TRACH RRNA CVX QL NAA+PROBE: NOT DETECTED
CALCIUM SPEC-SCNC: 9.2 MG/DL (ref 7.7–10)
CHLORIDE SERPL-SCNC: 109 MMOL/L (ref 99–112)
CO2 SERPL-SCNC: 32.3 MMOL/L (ref 24.3–31.9)
CREAT BLD-MCNC: 1.07 MG/DL (ref 0.43–1.29)
DEPRECATED RDW RBC AUTO: 41 FL (ref 37–54)
EOSINOPHIL # BLD AUTO: 0.59 10*3/MM3 (ref 0–0.7)
EOSINOPHIL NFR BLD AUTO: 5.4 % (ref 0–5)
ERYTHROCYTE [DISTWIDTH] IN BLOOD BY AUTOMATED COUNT: 13.5 % (ref 11.5–14.5)
GFR SERPL CREATININE-BSD FRML MDRD: 72 ML/MIN/1.73
GLOBULIN UR ELPH-MCNC: 2.4 GM/DL
GLUCOSE BLD-MCNC: 96 MG/DL (ref 70–110)
HAV IGM SERPL QL IA: NORMAL
HBV CORE IGM SERPL QL IA: NORMAL
HBV SURFACE AG SERPL QL IA: NORMAL
HCT VFR BLD AUTO: 37.9 % (ref 42–52)
HCV AB SER DONR QL: NORMAL
HGB BLD-MCNC: 13 G/DL (ref 14–18)
HIV1+2 AB SER QL: NORMAL
IMM GRANULOCYTES # BLD: 0.04 10*3/MM3 (ref 0–0.03)
IMM GRANULOCYTES NFR BLD: 0.4 % (ref 0–0.5)
LYMPHOCYTES # BLD AUTO: 2.35 10*3/MM3 (ref 1–3)
LYMPHOCYTES NFR BLD AUTO: 21.7 % (ref 21–51)
MCH RBC QN AUTO: 28.8 PG (ref 27–33)
MCHC RBC AUTO-ENTMCNC: 34.3 G/DL (ref 33–37)
MCV RBC AUTO: 83.8 FL (ref 80–94)
MONOCYTES # BLD AUTO: 1.23 10*3/MM3 (ref 0.1–0.9)
MONOCYTES NFR BLD AUTO: 11.3 % (ref 0–10)
N GONORRHOEA RRNA SPEC QL NAA+PROBE: NOT DETECTED
NEUTROPHILS # BLD AUTO: 6.49 10*3/MM3 (ref 1.4–6.5)
NEUTROPHILS NFR BLD AUTO: 59.8 % (ref 30–70)
NRBC BLD MANUAL-RTO: 0 /100 WBC (ref 0–0)
OSMOLALITY SERPL CALC.SUM OF ELEC: 285.7 MOSM/KG (ref 273–305)
PLATELET # BLD AUTO: 364 10*3/MM3 (ref 130–400)
PMV BLD AUTO: 10.2 FL (ref 6–10)
POTASSIUM BLD-SCNC: 3.5 MMOL/L (ref 3.5–5.3)
PROT SERPL-MCNC: 6.3 G/DL (ref 6–8)
RBC # BLD AUTO: 4.52 10*6/MM3 (ref 4.7–6.1)
SODIUM BLD-SCNC: 144 MMOL/L (ref 135–153)
WBC NRBC COR # BLD: 10.85 10*3/MM3 (ref 4.5–12.5)

## 2018-06-18 PROCEDURE — 80074 ACUTE HEPATITIS PANEL: CPT

## 2018-06-18 PROCEDURE — 87591 N.GONORRHOEAE DNA AMP PROB: CPT

## 2018-06-18 PROCEDURE — 36415 COLL VENOUS BLD VENIPUNCTURE: CPT

## 2018-06-18 PROCEDURE — 85025 COMPLETE CBC W/AUTO DIFF WBC: CPT

## 2018-06-18 PROCEDURE — 80053 COMPREHEN METABOLIC PANEL: CPT

## 2018-06-18 PROCEDURE — 87491 CHLMYD TRACH DNA AMP PROBE: CPT

## 2018-06-18 PROCEDURE — G0432 EIA HIV-1/HIV-2 SCREEN: HCPCS

## 2018-06-20 ENCOUNTER — HOSPITAL ENCOUNTER (EMERGENCY)
Facility: HOSPITAL | Age: 53
Discharge: HOME | End: 2018-06-20
Attending: EMERGENCY MEDICINE | Admitting: EMERGENCY MEDICINE

## 2018-06-20 VITALS
WEIGHT: 155 LBS | TEMPERATURE: 98.6 F | SYSTOLIC BLOOD PRESSURE: 145 MMHG | HEIGHT: 66 IN | DIASTOLIC BLOOD PRESSURE: 90 MMHG | HEART RATE: 80 BPM | RESPIRATION RATE: 16 BRPM | OXYGEN SATURATION: 100 % | BODY MASS INDEX: 24.91 KG/M2

## 2018-06-20 DIAGNOSIS — Z45.2 PICC (PERIPHERALLY INSERTED CENTRAL CATHETER) REMOVAL: Primary | ICD-10-CM

## 2018-06-20 PROCEDURE — 99283 EMERGENCY DEPT VISIT LOW MDM: CPT

## 2018-06-20 NOTE — ED NOTES
Pt states he was told to come to the er to have picc line removed by family doctor, Sonia Acosta office.     Justice Lloyd RN  06/20/18 1924

## 2018-06-21 NOTE — ED PROVIDER NOTES
Subjective   53-year-old white male presents to ER with complaints of wanting PICC line removed.  Patient admitted that he was done with IV antibiotics.  Patient admitted that PICC line was inserted about 4 weeks prior to arrival.  Patient denies any other complications at this time.            Review of Systems   Constitutional: Negative.  Negative for fever.   HENT: Negative.    Respiratory: Negative.    Cardiovascular: Negative.  Negative for chest pain.   Gastrointestinal: Negative.  Negative for abdominal pain.   Endocrine: Negative.    Genitourinary: Negative.  Negative for dysuria.   Skin: Negative.    Neurological: Negative.    Psychiatric/Behavioral: Negative.    All other systems reviewed and are negative.      Past Medical History:   Diagnosis Date   • Arthritis    • Elevated cholesterol    • GERD (gastroesophageal reflux disease)    • Hypertension        No Known Allergies    Past Surgical History:   Procedure Laterality Date   • VASECTOMY  1993       History reviewed. No pertinent family history.    Social History     Social History   • Marital status:      Social History Main Topics   • Smoking status: Current Every Day Smoker     Packs/day: 1.50     Years: 30.00     Types: Cigarettes   • Smokeless tobacco: Never Used   • Alcohol use No   • Drug use: No   • Sexual activity: Defer     Other Topics Concern   • Not on file           Objective   Physical Exam   Constitutional: He is oriented to person, place, and time. He appears well-developed and well-nourished. No distress.   HENT:   Head: Normocephalic and atraumatic.   Right Ear: External ear normal.   Left Ear: External ear normal.   Nose: Nose normal.   Eyes: Conjunctivae are normal.   Neck: Normal range of motion. Neck supple. No JVD present. No tracheal deviation present.   Cardiovascular: Normal rate and regular rhythm.    No murmur heard.  Pulmonary/Chest: Effort normal. No respiratory distress. He has no wheezes.   Abdominal: Soft. There  is no tenderness.   Musculoskeletal: Normal range of motion. He exhibits no edema or deformity.   Neurological: He is alert and oriented to person, place, and time. No cranial nerve deficit.   Skin: Skin is warm and dry. No rash noted. He is not diaphoretic. No erythema. No pallor.   Size for PICC line noted to the right bacillus on the right upper extremity.   Psychiatric: He has a normal mood and affect. His behavior is normal. Thought content normal.   Nursing note and vitals reviewed.      Procedures           ED Course  ED Course as of Jun 20 2012 Wed Jun 20, 2018 2009 Consent for pick line removal signed by patient.  PICC line was removed and given to RN for measurement.  Patient tolerated procedure well.  Dressing applied with pressure.  No active bleeding noted.  [RB]      ED Course User Index  [RB] FELIPE Kenyon                  MDM  Number of Diagnoses or Management Options  PICC (peripherally inserted central catheter) removal: new and does not require workup  Risk of Complications, Morbidity, and/or Mortality  Presenting problems: low  Diagnostic procedures: low  Management options: low    Patient Progress  Patient progress: stable        Final diagnoses:   PICC (peripherally inserted central catheter) removal            FELIPE Kenyon  06/20/18 2012

## 2018-08-07 ENCOUNTER — OFFICE VISIT (OUTPATIENT)
Dept: SURGERY | Facility: CLINIC | Age: 53
End: 2018-08-07

## 2018-08-07 VITALS
BODY MASS INDEX: 25.71 KG/M2 | WEIGHT: 160 LBS | SYSTOLIC BLOOD PRESSURE: 150 MMHG | HEART RATE: 88 BPM | HEIGHT: 66 IN | DIASTOLIC BLOOD PRESSURE: 85 MMHG

## 2018-08-07 DIAGNOSIS — K21.9 GASTROESOPHAGEAL REFLUX DISEASE, ESOPHAGITIS PRESENCE NOT SPECIFIED: ICD-10-CM

## 2018-08-07 DIAGNOSIS — R13.14 PHARYNGOESOPHAGEAL DYSPHAGIA: Primary | ICD-10-CM

## 2018-08-07 DIAGNOSIS — K59.09 OTHER CONSTIPATION: ICD-10-CM

## 2018-08-07 DIAGNOSIS — Z86.010 HISTORY OF COLON POLYPS: ICD-10-CM

## 2018-08-07 PROBLEM — Z86.0100 HISTORY OF COLON POLYPS: Status: ACTIVE | Noted: 2018-08-07

## 2018-08-07 PROCEDURE — 99406 BEHAV CHNG SMOKING 3-10 MIN: CPT | Performed by: SURGERY

## 2018-08-07 PROCEDURE — 99243 OFF/OP CNSLTJ NEW/EST LOW 30: CPT | Performed by: SURGERY

## 2018-08-07 RX ORDER — SODIUM, POTASSIUM,MAG SULFATES 17.5-3.13G
1 SOLUTION, RECONSTITUTED, ORAL ORAL EVERY 12 HOURS
Qty: 1 BOTTLE | Refills: 0 | Status: SHIPPED | OUTPATIENT
Start: 2018-08-07 | End: 2018-08-22

## 2018-08-07 NOTE — PROGRESS NOTES
Subjective   Marcus Bobo is a 53 y.o. male here as consultation from Sonia Acosta, APRN    53 y.o. male here for previous adenomatous polyp There is no family history of colon neoplasia. No family hx of gastric, ovarian, or uterine cancer.colonoscopy 4 years ago without abnormalities..  Patient has previously undergone no surgeries.  Patient is not on anticoagulation.  Patient denies weight loss.  Patient with chronic constipation.  EGD showed gastritis previously and he still has upper esophageal dysphagia.  He does continue to smoke..     The following portions of the patient's history were reviewed and updated as appropriate: allergies, current medications, past family history, past medical history, past social history, past surgical history and problem list.    Past Medical History:   Diagnosis Date   • Arthritis    • Elevated cholesterol    • GERD (gastroesophageal reflux disease)    • Hypertension        Family History   Problem Relation Age of Onset   • Cancer Sister         breast   • Hypertension Other    • Heart disease Neg Hx        Social History     Social History   • Marital status:      Spouse name: N/A   • Number of children: N/A   • Years of education: N/A     Occupational History   • Not on file.     Social History Main Topics   • Smoking status: Current Every Day Smoker     Packs/day: 1.50     Years: 30.00     Types: Cigarettes   • Smokeless tobacco: Never Used   • Alcohol use No   • Drug use: No   • Sexual activity: Defer     Other Topics Concern   • Not on file     Social History Narrative   • No narrative on file       Past Surgical History:   Procedure Laterality Date   • COLONOSCOPY     • VASECTOMY  1993         Review of Systems   Constitutional: Negative for activity change, appetite change, chills and fever.   HENT: Negative for sore throat and trouble swallowing.    Eyes: Negative for visual disturbance.   Respiratory: Negative for cough and shortness of breath.   "  Cardiovascular: Negative for chest pain and palpitations.   Gastrointestinal: Negative for abdominal distention, abdominal pain, blood in stool, constipation, diarrhea, nausea and vomiting.   Endocrine: Negative for cold intolerance and heat intolerance.   Genitourinary: Negative for dysuria.   Musculoskeletal: Negative for joint swelling.   Skin: Negative for color change, rash and wound.   Allergic/Immunologic: Negative for immunocompromised state.   Neurological: Negative for dizziness, seizures, weakness and headaches.   Hematological: Negative for adenopathy. Does not bruise/bleed easily.   Psychiatric/Behavioral: Negative for agitation and confusion.         /85   Pulse 88   Ht 167.6 cm (66\")   Wt 72.6 kg (160 lb)   BMI 25.82 kg/m²   Objective   Physical Exam   Constitutional: He is oriented to person, place, and time. He appears well-developed.   HENT:   Head: Normocephalic and atraumatic.   Mouth/Throat: Mucous membranes are normal.   Eyes: Pupils are equal, round, and reactive to light. Conjunctivae are normal.   Neck: Neck supple. No JVD present. No tracheal deviation present. No thyromegaly present.   Cardiovascular: Normal rate and regular rhythm.  Exam reveals no gallop and no friction rub.    No murmur heard.  Pulmonary/Chest: Effort normal and breath sounds normal.   Abdominal: Soft. He exhibits no distension. There is no splenomegaly or hepatomegaly. There is no tenderness. No hernia.   Musculoskeletal: Normal range of motion. He exhibits no deformity.   Neurological: He is alert and oriented to person, place, and time.   Skin: Skin is warm and dry.   Psychiatric: He has a normal mood and affect.         Marcus was seen today for colonoscopy consult and difficulty swallowing.    Diagnoses and all orders for this visit:    Pharyngoesophageal dysphagia  -     Case Request; Standing  -     Case Request    Other constipation  -     Case Request; Standing  -     Case Request    Gastroesophageal " reflux disease, esophagitis presence not specified  -     Case Request; Standing  -     Case Request    History of colon polyps  -     Case Request; Standing  -     Case Request    Other orders  -     Follow Anesthesia Guidelines / Standing Orders; Future  -     Provide instructions to patient on NPO status  -     Follow Anesthesia Guidelines / Standing Orders; Standing  -     Verify NPO Status; Standing  -     Obtain informed consent; Standing        Assessment     52 yo M with history of colon polyps as well as GERD and constipation.  He understands the risks and benefits and will undergo screening colonoscopy and EGD.    Patient's Body mass index is 25.82 kg/m². BMI is within normal parameters. No follow-up required.    I advised Marcus of the risks of continuing to use tobacco, and I provided him with tobacco cessation educational materials in the After Visit Summary.     During this visit, I spent 5 minutes counseling the patient regarding tobacco cessation.

## 2018-08-09 ENCOUNTER — TELEPHONE (OUTPATIENT)
Dept: SURGERY | Facility: CLINIC | Age: 53
End: 2018-08-09

## 2018-08-09 NOTE — TELEPHONE ENCOUNTER
Patient is aware of surgery date and time as well as PAT to be done over the phone. Clear liquid diet was discussed with patient as well as bowel prep instructions. He understands fully and states he will be present.     BC 8/9/18 @ 9:11am

## 2018-08-16 ENCOUNTER — HOSPITAL ENCOUNTER (OUTPATIENT)
Facility: HOSPITAL | Age: 53
Setting detail: HOSPITAL OUTPATIENT SURGERY
Discharge: HOME | End: 2018-08-16
Attending: SURGERY | Admitting: ANESTHESIOLOGY

## 2018-08-16 ENCOUNTER — ANESTHESIA EVENT (OUTPATIENT)
Dept: PERIOP | Facility: HOSPITAL | Age: 53
End: 2018-08-16

## 2018-08-16 ENCOUNTER — ANESTHESIA (OUTPATIENT)
Dept: PERIOP | Facility: HOSPITAL | Age: 53
End: 2018-08-16

## 2018-08-16 VITALS
HEART RATE: 88 BPM | HEIGHT: 66 IN | TEMPERATURE: 97.6 F | RESPIRATION RATE: 18 BRPM | WEIGHT: 160 LBS | SYSTOLIC BLOOD PRESSURE: 116 MMHG | BODY MASS INDEX: 25.71 KG/M2 | OXYGEN SATURATION: 99 % | DIASTOLIC BLOOD PRESSURE: 72 MMHG

## 2018-08-16 DIAGNOSIS — K59.09 OTHER CONSTIPATION: ICD-10-CM

## 2018-08-16 DIAGNOSIS — K21.9 GASTROESOPHAGEAL REFLUX DISEASE, ESOPHAGITIS PRESENCE NOT SPECIFIED: ICD-10-CM

## 2018-08-16 DIAGNOSIS — R13.14 PHARYNGOESOPHAGEAL DYSPHAGIA: ICD-10-CM

## 2018-08-16 DIAGNOSIS — Z86.010 HISTORY OF COLON POLYPS: ICD-10-CM

## 2018-08-16 PROCEDURE — 25010000002 FENTANYL CITRATE (PF) 100 MCG/2ML SOLUTION: Performed by: NURSE ANESTHETIST, CERTIFIED REGISTERED

## 2018-08-16 PROCEDURE — 25010000002 PROPOFOL 1000 MG/ML EMULSION: Performed by: NURSE ANESTHETIST, CERTIFIED REGISTERED

## 2018-08-16 PROCEDURE — 25010000002 MIDAZOLAM PER 1 MG: Performed by: NURSE ANESTHETIST, CERTIFIED REGISTERED

## 2018-08-16 PROCEDURE — 43239 EGD BIOPSY SINGLE/MULTIPLE: CPT | Performed by: SURGERY

## 2018-08-16 PROCEDURE — 25010000002 PROPOFOL 10 MG/ML EMULSION: Performed by: NURSE ANESTHETIST, CERTIFIED REGISTERED

## 2018-08-16 PROCEDURE — 45385 COLONOSCOPY W/LESION REMOVAL: CPT | Performed by: SURGERY

## 2018-08-16 RX ORDER — CITALOPRAM 10 MG/1
10 TABLET ORAL DAILY
COMMUNITY
End: 2018-10-10 | Stop reason: SDUPTHER

## 2018-08-16 RX ORDER — MIDAZOLAM HYDROCHLORIDE 1 MG/ML
INJECTION INTRAMUSCULAR; INTRAVENOUS AS NEEDED
Status: DISCONTINUED | OUTPATIENT
Start: 2018-08-16 | End: 2018-08-16 | Stop reason: SURG

## 2018-08-16 RX ORDER — FENTANYL CITRATE 50 UG/ML
INJECTION, SOLUTION INTRAMUSCULAR; INTRAVENOUS AS NEEDED
Status: DISCONTINUED | OUTPATIENT
Start: 2018-08-16 | End: 2018-08-16 | Stop reason: SURG

## 2018-08-16 RX ORDER — SODIUM CHLORIDE, SODIUM LACTATE, POTASSIUM CHLORIDE, CALCIUM CHLORIDE 600; 310; 30; 20 MG/100ML; MG/100ML; MG/100ML; MG/100ML
125 INJECTION, SOLUTION INTRAVENOUS CONTINUOUS
Status: DISCONTINUED | OUTPATIENT
Start: 2018-08-16 | End: 2018-08-16 | Stop reason: HOSPADM

## 2018-08-16 RX ORDER — IPRATROPIUM BROMIDE AND ALBUTEROL SULFATE 2.5; .5 MG/3ML; MG/3ML
3 SOLUTION RESPIRATORY (INHALATION) ONCE AS NEEDED
Status: DISCONTINUED | OUTPATIENT
Start: 2018-08-16 | End: 2018-08-16 | Stop reason: HOSPADM

## 2018-08-16 RX ORDER — PROPOFOL 10 MG/ML
VIAL (ML) INTRAVENOUS AS NEEDED
Status: DISCONTINUED | OUTPATIENT
Start: 2018-08-16 | End: 2018-08-16 | Stop reason: SURG

## 2018-08-16 RX ORDER — LIDOCAINE HYDROCHLORIDE 20 MG/ML
INJECTION, SOLUTION INFILTRATION; PERINEURAL AS NEEDED
Status: DISCONTINUED | OUTPATIENT
Start: 2018-08-16 | End: 2018-08-16 | Stop reason: SURG

## 2018-08-16 RX ORDER — POTASSIUM CHLORIDE 750 MG/1
10 TABLET, FILM COATED, EXTENDED RELEASE ORAL ONCE
COMMUNITY

## 2018-08-16 RX ORDER — FENTANYL CITRATE 50 UG/ML
50 INJECTION, SOLUTION INTRAMUSCULAR; INTRAVENOUS
Status: DISCONTINUED | OUTPATIENT
Start: 2018-08-16 | End: 2018-08-16 | Stop reason: HOSPADM

## 2018-08-16 RX ORDER — SODIUM CHLORIDE 0.9 % (FLUSH) 0.9 %
1-10 SYRINGE (ML) INJECTION AS NEEDED
Status: DISCONTINUED | OUTPATIENT
Start: 2018-08-16 | End: 2018-08-16 | Stop reason: HOSPADM

## 2018-08-16 RX ORDER — ONDANSETRON 2 MG/ML
4 INJECTION INTRAMUSCULAR; INTRAVENOUS ONCE AS NEEDED
Status: DISCONTINUED | OUTPATIENT
Start: 2018-08-16 | End: 2018-08-16 | Stop reason: HOSPADM

## 2018-08-16 RX ADMIN — SODIUM CHLORIDE, POTASSIUM CHLORIDE, SODIUM LACTATE AND CALCIUM CHLORIDE: 600; 310; 30; 20 INJECTION, SOLUTION INTRAVENOUS at 09:57

## 2018-08-16 RX ADMIN — MIDAZOLAM HYDROCHLORIDE 2 MG: 1 INJECTION, SOLUTION INTRAMUSCULAR; INTRAVENOUS at 09:57

## 2018-08-16 RX ADMIN — PROPOFOL 50 MG: 10 INJECTION, EMULSION INTRAVENOUS at 10:00

## 2018-08-16 RX ADMIN — PROPOFOL 150 MCG/KG/MIN: 10 INJECTION, EMULSION INTRAVENOUS at 10:00

## 2018-08-16 RX ADMIN — LIDOCAINE HYDROCHLORIDE 80 MG: 20 INJECTION, SOLUTION INFILTRATION; PERINEURAL at 10:00

## 2018-08-16 RX ADMIN — FENTANYL CITRATE 100 MCG: 50 INJECTION INTRAMUSCULAR; INTRAVENOUS at 09:57

## 2018-08-16 NOTE — ANESTHESIA POSTPROCEDURE EVALUATION
Patient: Marcus Bobo    Procedure Summary     Date:  08/16/18 Room / Location:  [facility] OR [address] [place] [address] COR OR    Anesthesia Start:  0957 Anesthesia Stop:  1022    Procedures:       ESOPHAGOGASTRODUODENOSCOPY WITH ANESTHESIA (N/A Esophagus)      COLONOSCOPY (N/A ) Diagnosis:       Other constipation      Pharyngoesophageal dysphagia      History of colon polyps      Gastroesophageal reflux disease, esophagitis presence not specified      (Other constipation [K59.09])      (Pharyngoesophageal dysphagia [R13.14])      (History of colon polyps [Z86.010])      (Gastroesophageal reflux disease, esophagitis presence not specified [K21.9])    Surgeon:  Negrito Goldberg MD Provider:  Jordan Kat DO    Anesthesia Type:  general ASA Status:  2          Anesthesia Type: general  Last vitals  BP   103/68 (08/16/18 1030)   Temp   97.6 °F (36.4 °C) (08/16/18 1025)   Pulse   78 (08/16/18 1030)   Resp   18 (08/16/18 1030)     SpO2   95 % (08/16/18 1030)     Post Anesthesia Care and Evaluation    Patient location during evaluation: PHASE II  Patient participation: complete - patient participated  Level of consciousness: awake and alert  Pain score: 0  Pain management: adequate  Airway patency: patent  Anesthetic complications: No anesthetic complications  PONV Status: controlled  Cardiovascular status: acceptable and stable  Respiratory status: acceptable and room air  Hydration status: euvolemic  No anesthesia care post op

## 2018-08-16 NOTE — OP NOTE
ESOPHAGOGASTRODUODENOSCOPY WITH ANESTHESIA, COLONOSCOPY  Procedure Note    Marcus Bobo  8/16/2018    Pre-op Diagnosis:   Other constipation [K59.09]  Pharyngoesophageal dysphagia [R13.14]  History of colon polyps [Z86.010]  Gastroesophageal reflux disease, esophagitis presence not specified [K21.9]    Post-op Diagnosis:   Gastritis, colon polyp    Indications: See above    Procedure(s):  ESOPHAGOGASTRODUODENOSCOPY WITH ANESTHESIA  COLONOSCOPY    Surgeon(s):  Negrito Goldberg MD    Anesthesia: General    Staff:   Circulator: Daisy Perez RN  Endo Technician: Isra Santacruz    Findings: Antral gastritis, transverse colon polyp benign-appearing    Operative Procedure: The patient was taken operating suite and placed in left lateral decubitus position.  Bilateral sequential compression devices were in place and IV anesthesia was administered.  Timeout procedure was performed.  The endoscope was inserted into the oropharynx and the esophagus was intubated.  The scope was advanced to the third portion of the duodenum.  The scope was slowly withdrawn evaluating all mucosal areas.  Mild enterogastritis was identified and biopsied with biopsy forceps.  Retroflexion was performed with no evidence of hiatal hernia.  The gastric body and duodenum were within normal limits.  The GE junction showed mild reflux changes but no other abnormality.  The remainder of the esophageal mucosa was within normal limits.  At this time digital rectal examination was performed and was normal.  The prostate was within normal limits.  The colonoscope was inserted and advanced to the cecum as evidenced by the appendiceal orifice and ileocecal valve.  The prep was good.  There was some liquid stool that was residual in the colon.  The scope was withdrawn and the colonic mucosa evaluated carefully.  There was a single benign-appearing polyp in the transverse colon that was removed with cold snare polypectomy.  The remainder of the  colon was within normal limits and the scope was withdrawn.  Patient tolerated procedure well    Estimated Blood Loss: minimal    Specimens:  Transverse colon polyp, antral biopsies                Drains: none    Grafts or Implants: none    Complications: none    Recommendations: Screening colonoscopy in 10 years, pending pathology, continue PPI therapy    Negrito Goldberg MD     Date: 8/16/2018  Time: 12:09 PM

## 2018-08-16 NOTE — ANESTHESIA PREPROCEDURE EVALUATION
Anesthesia Evaluation     Patient summary reviewed and Nursing notes reviewed   no history of anesthetic complications:  NPO Solid Status: > 8 hours  NPO Liquid Status: > 8 hours           Airway   Mallampati: II  TM distance: >3 FB  Neck ROM: full  No difficulty expected  Dental - normal exam   (+) edentulous    Pulmonary - negative pulmonary ROS and normal exam    breath sounds clear to auscultation  Cardiovascular - normal exam    ECG reviewed  Rhythm: regular  Rate: normal    (+) hypertension, hyperlipidemia,       Neuro/Psych- negative ROS  GI/Hepatic/Renal/Endo    (+)  GERD poorly controlled,      Musculoskeletal     Abdominal  - normal exam    Bowel sounds: normal.   Substance History - negative use     OB/GYN negative ob/gyn ROS         Other   (+) arthritis                   Anesthesia Plan    ASA 2     general   total IV anesthesia  intravenous induction   Anesthetic plan and risks discussed with patient and spouse/significant other.    Plan discussed with CRNA.

## 2018-08-20 LAB
LAB AP CASE REPORT: NORMAL
PATH REPORT.FINAL DX SPEC: NORMAL

## 2018-08-22 ENCOUNTER — OFFICE VISIT (OUTPATIENT)
Dept: CARDIOLOGY | Facility: CLINIC | Age: 53
End: 2018-08-22

## 2018-08-22 VITALS
OXYGEN SATURATION: 98 % | DIASTOLIC BLOOD PRESSURE: 64 MMHG | SYSTOLIC BLOOD PRESSURE: 98 MMHG | HEART RATE: 97 BPM | WEIGHT: 164.9 LBS | BODY MASS INDEX: 26.5 KG/M2 | HEIGHT: 66 IN

## 2018-08-22 DIAGNOSIS — R06.09 DYSPNEA ON EXERTION: ICD-10-CM

## 2018-08-22 DIAGNOSIS — I73.9 CLAUDICATION (HCC): ICD-10-CM

## 2018-08-22 DIAGNOSIS — R00.2 PALPITATIONS: ICD-10-CM

## 2018-08-22 DIAGNOSIS — E78.2 MIXED HYPERLIPIDEMIA: ICD-10-CM

## 2018-08-22 DIAGNOSIS — R07.9 CHEST PAIN, UNSPECIFIED TYPE: Primary | ICD-10-CM

## 2018-08-22 DIAGNOSIS — I10 ESSENTIAL HYPERTENSION: ICD-10-CM

## 2018-08-22 PROBLEM — R06.00 DYSPNEA: Status: ACTIVE | Noted: 2018-08-22

## 2018-08-22 PROCEDURE — 93000 ELECTROCARDIOGRAM COMPLETE: CPT | Performed by: INTERNAL MEDICINE

## 2018-08-22 PROCEDURE — 99407 BEHAV CHNG SMOKING > 10 MIN: CPT | Performed by: INTERNAL MEDICINE

## 2018-08-22 PROCEDURE — 99204 OFFICE O/P NEW MOD 45 MIN: CPT | Performed by: INTERNAL MEDICINE

## 2018-08-22 RX ORDER — HYDROXYZINE PAMOATE 25 MG/1
25 CAPSULE ORAL EVERY 6 HOURS PRN
COMMUNITY
End: 2018-11-07 | Stop reason: SDUPTHER

## 2018-08-22 RX ORDER — ASPIRIN 81 MG/1
81 TABLET, CHEWABLE ORAL DAILY
COMMUNITY
End: 2019-10-03

## 2018-08-22 RX ORDER — SPIRONOLACTONE 25 MG/1
TABLET ORAL 2 TIMES DAILY
COMMUNITY
Start: 2015-01-12 | End: 2019-12-03 | Stop reason: ALTCHOICE

## 2018-08-22 NOTE — PROGRESS NOTES
"     [facility] CARDIOLOGY  [address]  [place] [address]  Phone: [phone]  Fax: [phone]    08/22/2018    Chief Complaint   Patient presents with   • Palpitations   • Hypertension   • Fatigue   • Shortness of Breath        History:   Marcus Bobo is a 53 y.o. male seen in consultation, referred by Dr.Leslie Nivia Acosta, AP*  for Chest pain, diaphoresis and palpitations.     Mr. Bobo has been having chest pain for a long time now, he had initial cardiac workup done in 2015 for same chest pain, palpitation, diaphoresis and dyspnea.  At the time he had an echocardiogram which showed normal LV systolic function and no major valvular abnormalities, he also had a treadmill stress perfusion imaging study which was normal with no EKG or perfusion imaging evidence of ischemia.  He has not seen a doctor since his last stress test and echocardiogram in 2015.  Unfortunately he continues to have chest pain, his chest pain is usually brought on by even mild activity, his chest pain is retrosternal, associated with the dyspnea, diaphoresis, palpitation, dizziness.  His chest pain usually resolves after rest.He denies any syncopal episodes.    He also complains of bilateral lower extremity pain upon mild to moderate activity usually walking for a block or climbing a flight of stairs.    He also continues to smoke about 1-2 packs of cigarettes per day.  He did had some \"quitting smoking a few years ago with the help of  Chantix.  This is for the same day when he had the past cardiac workup in 2015.    He also has uncontrolled hypertension, he is on 3 antihypertensive medication, I did review his past chart and he had a renal ultrasound which did not show any significant renal artery stenosis.  He seemed to be complaint with his medications, his blood pressure in the office was actually 100/64 which is well controlled, I did ask him about his home blood pressure recordings and he said " usually it runs about 130s to 1 Foskey systolic and 80-90 diastolic.        Past Medical History:   Diagnosis Date   • Arthritis    • Elevated cholesterol    • GERD (gastroesophageal reflux disease)    • Hypertension        Past Surgical History:   Procedure Laterality Date   • COLONOSCOPY     • COLONOSCOPY N/A 8/16/2018    Procedure: COLONOSCOPY;  Surgeon: Negrito Goldberg MD;  Location: [facility];  Service: Gastroenterology   • ENDOSCOPY N/A 8/16/2018    Procedure: ESOPHAGOGASTRODUODENOSCOPY WITH ANESTHESIA;  Surgeon: Negrito Goldberg MD;  Location: [facility];  Service: Gastroenterology   • VASECTOMY  1993        Review of Systems   Constitution: Positive for diaphoresis and weakness. Negative for fever, weight gain and weight loss.   HENT: Negative for congestion, nosebleeds and sore throat.    Eyes: Negative for blurred vision and visual disturbance.   Cardiovascular: Positive for chest pain, dyspnea on exertion, irregular heartbeat, leg swelling, near-syncope and palpitations. Negative for claudication, orthopnea, paroxysmal nocturnal dyspnea and syncope.   Respiratory: Positive for cough and shortness of breath. Negative for hemoptysis, sleep disturbances due to breathing, snoring, sputum production and wheezing.    Endocrine: Negative for cold intolerance, heat intolerance, polydipsia, polyphagia and polyuria.   Hematologic/Lymphatic: Negative for bleeding problem.   Skin: Negative for dry skin, itching and rash.   Musculoskeletal: Negative for muscle cramps, muscle weakness and myalgias.   Gastrointestinal: Negative for abdominal pain, constipation, diarrhea, heartburn, hematemesis, hematochezia, hemorrhoids, melena, nausea and vomiting.   Genitourinary: Negative for hematuria and nocturia.   Neurological: Positive for dizziness and light-headedness. Negative for excessive daytime sleepiness, focal weakness, headaches, numbness, seizures and vertigo.   Psychiatric/Behavioral: Negative for  depression, substance abuse and suicidal ideas.   Allergic/Immunologic: Negative for environmental allergies.         Past Social History:  Social History     Social History   • Marital status:      Social History Main Topics   • Smoking status: Current Every Day Smoker     Packs/day: 2.00     Years: 30.00     Types: Cigarettes   • Smokeless tobacco: Never Used   • Alcohol use No   • Drug use: No   • Sexual activity: Defer     Other Topics Concern   • Not on file       Past Family History:  Family History   Problem Relation Age of Onset   • Cancer Sister         breast   • Hypertension Other    • Heart disease Neg Hx        Current Outpatient Prescriptions   Medication Sig Dispense Refill   • aspirin  MG tablet Take 325 mg by mouth Daily.     • citalopram (CeleXA) 10 MG tablet Take 10 mg by mouth Daily.     • hydrOXYzine (VISTARIL) 25 MG capsule Take 25 mg by mouth Every 6 (Six) Hours As Needed for Itching.     • labetalol (NORMODYNE) 300 MG tablet Take 300 mg by mouth 2 (Two) Times a Day.     • lisinopril (PRINIVIL,ZESTRIL) 20 MG tablet Take 10 mg by mouth Daily.     • magnesium oxide (MAGOX) 400 (241.3 Mg) MG tablet tablet Take 400 mg by mouth Daily.     • pantoprazole (PROTONIX) 40 MG EC tablet Take 40 mg by mouth Daily.     • potassium chloride (K-DUR) 10 MEQ CR tablet Take 10 mEq by mouth 1 (One) Time.     • spironolactone (ALDACTONE) 25 MG tablet Take  by mouth 2 (Two) Times a Day.     • vitamin D (ERGOCALCIFEROL) 38265 units capsule capsule Take 50,000 Units by mouth 1 (One) Time Per Week. Prior to [facility] Admission, Patient was on: takes on sundays     • meloxicam (MOBIC) 15 MG tablet Take 15 mg by mouth Daily.     • nicotine (NICODERM CQ) 21 MG/24HR patch 21 mg patch daily x 4 weeks then 14 mg patch daily x 2 weeks, then 7 mg patch daily x 2 weeks 58 patch 0   • Omega-3 Fatty Acids (FISH OIL) 1000 MG capsule capsule Take 1,000 mg by mouth Daily With Breakfast.       No current  facility-administered medications for this visit.         No Known Allergies      Objective:  Vitals:    08/22/18 1333   BP: 98/64   Pulse: 97   SpO2: 98%         Comfortable NAD  PERRL, conjunctiva clear  Neck supple, no JVD or thyromegaly appreciated  S1/S2 RRR, no m/r/g  Lungs Diminished bilateral at entry, no significant wheeze or crepitations noted.  Abdomen S/NT/ND (+) BS, no HSM appreciated  Extremities warm, no clubbing, cyanosis, or edema  Normal gait  No visible or palpable skin lesions  A/Ox4, mood and affect appropriate  Pulse exam:   Feet are warm bilateral  1+ edema bilateral  Capillary refill is normal  No evidence of ulceration or color change of the toes  PULSES  Right DP and PT are 1+ and Left DP and PT are 2+    DATA:      Results for orders placed in visit on 01/16/15   SCANNED - ECHOCARDIOGRAM      Results for orders placed in visit on 01/16/15   SCANNED - STRESS TEST      Results for orders placed in visit on 01/16/15   SCANNED - STRESS TEST        Results for orders placed during the hospital encounter of 01/16/15   ULTRASOUND RENAL BILATERAL    Narrative ULTRASOUND EXAMINATION OF THE KIDNEYS     REASON FOR EXAM: Hypertension.     Multiple real-time images were acquired. The right kidney measured 8.5 x  4.9 cm in longitudinal and AP dimension. The left kidney measures 10.5 x  5.4 cm. There was no evidence of hydronephrosis involving the collecting  system of either kidney. No perinephric fluid collections or focal renal  parenchymal abnormalities were identified.     IMPRESSION- No sonographic abnormalities were demonstrated in the  kidneys. A source of the patient's hypertension was not identified.             Reading Radiologist- SKYE MARCANO       Releasing Radiologist- SKYE MARCANO       Released Date Time- 01/16/15 2326       Torri RUTH   ------------------------------------------------------------------------------         ECG 12 Lead  Date/Time: 8/22/2018 1:33  PM  Performed by: EDWIGE COSTA  Authorized by: EDWIGE COSTA   Rhythm: sinus rhythm  Clinical impression: normal ECG             Assessment:  Chest pain, has both typical and atypical features for angina, his EKG in the office today showed sinus rhythm with no significant ST segment changes. He does have multiple risk factors for having up coronary artery disease including chronic active tobacco smoking, hypertension And dyslipidemia.  Hypertension, controlled.  Dyslipidemia  Dyspnea on exertion  Chronic active tobacco use    Plan:  Will get repeat 2-D echocardiogram and treadmill stress perfusion imaging study for evaluation of his ongoing chest pain and dyspnea.  We did talk about evaluating his claudication, we'll get an ANGEL bilateral lower extremities.  I did strongly recommend again for his tobacco cessation, he wanted to try Chantix to help him quit smoking.  I did spend more than 10 minutes in counseling him about Risks of tobacco smoking.      I advised Marcus of the risks of continuing to use tobacco, and I provided him with tobacco cessation educational materials in the After Visit Summary.     During this visit, I spent 10 minutes counseling the patient regarding tobacco cessation.         Patient's Body mass index is 26.62 kg/m². BMI is above normal parameters. Recommendations include: educational material, exercise counseling and nutrition counseling.         Thank you for allowing me to participate in the care of Marcus Bobo. Feel free to contact me directly with any further questions or concerns.        Edwige Costa MD, FACC  Interventional Cardiology

## 2018-09-25 ENCOUNTER — APPOINTMENT (OUTPATIENT)
Dept: NUCLEAR MEDICINE | Facility: HOSPITAL | Age: 53
End: 2018-09-25
Attending: INTERNAL MEDICINE

## 2018-09-25 ENCOUNTER — APPOINTMENT (OUTPATIENT)
Dept: CARDIOLOGY | Facility: HOSPITAL | Age: 53
End: 2018-09-25
Attending: INTERNAL MEDICINE

## 2018-09-25 ENCOUNTER — APPOINTMENT (OUTPATIENT)
Dept: GENERAL RADIOLOGY | Facility: HOSPITAL | Age: 53
End: 2018-09-25
Attending: INTERNAL MEDICINE

## 2018-10-02 ENCOUNTER — HOSPITAL ENCOUNTER (OUTPATIENT)
Dept: CARDIOLOGY | Facility: HOSPITAL | Age: 53
Discharge: HOME | End: 2018-10-02
Attending: INTERNAL MEDICINE

## 2018-10-02 ENCOUNTER — HOSPITAL ENCOUNTER (OUTPATIENT)
Dept: NUCLEAR MEDICINE | Facility: HOSPITAL | Age: 53
Discharge: HOME | End: 2018-10-02
Attending: INTERNAL MEDICINE

## 2018-10-02 ENCOUNTER — OFFICE VISIT (OUTPATIENT)
Dept: PSYCHIATRY | Facility: CLINIC | Age: 53
End: 2018-10-02

## 2018-10-02 ENCOUNTER — HOSPITAL ENCOUNTER (OUTPATIENT)
Dept: ULTRASOUND IMAGING | Facility: HOSPITAL | Age: 53
Discharge: HOME | End: 2018-10-02
Attending: INTERNAL MEDICINE

## 2018-10-02 DIAGNOSIS — R00.2 PALPITATIONS: ICD-10-CM

## 2018-10-02 DIAGNOSIS — F33.1 MAJOR DEPRESSIVE DISORDER, RECURRENT EPISODE, MODERATE (HCC): Primary | ICD-10-CM

## 2018-10-02 DIAGNOSIS — R07.9 CHEST PAIN, UNSPECIFIED TYPE: ICD-10-CM

## 2018-10-02 DIAGNOSIS — R06.09 DYSPNEA ON EXERTION: ICD-10-CM

## 2018-10-02 DIAGNOSIS — I73.9 CLAUDICATION (HCC): ICD-10-CM

## 2018-10-02 DIAGNOSIS — F41.1 GENERALIZED ANXIETY DISORDER: ICD-10-CM

## 2018-10-02 LAB
BH CV ECHO MEAS - % IVS THICK: 16.3 %
BH CV ECHO MEAS - % LVPW THICK: 86.8 %
BH CV ECHO MEAS - ACS: 1.9 CM
BH CV ECHO MEAS - AO MAX PG: 8.9 MMHG
BH CV ECHO MEAS - AO MEAN PG: 4.4 MMHG
BH CV ECHO MEAS - AO ROOT AREA (BSA CORRECTED): 1.6
BH CV ECHO MEAS - AO ROOT AREA: 6.7 CM^2
BH CV ECHO MEAS - AO ROOT DIAM: 2.9 CM
BH CV ECHO MEAS - AO V2 MAX: 149.3 CM/SEC
BH CV ECHO MEAS - AO V2 MEAN: 99.2 CM/SEC
BH CV ECHO MEAS - AO V2 VTI: 32.1 CM
BH CV ECHO MEAS - BSA(HAYCOCK): 1.9 M^2
BH CV ECHO MEAS - BSA: 1.8 M^2
BH CV ECHO MEAS - BZI_BMI: 26.5 KILOGRAMS/M^2
BH CV ECHO MEAS - BZI_METRIC_HEIGHT: 167.6 CM
BH CV ECHO MEAS - BZI_METRIC_WEIGHT: 74.4 KG
BH CV ECHO MEAS - EDV(CUBED): 92.3 ML
BH CV ECHO MEAS - EDV(MOD-SP4): 73 ML
BH CV ECHO MEAS - EDV(TEICH): 93.4 ML
BH CV ECHO MEAS - EF(CUBED): 59.4 %
BH CV ECHO MEAS - EF(MOD-SP4): 61.6 %
BH CV ECHO MEAS - EF(TEICH): 51.1 %
BH CV ECHO MEAS - ESV(CUBED): 37.5 ML
BH CV ECHO MEAS - ESV(MOD-SP4): 28 ML
BH CV ECHO MEAS - ESV(TEICH): 45.7 ML
BH CV ECHO MEAS - FS: 25.9 %
BH CV ECHO MEAS - IVS/LVPW: 1
BH CV ECHO MEAS - IVSD: 0.98 CM
BH CV ECHO MEAS - IVSS: 1.1 CM
BH CV ECHO MEAS - LA DIMENSION: 3.2 CM
BH CV ECHO MEAS - LA/AO: 1.1
BH CV ECHO MEAS - LV DIASTOLIC VOL/BSA (35-75): 39.7 ML/M^2
BH CV ECHO MEAS - LV MASS(C)D: 149.9 GRAMS
BH CV ECHO MEAS - LV MASS(C)DI: 81.6 GRAMS/M^2
BH CV ECHO MEAS - LV MASS(C)S: 179.1 GRAMS
BH CV ECHO MEAS - LV MASS(C)SI: 97.4 GRAMS/M^2
BH CV ECHO MEAS - LV SYSTOLIC VOL/BSA (12-30): 15.2 ML/M^2
BH CV ECHO MEAS - LVIDD: 4.5 CM
BH CV ECHO MEAS - LVIDS: 3.3 CM
BH CV ECHO MEAS - LVLD AP4: 8 CM
BH CV ECHO MEAS - LVLS AP4: 6.9 CM
BH CV ECHO MEAS - LVOT AREA (M): 3.8 CM^2
BH CV ECHO MEAS - LVOT AREA: 3.7 CM^2
BH CV ECHO MEAS - LVOT DIAM: 2.2 CM
BH CV ECHO MEAS - LVPWD: 0.98 CM
BH CV ECHO MEAS - LVPWS: 1.8 CM
BH CV ECHO MEAS - MV A MAX VEL: 65.3 CM/SEC
BH CV ECHO MEAS - MV E MAX VEL: 91 CM/SEC
BH CV ECHO MEAS - MV E/A: 1.4
BH CV ECHO MEAS - PA ACC SLOPE: 2077 CM/SEC^2
BH CV ECHO MEAS - PA ACC TIME: 0.06 SEC
BH CV ECHO MEAS - PA PR(ACCEL): 50.5 MMHG
BH CV ECHO MEAS - RAP SYSTOLE: 10 MMHG
BH CV ECHO MEAS - RVSP: 32.7 MMHG
BH CV ECHO MEAS - SI(AO): 116.3 ML/M^2
BH CV ECHO MEAS - SI(CUBED): 29.8 ML/M^2
BH CV ECHO MEAS - SI(MOD-SP4): 24.5 ML/M^2
BH CV ECHO MEAS - SI(TEICH): 25.9 ML/M^2
BH CV ECHO MEAS - SV(AO): 213.9 ML
BH CV ECHO MEAS - SV(CUBED): 54.8 ML
BH CV ECHO MEAS - SV(MOD-SP4): 45 ML
BH CV ECHO MEAS - SV(TEICH): 47.7 ML
BH CV ECHO MEAS - TR MAX VEL: 238.3 CM/SEC
MAXIMAL PREDICTED HEART RATE: 165 BPM
STRESS TARGET HR: 140 BPM

## 2018-10-02 PROCEDURE — 0 TECHNETIUM SESTAMIBI: Performed by: INTERNAL MEDICINE

## 2018-10-02 PROCEDURE — A9500 TC99M SESTAMIBI: HCPCS | Performed by: INTERNAL MEDICINE

## 2018-10-02 PROCEDURE — 93922 UPR/L XTREMITY ART 2 LEVELS: CPT

## 2018-10-02 PROCEDURE — 78452 HT MUSCLE IMAGE SPECT MULT: CPT

## 2018-10-02 PROCEDURE — 90791 PSYCH DIAGNOSTIC EVALUATION: CPT | Performed by: SOCIAL WORKER

## 2018-10-02 PROCEDURE — 93306 TTE W/DOPPLER COMPLETE: CPT

## 2018-10-02 PROCEDURE — 78452 HT MUSCLE IMAGE SPECT MULT: CPT | Performed by: INTERNAL MEDICINE

## 2018-10-02 PROCEDURE — 93922 UPR/L XTREMITY ART 2 LEVELS: CPT | Performed by: RADIOLOGY

## 2018-10-02 PROCEDURE — 93306 TTE W/DOPPLER COMPLETE: CPT | Performed by: INTERNAL MEDICINE

## 2018-10-02 PROCEDURE — 93017 CV STRESS TEST TRACING ONLY: CPT

## 2018-10-02 PROCEDURE — 93018 CV STRESS TEST I&R ONLY: CPT | Performed by: INTERNAL MEDICINE

## 2018-10-02 RX ADMIN — TECHNETIUM TC 99M SESTAMIBI 1 DOSE: 1 INJECTION INTRAVENOUS at 09:00

## 2018-10-02 RX ADMIN — TECHNETIUM TC 99M SESTAMIBI 1 DOSE: 1 INJECTION INTRAVENOUS at 11:05

## 2018-10-02 NOTE — PROGRESS NOTES
Date of Service: 2018  Time In:  3:02 PM  Time Out:  4:00 PM      PROGRESS NOTE  Data:  Jose Bobo is a 53 y.o. male who met with the undersigned for a regularly scheduled individual outpatient therapy session      HPI:       Clinical Maneuvering/Intervention:  Assisted patient in processing above session content; acknowledged and normalized patient’s thoughts, feelings, and concerns.     Allowed patient to freely discuss issues without interruption or judgment. Provided safe, confidential environment to facilitate the development of positive therapeutic relationship and encourage open, honest communication. Assisted patient in identifying risk factors which would indicate the need for higher level of care including thoughts to harm self or others and/or self-harming behavior and encouraged patient to contact this office, call 911, or present to the nearest emergency room should any of these events occur. Discussed crisis intervention services and means to access.  Patient adamantly and convincingly denies current suicidal or homicidal ideation or perceptual disturbance.    Assessment     There are no diagnoses linked to this encounter.           Mental Status Exam  Hygiene:  {[employer] NH:49793}  Dress:  {[employer]'s dress:58226}  Attitude:  {Cooperation:753234031}  Motor Activity:  {Psychomotor Behavior:31087}  Speech:  {Speech:202109644}  Mood:  {MOOD:91751}  Affect:  {AFFECTS:66312}  Thought Processes:  {Thought Progess:307066362}  Thought Content:  {Exam; psych thought content:77199}  Suicidal Thoughts:  {has/denies:076067}  Homicidal Thoughts:  {has/denies:987362}  Crisis Safety Plan: yes, to come to the emergency room.  Hallucinations:  {Actions; denies/admits to:5300}    Patient's Support Network Includes:  {family members:}    Progress toward goal: {tnpt}    Functional Status: {rsfunctionalstatus:95091}    Prognosis: {tnprognosis:11974}    Plan         Patient will adhere  to medication regimen as prescribed and report any side effects. Patient will contact this office, call 911 or present to the nearest emergency room should suicidal or homicidal ideations occur. Provide Cognitive Behavioral Therapy and Integrative Therapy to improve functioning, maintain stability, and avoid decompensation and the need for higher level of care.          No Follow-up on file.      This document signed by Viktoria Ching LCSW, ROBERTO October 2, 2018 2:59 PM

## 2018-10-03 ENCOUNTER — TELEPHONE (OUTPATIENT)
Dept: CARDIOLOGY | Facility: CLINIC | Age: 53
End: 2018-10-03

## 2018-10-03 LAB
BH CV NUCLEAR PRIOR STUDY: 3
BH CV STRESS BP STAGE 1: NORMAL
BH CV STRESS BP STAGE 2: NORMAL
BH CV STRESS BP STAGE 3: NORMAL
BH CV STRESS DURATION MIN STAGE 1: 3
BH CV STRESS DURATION MIN STAGE 2: 3
BH CV STRESS DURATION MIN STAGE 3: 3
BH CV STRESS DURATION SEC STAGE 1: 0
BH CV STRESS DURATION SEC STAGE 2: 0
BH CV STRESS DURATION SEC STAGE 3: 0
BH CV STRESS GRADE STAGE 1: 10
BH CV STRESS GRADE STAGE 2: 12
BH CV STRESS GRADE STAGE 3: 14
BH CV STRESS HR STAGE 1: 117
BH CV STRESS HR STAGE 2: 132
BH CV STRESS HR STAGE 3: 144
BH CV STRESS METS STAGE 1: 5
BH CV STRESS METS STAGE 2: 7.5
BH CV STRESS METS STAGE 3: 10
BH CV STRESS PROTOCOL 1: NORMAL
BH CV STRESS RECOVERY BP: NORMAL MMHG
BH CV STRESS RECOVERY HR: 93 BPM
BH CV STRESS SPEED STAGE 1: 1.7
BH CV STRESS SPEED STAGE 2: 2.5
BH CV STRESS SPEED STAGE 3: 3.4
BH CV STRESS STAGE 1: 1
BH CV STRESS STAGE 2: 2
BH CV STRESS STAGE 3: 3
LV EF NUC BP: 60 %
MAXIMAL PREDICTED HEART RATE: 165 BPM
PERCENT MAX PREDICTED HR: 87.27 %
STRESS BASELINE BP: NORMAL MMHG
STRESS BASELINE HR: 85 BPM
STRESS PERCENT HR: 103 %
STRESS POST ESTIMATED WORKLOAD: 10.1 METS
STRESS POST EXERCISE DUR MIN: 7 MIN
STRESS POST EXERCISE DUR SEC: 30 SEC
STRESS POST PEAK BP: NORMAL MMHG
STRESS POST PEAK HR: 144 BPM
STRESS TARGET HR: 140 BPM

## 2018-10-03 NOTE — TELEPHONE ENCOUNTER
Patient's wife called confirming that Marcus's appointment was cancelled today and wanted to reschedule. I told her that it was already rescheduled for 10/17 at 3:00 and she understood and said they would be here.

## 2018-10-03 NOTE — PROGRESS NOTES
IDENTIFYING INFORMATION:   The patient is a 53 y.o. male who is here today for initial appointment from 3:00 PM to 4:00 PM at the [facility]..     CHIEF COMPLIANT:  Patient reports he had been taking Cymbalta for depression and pain for 8-10 years he recently switched to Celexa because Cymbalta was no longer effective.  The change has helped some but patient is still depressed and has been having a lot of anxiety because of legal issues.  Patient was observed to be very tense and was constantly twisting a paper towel during the session.  Patient has been very irritable.  Patient reports he has been isolating from friends and family, has lost interest in doing anything, doesn't sleep when he is in the [place] area (he went out of town for a few days and slept really well while he was gone).  Patient reports feeling always tired, worthless, hopeless, and feels guilty about the legal issues.  He has poor concentration, but reports no suicidal ideation.    HPI:  Patient reports that he invited his oldest son to stay with him right after his wife left him (September or October, 2017) because his son had just been released from assisted and didn't have a place to stay.  His son was using and dealing drugs and patient was arrested 3 different times while his son was with him.  Patient reports he had never been involved with drugs, but there were people in and out of his house while his son was staying with him, and he figures he paid for a lot of drugs by giving money to his son and friends.  Patient thinks the police began suspecting him because of his association with his son and son's friends.  He finally insisted his son leave his home, but patient still has one charge pending.  The other 2 were dismissed.  Because people continued to come to his house looking for drugs and for his son, patient has locked his house up and is currently staying with his estranged wife.    PAST PSYCHIATRIC HISTORY:  No history of  psychiatric treatment other than taking Cymbalta for many years.      SUBSTANCE ABUSE HISTORY:  Patient reports he used to drink quite a lot but he currently drinks no more than 2 or 3 beers at special gatherings.      MEDICAL HISTORY:  Patient has high blood pressure, stomach problems, and takes a beta blocker.  He has shoulder pain due to a missing disc in his neck.      CURRENT MEDICATIONS:  Current Outpatient Prescriptions   Medication Sig Dispense Refill   • aspirin 81 MG chewable tablet Chew 81 mg Daily.     • citalopram (CeleXA) 10 MG tablet Take 10 mg by mouth Daily.     • hydrOXYzine (VISTARIL) 25 MG capsule Take 25 mg by mouth Every 6 (Six) Hours As Needed for Itching.     • labetalol (NORMODYNE) 300 MG tablet Take 300 mg by mouth 2 (Two) Times a Day.     • lisinopril (PRINIVIL,ZESTRIL) 20 MG tablet Take 10 mg by mouth Daily.     • magnesium oxide (MAGOX) 400 (241.3 Mg) MG tablet tablet Take 400 mg by mouth Daily.     • meloxicam (MOBIC) 15 MG tablet Take 15 mg by mouth Daily.     • Omega-3 Fatty Acids (FISH OIL) 1000 MG capsule capsule Take 1,000 mg by mouth Daily With Breakfast.     • pantoprazole (PROTONIX) 40 MG EC tablet Take 40 mg by mouth Daily.     • potassium chloride (K-DUR) 10 MEQ CR tablet Take 10 mEq by mouth 1 (One) Time.     • spironolactone (ALDACTONE) 25 MG tablet Take  by mouth 2 (Two) Times a Day.     • varenicline (CHANTIX STARTING MONTH PAK) 0.5 MG X 11 & 1 MG X 42 tablet Take 0.5 mg one daily on days 1-3 and and 0.5 mg twice daily on days 4-7.Then 1 mg twice daily for a total of 12 weeks. 53 tablet 0   • vitamin D (ERGOCALCIFEROL) 34264 units capsule capsule Take 50,000 Units by mouth 1 (One) Time Per Week. Prior to [facility] Admission, Patient was on: takes on sundays       No current facility-administered medications for this visit.          FAMILY HISTORY:  Patient reports his brother and sister both drink, but he is not sure how much.  His son is a drug addict    SOCIAL HISTORY:    Patient reports he was raised in [place], [place].  He has 1 sister and 2 brothers, and both parents are still living.  He states he had a good childhood.  He has an associates degree in electronics.  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  He was gone for days at a time and worked alone.  His employer fired him because of the drug charges against him, but he hopes to be able to get rehired since he actually didn't use drugs and the first 2 charges were dropped.    Patient reports his wife left him last fall.  She has said little about why she left, and although he is currently staying with her, he is not sure if they will reunite.  He indicates that his wife traveled a lot for her work and they rarely saw each other before the separation, and didn't communicate very much or very well.  They have 2 adult sons who are both doing well.  Patient's son from a previous marriage is the one who stayed with him and was doing drugs.      MENTAL STATUS EXAM:   Hygiene:   good  Cooperation:  Cooperative  Eye Contact:  Fair  Psychomotor Behavior:  Aggitated  Affect:  Anxious/worried  Hopelessness: 7  Speech:  Rambling  Thought Process:  Circum  Thought Content:  Normal  Suicidal:  None  Homicidal:  None  Hallucinations:  None  Delusion:  None  Memory:  Intact  Orientation:  Person, Place, Time and Situation  Reliability:  fair  Insight:  Fair  Judgement:  Good  Impulse Control:  Good  Physical/Medical Issues:  High blood pressure, shoulder pain/missing disc, beta blocker, stomach problems    PROBLEM LIST:  Pending legal charge, unemployed, depression, anxiety      STRENGTHS:  Hard worker, educated, stable place to live, motivated for treatment       WEAKNESSES:  Recent poor choices when trying to help others.      SHORT-TERM GOALS: Patient  will be compliant with clinic appointments.  Patient will be engaged in therapy, medication compliant with minimal side effects. Patient  will report decrease of symptoms and frequency.    LONG-TERM GOALS: Patient will have cessation of symptoms and be able to function at optimal levels without continued treatment.     DIAGNOSIS:     ICD-10-CM ICD-9-CM   1. Major depressive disorder, recurrent episode, moderate (CMS/HCC) F33.1 296.32   2. Generalized anxiety disorder F41.1 300.02         PLAN:   Patient will continue in bimonthly individual outpatient treatment and pharmacotherapy as scheduled.        The patient was instructed to contact the clinic, call 911, or present to the nearest emergency room if crisis occurs.         Viktoria Ching LCSW

## 2018-10-04 ENCOUNTER — TELEPHONE (OUTPATIENT)
Dept: CARDIOLOGY | Facility: CLINIC | Age: 53
End: 2018-10-04

## 2018-10-04 NOTE — TELEPHONE ENCOUNTER
Called and spoke with patient's wife, I let her know that Jose's stress test was normal and for him to keep his appointment on 10/17. She understood and said they would be here.

## 2018-10-04 NOTE — TELEPHONE ENCOUNTER
----- Message from Bear Thomas MD sent at 10/3/2018  3:43 PM EDT -----  Please call pt to tell his stress test was normal.   Thanks

## 2018-10-10 ENCOUNTER — OFFICE VISIT (OUTPATIENT)
Dept: PSYCHIATRY | Facility: CLINIC | Age: 53
End: 2018-10-10

## 2018-10-10 VITALS
DIASTOLIC BLOOD PRESSURE: 76 MMHG | WEIGHT: 171.2 LBS | BODY MASS INDEX: 27.51 KG/M2 | SYSTOLIC BLOOD PRESSURE: 134 MMHG | HEART RATE: 91 BPM | HEIGHT: 66 IN

## 2018-10-10 DIAGNOSIS — F41.1 GENERALIZED ANXIETY DISORDER: ICD-10-CM

## 2018-10-10 DIAGNOSIS — F33.1 MAJOR DEPRESSIVE DISORDER, RECURRENT EPISODE, MODERATE (HCC): Primary | ICD-10-CM

## 2018-10-10 PROCEDURE — 99213 OFFICE O/P EST LOW 20 MIN: CPT | Performed by: NURSE PRACTITIONER

## 2018-10-10 RX ORDER — CITALOPRAM 20 MG/1
20 TABLET ORAL DAILY
Qty: 30 TABLET | Refills: 1 | Status: SHIPPED | OUTPATIENT
Start: 2018-10-10 | End: 2018-11-07 | Stop reason: SDUPTHER

## 2018-10-10 NOTE — PROGRESS NOTES
"Agustina Bobo is a 53 y.o. male is here today for medication management follow-up.    Chief Complaint: Depression and Anxiety     History of Present Illness   Patient reports depression and anxiety. He rates his depression 7/10 and anxiety 10/10 with 10 being the worst. He reports he stays to himself and doesn't go around his friends or family. He reports no motivation or interest in doing things. He reports worry over legal issues regarding possession charges of 2.2 grams of methamphetamine. He reports he last did meth 3-4 months ago by smoking. He reports he has never injected drugs. He reports he will either sleep too much or not enough. He reports feeling fatigue. He reports trouble sleeping at present due to racing thoughts. He reports his PCP started him on Celexa in July and it was helpful initially. He reports tolerating without side effects. He denies SI/HI/AH/VH.     The following portions of the patient's history were reviewed and updated as appropriate: allergies, current medications, past family history, past medical history, past social history, past surgical history and problem list.    Review of Systems   Constitutional: Negative.    HENT: Positive for dental problem.    Eyes: Negative.    Respiratory: Negative.    Cardiovascular: Negative.    Gastrointestinal: Negative.    Endocrine: Negative.    Genitourinary: Negative.    Musculoskeletal: Negative.    Skin: Negative.    Allergic/Immunologic: Negative.    Neurological: Negative.    Hematological: Negative.    Psychiatric/Behavioral: Positive for decreased concentration and sleep disturbance. The patient is nervous/anxious.        Objective   Physical Exam   Constitutional: He appears well-developed and well-nourished. No distress.   Skin: He is not diaphoretic.   Vitals reviewed.    Blood pressure 134/76, pulse 91, height 167.6 cm (66\"), weight 77.7 kg (171 lb 3.2 oz).    Medication List:   Current Outpatient Prescriptions "   Medication Sig Dispense Refill   • aspirin 81 MG chewable tablet Chew 81 mg Daily.     • citalopram (CeleXA) 20 MG tablet Take 1 tablet by mouth Daily. 30 tablet 1   • hydrOXYzine (VISTARIL) 25 MG capsule Take 25 mg by mouth Every 6 (Six) Hours As Needed for Itching.     • labetalol (NORMODYNE) 300 MG tablet Take 300 mg by mouth 2 (Two) Times a Day.     • lisinopril (PRINIVIL,ZESTRIL) 20 MG tablet Take 10 mg by mouth Daily.     • magnesium oxide (MAGOX) 400 (241.3 Mg) MG tablet tablet Take 400 mg by mouth Daily.     • Omega-3 Fatty Acids (FISH OIL) 1000 MG capsule capsule Take 1,000 mg by mouth Daily With Breakfast.     • pantoprazole (PROTONIX) 40 MG EC tablet Take 40 mg by mouth Daily.     • potassium chloride (K-DUR) 10 MEQ CR tablet Take 10 mEq by mouth 1 (One) Time.     • spironolactone (ALDACTONE) 25 MG tablet Take  by mouth 2 (Two) Times a Day.     • varenicline (CHANTIX STARTING MONTH PAK) 0.5 MG X 11 & 1 MG X 42 tablet Take 0.5 mg one daily on days 1-3 and and 0.5 mg twice daily on days 4-7.Then 1 mg twice daily for a total of 12 weeks. 53 tablet 0   • vitamin D (ERGOCALCIFEROL) 47402 units capsule capsule Take 50,000 Units by mouth 1 (One) Time Per Week. Prior to [facility] Admission, Patient was on: takes on sundays       No current facility-administered medications for this visit.        Labs:   Recent Results (from the past 2016 hour(s))   Tissue Pathology Exam    Collection Time: 08/16/18 10:04 AM   Result Value Ref Range    Case Report       Surgical Pathology Report                         Case: [id number]                                  Authorizing Provider:  Negrito Goldberg MD   Collected:           08/16/2018 10:04 AM          Ordering Location:     [facility]      Received:            08/16/2018 12:53 PM                                 OPERATING ROOM DEPARTMENT                                                    Pathologist:           Carmina Vasquez MD                                                        Specimens:   1) - Gastric, Antrum, ANTRUM BX                                                                     2) - Large Intestine, Transverse Colon, TRANSVERSE COLON POLYP                             Final Diagnosis       ssr     Adult Transthoracic Echo Complete W/ Cont if Necessary Per Protocol    Collection Time: 10/02/18  9:01 AM   Result Value Ref Range    BSA 1.8 m^2    IVSd 0.98 cm    IVSs 1.1 cm    LVIDd 4.5 cm    LVIDs 3.3 cm    LVPWd 0.98 cm    BH CV ECHO HOPE - LVPWS 1.8 cm    IVS/LVPW 1.0     FS 25.9 %    EDV(Teich) 93.4 ml    ESV(Teich) 45.7 ml    EF(Teich) 51.1 %    EDV(cubed) 92.3 ml    ESV(cubed) 37.5 ml    EF(cubed) 59.4 %    % IVS thick 16.3 %    % LVPW thick 86.8 %    LV mass(C)d 149.9 grams    LV mass(C)dI 81.6 grams/m^2    LV mass(C)s 179.1 grams    LV mass(C)sI 97.4 grams/m^2    SV(Teich) 47.7 ml    SI(Teich) 25.9 ml/m^2    SV(cubed) 54.8 ml    SI(cubed) 29.8 ml/m^2    Ao root diam 2.9 cm    Ao root area 6.7 cm^2    ACS 1.9 cm    LA dimension 3.2 cm    LA/Ao 1.1     LVOT diam 2.2 cm    LVOT area 3.7 cm^2    LVOT area(traced) 3.8 cm^2    LVLd ap4 8.0 cm    EDV(MOD-sp4) 73.0 ml    LVLs ap4 6.9 cm    ESV(MOD-sp4) 28.0 ml    EF(MOD-sp4) 61.6 %    SV(MOD-sp4) 45.0 ml    SI(MOD-sp4) 24.5 ml/m^2    Ao root area (BSA corrected) 1.6     LV Matos Vol (BSA corrected) 39.7 ml/m^2    LV Sys Vol (BSA corrected) 15.2 ml/m^2    MV E max landon 91.0 cm/sec    MV A max landon 65.3 cm/sec    MV E/A 1.4     Ao pk landon 149.3 cm/sec    Ao max PG 8.9 mmHg    Ao V2 mean 99.2 cm/sec    Ao mean PG 4.4 mmHg    Ao V2 VTI 32.1 cm    SV(Ao) 213.9 ml    SI(Ao) 116.3 ml/m^2    PA acc slope 2,077 cm/sec^2    PA acc time 0.06 sec    TR max landon 238.3 cm/sec    RVSP(TR) 32.7 mmHg    RAP systole 10.0 mmHg    PA pr(Accel) 50.5 mmHg     CV ECHO HOPE - BZI_BMI 26.5 kilograms/m^2     CV ECHO HOPE - BSA([facility]) 1.9 m^2     CV ECHO HOPE - BZI_METRIC_WEIGHT 74.4 kg     CV ECHO HOPE - BZI_METRIC_HEIGHT  167.6 cm    Target HR (85%) 140 bpm    Max. Pred. HR (100%) 165 bpm   Stress Test With Myocardial Perfusion (1 Day)    Collection Time: 10/02/18 12:23 PM   Result Value Ref Range    Nuclear Prior Study 3     Exercise duration (sec) 30 sec    Exercise duration (min) 7 min    BH CV STRESS PROTOCOL 1 Andrea     Stage 1 1     HR Stage 1 117     BP Stage 1 137/66     Duration Min Stage 1 3     Duration Sec Stage 1 0     Grade Stage 1 10     Speed Stage 1 1.7     BH CV STRESS METS STAGE 1 5     Stage 2 2     HR Stage 2 132     BP Stage 2 139/66     Duration Min Stage 2 3     Duration Sec Stage 2 0     Grade Stage 2 12     Speed Stage 2 2.5     BH CV STRESS METS STAGE 2 7.5     Stage 3 3     HR Stage 3 144     BP Stage 3 160/86     Duration Min Stage 3 3     Duration Sec Stage 3 0     Grade Stage 3 14     Speed Stage 3 3.4     BH CV STRESS METS STAGE 3 10.0     Baseline HR 85 bpm    Baseline /79 mmHg    Peak  bpm    Percent Max Pred HR 87.27 %    Percent Target  %    Peak /67 mmHg    Recovery HR 93 bpm    Recovery /70 mmHg    Target HR (85%) 140 bpm    Max. Pred. HR (100%) 165 bpm    Estimated workload 10.1 METS    Nuc Stress EF 60 %         Mental Status Exam:   Hygiene:   fair  Cooperation:  Cooperative  Eye Contact:  Good  Psychomotor Behavior:  Appropriate  Affect:  Restricted  Hopelessness: Denies  Speech:  Normal  Thought Process:  Goal directed and Linear  Thought Content:  Normal  Suicidal:  None  Homicidal:  None  Hallucinations:  None  Delusion:  None  Memory:  Intact  Orientation:  Person, Place, Time and Situation  Reliability:  fair  Insight:  Fair  Judgement:  Fair  Impulse Control:  Fair  Physical/Medical Issues:  Yes HTN    Assessment/Plan   Diagnoses and all orders for this visit:    Major depressive disorder, recurrent episode, moderate (CMS/HCC)  -     citalopram (CeleXA) 20 MG tablet; Take 1 tablet by mouth Daily.    Generalized anxiety disorder  -     citalopram (CeleXA) 20  MG tablet; Take 1 tablet by mouth Daily.          Body mass index is 27.63 kg/m².  Patient was educated on healthier and more balanced diet choices and encouraged exercise within physical limitations.  Functionality: pt having significant impairment in important areas of daily functioning.  Prognosis: Guarded dependent on medication/follow up and treatment plan compliance.    Impression: Patient experiencing worsening of depression, anxiety and sleep disturbance.    Plan:  1) Start Celexa 20 mg po daily for anxiety and depression.  2) Continue hydroxyzine 25 mg po every 6 hrs prn anxiety.  3) Continue psychotherapy with Viktoria Ching LCSW  4) RTC in 1 month   Discussed medication options.  Reviewed the risks, benefits, and side effects of the medications; patient acknowledged and verbally consented.  Patient is agreeable to call the [facility].  Patient is aware to call 911 or go to the nearest ER should begin having SI/HI.

## 2018-10-17 ENCOUNTER — OFFICE VISIT (OUTPATIENT)
Dept: CARDIOLOGY | Facility: CLINIC | Age: 53
End: 2018-10-17

## 2018-10-17 VITALS
HEART RATE: 94 BPM | DIASTOLIC BLOOD PRESSURE: 68 MMHG | OXYGEN SATURATION: 99 % | HEIGHT: 66 IN | SYSTOLIC BLOOD PRESSURE: 114 MMHG | WEIGHT: 175.5 LBS | BODY MASS INDEX: 28.21 KG/M2

## 2018-10-17 DIAGNOSIS — I10 ESSENTIAL HYPERTENSION: ICD-10-CM

## 2018-10-17 DIAGNOSIS — R07.9 CHEST PAIN, UNSPECIFIED TYPE: Primary | ICD-10-CM

## 2018-10-17 PROCEDURE — 99406 BEHAV CHNG SMOKING 3-10 MIN: CPT | Performed by: INTERNAL MEDICINE

## 2018-10-17 PROCEDURE — 99213 OFFICE O/P EST LOW 20 MIN: CPT | Performed by: INTERNAL MEDICINE

## 2018-10-17 NOTE — PROGRESS NOTES
[facility] CARDIOLOGY  [address]  [place] [address]  Phone: [phone]  Fax: [phone]    10/17/2018    Chief Complaint   Patient presents with   • Chest Pain        History:   Jose Bobo is a 53 y.o. male seen in followup, after his cardiac evaluation.  He had a transthoracic echocardiogram which showed normal LV systolic function, no major valvular abnormalities, he also had a lower extremity ANGEL which was normal with triphasic waveforms, he also had a stress test which was normal.  He is not having any active cardiac symptoms.    Past Medical History:   Diagnosis Date   • Arthritis    • Elevated cholesterol    • GERD (gastroesophageal reflux disease)    • Hypertension        Past Surgical History:   Procedure Laterality Date   • COLONOSCOPY     • COLONOSCOPY N/A 8/16/2018    Procedure: COLONOSCOPY;  Surgeon: Negrito Goldberg MD;  Location: [facility];  Service: Gastroenterology   • ENDOSCOPY N/A 8/16/2018    Procedure: ESOPHAGOGASTRODUODENOSCOPY WITH ANESTHESIA;  Surgeon: Negrito Goldberg MD;  Location: [facility];  Service: Gastroenterology   • VASECTOMY  1993        Past Social History:  Social History     Social History   • Marital status:      Social History Main Topics   • Smoking status: Current Every Day Smoker     Packs/day: 2.00     Years: 30.00     Types: Cigarettes   • Smokeless tobacco: Never Used   • Alcohol use No   • Drug use: No   • Sexual activity: Defer     Other Topics Concern   • Not on file       Past Family History:  Family History   Problem Relation Age of Onset   • Cancer Sister         breast   • Hypertension Other    • Heart disease Neg Hx        Review of Systems:   Review of Systems   Constitution: Positive for decreased appetite, diaphoresis, fever (Last week ), weakness and malaise/fatigue.   HENT: Positive for congestion and sore throat.    Eyes: Positive for blurred vision.   Cardiovascular: Positive for dyspnea on  exertion, near-syncope and palpitations.   Respiratory: Positive for cough, snoring and wheezing.    Skin: Positive for rash.   Musculoskeletal: Positive for muscle cramps and muscle weakness.   Gastrointestinal: Positive for diarrhea, nausea (Last week ) and vomiting (Last week ).   Neurological: Positive for excessive daytime sleepiness and headaches.   Psychiatric/Behavioral: Positive for depression.   Allergic/Immunologic: Positive for environmental allergies.         Current Outpatient Prescriptions   Medication Sig Dispense Refill   • aspirin 81 MG chewable tablet Chew 81 mg Daily.     • citalopram (CeleXA) 20 MG tablet Take 1 tablet by mouth Daily. 30 tablet 1   • hydrOXYzine (VISTARIL) 25 MG capsule Take 25 mg by mouth Every 6 (Six) Hours As Needed for Itching.     • labetalol (NORMODYNE) 300 MG tablet Take 300 mg by mouth 2 (Two) Times a Day.     • lisinopril (PRINIVIL,ZESTRIL) 20 MG tablet Take 10 mg by mouth Daily.     • magnesium oxide (MAGOX) 400 (241.3 Mg) MG tablet tablet Take 400 mg by mouth Daily.     • Omega-3 Fatty Acids (FISH OIL) 1000 MG capsule capsule Take 1,000 mg by mouth Daily With Breakfast.     • pantoprazole (PROTONIX) 40 MG EC tablet Take 40 mg by mouth Daily.     • potassium chloride (K-DUR) 10 MEQ CR tablet Take 10 mEq by mouth 1 (One) Time.     • spironolactone (ALDACTONE) 25 MG tablet Take  by mouth 2 (Two) Times a Day.     • varenicline (CHANTIX STARTING MONTH PAK) 0.5 MG X 11 & 1 MG X 42 tablet Take 0.5 mg one daily on days 1-3 and and 0.5 mg twice daily on days 4-7.Then 1 mg twice daily for a total of 12 weeks. 53 tablet 0   • vitamin D (ERGOCALCIFEROL) 50570 units capsule capsule Take 50,000 Units by mouth 1 (One) Time Per Week. Prior to [facility] Admission, Patient was on: takes on sundays       No current facility-administered medications for this visit.         No Known Allergies    Objective     /68 (BP Location: Left arm, Patient Position: Sitting)   Pulse 94   Ht  "167.6 cm (66\")   Wt 79.6 kg (175 lb 8 oz)   SpO2 99%   BMI 28.33 kg/m²     Physical Exam   Constitutional: He is oriented to person, place, and time. He appears well-developed and well-nourished.   HENT:   Head: Normocephalic and atraumatic.   Eyes: Pupils are equal, round, and reactive to light.   Neck: Normal range of motion. Neck supple.   Cardiovascular: Normal rate and regular rhythm.    Pulmonary/Chest: Effort normal and breath sounds normal.   Abdominal: Soft.   Neurological: He is alert and oriented to person, place, and time.   Skin: Skin is warm and dry.       DATA:      Results for orders placed during the hospital encounter of 10/02/18   Adult Transthoracic Echo Complete W/ Cont if Necessary Per Protocol    Narrative · Left ventricular systolic function is normal. Estimated EF appears to be   in the range of 56 - 60%.  · Left ventricular diastolic function is normal. Normal left atrial   pressure.  · Normal right ventricular cavity size and systolic function noted.  · Trace mitral valve regurgitation is present  · There is no evidence of pericardial effusion         Results for orders placed during the hospital encounter of 10/02/18   Stress Test With Myocardial Perfusion (1 Day)    Narrative · Myocardial perfusion imaging indicates a normal myocardial perfusion   study with no evidence of ischemia.  · Left ventricular ejection fraction is normal (Calculated EF = 60%). TID   0.98  · GI artifact is present.  · Pt walked on treadmill for 7 minutes and 30 secs, 10 METS achieving   maximum heart rate 165 bpm which represents 87.27 % of APMHR, no chest   pain, normal hemodynamic response and no ishcemic EKG changes  · Findings consistent with a normal ECG stress test.  · Impressions are consistent with a low risk study.         Results for orders placed during the hospital encounter of 10/02/18   Stress Test With Myocardial Perfusion (1 Day)    Narrative · Myocardial perfusion imaging indicates a normal " myocardial perfusion   study with no evidence of ischemia.  · Left ventricular ejection fraction is normal (Calculated EF = 60%). TID   0.98  · GI artifact is present.  · Pt walked on treadmill for 7 minutes and 30 secs, 10 METS achieving   maximum heart rate 165 bpm which represents 87.27 % of APMHR, no chest   pain, normal hemodynamic response and no ishcemic EKG changes  · Findings consistent with a normal ECG stress test.  · Impressions are consistent with a low risk study.          Procedures             Chest pain, atypical, echocardiogram, stress test, ANGEL all came back are normal.  No further cardiac evaluation at this point of time.  Hypertension, controlled continue with current antihypertensive medication.  We will follow him up in clinic in 6 months.        Recommended increase activity to 30 minutes of walking daily, most days of the week.  Counseled the patient for 10 minutes regarding smoking cessation.  Discussed tobacco use relationship to cardiac disease progression.  Offered smoking cessation medications.  Discussed diet and weight loss with patient.        Patient's Body mass index is 28.33 kg/m². BMI is above normal parameters. Recommendations include: educational material.       No Follow-up on file.    Thank you for allowing me to participate in the care of Jose Bobo. Feel free to contact me directly with any further questions or concerns.          Bear Thomas MD, [facility]  Interventional Cardiology

## 2018-10-18 ENCOUNTER — OFFICE VISIT (OUTPATIENT)
Dept: PSYCHIATRY | Facility: CLINIC | Age: 53
End: 2018-10-18

## 2018-10-18 DIAGNOSIS — F41.1 GENERALIZED ANXIETY DISORDER: ICD-10-CM

## 2018-10-18 DIAGNOSIS — F33.1 MAJOR DEPRESSIVE DISORDER, RECURRENT EPISODE, MODERATE (HCC): Primary | ICD-10-CM

## 2018-10-18 PROCEDURE — 90834 PSYTX W PT 45 MINUTES: CPT | Performed by: SOCIAL WORKER

## 2018-10-18 NOTE — PROGRESS NOTES
Date of Service: October 19, 2018  Time In: 9:00 AM  Time Out: 9:45 AM    PROGRESS NOTE  Data:  Jose Bobo is a 53 y.o. male who met with the undersigned for a regularly scheduled individual outpatient therapy session at the [facility].     HPI:   Patient appeared somewhat less anxious today than last session.  He continues to stay with his ex-wife.  This is stressful because she is still suspicious of him using drugs, and because all of her family is involved with drugs and she repeatedly gets drawn back in to enabling them.  Patient has also had further contact with police although it did not come to anything.  He said that he plans to move back to his own house soon, but did not give a date.  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  He said that he is no longer involved in the selling because he is an easy yamila when people don't want to pay on time rather than paying cash upfront.  He thinks that his relatives also have some difficulty with that but he tries to stay out of it, as long as they are making a profit.    Clinical Maneuvering/Intervention:  Assisted patient in processing above session content; acknowledged and normalized patient’s thoughts, feelings, and concerns.  Gave positive reinforcement for patient staying out of most of the drug related trauma.  Coverage patient and his ex-wife to consider going to Al-Anon or narc anon for support in learning to manage issues of enabling and just general emotional/relational issues.    Encouraged patient to establish a daily routine, and to find enjoyable activities to fill his time as this will have a positive impact on his depression and anxiety.  Patient states he is spending significant time going to  cars, but he has little else to do because he still does not  want to be out and about in the community because of the potential for police to stop him again.  He reports he doesn't know where his oldest son is at this point, but some of the people he hung out with still ask patient about him and/or about drugs.    Allowed patient to freely discuss issues without interruption or judgment. Provided safe, confidential environment to facilitate the development of positive therapeutic relationship and encourage open, honest communication. Assisted patient in identifying risk factors which would indicate the need for higher level of care including thoughts to harm self or others and/or self-harming behavior and encouraged patient to contact this office, call 911, or present to the nearest emergency room should any of these events occur. Discussed crisis intervention services and means to access.  Patient adamantly and convincingly denies current suicidal or homicidal ideation or perceptual disturbance.    Assessment     Diagnoses and all orders for this visit:    Major depressive disorder, recurrent episode, moderate (CMS/HCC)    Generalized anxiety disorder    Mental Status Exam  Hygiene:  good  Dress:  casual  Attitude:  Cooperative  Motor Activity:  Appropriate  Speech:  Normal  Mood:  anxious  Affect:  anxious  Thought Processes:  Linear  Thought Content:  normal  Suicidal Thoughts:  denies  Homicidal Thoughts:  denies  Crisis Safety Plan: yes, to come to the emergency room.  Hallucinations:  denies    Patient's Support Network Includes:  wife, children    Progress toward goal: Not at goal    Functional Status: Moderate impairment     Prognosis: Fair with Ongoing Treatment     Plan   Patient will continue in individual outpatient therapy every 2 weeks with focus on improved functioning and coping skills, maintaining stability, and avoiding decompensation and the need for a higher level of care.    Patient will adhere to medication regimen as prescribed and report any side  effects. Patient will contact this office, call 911 or present to the nearest emergency room should suicidal or homicidal ideations occur. Provide Cognitive Behavioral Therapy and Integrative Therapy to improve functioning, maintain stability, and avoid decompensation and the need for higher level of care.     Return in about 2 weeks (around 11/1/2018).      This document signed by Viktoria Ching LCSW,  October 19, 2018 2:14 PM

## 2018-11-01 ENCOUNTER — OFFICE VISIT (OUTPATIENT)
Dept: PSYCHIATRY | Facility: CLINIC | Age: 53
End: 2018-11-01

## 2018-11-01 DIAGNOSIS — F41.1 GENERALIZED ANXIETY DISORDER: ICD-10-CM

## 2018-11-01 DIAGNOSIS — F33.1 MAJOR DEPRESSIVE DISORDER, RECURRENT EPISODE, MODERATE (HCC): Primary | ICD-10-CM

## 2018-11-01 PROCEDURE — 90834 PSYTX W PT 45 MINUTES: CPT | Performed by: SOCIAL WORKER

## 2018-11-01 NOTE — PROGRESS NOTES
Date of Service: November 1, 2018  Time In:  9:00 AM  Time Out:  9:45 AM      PROGRESS NOTE  Data:  Jose Bobo is a 53 y.o. male who met with the undersigned for a regularly scheduled individual outpatient therapy session at the [facility].     HPI:   Patient shares his frustration with family members who have gotten work for him (son) without scheduling it with him, and others (ex-wife) who think that he should be able to do much more work in a day than he can.  Patient said that he is worried for his own sake about doing the work because he sometimes feels faint.  He reports heart issues have been ruled out, but there is some question of COPD.  In addition he states that he works from the time he gets up in the morning until the time he goes to bed at night, which is usually a 10-12 hour day.  He reports that he still has difficulty falling asleep because his mind is always racing.    Patient said that he did have one court date but it was continued, the legal issues are still pending.    Clinical Maneuvering/Intervention:  Assisted patient in processing above session content; acknowledged and normalized patient’s thoughts, feelings, and concerns.  Provided empathy and support as patient processed the above content.  Encouraged patient to use deep breathing to help him relax at night.  Also taught him a focusing exercise to help reduce his anxiety and racing thoughts.  After trying the exercise in session he said that it did help him relax; he was less fidgety and visibly less tense.    Discussed setting limits with his wife and son regarding his work.  Encouraged him to be explicit with his son about what he can do and when, and insists that son check with him before committing him to do any work.  Patient said that his wife will ask what he accomplished today and patient will tell her, but she always dismisses it, saying that  should take about 15 minutes.  Again encouraged patient to be explicit, and  even make a list as he goes through his day, of what he has done.  Patient agreed that it might help.    Allowed patient to freely discuss issues without interruption or judgment. Provided safe, confidential environment to facilitate the development of positive therapeutic relationship and encourage open, honest communication. Assisted patient in identifying risk factors which would indicate the need for higher level of care including thoughts to harm self or others and/or self-harming behavior and encouraged patient to contact this office, call 911, or present to the nearest emergency room should any of these events occur. Discussed crisis intervention services and means to access.  Patient adamantly and convincingly denies current suicidal or homicidal ideation or perceptual disturbance.    Assessment     Diagnoses and all orders for this visit:    Major depressive disorder, recurrent episode, moderate (CMS/HCC)    Generalized anxiety disorder    Mental Status Exam  Hygiene:  good  Dress:  casual  Attitude:  Cooperative  Motor Activity:  Fidgety, tense  Speech:  Normal  Mood:  anxious  Affect:  anxious  Thought Processes:  Goal directed and Linear  Thought Content:  normal  Suicidal Thoughts:  denies  Homicidal Thoughts:  denies  Crisis Safety Plan: yes, to come to the emergency room.  Hallucinations:  denies    Patient's Support Network Includes:  son, extended family and Ex-wife    Progress toward goal: Not at goal    Functional Status: Moderate impairment     Prognosis: Fair with Ongoing Treatment       Plan   Continue individual outpatient therapy bimonthly with focus on improved functioning and coping skills, maintaining stability, and avoiding decompensation and the need for higher level of care.    Patient will adhere to medication regimen as prescribed and report any side effects. Patient will contact this office, call 911 or present to the nearest emergency room should suicidal or homicidal ideations  occur. Provide Cognitive Behavioral Therapy and Integrative Therapy to improve functioning, maintain stability, and avoid decompensation and the need for higher level of care.     Return in about 2 weeks (around 11/15/2018).      This document signed by Viktoria Ching LCSW, November 1, 2018 5:45 PM

## 2018-11-01 NOTE — TREATMENT PLAN
Multi-Disciplinary Problems (from Behavioral Health Treatment Plan)    Active Problems     Problem: Anxiety  Start Date: 11/01/18    Problem Details:  The patient self-scales this problem as a 8 with 10 being the worst.        Goal Priority Start Date Expected End Date End Date    Patient will develop and implement behavioral and cognitive strategies to reduce anxiety and irrational fears. -- 11/01/18 11/01/19 --    Goal Details:  Progress toward goal:  Not appropriate to rate progress toward goal since this is the initial treatment plan.        Goal Intervention Frequency Start Date End Date    Help patient explore past emotional issues in relation to present anxiety. Q2 Weeks 11/01/18 11/01/19    Intervention Details:  Duration of treatment until until remission of symptoms.        Goal Intervention Frequency Start Date End Date    Help patient develop an awareness of their cognitive and physical responses to anxiety. Q2 Weeks 11/01/18 11/01/19    Intervention Details:  Duration of treatment until until remission of symptoms.              Problem: Depression  Start Date: 11/01/18    Problem Details:  The patient self-scales this problem as a 6 with 10 being the worst.        Goal Priority Start Date Expected End Date End Date    Patient will demonstrate the ability to initiate new constructive life skills outside of sessions on a consistent basis. -- 11/01/18 11/01/19 --    Goal Details:  Progress toward goal:  Not appropriate to rate progress toward goal since this is the initial treatment plan.        Goal Intervention Frequency Start Date End Date    Assist patient in setting attainable activities of daily living goals. Q2 Weeks 11/01/18 11/01/19    Goal Intervention Frequency Start Date End Date    Provide education about depression Q2 Weeks 11/01/18 11/01/19    Intervention Details:  Duration of treatment until until remission of symptoms.        Goal Intervention Frequency Start Date End Date    Assist  patient in developing healthy coping strategies. Q2 Weeks 11/01/18 11/01/19    Intervention Details:  Duration of treatment until until remission of symptoms.                    Reviewed By     Viktoria Ching, [facility] 11/01/18 2058                 I have discussed and reviewed this treatment plan with the patient.  It has been printed for signatures.

## 2018-11-07 ENCOUNTER — OFFICE VISIT (OUTPATIENT)
Dept: PSYCHIATRY | Facility: CLINIC | Age: 53
End: 2018-11-07

## 2018-11-07 VITALS
DIASTOLIC BLOOD PRESSURE: 63 MMHG | HEART RATE: 80 BPM | BODY MASS INDEX: 28.28 KG/M2 | SYSTOLIC BLOOD PRESSURE: 114 MMHG | HEIGHT: 66 IN | WEIGHT: 176 LBS

## 2018-11-07 DIAGNOSIS — F33.1 MAJOR DEPRESSIVE DISORDER, RECURRENT EPISODE, MODERATE (HCC): ICD-10-CM

## 2018-11-07 DIAGNOSIS — F41.1 GENERALIZED ANXIETY DISORDER: ICD-10-CM

## 2018-11-07 PROCEDURE — 99214 OFFICE O/P EST MOD 30 MIN: CPT | Performed by: NURSE PRACTITIONER

## 2018-11-07 RX ORDER — CITALOPRAM 20 MG/1
20 TABLET ORAL DAILY
Qty: 30 TABLET | Refills: 1 | Status: SHIPPED | OUTPATIENT
Start: 2018-11-07 | End: 2018-12-06

## 2018-11-07 RX ORDER — HYDROXYZINE PAMOATE 25 MG/1
25 CAPSULE ORAL EVERY 6 HOURS PRN
Qty: 60 CAPSULE | Refills: 1 | Status: SHIPPED | OUTPATIENT
Start: 2018-11-07 | End: 2018-12-06 | Stop reason: SDUPTHER

## 2018-11-07 RX ORDER — MIRTAZAPINE 15 MG/1
15 TABLET, FILM COATED ORAL NIGHTLY
Qty: 30 TABLET | Refills: 0 | Status: SHIPPED | OUTPATIENT
Start: 2018-11-07 | End: 2018-12-06

## 2018-11-07 NOTE — PROGRESS NOTES
"Agustina Bobo is a 53 y.o. male is here today for medication management follow-up.    Chief Complaint: Depression and Anxiety     History of Present Illness   Patient presents for follow-up today. He reports things are about the same. Not much changes in anxiety or depression from last visit. He reports continued problems with initiating and maintaining sleep. He reports he sleep around four hours a night. He reports fatigue and sleeps during the day. He reports tolerating medication without side effects. He denies SI/HI/AH/VH. Patient denies any history of seizures. He reports history of HTN and chronic back pain. Patient reports he is currently taking Chantix for smoking cessation.     The following portions of the patient's history were reviewed and updated as appropriate: allergies, current medications, past family history, past medical history, past social history, past surgical history and problem list.    Review of Systems   Constitutional: Negative.    HENT: Positive for dental problem.    Eyes: Negative.    Respiratory: Negative.    Cardiovascular: Negative.    Gastrointestinal: Negative.    Endocrine: Negative.    Genitourinary: Negative.    Musculoskeletal: Negative.    Skin: Negative.    Allergic/Immunologic: Negative.    Neurological: Negative.    Hematological: Negative.    Psychiatric/Behavioral: Positive for decreased concentration and sleep disturbance. The patient is nervous/anxious.        Objective   Physical Exam   Constitutional: He appears well-developed and well-nourished. No distress.   Skin: He is not diaphoretic.   Vitals reviewed.    Blood pressure 114/63, pulse 80, height 167.6 cm (65.98\"), weight 79.8 kg (176 lb).    Medication List:   Current Outpatient Prescriptions   Medication Sig Dispense Refill   • aspirin 81 MG chewable tablet Chew 81 mg Daily.     • citalopram (CeleXA) 20 MG tablet Take 1 tablet by mouth Daily. 30 tablet 1   • hydrOXYzine (VISTARIL) 25 MG " capsule Take 1 capsule by mouth Every 6 (Six) Hours As Needed for Anxiety. 60 capsule 1   • labetalol (NORMODYNE) 300 MG tablet Take 300 mg by mouth 2 (Two) Times a Day.     • lisinopril (PRINIVIL,ZESTRIL) 20 MG tablet Take 10 mg by mouth Daily.     • magnesium oxide (MAGOX) 400 (241.3 Mg) MG tablet tablet Take 400 mg by mouth Daily.     • Omega-3 Fatty Acids (FISH OIL) 1000 MG capsule capsule Take 1,000 mg by mouth Daily With Breakfast.     • pantoprazole (PROTONIX) 40 MG EC tablet Take 40 mg by mouth Daily.     • potassium chloride (K-DUR) 10 MEQ CR tablet Take 10 mEq by mouth 1 (One) Time.     • spironolactone (ALDACTONE) 25 MG tablet Take  by mouth 2 (Two) Times a Day.     • varenicline (CHANTIX STARTING MONTH PAK) 0.5 MG X 11 & 1 MG X 42 tablet Take 0.5 mg one daily on days 1-3 and and 0.5 mg twice daily on days 4-7.Then 1 mg twice daily for a total of 12 weeks. 53 tablet 0   • vitamin D (ERGOCALCIFEROL) 81026 units capsule capsule Take 50,000 Units by mouth 1 (One) Time Per Week. Prior to [facility] Admission, Patient was on: takes on sundays     • mirtazapine (REMERON) 15 MG tablet Take 1 tablet by mouth Every Night. 30 tablet 0     No current facility-administered medications for this visit.        Labs:   Recent Results (from the past 2016 hour(s))   Tissue Pathology Exam    Collection Time: 08/16/18 10:04 AM   Result Value Ref Range    Case Report       Surgical Pathology Report                         Case: [id number]                                  Authorizing Provider:  Negrito Goldberg MD   Collected:           08/16/2018 10:04 AM          Ordering Location:     [facility]      Received:            08/16/2018 12:53 PM                                 OPERATING ROOM DEPARTMENT                                                    Pathologist:           Carmina Vasquez MD                                                       Specimens:   1) - Gastric, Antrum, ANTRUM BX                                                                      2) - Large Intestine, Transverse Colon, TRANSVERSE COLON POLYP                             Final Diagnosis       ssr     Adult Transthoracic Echo Complete W/ Cont if Necessary Per Protocol    Collection Time: 10/02/18  9:01 AM   Result Value Ref Range    BSA 1.8 m^2    IVSd 0.98 cm    IVSs 1.1 cm    LVIDd 4.5 cm    LVIDs 3.3 cm    LVPWd 0.98 cm    BH CV ECHO HOPE - LVPWS 1.8 cm    IVS/LVPW 1.0     FS 25.9 %    EDV(Teich) 93.4 ml    ESV(Teich) 45.7 ml    EF(Teich) 51.1 %    EDV(cubed) 92.3 ml    ESV(cubed) 37.5 ml    EF(cubed) 59.4 %    % IVS thick 16.3 %    % LVPW thick 86.8 %    LV mass(C)d 149.9 grams    LV mass(C)dI 81.6 grams/m^2    LV mass(C)s 179.1 grams    LV mass(C)sI 97.4 grams/m^2    SV(Teich) 47.7 ml    SI(Teich) 25.9 ml/m^2    SV(cubed) 54.8 ml    SI(cubed) 29.8 ml/m^2    Ao root diam 2.9 cm    Ao root area 6.7 cm^2    ACS 1.9 cm    LA dimension 3.2 cm    LA/Ao 1.1     LVOT diam 2.2 cm    LVOT area 3.7 cm^2    LVOT area(traced) 3.8 cm^2    LVLd ap4 8.0 cm    EDV(MOD-sp4) 73.0 ml    LVLs ap4 6.9 cm    ESV(MOD-sp4) 28.0 ml    EF(MOD-sp4) 61.6 %    SV(MOD-sp4) 45.0 ml    SI(MOD-sp4) 24.5 ml/m^2    Ao root area (BSA corrected) 1.6     LV Matos Vol (BSA corrected) 39.7 ml/m^2    LV Sys Vol (BSA corrected) 15.2 ml/m^2    MV E max landon 91.0 cm/sec    MV A max landon 65.3 cm/sec    MV E/A 1.4     Ao pk landon 149.3 cm/sec    Ao max PG 8.9 mmHg    Ao V2 mean 99.2 cm/sec    Ao mean PG 4.4 mmHg    Ao V2 VTI 32.1 cm    SV(Ao) 213.9 ml    SI(Ao) 116.3 ml/m^2    PA acc slope 2,077 cm/sec^2    PA acc time 0.06 sec    TR max landon 238.3 cm/sec    RVSP(TR) 32.7 mmHg    RAP systole 10.0 mmHg    PA pr(Accel) 50.5 mmHg     CV ECHO HOPE - BZI_BMI 26.5 kilograms/m^2     CV ECHO HOPE - BSA(HAYCOCK) 1.9 m^2     CV ECHO HOPE - BZI_METRIC_WEIGHT 74.4 kg     CV ECHO HOPE - BZI_METRIC_HEIGHT 167.6 cm    Target HR (85%) 140 bpm    Max. Pred. HR (100%) 165 bpm   Stress Test With Myocardial  Perfusion (1 Day)    Collection Time: 10/02/18 12:23 PM   Result Value Ref Range    Nuclear Prior Study 3     Exercise duration (sec) 30 sec    Exercise duration (min) 7 min    BH CV STRESS PROTOCOL 1 Andrea     Stage 1 1     HR Stage 1 117     BP Stage 1 137/66     Duration Min Stage 1 3     Duration Sec Stage 1 0     Grade Stage 1 10     Speed Stage 1 1.7     BH CV STRESS METS STAGE 1 5     Stage 2 2     HR Stage 2 132     BP Stage 2 139/66     Duration Min Stage 2 3     Duration Sec Stage 2 0     Grade Stage 2 12     Speed Stage 2 2.5     BH CV STRESS METS STAGE 2 7.5     Stage 3 3     HR Stage 3 144     BP Stage 3 160/86     Duration Min Stage 3 3     Duration Sec Stage 3 0     Grade Stage 3 14     Speed Stage 3 3.4     BH CV STRESS METS STAGE 3 10.0     Baseline HR 85 bpm    Baseline /79 mmHg    Peak  bpm    Percent Max Pred HR 87.27 %    Percent Target  %    Peak /67 mmHg    Recovery HR 93 bpm    Recovery /70 mmHg    Target HR (85%) 140 bpm    Max. Pred. HR (100%) 165 bpm    Estimated workload 10.1 METS    Nuc Stress EF 60 %         Mental Status Exam:   Hygiene:   fair  Cooperation:  Cooperative  Eye Contact:  Good  Psychomotor Behavior:  Appropriate  Affect:  Restricted  Hopelessness: Denies  Speech:  Normal  Thought Process:  Goal directed and Linear  Thought Content:  Normal  Suicidal:  None  Homicidal:  None  Hallucinations:  None  Delusion:  None  Memory:  Intact  Orientation:  Person, Place, Time and Situation  Reliability:  fair  Insight:  Fair  Judgement:  Fair  Impulse Control:  Fair  Physical/Medical Issues:  Yes HTN    Assessment/Plan   Diagnoses and all orders for this visit:    Major depressive disorder, recurrent episode, moderate (CMS/HCC)  -     citalopram (CeleXA) 20 MG tablet; Take 1 tablet by mouth Daily.    Generalized anxiety disorder  -     citalopram (CeleXA) 20 MG tablet; Take 1 tablet by mouth Daily.    Other orders  -     mirtazapine (REMERON) 15 MG  tablet; Take 1 tablet by mouth Every Night.  -     hydrOXYzine (VISTARIL) 25 MG capsule; Take 1 capsule by mouth Every 6 (Six) Hours As Needed for Anxiety.          Body mass index is 28.42 kg/m².  Patient was educated on healthier and more balanced diet choices and encouraged exercise within physical limitations.  Functionality: pt having significant impairment in important areas of daily functioning.  Prognosis: Guarded dependent on medication/follow up and treatment plan compliance.    Impression: Patient continues to experience worsening of depression, anxiety and sleep disturbance.    Plan:  1) Continue Celexa 20 mg po daily for anxiety and depression.  2) Continue hydroxyzine 25 mg po every 6 hrs prn anxiety.  3) Continue psychotherapy with Viktoria Ching LCSW  4) Start Remeron 15 mg po QHS as adjunct for depression and anxiety interfering with sleep.   5) RTC in 1 month     Discussed medication options.  Reviewed the risks, benefits, and side effects of the medications; patient acknowledged and verbally consented.  Patient is agreeable to call the [facility].  Patient is aware to call 911 or go to the nearest ER should begin having SI/HI.

## 2018-11-26 ENCOUNTER — OFFICE VISIT (OUTPATIENT)
Dept: PSYCHIATRY | Facility: CLINIC | Age: 53
End: 2018-11-26

## 2018-11-26 DIAGNOSIS — F33.1 MAJOR DEPRESSIVE DISORDER, RECURRENT EPISODE, MODERATE (HCC): Primary | ICD-10-CM

## 2018-11-26 DIAGNOSIS — F41.1 GENERALIZED ANXIETY DISORDER: ICD-10-CM

## 2018-11-26 PROCEDURE — 90834 PSYTX W PT 45 MINUTES: CPT | Performed by: SOCIAL WORKER

## 2018-11-26 NOTE — PROGRESS NOTES
Date of Service: November 26, 2018  Time In:  10:30 AM  Time Out:  11:15 AM      PROGRESS NOTE  Data:  Jose Bobo is a 53 y.o. male who met with the undersigned for a regularly scheduled individual outpatient therapy session at the [facility].     HPI:   Patient shared that things are about the same for him.  He has 2 court dates next week, and hopes to resolve the legal issues at that time.  He continues to be frustrated with his wife.  He said they have decided to remain together, but his wife wants patient to remodel/upgrade their home before they moved back into it.  Patient is doing the work although he doesn't really want to.  He reports that she continues to suspect him of doing drugs, and continues to verbalize her belief that he doesn't do much work all day long.  She also continues to enable various members of her family who are doing drugs.    Clinical Maneuvering/Intervention:  Assisted patient in processing above session content; acknowledged and normalized patient’s thoughts, feelings, and concerns.  Provided empathy and support as he processed the above content.  Confronted the mixed messages patient and his wife exchange with each other, and patient's inability to set limits effectively with his wife.  Patient acknowledged ambivalence about getting back together with her, and also shared his suspicion that his wife was somehow involved in his getting arrested on drug charges.  Patient was vague about why he thinks that, but said that he is concerned that there could be further legal repercussions if he doesn't do what his wife wants.  Encouraged patient to give serious thought to what he wants before committing himself to a reconciliation.    Allowed patient to freely discuss issues without interruption or judgment. Provided safe, confidential environment to facilitate the development of positive therapeutic relationship and encourage open, honest communication. Assisted patient in identifying  risk factors which would indicate the need for higher level of care including thoughts to harm self or others and/or self-harming behavior and encouraged patient to contact this office, call 911, or present to the nearest emergency room should any of these events occur. Discussed crisis intervention services and means to access.  Patient adamantly and convincingly denies current suicidal or homicidal ideation or perceptual disturbance.    Assessment     Diagnoses and all orders for this visit:    Major depressive disorder, recurrent episode, moderate (CMS/HCC)    Generalized anxiety disorder    Mental Status Exam  Hygiene:  good  Dress:  casual  Attitude:  Guarded  Motor Activity:  Appropriate  Speech:  Normal  Mood:  constricted  Affect:  anxious  Thought Processes:  Circum  Thought Content:  normal  Suicidal Thoughts:  denies  Homicidal Thoughts:  denies  Crisis Safety Plan: yes, to come to the emergency room.  Hallucinations:  denies    Patient's Support Network Includes:  wife, children and extended family    Progress toward goal: Not at goal    Functional Status: Mild impairment     Prognosis: Guarded with Ongoing Treatment      Plan   Patient will continue with individual outpatient therapy bimonthly with focus on improved functioning and coping skills, maintaining stability, and avoiding decompensation and the need for higher level of care.    Patient will adhere to medication regimen as prescribed and report any side effects. Patient will contact this office, call 911 or present to the nearest emergency room should suicidal or homicidal ideations occur. Provide Cognitive Behavioral Therapy and Integrative Therapy to improve functioning, maintain stability, and avoid decompensation and the need for higher level of care.     Return in about 2 weeks (around 12/10/2018).      This document signed by Viktoria Ching LCSW,  November 26, 2018 5:37 PM

## 2018-12-06 ENCOUNTER — OFFICE VISIT (OUTPATIENT)
Dept: PSYCHIATRY | Facility: CLINIC | Age: 53
End: 2018-12-06

## 2018-12-06 VITALS
DIASTOLIC BLOOD PRESSURE: 65 MMHG | WEIGHT: 176.4 LBS | HEIGHT: 66 IN | BODY MASS INDEX: 28.35 KG/M2 | SYSTOLIC BLOOD PRESSURE: 102 MMHG

## 2018-12-06 DIAGNOSIS — F33.1 MAJOR DEPRESSIVE DISORDER, RECURRENT EPISODE, MODERATE (HCC): Primary | ICD-10-CM

## 2018-12-06 DIAGNOSIS — F51.05 INSOMNIA DUE TO MENTAL DISORDER: ICD-10-CM

## 2018-12-06 DIAGNOSIS — F41.1 GENERALIZED ANXIETY DISORDER: ICD-10-CM

## 2018-12-06 PROCEDURE — 99214 OFFICE O/P EST MOD 30 MIN: CPT | Performed by: NURSE PRACTITIONER

## 2018-12-06 RX ORDER — HYDROXYZINE PAMOATE 25 MG/1
25 CAPSULE ORAL EVERY 6 HOURS PRN
Qty: 60 CAPSULE | Refills: 1 | Status: SHIPPED | OUTPATIENT
Start: 2018-12-06 | End: 2019-01-03 | Stop reason: SDUPTHER

## 2018-12-06 RX ORDER — QUETIAPINE FUMARATE 25 MG/1
12.5-25 TABLET, FILM COATED ORAL 2 TIMES DAILY PRN
Qty: 60 TABLET | Refills: 0 | Status: SHIPPED | OUTPATIENT
Start: 2018-12-06 | End: 2019-01-03 | Stop reason: SDUPTHER

## 2018-12-06 RX ORDER — QUETIAPINE FUMARATE 50 MG/1
50-100 TABLET, FILM COATED ORAL NIGHTLY PRN
Qty: 60 TABLET | Refills: 0 | Status: SHIPPED | OUTPATIENT
Start: 2018-12-06 | End: 2019-01-03 | Stop reason: SDUPTHER

## 2018-12-06 RX ORDER — SERTRALINE HYDROCHLORIDE 25 MG/1
25 TABLET, FILM COATED ORAL
Qty: 30 TABLET | Refills: 0 | Status: SHIPPED | OUTPATIENT
Start: 2018-12-06 | End: 2019-01-03 | Stop reason: SDUPTHER

## 2018-12-06 RX ORDER — LAMOTRIGINE 25 MG/1
25 TABLET ORAL DAILY
Qty: 45 TABLET | Refills: 0 | Status: SHIPPED | OUTPATIENT
Start: 2018-12-06 | End: 2019-01-31 | Stop reason: SDUPTHER

## 2018-12-06 NOTE — PROGRESS NOTES
"Agustina Bobo is a 53 y.o. male is here today for medication management follow-up.    Chief Complaint: Depression and Anxiety     History of Present Illness   Patient presents for follow-up today accompanied by wife. Patient reports he is doing \"alright\". Wife reports he isn't sleeping and doesn't want to leave the house. Medication not helping with depression. He rates depression and anxiety 6/10 with 10 being the worst. Patient reports he is not sleeping and anxiety is worst at night. He is paranoid when is he around others that they are watching him or talking about him. Wife reports his moods are up and down and he is irritable most of the time. He doesn't feel the Celexa or Remeron is helping and wants to switch. He has been on Wellbutrin and Cymbalta which were not effective. He denies suicidal and homicidal ideations. He denies auditory and visual hallucinations. Appetite is fair. Patient is not working and this is a stressor and a source of conflict between patient and wife.     The following portions of the patient's history were reviewed and updated as appropriate: allergies, current medications, past family history, past medical history, past social history, past surgical history and problem list.    Review of Systems   Constitutional: Negative.    HENT: Positive for dental problem.    Eyes: Negative.    Respiratory: Negative.    Cardiovascular: Negative.    Gastrointestinal: Negative.    Endocrine: Negative.    Genitourinary: Negative.    Musculoskeletal: Negative.    Skin: Negative.    Allergic/Immunologic: Negative.    Neurological: Negative.    Hematological: Negative.    Psychiatric/Behavioral: Positive for decreased concentration and sleep disturbance. The patient is nervous/anxious.        Objective   Physical Exam   Constitutional: He appears well-developed and well-nourished. No distress.   Skin: He is not diaphoretic.   Vitals reviewed.    Blood pressure 102/65, height 167.6 cm " "(65.98\"), weight 80 kg (176 lb 6.4 oz).    Medication List:   Current Outpatient Medications   Medication Sig Dispense Refill   • aspirin 81 MG chewable tablet Chew 81 mg Daily.     • hydrOXYzine (VISTARIL) 25 MG capsule Take 1 capsule by mouth Every 6 (Six) Hours As Needed for Anxiety. 60 capsule 1   • labetalol (NORMODYNE) 300 MG tablet Take 300 mg by mouth 2 (Two) Times a Day.     • lisinopril (PRINIVIL,ZESTRIL) 20 MG tablet Take 10 mg by mouth Daily.     • pantoprazole (PROTONIX) 40 MG EC tablet Take 40 mg by mouth Daily.     • potassium chloride (K-DUR) 10 MEQ CR tablet Take 10 mEq by mouth 1 (One) Time.     • spironolactone (ALDACTONE) 25 MG tablet Take  by mouth 2 (Two) Times a Day.     • lamoTRIgine (LaMICtal) 25 MG tablet Take 1 tablet by mouth Daily. 45 tablet 0   • magnesium oxide (MAGOX) 400 (241.3 Mg) MG tablet tablet Take 400 mg by mouth Daily.     • Omega-3 Fatty Acids (FISH OIL) 1000 MG capsule capsule Take 1,000 mg by mouth Daily With Breakfast.     • QUEtiapine (SEROquel) 25 MG tablet Take 0.5-1 tablets by mouth 2 (Two) Times a Day As Needed (anxiety/paranoia). 60 tablet 0   • QUEtiapine (SEROquel) 50 MG tablet Take 1-2 tablets by mouth At Night As Needed (sleep/anxiety/paranoia). 60 tablet 0   • sertraline (ZOLOFT) 25 MG tablet Take 1 tablet by mouth every night at bedtime. 30 tablet 0   • varenicline (CHANTIX STARTING MONTH PAK) 0.5 MG X 11 & 1 MG X 42 tablet Take 0.5 mg one daily on days 1-3 and and 0.5 mg twice daily on days 4-7.Then 1 mg twice daily for a total of 12 weeks. 53 tablet 0   • vitamin D (ERGOCALCIFEROL) 27211 units capsule capsule Take 50,000 Units by mouth 1 (One) Time Per Week. Prior to [facility] Admission, Patient was on: takes on sundays       No current facility-administered medications for this visit.        Labs:   Recent Results (from the past 2016 hour(s))   Adult Transthoracic Echo Complete W/ Cont if Necessary Per Protocol    Collection Time: 10/02/18  9:01 AM   Result " Value Ref Range    BSA 1.8 m^2    IVSd 0.98 cm    IVSs 1.1 cm    LVIDd 4.5 cm    LVIDs 3.3 cm    LVPWd 0.98 cm    BH CV ECHO HOPE - LVPWS 1.8 cm    IVS/LVPW 1.0     FS 25.9 %    EDV(Teich) 93.4 ml    ESV(Teich) 45.7 ml    EF(Teich) 51.1 %    EDV(cubed) 92.3 ml    ESV(cubed) 37.5 ml    EF(cubed) 59.4 %    % IVS thick 16.3 %    % LVPW thick 86.8 %    LV mass(C)d 149.9 grams    LV mass(C)dI 81.6 grams/m^2    LV mass(C)s 179.1 grams    LV mass(C)sI 97.4 grams/m^2    SV(Teich) 47.7 ml    SI(Teich) 25.9 ml/m^2    SV(cubed) 54.8 ml    SI(cubed) 29.8 ml/m^2    Ao root diam 2.9 cm    Ao root area 6.7 cm^2    ACS 1.9 cm    LA dimension 3.2 cm    LA/Ao 1.1     LVOT diam 2.2 cm    LVOT area 3.7 cm^2    LVOT area(traced) 3.8 cm^2    LVLd ap4 8.0 cm    EDV(MOD-sp4) 73.0 ml    LVLs ap4 6.9 cm    ESV(MOD-sp4) 28.0 ml    EF(MOD-sp4) 61.6 %    SV(MOD-sp4) 45.0 ml    SI(MOD-sp4) 24.5 ml/m^2    Ao root area (BSA corrected) 1.6     LV Matos Vol (BSA corrected) 39.7 ml/m^2    LV Sys Vol (BSA corrected) 15.2 ml/m^2    MV E max landon 91.0 cm/sec    MV A max landon 65.3 cm/sec    MV E/A 1.4     Ao pk landon 149.3 cm/sec    Ao max PG 8.9 mmHg    Ao V2 mean 99.2 cm/sec    Ao mean PG 4.4 mmHg    Ao V2 VTI 32.1 cm    SV(Ao) 213.9 ml    SI(Ao) 116.3 ml/m^2    PA acc slope 2,077 cm/sec^2    PA acc time 0.06 sec    TR max landon 238.3 cm/sec    RVSP(TR) 32.7 mmHg    RAP systole 10.0 mmHg    PA pr(Accel) 50.5 mmHg     CV ECHO HOPE - BZI_BMI 26.5 kilograms/m^2     CV ECHO HOPE - BSA(HAYCOCK) 1.9 m^2     CV ECHO HOPE - BZI_METRIC_WEIGHT 74.4 kg     CV ECHO HOPE - BZI_METRIC_HEIGHT 167.6 cm    Target HR (85%) 140 bpm    Max. Pred. HR (100%) 165 bpm   Stress Test With Myocardial Perfusion (1 Day)    Collection Time: 10/02/18 12:23 PM   Result Value Ref Range    Nuclear Prior Study 3     Exercise duration (sec) 30 sec    Exercise duration (min) 7 min     CV STRESS PROTOCOL 1 Andrea     Stage 1 1     HR Stage 1 117     BP Stage 1 137/66     Duration Min  Stage 1 3     Duration Sec Stage 1 0     Grade Stage 1 10     Speed Stage 1 1.7     BH CV STRESS METS STAGE 1 5     Stage 2 2     HR Stage 2 132     BP Stage 2 139/66     Duration Min Stage 2 3     Duration Sec Stage 2 0     Grade Stage 2 12     Speed Stage 2 2.5     BH CV STRESS METS STAGE 2 7.5     Stage 3 3     HR Stage 3 144     BP Stage 3 160/86     Duration Min Stage 3 3     Duration Sec Stage 3 0     Grade Stage 3 14     Speed Stage 3 3.4     BH CV STRESS METS STAGE 3 10.0     Baseline HR 85 bpm    Baseline /79 mmHg    Peak  bpm    Percent Max Pred HR 87.27 %    Percent Target  %    Peak /67 mmHg    Recovery HR 93 bpm    Recovery /70 mmHg    Target HR (85%) 140 bpm    Max. Pred. HR (100%) 165 bpm    Estimated workload 10.1 METS    Nuc Stress EF 60 %         Mental Status Exam:   Hygiene:   fair  Cooperation:  Cooperative  Eye Contact:  Good  Psychomotor Behavior:  Appropriate  Affect:  Restricted  Hopelessness: Denies  Speech:  Normal  Thought Process:  Goal directed and Linear  Thought Content:  Normal  Suicidal:  None  Homicidal:  None  Hallucinations:  None  Delusion:  Paranoid  Memory:  Intact  Orientation:  Person, Place, Time and Situation  Reliability:  fair  Insight:  Fair  Judgement:  Fair  Impulse Control:  Fair  Physical/Medical Issues:  Yes HTN    Assessment/Plan   Diagnoses and all orders for this visit:    Major depressive disorder, recurrent episode, moderate (CMS/HCC)  -     lamoTRIgine (LaMICtal) 25 MG tablet; Take 1 tablet by mouth Daily.  -     sertraline (ZOLOFT) 25 MG tablet; Take 1 tablet by mouth every night at bedtime.    Generalized anxiety disorder  -     hydrOXYzine (VISTARIL) 25 MG capsule; Take 1 capsule by mouth Every 6 (Six) Hours As Needed for Anxiety.  -     QUEtiapine (SEROquel) 25 MG tablet; Take 0.5-1 tablets by mouth 2 (Two) Times a Day As Needed (anxiety/paranoia).  -     QUEtiapine (SEROquel) 50 MG tablet; Take 1-2 tablets by mouth At Night  As Needed (sleep/anxiety/paranoia).  -     sertraline (ZOLOFT) 25 MG tablet; Take 1 tablet by mouth every night at bedtime.    Insomnia due to mental disorder  -     QUEtiapine (SEROquel) 50 MG tablet; Take 1-2 tablets by mouth At Night As Needed (sleep/anxiety/paranoia).          Body mass index is 28.49 kg/m².  Patient was educated on healthier and more balanced diet choices and encouraged exercise within physical limitations.  Functionality: pt having significant impairment in important areas of daily functioning.  Prognosis: Guarded dependent on medication/follow up and treatment plan compliance.    Impression: Patient continues to experience worsening of depression, anxiety and sleep disturbance.    Plan:  1) Patient to reduce Celexa dose to 10 mg daily for three days then stop. Start Zoloft 25 mg po QHS for depression and anxiety.   9) Start Zoloft 25 mg po QHS for depression and anxiety.   2) Continue hydroxyzine 25 mg po every 6 hrs prn anxiety.  3) Continue psychotherapy with Viktoria Ching LCSW  4) Discontinue Remeron due to not effective.   5) Start Seroquel 25 mg 1/2-1 tablet BID prn anxiety.  6) Start Seroquel 50 mg 1-2 tablets QHS prn anxiety and sleep disturbance.   7) Start Lamictal 25 mg po daily for 14 days then increase to two tablets daily. Patient instructed to stop medication should rash develop and contact prescriber.   8) RTC 4 weeks     Discussed medication options.  Reviewed the risks, benefits, and side effects of the medications; patient acknowledged and verbally consented.  Patient is agreeable to call the [facility].  Patient is aware to call 911 or go to the nearest ER should begin having SI/HI.

## 2018-12-11 ENCOUNTER — TELEPHONE (OUTPATIENT)
Dept: PSYCHIATRY | Facility: CLINIC | Age: 53
End: 2018-12-11

## 2018-12-11 NOTE — TELEPHONE ENCOUNTER
If he is taking two tablets of Seroquel at bedtime have him cut the dose back to one tablet at bedtime and see if his symptoms improve. Please have him call back if worsens or no improvement after cutting the dose back. Thank you.

## 2018-12-11 NOTE — TELEPHONE ENCOUNTER
Patient's wife called stating with recent med changes made on the 8th he is doing good on them except he is unable to sleep he has been experience symptoms like with restless leg, legs jerking and keeping him awake.   Please advise

## 2018-12-20 ENCOUNTER — OFFICE VISIT (OUTPATIENT)
Dept: PSYCHIATRY | Facility: CLINIC | Age: 53
End: 2018-12-20

## 2018-12-20 DIAGNOSIS — F33.1 MAJOR DEPRESSIVE DISORDER, RECURRENT EPISODE, MODERATE (HCC): Primary | ICD-10-CM

## 2018-12-20 DIAGNOSIS — F41.1 GENERALIZED ANXIETY DISORDER: ICD-10-CM

## 2018-12-20 PROCEDURE — 90785 PSYTX COMPLEX INTERACTIVE: CPT | Performed by: SOCIAL WORKER

## 2018-12-20 PROCEDURE — 90834 PSYTX W PT 45 MINUTES: CPT | Performed by: SOCIAL WORKER

## 2018-12-20 NOTE — PROGRESS NOTES
Date of Service: December 20, 2018  Time In:  11:00 AM  Time Out:  11:45 AM      PROGRESS NOTE  Data:  Jose Bobo is a 53 y.o. male who met with the undersigned for a regularly scheduled individual outpatient therapy session at the [facility].     HPI:   Patient reports that he is doing a little better.  He had his first court date, and the DUI was dismissed.  The date for the possession charge is after the first of the year.  He has been offered supervised probation if he plead guilty to the charge, but patient is not willing to do that since he didn't commit the crime.  His wife and friends tell him he should accept the deal.  His  told him he would represent him at a trial, but also said that probation was a good deal considering discharge.  Patient continues to remodel his house and is trying to remain upbeat.  He did say that he is still having difficulty sleeping because of restless legs.    Clinical Maneuvering/Intervention:  Assisted patient in processing above session content; acknowledged and normalized patient’s thoughts, feelings, and concerns.  Provided empathy and support as patient processed the above content.  Inquired what his  thinks about his chances of being acquitted, but patient said the  did not comment on it.  Patient is hopeful that he will be able to get back to work once the legal issues are resolved.    Allowed patient to freely discuss issues without interruption or judgment. Provided safe, confidential environment to facilitate the development of positive therapeutic relationship and encourage open, honest communication. Assisted patient in identifying risk factors which would indicate the need for higher level of care including thoughts to harm self or others and/or self-harming behavior and encouraged patient to contact this office, call 911, or present to the nearest emergency room should any of these events occur. Discussed crisis intervention services  and means to access.  Patient adamantly and convincingly denies current suicidal or homicidal ideation or perceptual disturbance.    Assessment     Diagnoses and all orders for this visit:    Major depressive disorder, recurrent episode, moderate (CMS/HCC)    Generalized anxiety disorder               Mental Status Exam  Hygiene:  good  Dress:  casual  Attitude:  Cooperative  Motor Activity:  Appropriate  Speech:  Normal  Mood:  anxious  Affect:  anxious  Thought Processes:  Goal directed and Linear  Thought Content:  normal  Suicidal Thoughts:  denies  Homicidal Thoughts:  denies  Crisis Safety Plan: yes, to come to the emergency room.  Hallucinations:  denies    Patient's Support Network Includes:  wife, son and extended family    Progress toward goal: Not at goal    Functional Status: Mild impairment     Prognosis: Good with Ongoing Treatment       Plan   Patient will continue in individual outpatient therapy bi-monthly with focus on improved functioning and coping skills, maintaining stability, and avoiding decompensation and the need for a higher level of care.      Patient will adhere to medication regimen as prescribed and report any side effects. Patient will contact this office, call 911 or present to the nearest emergency room should suicidal or homicidal ideations occur. Provide Cognitive Behavioral Therapy and Integrative Therapy to improve functioning, maintain stability, and avoid decompensation and the need for higher level of care.          Return in about 3 weeks (around 1/10/2019).      This document signed by Viktoria Ching LCSW, December 20, 2018 6:59 PM

## 2019-01-03 ENCOUNTER — OFFICE VISIT (OUTPATIENT)
Dept: PSYCHIATRY | Facility: CLINIC | Age: 54
End: 2019-01-03

## 2019-01-03 VITALS
BODY MASS INDEX: 30.02 KG/M2 | HEIGHT: 66 IN | DIASTOLIC BLOOD PRESSURE: 78 MMHG | WEIGHT: 186.8 LBS | SYSTOLIC BLOOD PRESSURE: 143 MMHG

## 2019-01-03 DIAGNOSIS — F51.05 INSOMNIA DUE TO MENTAL DISORDER: ICD-10-CM

## 2019-01-03 DIAGNOSIS — F33.1 MAJOR DEPRESSIVE DISORDER, RECURRENT EPISODE, MODERATE (HCC): ICD-10-CM

## 2019-01-03 DIAGNOSIS — F41.1 GENERALIZED ANXIETY DISORDER: ICD-10-CM

## 2019-01-03 PROCEDURE — 99214 OFFICE O/P EST MOD 30 MIN: CPT | Performed by: NURSE PRACTITIONER

## 2019-01-03 RX ORDER — QUETIAPINE FUMARATE 25 MG/1
12.5-25 TABLET, FILM COATED ORAL 2 TIMES DAILY PRN
Qty: 60 TABLET | Refills: 1 | Status: SHIPPED | OUTPATIENT
Start: 2019-01-03 | End: 2019-01-31 | Stop reason: SDUPTHER

## 2019-01-03 RX ORDER — LISINOPRIL 10 MG/1
TABLET ORAL
COMMUNITY
Start: 2018-12-27 | End: 2019-05-22 | Stop reason: DRUGHIGH

## 2019-01-03 RX ORDER — HYDROXYZINE PAMOATE 25 MG/1
25 CAPSULE ORAL EVERY 6 HOURS PRN
Qty: 60 CAPSULE | Refills: 1 | Status: SHIPPED | OUTPATIENT
Start: 2019-01-03 | End: 2019-01-31 | Stop reason: SDUPTHER

## 2019-01-03 RX ORDER — QUETIAPINE FUMARATE 50 MG/1
50 TABLET, FILM COATED ORAL NIGHTLY PRN
Qty: 30 TABLET | Refills: 1 | Status: SHIPPED | OUTPATIENT
Start: 2019-01-03 | End: 2019-01-31 | Stop reason: SDUPTHER

## 2019-01-03 RX ORDER — ALBUTEROL SULFATE 1.25 MG/3ML
SOLUTION RESPIRATORY (INHALATION)
COMMUNITY
Start: 2018-12-27

## 2019-01-03 RX ORDER — CYCLOBENZAPRINE HCL 5 MG
TABLET ORAL
COMMUNITY
Start: 2018-12-27 | End: 2019-10-03

## 2019-01-03 NOTE — PROGRESS NOTES
"Agustina Bobo is a 53 y.o. male is here today for medication management follow-up.    Chief Complaint: Depression and Anxiety     History of Present Illness   Patient reports he misplaced his Lamictal and found it and started it a few days ago. He reports anxiety and mood has slightly improved. He rates anxiety and depression 5/10 with 10 being the worst. He denies panic attacks. He reports he continues to struggle with motivation and not wanting to leave the house. He reports paranoia has improved. He reports issues with sleep due to restless leg. He hasn't been able to find anything to help. He reports some days are worse than others. He reports he has had this for years.   He denies suicidal and homicidal ideations. He denies auditory and visual hallucinations. Appetite is fair. Patient reports compliance with medications and tolerating without side effects.     The following portions of the patient's history were reviewed and updated as appropriate: allergies, current medications, past family history, past medical history, past social history, past surgical history and problem list.    Review of Systems   Constitutional: Negative.    HENT: Positive for dental problem.    Eyes: Negative.    Respiratory: Negative.    Cardiovascular: Negative.    Gastrointestinal: Negative.    Endocrine: Negative.    Genitourinary: Negative.    Musculoskeletal: Negative.    Skin: Negative.    Allergic/Immunologic: Negative.    Neurological: Negative.    Hematological: Negative.    Psychiatric/Behavioral: Positive for decreased concentration and sleep disturbance. The patient is nervous/anxious.        Objective   Physical Exam   Constitutional: He appears well-developed and well-nourished. No distress.   Skin: He is not diaphoretic.   Vitals reviewed.    Blood pressure 143/78, height 167.6 cm (65.98\"), weight 84.7 kg (186 lb 12.8 oz).    Medication List:   Current Outpatient Medications   Medication Sig Dispense " Refill   • albuterol (ACCUNEB) 1.25 MG/3ML nebulizer solution      • aspirin 81 MG chewable tablet Chew 81 mg Daily.     • cyclobenzaprine (FLEXERIL) 5 MG tablet      • hydrOXYzine (VISTARIL) 25 MG capsule Take 1 capsule by mouth Every 6 (Six) Hours As Needed for Anxiety. 60 capsule 1   • ipratropium (ATROVENT) 0.02 % nebulizer solution      • labetalol (NORMODYNE) 300 MG tablet Take 300 mg by mouth 2 (Two) Times a Day.     • lamoTRIgine (LaMICtal) 25 MG tablet Take 1 tablet by mouth Daily. 45 tablet 0   • lisinopril (PRINIVIL,ZESTRIL) 20 MG tablet Take 10 mg by mouth Daily.     • pantoprazole (PROTONIX) 40 MG EC tablet Take 40 mg by mouth Daily.     • potassium chloride (K-DUR) 10 MEQ CR tablet Take 10 mEq by mouth 1 (One) Time.     • QUEtiapine (SEROquel) 25 MG tablet Take 0.5-1 tablets by mouth 2 (Two) Times a Day As Needed (anxiety/paranoia). 60 tablet 1   • QUEtiapine (SEROquel) 50 MG tablet Take 1 tablet by mouth At Night As Needed (sleep/anxiety/paranoia). 30 tablet 1   • sertraline (ZOLOFT) 50 MG tablet Take 1 tablet by mouth every night at bedtime. 30 tablet 0   • lisinopril (PRINIVIL,ZESTRIL) 10 MG tablet      • magnesium oxide (MAGOX) 400 (241.3 Mg) MG tablet tablet Take 400 mg by mouth Daily.     • Omega-3 Fatty Acids (FISH OIL) 1000 MG capsule capsule Take 1,000 mg by mouth Daily With Breakfast.     • spironolactone (ALDACTONE) 25 MG tablet Take  by mouth 2 (Two) Times a Day.     • varenicline (CHANTIX STARTING MONTH PAK) 0.5 MG X 11 & 1 MG X 42 tablet Take 0.5 mg one daily on days 1-3 and and 0.5 mg twice daily on days 4-7.Then 1 mg twice daily for a total of 12 weeks. 53 tablet 0   • vitamin D (ERGOCALCIFEROL) 41463 units capsule capsule Take 50,000 Units by mouth 1 (One) Time Per Week. Prior to [facility] Admission, Patient was on: takes on sundays       No current facility-administered medications for this visit.        Labs:   No results found for this or any previous visit (from the past 2016  hour(s)).      Mental Status Exam:   Hygiene:   fair  Cooperation:  Cooperative  Eye Contact:  Good  Psychomotor Behavior:  Appropriate  Affect:  Restricted  Hopelessness: Denies  Speech:  Normal  Thought Process:  Goal directed and Linear  Thought Content:  Normal  Suicidal:  None  Homicidal:  None  Hallucinations:  None  Delusion:  None  Memory:  Intact  Orientation:  Person, Place, Time and Situation  Reliability:  fair  Insight:  Fair  Judgement:  Fair  Impulse Control:  Fair  Physical/Medical Issues:  Yes HTN    Assessment/Plan   Diagnoses and all orders for this visit:    Generalized anxiety disorder  -     hydrOXYzine (VISTARIL) 25 MG capsule; Take 1 capsule by mouth Every 6 (Six) Hours As Needed for Anxiety.  -     QUEtiapine (SEROquel) 25 MG tablet; Take 0.5-1 tablets by mouth 2 (Two) Times a Day As Needed (anxiety/paranoia).  -     QUEtiapine (SEROquel) 50 MG tablet; Take 1 tablet by mouth At Night As Needed (sleep/anxiety/paranoia).  -     sertraline (ZOLOFT) 50 MG tablet; Take 1 tablet by mouth every night at bedtime.    Insomnia due to mental disorder  -     QUEtiapine (SEROquel) 50 MG tablet; Take 1 tablet by mouth At Night As Needed (sleep/anxiety/paranoia).    Major depressive disorder, recurrent episode, moderate (CMS/HCC)  -     sertraline (ZOLOFT) 50 MG tablet; Take 1 tablet by mouth every night at bedtime.          Body mass index is 30.17 kg/m².  Patient was educated on healthier and more balanced diet choices and encouraged exercise within physical limitations.  Functionality: pt having significant impairment in important areas of daily functioning.  Prognosis: Guarded dependent on medication/follow up and treatment plan compliance.    Impression: Patient continues to experience worsening of depression, anxiety and sleep disturbance.    Plan:  1) Increase Zoloft to 50 mg po QHS for depression and anxiety. .   2) Continue hydroxyzine 25 mg po every 6 hrs prn anxiety.  3) Continue psychotherapy  with Viktoria Ching LCSW  4) Continue Seroquel 25 mg 1/2-1 tablet BID prn anxiety.  5) Continue Seroquel 50 mg one tablet QHS prn anxiety and sleep disturbance.   7) Continue Lamictal 25 mg po daily for 14 days then increase to two tablets daily. Patient instructed to stop medication should rash develop and contact prescriber.   8) RTC 4 weeks     Discussed medication options.  Reviewed the risks, benefits, and side effects of the medications; patient acknowledged and verbally consented.  Patient is agreeable to call the [facility].  Patient is aware to call 911 or go to the nearest ER should begin having SI/HI.

## 2019-01-10 ENCOUNTER — OFFICE VISIT (OUTPATIENT)
Dept: PSYCHIATRY | Facility: CLINIC | Age: 54
End: 2019-01-10

## 2019-01-10 DIAGNOSIS — F41.1 GENERALIZED ANXIETY DISORDER: Primary | ICD-10-CM

## 2019-01-10 DIAGNOSIS — F33.1 MAJOR DEPRESSIVE DISORDER, RECURRENT EPISODE, MODERATE (HCC): ICD-10-CM

## 2019-01-10 PROCEDURE — 90834 PSYTX W PT 45 MINUTES: CPT | Performed by: SOCIAL WORKER

## 2019-01-10 NOTE — PROGRESS NOTES
Date of Service: January 10, 2019  Time In:  10:35 AM  Time Out:  11:15 AM      PROGRESS NOTE  Data:  Jose Bobo is a 53 y.o. male who met with the undersigned for a regularly scheduled individual outpatient therapy session at the [facility].     HPI:   Patient reports he is doing somewhat better.  He has started taking Lamictal and hydroxyzine was prescribed for anxiety.  He is now taking Zoloft for depression.  The hydroxyzine has helped somewhat with anxiety, but he states he still doesn't sleep that well.    Patient reports that he and his wife have moved back in to their own home.  He has a little bit of time work still to finish.  He said that his wife has been throwing away everything that was in the house before (his things).  She says that he is always angry, but he doesn't think that he is.  He complains that she is always telling him this needs to be done or that needs to be done, and he gets frustrated because he already knows it needs to be done and is working on it.  She recently told him he couldn't take a vehicle to the man who can fix it by himself, because he does drugs and she thinks he will be on drugs if he goes by himself.    Clinical Maneuvering/Intervention:  Assisted patient in processing above session content; acknowledged and normalized patient’s thoughts, feelings, and concerns.  Provided empathy and support as he processed the above content.  Described the couple's pattern of attack and defend in all of their communication.  Encourage patient to speak up if he doesn't want to throw away everything (he would like to give it to [employer] or to someone who can use it).  Modeled nondefensive responses to some of his wife's communication.  Also suggested that he may simply have to bite the bullet and not be alone when he is around people who do drugs, so that he can regain his wife's trust.  Patient said that he guessed he just really doesn't like to be told what to do.  Praised his  recognition of his motivation for arguing, and encouraged him to consider over the next 2 weeks what outcome he would like to have with his wife.    Allowed patient to freely discuss issues without interruption or judgment. Provided safe, confidential environment to facilitate the development of positive therapeutic relationship and encourage open, honest communication. Assisted patient in identifying risk factors which would indicate the need for higher level of care including thoughts to harm self or others and/or self-harming behavior and encouraged patient to contact this office, call 911, or present to the nearest emergency room should any of these events occur. Discussed crisis intervention services and means to access.  Patient adamantly and convincingly denies current suicidal or homicidal ideation or perceptual disturbance.    Assessment     Diagnoses and all orders for this visit:    Generalized anxiety disorder    Major depressive disorder, recurrent episode, moderate (CMS/HCC)               Mental Status Exam  Hygiene:  good  Dress:  casual  Attitude:  Cooperative  Motor Activity:  Appropriate  Speech:  Rambling  Mood:  anxious  Affect:  anxious  Thought Processes:  Circum  Thought Content:  normal  Suicidal Thoughts:  denies  Homicidal Thoughts:  denies  Crisis Safety Plan: yes, to come to the emergency room.  Hallucinations:  denies    Patient's Support Network Includes:  wife, children and extended family    Progress toward goal: Not at goal    Functional Status: Moderate impairment     Prognosis: Fair with Ongoing Treatment       Plan   Patient will continue in individual outpatient therapy bimonthly with focus on improved functioning and coping skills, maintaining stability, and avoiding decompensation and the need for higher level of care.      Patient will adhere to medication regimen as prescribed and report any side effects. Patient will contact this office, call 911 or present to the nearest  emergency room should suicidal or homicidal ideations occur. Provide Cognitive Behavioral Therapy and Integrative Therapy to improve functioning, maintain stability, and avoid decompensation and the need for higher level of care.          Return in about 2 weeks (around 1/24/2019).      This document signed by Viktoria Ching LCSW, January 10, 2019 3:11 PM

## 2019-01-24 ENCOUNTER — OFFICE VISIT (OUTPATIENT)
Dept: PSYCHIATRY | Facility: CLINIC | Age: 54
End: 2019-01-24

## 2019-01-24 DIAGNOSIS — F41.1 GENERALIZED ANXIETY DISORDER: ICD-10-CM

## 2019-01-24 DIAGNOSIS — F33.1 MAJOR DEPRESSIVE DISORDER, RECURRENT EPISODE, MODERATE (HCC): Primary | ICD-10-CM

## 2019-01-24 PROCEDURE — 90834 PSYTX W PT 45 MINUTES: CPT | Performed by: SOCIAL WORKER

## 2019-01-24 NOTE — PROGRESS NOTES
Date of Service: January 24, 2019  Time In:  10:15 AM  Time Out:  11:00 AM      PROGRESS NOTE  Data:  Jose Bobo is a 53 y.o. male who met with the undersigned for a regularly scheduled individual outpatient therapy session at the [facility].     HPI:   Patient said he thinks he is doing a little better, but continues to have the same arguments with his wife.  She limits the time he spends away from the house as well as his money, and always wants him to do things the way she thinks they needs to be done.  Patients next hearing date is February 7, 2019.  Patient talked about his own conflicted attitude about what happened to him.  Even if the case is dismissed because police did an illegal search and seizure, patient believes the police should be able to search cars without so many regulations; and having the case dismissed is not a finding of innocence, which is what patient would like.    Clinical Maneuvering/Intervention:  Assisted patient in processing above session content; acknowledged and normalized patient’s thoughts, feelings, and concerns.  Provided empathy and support as he processed the above issues.  Encouraged patient to continue doing what he can to manage his anxiety and frustration.    Allowed patient to freely discuss issues without interruption or judgment. Provided safe, confidential environment to facilitate the development of positive therapeutic relationship and encourage open, honest communication. Assisted patient in identifying risk factors which would indicate the need for higher level of care including thoughts to harm self or others and/or self-harming behavior and encouraged patient to contact this office, call 911, or present to the nearest emergency room should any of these events occur. Discussed crisis intervention services and means to access.  Patient adamantly and convincingly denies current suicidal or homicidal ideation or perceptual disturbance.    Assessment    Patient  appeared to be somewhat calm her and less anxious today, but continues to struggle with the same frustrations with his wife and continues to worry about the outcome of his final legal issue.  He will continue in individual outpatient therapy bimonthly in order to develop effective communication skills with his wife and self soothing skills.    Diagnoses and all orders for this visit:    Major depressive disorder, recurrent episode, moderate (CMS/HCC)    Generalized anxiety disorder               Mental Status Exam  Hygiene:  good  Dress:  casual  Attitude:  Cooperative  Motor Activity:  Appropriate  Speech:  Normal  Mood:  decreased range  Affect:  anxious  Thought Processes:  Goal directed and Linear  Thought Content:  normal  Suicidal Thoughts:  denies  Homicidal Thoughts:  denies  Crisis Safety Plan: yes, to come to the emergency room.  Hallucinations:  denies    Patient's Support Network Includes:  wife, son, parents and extended family    Progress toward goal: Not at goal    Functional Status: Mild impairment     Prognosis: Fair with Ongoing Treatment       Plan   Patient will continue in individual outpatient therapy bimonthly with focus on improved functioning and coping skills, maintaining stability, and avoiding decompensation and the need for higher level of care.    Patient will adhere to medication regimen as prescribed and report any side effects. Patient will contact this office, call 911 or present to the nearest emergency room should suicidal or homicidal ideations occur. Provide Cognitive Behavioral Therapy and Integrative Therapy to improve functioning, maintain stability, and avoid decompensation and the need for higher level of care.        Return in about 2 weeks (around 2/7/2019).      This document signed by Viktoria Ching LCSW, January 24, 2019 3:58 PM

## 2019-01-31 ENCOUNTER — OFFICE VISIT (OUTPATIENT)
Dept: PSYCHIATRY | Facility: CLINIC | Age: 54
End: 2019-01-31

## 2019-01-31 VITALS
SYSTOLIC BLOOD PRESSURE: 148 MMHG | WEIGHT: 185.6 LBS | BODY MASS INDEX: 29.83 KG/M2 | HEART RATE: 81 BPM | DIASTOLIC BLOOD PRESSURE: 82 MMHG | HEIGHT: 66 IN

## 2019-01-31 DIAGNOSIS — F41.1 GENERALIZED ANXIETY DISORDER: ICD-10-CM

## 2019-01-31 DIAGNOSIS — F51.05 INSOMNIA DUE TO MENTAL DISORDER: ICD-10-CM

## 2019-01-31 DIAGNOSIS — F33.1 MAJOR DEPRESSIVE DISORDER, RECURRENT EPISODE, MODERATE (HCC): ICD-10-CM

## 2019-01-31 PROCEDURE — 99214 OFFICE O/P EST MOD 30 MIN: CPT | Performed by: NURSE PRACTITIONER

## 2019-01-31 RX ORDER — SERTRALINE HYDROCHLORIDE 100 MG/1
100 TABLET, FILM COATED ORAL
Qty: 30 TABLET | Refills: 0 | Status: SHIPPED | OUTPATIENT
Start: 2019-01-31 | End: 2019-02-28 | Stop reason: SDUPTHER

## 2019-01-31 RX ORDER — LAMOTRIGINE 100 MG/1
100 TABLET ORAL DAILY
Qty: 30 TABLET | Refills: 0 | Status: SHIPPED | OUTPATIENT
Start: 2019-01-31 | End: 2019-02-28 | Stop reason: SDUPTHER

## 2019-01-31 RX ORDER — QUETIAPINE FUMARATE 25 MG/1
12.5-25 TABLET, FILM COATED ORAL 2 TIMES DAILY PRN
Qty: 60 TABLET | Refills: 1 | Status: SHIPPED | OUTPATIENT
Start: 2019-01-31 | End: 2019-02-28 | Stop reason: SDUPTHER

## 2019-01-31 RX ORDER — HYDROXYZINE PAMOATE 25 MG/1
25 CAPSULE ORAL EVERY 6 HOURS PRN
Qty: 60 CAPSULE | Refills: 1 | Status: SHIPPED | OUTPATIENT
Start: 2019-01-31 | End: 2019-02-28 | Stop reason: SDUPTHER

## 2019-01-31 RX ORDER — QUETIAPINE FUMARATE 100 MG/1
100 TABLET, FILM COATED ORAL NIGHTLY PRN
Qty: 30 TABLET | Refills: 1 | Status: SHIPPED | OUTPATIENT
Start: 2019-01-31 | End: 2019-02-28 | Stop reason: SDUPTHER

## 2019-01-31 NOTE — PROGRESS NOTES
"Agustina Bobo is a 53 y.o. male is here today for medication management follow-up.    Chief Complaint: Depression and Anxiety     History of Present Illness   Patient presents for follow-up. Patient reports he feel his mood has been stable, but reports his family tells him he is grouchy. He rates depression and anxiety 7/10 with 10 being the worst. He denies panic attacks. He reports mood instability and continues to struggle with motivation and interest. He reports slight improvement in sleep. Sleeping five hours at night. He reports paranoia has improved. He denies suicidal and homicidal ideations. He denies auditory and visual hallucinations. Appetite is fair. Patient reports compliance with medications and tolerating without side effects.     The following portions of the patient's history were reviewed and updated as appropriate: allergies, current medications, past family history, past medical history, past social history, past surgical history and problem list.    Review of Systems   Constitutional: Negative.    HENT: Positive for dental problem.    Eyes: Negative.    Respiratory: Negative.    Cardiovascular: Negative.    Gastrointestinal: Negative.    Endocrine: Negative.    Genitourinary: Negative.    Musculoskeletal: Negative.    Skin: Negative.    Allergic/Immunologic: Negative.    Neurological: Negative.    Hematological: Negative.    Psychiatric/Behavioral: Positive for agitation, decreased concentration and sleep disturbance. The patient is nervous/anxious.        Objective   Physical Exam   Constitutional: He appears well-developed and well-nourished. No distress.   Skin: He is not diaphoretic.   Vitals reviewed.    Blood pressure 148/82, pulse 81, height 167.6 cm (65.98\"), weight 84.2 kg (185 lb 9.6 oz).    Medication List:   Current Outpatient Medications   Medication Sig Dispense Refill   • albuterol (ACCUNEB) 1.25 MG/3ML nebulizer solution      • aspirin 81 MG chewable tablet Chew " 81 mg Daily.     • cyclobenzaprine (FLEXERIL) 5 MG tablet      • hydrOXYzine pamoate (VISTARIL) 25 MG capsule Take 1 capsule by mouth Every 6 (Six) Hours As Needed for Anxiety. 60 capsule 1   • ipratropium (ATROVENT) 0.02 % nebulizer solution      • labetalol (NORMODYNE) 300 MG tablet Take 300 mg by mouth 2 (Two) Times a Day.     • lamoTRIgine (LaMICtal) 100 MG tablet Take 1 tablet by mouth Daily. 30 tablet 0   • lisinopril (PRINIVIL,ZESTRIL) 10 MG tablet      • lisinopril (PRINIVIL,ZESTRIL) 20 MG tablet Take 10 mg by mouth Daily.     • magnesium oxide (MAGOX) 400 (241.3 Mg) MG tablet tablet Take 400 mg by mouth Daily.     • Omega-3 Fatty Acids (FISH OIL) 1000 MG capsule capsule Take 1,000 mg by mouth Daily With Breakfast.     • pantoprazole (PROTONIX) 40 MG EC tablet Take 40 mg by mouth Daily.     • potassium chloride (K-DUR) 10 MEQ CR tablet Take 10 mEq by mouth 1 (One) Time.     • QUEtiapine (SEROquel) 100 MG tablet Take 1 tablet by mouth At Night As Needed (sleep/anxiety/paranoia). 30 tablet 1   • QUEtiapine (SEROquel) 25 MG tablet Take 0.5-1 tablets by mouth 2 (Two) Times a Day As Needed (anxiety/paranoia). 60 tablet 1   • sertraline (ZOLOFT) 100 MG tablet Take 1 tablet by mouth every night at bedtime. 30 tablet 0   • spironolactone (ALDACTONE) 25 MG tablet Take  by mouth 2 (Two) Times a Day.     • varenicline (CHANTIX STARTING MONTH PAK) 0.5 MG X 11 & 1 MG X 42 tablet Take 0.5 mg one daily on days 1-3 and and 0.5 mg twice daily on days 4-7.Then 1 mg twice daily for a total of 12 weeks. 53 tablet 0   • vitamin D (ERGOCALCIFEROL) 66775 units capsule capsule Take 50,000 Units by mouth 1 (One) Time Per Week. Prior to [facility] Admission, Patient was on: takes on sundays       No current facility-administered medications for this visit.        Labs:   No results found for this or any previous visit (from the past 2016 hour(s)).      Mental Status Exam:   Hygiene:   fair  Cooperation:  Cooperative  Eye Contact:   Good  Psychomotor Behavior:  Appropriate  Affect:  Restricted  Hopelessness: Denies  Speech:  Normal  Thought Process:  Goal directed and Linear  Thought Content:  Normal  Suicidal:  None  Homicidal:  None  Hallucinations:  None  Delusion:  None  Memory:  Intact  Orientation:  Person, Place, Time and Situation  Reliability:  fair  Insight:  Fair  Judgement:  Fair  Impulse Control:  Fair  Physical/Medical Issues:  Yes HTN    Assessment/Plan   Diagnoses and all orders for this visit:    Generalized anxiety disorder  -     hydrOXYzine pamoate (VISTARIL) 25 MG capsule; Take 1 capsule by mouth Every 6 (Six) Hours As Needed for Anxiety.  -     QUEtiapine (SEROquel) 25 MG tablet; Take 0.5-1 tablets by mouth 2 (Two) Times a Day As Needed (anxiety/paranoia).  -     QUEtiapine (SEROquel) 100 MG tablet; Take 1 tablet by mouth At Night As Needed (sleep/anxiety/paranoia).  -     sertraline (ZOLOFT) 100 MG tablet; Take 1 tablet by mouth every night at bedtime.    Major depressive disorder, recurrent episode, moderate (CMS/HCC)  -     lamoTRIgine (LaMICtal) 100 MG tablet; Take 1 tablet by mouth Daily.  -     sertraline (ZOLOFT) 100 MG tablet; Take 1 tablet by mouth every night at bedtime.    Insomnia due to mental disorder  -     QUEtiapine (SEROquel) 100 MG tablet; Take 1 tablet by mouth At Night As Needed (sleep/anxiety/paranoia).          Body mass index is 29.97 kg/m².  Patient was educated on healthier and more balanced diet choices and encouraged exercise within physical limitations.  Functionality: pt having significant impairment in important areas of daily functioning.  Prognosis: Guarded dependent on medication/follow up and treatment plan compliance.    Impression: Patient continues to experience worsening of depression, anxiety and sleep disturbance.    Plan:  1) Increase Zoloft to 100 mg po QHS for depression and anxiety. .   2) Continue hydroxyzine 25 mg po every 6 hrs prn anxiety.  3) Continue psychotherapy with  Viktoria Ching LCSW  4) Continue Seroquel 25 mg 1/2-1 tablet BID prn anxiety.  5) Increase Seroquel 100 mg one tablet QHS prn anxiety and sleep disturbance.   7) Increase Lamictal 100 mg po daily. Patient instructed to stop medication should rash develop and contact prescriber.   8) RTC 4 weeks     Discussed medication options.  Reviewed the risks, benefits, and side effects of the medications; patient acknowledged and verbally consented.  Patient is agreeable to call the [facility].  Patient is aware to call 911 or go to the nearest ER should begin having SI/HI.

## 2019-02-11 ENCOUNTER — OFFICE VISIT (OUTPATIENT)
Dept: PSYCHIATRY | Facility: CLINIC | Age: 54
End: 2019-02-11

## 2019-02-11 DIAGNOSIS — F33.1 MAJOR DEPRESSIVE DISORDER, RECURRENT EPISODE, MODERATE (HCC): ICD-10-CM

## 2019-02-11 DIAGNOSIS — F41.1 GENERALIZED ANXIETY DISORDER: Primary | ICD-10-CM

## 2019-02-11 PROCEDURE — 90834 PSYTX W PT 45 MINUTES: CPT | Performed by: SOCIAL WORKER

## 2019-02-11 NOTE — PROGRESS NOTES
Date of Service: February 11, 2019  Time In:  9:30 AM  Time Out:  10:15 AM      PROGRESS NOTE  Data:  Jose Bobo is a 53 y.o. male who met with the undersigned for a regularly scheduled individual outpatient therapy session at the [facility].     HPI:   Patient reports that things are the same for him.  His wife and others have 100 things they want him to do, and he is not able to do all of it when they want it done.  He said he feels like he can't get anything done.  He is dependent on others for money, and his wife controls who he sees and where he is allowed to go by himself.  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    Clinical Maneuvering/Intervention:  Assisted patient in processing above session content; acknowledged and normalized patient’s thoughts, feelings, and concerns.   Provided empathy and support as patient processed the above content.  Therapist observed that we have covered the same ground before and have discussed how he might stand up for himself and set limits with the people he feels control his life.  Patient's response is that he has never liked confrontations or conflict, and nothing seems to change any way.  He acknowledges feeling really helpless.    Therapist suggested shifting focus.  Recommended that he begin keeping a gratitude journal, writing down at least one thing each day program which she is grateful.  Also told him to write down one positive thing about himself each day, and to bring that journal with him to next session.  Therapist explained rationale for the exercise which is to work on shifting his perspective and to improve his mood and self worth.  Patient agreed to attempt keeping a journal.    Allowed patient to freely discuss issues without interruption or judgment. Provided safe, confidential environment to facilitate the development of positive therapeutic relationship and  encourage open, honest communication. Assisted patient in identifying risk factors which would indicate the need for higher level of care including thoughts to harm self or others and/or self-harming behavior and encouraged patient to contact this office, call 911, or present to the nearest emergency room should any of these events occur. Discussed crisis intervention services and means to access.  Patient adamantly and convincingly denies current suicidal or homicidal ideation or perceptual disturbance.    Assessment     Diagnoses and all orders for this visit:    Generalized anxiety disorder    Major depressive disorder, recurrent episode, moderate (CMS/HCC)               Mental Status Exam  Hygiene:  good  Dress:  casual  Attitude:  Cooperative  Motor Activity:  Appropriate  Speech:  Normal  Mood:  constricted  Affect:  depressed and anxious  Thought Processes:  Goal directed and Linear  Thought Content:  normal  Suicidal Thoughts:  denies  Homicidal Thoughts:  denies  Crisis Safety Plan: yes, to come to the emergency room.  Hallucinations:  denies    Patient's Support Network Includes:  wife, children and extended family    Progress toward goal: Not at goal    Functional Status: Moderate impairment     Prognosis: Guarded with Ongoing Treatment      Plan   Patient will continue in individual outpatient therapy bi-monthly with focus on improved functioning and coping skills, maintaining stability, and avoiding decompensation and the need for higher level of care.    Patient will adhere to medication regimen as prescribed and report any side effects. Patient will contact this office, call 911 or present to the nearest emergency room should suicidal or homicidal ideations occur. Provide Cognitive Behavioral Therapy and Integrative Therapy to improve functioning, maintain stability, and avoid decompensation and the need for higher level of care.          Return in about 2 weeks (around 2/25/2019).      This document  signed by Viktoria Ching LCSW, February 11, 2019 4:39 PM

## 2019-02-28 ENCOUNTER — OFFICE VISIT (OUTPATIENT)
Dept: PSYCHIATRY | Facility: CLINIC | Age: 54
End: 2019-02-28

## 2019-02-28 VITALS
HEART RATE: 83 BPM | DIASTOLIC BLOOD PRESSURE: 66 MMHG | BODY MASS INDEX: 29.83 KG/M2 | HEIGHT: 66 IN | WEIGHT: 185.6 LBS | SYSTOLIC BLOOD PRESSURE: 107 MMHG

## 2019-02-28 DIAGNOSIS — F51.05 INSOMNIA DUE TO MENTAL DISORDER: ICD-10-CM

## 2019-02-28 DIAGNOSIS — F41.1 GENERALIZED ANXIETY DISORDER: ICD-10-CM

## 2019-02-28 DIAGNOSIS — F33.1 MAJOR DEPRESSIVE DISORDER, RECURRENT EPISODE, MODERATE (HCC): Primary | ICD-10-CM

## 2019-02-28 PROCEDURE — 99213 OFFICE O/P EST LOW 20 MIN: CPT | Performed by: NURSE PRACTITIONER

## 2019-02-28 RX ORDER — SERTRALINE HYDROCHLORIDE 100 MG/1
150 TABLET, FILM COATED ORAL
Qty: 45 TABLET | Refills: 1 | Status: SHIPPED | OUTPATIENT
Start: 2019-02-28 | End: 2019-04-09 | Stop reason: SDUPTHER

## 2019-02-28 RX ORDER — QUETIAPINE FUMARATE 25 MG/1
12.5-25 TABLET, FILM COATED ORAL 2 TIMES DAILY PRN
Qty: 60 TABLET | Refills: 1 | Status: SHIPPED | OUTPATIENT
Start: 2019-02-28 | End: 2019-04-09 | Stop reason: SDUPTHER

## 2019-02-28 RX ORDER — QUETIAPINE FUMARATE 100 MG/1
100 TABLET, FILM COATED ORAL NIGHTLY PRN
Qty: 30 TABLET | Refills: 1 | Status: SHIPPED | OUTPATIENT
Start: 2019-02-28 | End: 2019-04-09 | Stop reason: SDUPTHER

## 2019-02-28 RX ORDER — HYDROXYZINE PAMOATE 25 MG/1
25 CAPSULE ORAL EVERY 6 HOURS PRN
Qty: 60 CAPSULE | Refills: 1 | Status: SHIPPED | OUTPATIENT
Start: 2019-02-28 | End: 2019-04-09 | Stop reason: SDUPTHER

## 2019-02-28 RX ORDER — LAMOTRIGINE 100 MG/1
100 TABLET ORAL DAILY
Qty: 30 TABLET | Refills: 1 | Status: SHIPPED | OUTPATIENT
Start: 2019-02-28 | End: 2019-04-09 | Stop reason: SDUPTHER

## 2019-02-28 NOTE — PROGRESS NOTES
"Agustina Bobo is a 53 y.o. male is here today for medication management follow-up.    Chief Complaint: Depression and Anxiety     History of Present Illness   Patient presents for follow-up. Patient reports he is sleeping now. He reports mood has been less irritable. He reports he continues to struggle with motivation and not wanting to get out of the house.  He rates depression and anxiety 6/10 with 10 being the worst. He denies panic attacks. He reports mood instability and continues to struggle with motivation and interest. He denies paranoia.  He denies suicidal and homicidal ideations. He denies auditory and visual hallucinations. Appetite is good. Patient reports compliance with medications and tolerating without side effects.     The following portions of the patient's history were reviewed and updated as appropriate: allergies, current medications, past family history, past medical history, past social history, past surgical history and problem list.    Review of Systems   Constitutional: Negative.    HENT: Positive for dental problem.    Eyes: Negative.    Respiratory: Negative.    Cardiovascular: Negative.    Gastrointestinal: Negative.    Endocrine: Negative.    Genitourinary: Negative.    Musculoskeletal: Negative.    Skin: Negative.    Allergic/Immunologic: Negative.    Neurological: Negative.    Hematological: Negative.    Psychiatric/Behavioral: Positive for decreased concentration. The patient is nervous/anxious.        Objective   Physical Exam   Constitutional: He appears well-developed and well-nourished. No distress.   Skin: He is not diaphoretic.   Vitals reviewed.    Blood pressure 107/66, pulse 83, height 167.6 cm (65.98\"), weight 84.2 kg (185 lb 9.6 oz).    Medication List:   Current Outpatient Medications   Medication Sig Dispense Refill   • albuterol (ACCUNEB) 1.25 MG/3ML nebulizer solution      • aspirin 81 MG chewable tablet Chew 81 mg Daily.     • cyclobenzaprine " (FLEXERIL) 5 MG tablet      • hydrOXYzine pamoate (VISTARIL) 25 MG capsule Take 1 capsule by mouth Every 6 (Six) Hours As Needed for Anxiety. 60 capsule 1   • ipratropium (ATROVENT) 0.02 % nebulizer solution      • labetalol (NORMODYNE) 300 MG tablet Take 300 mg by mouth 2 (Two) Times a Day.     • lamoTRIgine (LaMICtal) 100 MG tablet Take 1 tablet by mouth Daily. 30 tablet 1   • lisinopril (PRINIVIL,ZESTRIL) 10 MG tablet      • lisinopril (PRINIVIL,ZESTRIL) 20 MG tablet Take 10 mg by mouth Daily.     • magnesium oxide (MAGOX) 400 (241.3 Mg) MG tablet tablet Take 400 mg by mouth Daily.     • Omega-3 Fatty Acids (FISH OIL) 1000 MG capsule capsule Take 1,000 mg by mouth Daily With Breakfast.     • pantoprazole (PROTONIX) 40 MG EC tablet Take 40 mg by mouth Daily.     • potassium chloride (K-DUR) 10 MEQ CR tablet Take 10 mEq by mouth 1 (One) Time.     • QUEtiapine (SEROquel) 100 MG tablet Take 1 tablet by mouth At Night As Needed (sleep/anxiety/paranoia). 30 tablet 1   • QUEtiapine (SEROquel) 25 MG tablet Take 0.5-1 tablets by mouth 2 (Two) Times a Day As Needed (anxiety/paranoia). 60 tablet 1   • sertraline (ZOLOFT) 100 MG tablet Take 1.5 tablets by mouth every night at bedtime. 45 tablet 1   • spironolactone (ALDACTONE) 25 MG tablet Take  by mouth 2 (Two) Times a Day.     • varenicline (CHANTIX STARTING MONTH PAK) 0.5 MG X 11 & 1 MG X 42 tablet Take 0.5 mg one daily on days 1-3 and and 0.5 mg twice daily on days 4-7.Then 1 mg twice daily for a total of 12 weeks. 53 tablet 0   • vitamin D (ERGOCALCIFEROL) 61192 units capsule capsule Take 50,000 Units by mouth 1 (One) Time Per Week. Prior to [facility] Admission, Patient was on: takes on sundays       No current facility-administered medications for this visit.        Labs:   No results found for this or any previous visit (from the past 2016 hour(s)).      Mental Status Exam:   Hygiene:   fair  Cooperation:  Cooperative  Eye Contact:  Good  Psychomotor Behavior:   Appropriate  Affect:  Restricted  Hopelessness: Denies  Speech:  Normal  Thought Process:  Goal directed and Linear  Thought Content:  Normal  Suicidal:  None  Homicidal:  None  Hallucinations:  None  Delusion:  None  Memory:  Intact  Orientation:  Person, Place, Time and Situation  Reliability:  fair  Insight:  Fair  Judgement:  Fair  Impulse Control:  Fair  Physical/Medical Issues:  Yes HTN    Assessment/Plan   Diagnoses and all orders for this visit:    Major depressive disorder, recurrent episode, moderate (CMS/HCC)  -     lamoTRIgine (LaMICtal) 100 MG tablet; Take 1 tablet by mouth Daily.  -     sertraline (ZOLOFT) 100 MG tablet; Take 1.5 tablets by mouth every night at bedtime.    Generalized anxiety disorder  -     hydrOXYzine pamoate (VISTARIL) 25 MG capsule; Take 1 capsule by mouth Every 6 (Six) Hours As Needed for Anxiety.  -     QUEtiapine (SEROquel) 100 MG tablet; Take 1 tablet by mouth At Night As Needed (sleep/anxiety/paranoia).  -     QUEtiapine (SEROquel) 25 MG tablet; Take 0.5-1 tablets by mouth 2 (Two) Times a Day As Needed (anxiety/paranoia).  -     sertraline (ZOLOFT) 100 MG tablet; Take 1.5 tablets by mouth every night at bedtime.    Insomnia due to mental disorder  -     QUEtiapine (SEROquel) 100 MG tablet; Take 1 tablet by mouth At Night As Needed (sleep/anxiety/paranoia).          Body mass index is 29.97 kg/m².  Patient was educated on healthier and more balanced diet choices and encouraged exercise within physical limitations.  Functionality: pt having significant impairment in important areas of daily functioning.  Prognosis: Guarded dependent on medication/follow up and treatment plan compliance.    Impression: Patient continues to struggle with motivation, depression and anxiety. Sleep has improved.     Plan:  1) Increase Zoloft to 150 mg po QHS for depression and anxiety. .   2) Continue hydroxyzine 25 mg po every 6 hrs prn anxiety.  3) Continue psychotherapy with Viktoria Ching  LCSW  4) Continue Seroquel 25 mg 1/2-1 tablet BID prn anxiety.  5) Increase Seroquel 100 mg one tablet QHS prn anxiety and sleep disturbance.   7) Increase Lamictal 100 mg po daily. Patient instructed to stop medication should rash develop and contact prescriber.   8) RTC 1 month     Discussed medication options.  Reviewed the risks, benefits, and side effects of the medications; patient acknowledged and verbally consented.  Patient is agreeable to call the [facility].  Patient is aware to call 911 or go to the nearest ER should begin having SI/HI.

## 2019-03-12 ENCOUNTER — TELEPHONE (OUTPATIENT)
Dept: PSYCHIATRY | Facility: CLINIC | Age: 54
End: 2019-03-12

## 2019-03-12 ENCOUNTER — OFFICE VISIT (OUTPATIENT)
Dept: PSYCHIATRY | Facility: CLINIC | Age: 54
End: 2019-03-12

## 2019-03-12 DIAGNOSIS — F41.1 GENERALIZED ANXIETY DISORDER: ICD-10-CM

## 2019-03-12 DIAGNOSIS — F33.1 MAJOR DEPRESSIVE DISORDER, RECURRENT EPISODE, MODERATE (HCC): Primary | ICD-10-CM

## 2019-03-12 PROCEDURE — 90834 PSYTX W PT 45 MINUTES: CPT | Performed by: SOCIAL WORKER

## 2019-03-12 NOTE — PROGRESS NOTES
Date of Service: March 12, 2019  Time In:  1:00 PM  Time Out: 1:45 PM    PROGRESS NOTE  Data:  Jose Bobo is a 53 y.o. male who met with the undersigned for a regularly scheduled individual outpatient therapy session at the [facility].     HPI:   Patient reports he has been to court again, but his case was continued.  He thinks his  is filing a motion to dismiss the case because of an illegal search and seizure.  He reports these substance that was found in his truck was tested and it was in fact methamphetamine.  His next court date is not until next month.    Patient also shared that he was diagnosed with COPD.  He was told he needs to quit smoking, and he wants to anyway.  He states his wife said she wants to quit smoking to, but he does not think she will.    Clinical Maneuvering/Intervention:  Assisted patient in processing above session content; acknowledged and normalized patient’s thoughts, feelings, and concerns.  Provided empathy and support as patient processed the above content.    Discussed a plan for quitting smoking.  Patient wants to get back on Chantix or another medication to help him quit.  He is not sure if he can quit cold turkey or if he will have to cut back gradually.  He states that everyone around him smokes.  Encouraged patient to propose a new rule at his home that people must smoke outside.  Also recommended that patient think about what he can do with his hands and his mouth as a distraction when he wants to smoke.  Suggested having carrot and celery sticks available to chew on, chewing gum, and activities that require him to use his hands such as a jigsaw puzzle.  Patient acknowledged the ideas, and thought they would be helpful, but he stopped short of making a firm plan.  Therapist gave him encouragement to pursue his goal of quitting smoking.  Offered support in standing up to any objections from his family, reminding him that his health is at stake, and that he will  have to set the boundaries and take the lead in the behavior changes.    Allowed patient to freely discuss issues without interruption or judgment. Provided safe, confidential environment to facilitate the development of positive therapeutic relationship and encourage open, honest communication. Assisted patient in identifying risk factors which would indicate the need for higher level of care including thoughts to harm self or others and/or self-harming behavior and encouraged patient to contact this office, call 911, or present to the nearest emergency room should any of these events occur. Discussed crisis intervention services and means to access.  Patient adamantly and convincingly denies current suicidal or homicidal ideation or perceptual disturbance.    Assessment     Diagnoses and all orders for this visit:    Major depressive disorder, recurrent episode, moderate (CMS/HCC)    Generalized anxiety disorder               Mental Status Exam  Hygiene:  good  Dress:  casual  Attitude:  Cooperative  Motor Activity:  Appropriate  Speech:  Normal  Mood:  anxious  Affect:  calm and pleasant  Thought Processes:  Circum  Thought Content:  normal  Suicidal Thoughts:  denies  Homicidal Thoughts:  denies  Crisis Safety Plan: yes, to come to the emergency room.  Hallucinations:  denies    Patient's Support Network Includes:  wife, children and extended family    Progress toward goal: Not at goal    Functional Status: Moderate impairment     Prognosis: Guarded with Ongoing Treatment      Plan   Will continue in individual outpatient therapy bimonthly with focus on improved functioning and coping skills, maintaining stability, and avoiding decompensation and the need for higher level of care.    Patient will adhere to medication regimen as prescribed and report any side effects. Patient will contact this office, call 911 or present to the nearest emergency room should suicidal or homicidal ideations occur. Provide Cognitive  Behavioral Therapy and Integrative Therapy to improve functioning, maintain stability, and avoid decompensation and the need for higher level of care.          Return in about 1 week (around 3/19/2019).      This document signed by Viktoria Ching LCSW, March 12, 2019 5:43 PM

## 2019-03-12 NOTE — TREATMENT PLAN
Multi-Disciplinary Problems (from Behavioral Health Treatment Plan)    Active Problems     Problem: Anxiety  Start Date: 03/12/19    Problem Details:  The patient self-scales this problem as a 7 with 10 being the worst.       Goal Priority Start Date Expected End Date End Date    Patient will develop and implement behavioral and cognitive strategies to reduce anxiety and irrational fears. -- 03/12/19 03/12/20 --    Goal Details:  Progress toward goal:  The patient self-scales their progress related to this goal as a 7 with 10 being the worst.       Goal Intervention Frequency Start Date End Date    Help patient explore past emotional issues in relation to present anxiety. Q2 Weeks 03/12/19 03/12/20    Intervention Details:  Duration of treatment until until remission of symptoms.       Goal Intervention Frequency Start Date End Date    Help patient develop an awareness of their cognitive and physical responses to anxiety. Q2 Weeks 03/12/19 03/12/20    Intervention Details:  Duration of treatment until until remission of symptoms.             Problem: Depression  Start Date: 03/12/19    Problem Details:  The patient self-scales this problem as a 5 with 10 being the worst.       Goal Priority Start Date Expected End Date End Date    Patient will demonstrate the ability to initiate new constructive life skills outside of sessions on a consistent basis. -- 03/12/19 03/12/20 --    Goal Details:  Progress toward goal:  The patient self-scales their progress related to this goal as a 7 with 10 being the worst.       Goal Intervention Frequency Start Date End Date    Assist patient in setting attainable activities of daily living goals. Q2 Weeks 03/12/19 03/12/20    Goal Intervention Frequency Start Date End Date    Provide education about depression Q2 Weeks 03/12/19 03/12/20    Intervention Details:  Duration of treatment until until remission of symptoms.       Goal Intervention Frequency Start Date End Date    Assist  patient in developing healthy coping strategies. Q2 Weeks 03/12/19 03/12/20    Intervention Details:  Duration of treatment until until remission of symptoms.                   Reviewed By     Viktoria Ching, [facility] 03/12/19 3058                 I have discussed and reviewed this treatment plan with the patient.  It has been printed for signatures.

## 2019-03-12 NOTE — TELEPHONE ENCOUNTER
Patient is wanting to quit smoking. He is asking if you would write him a script for Chantix pills. He said that he went to the doctor last week and he has copd and needs to quit smoking.

## 2019-03-27 ENCOUNTER — OFFICE VISIT (OUTPATIENT)
Dept: PSYCHIATRY | Facility: CLINIC | Age: 54
End: 2019-03-27

## 2019-03-27 ENCOUNTER — HOSPITAL ENCOUNTER (EMERGENCY)
Facility: HOSPITAL | Age: 54
Discharge: HOME | End: 2019-03-28
Attending: EMERGENCY MEDICINE | Admitting: EMERGENCY MEDICINE

## 2019-03-27 VITALS
TEMPERATURE: 97.3 F | SYSTOLIC BLOOD PRESSURE: 157 MMHG | DIASTOLIC BLOOD PRESSURE: 101 MMHG | OXYGEN SATURATION: 100 % | WEIGHT: 180 LBS | BODY MASS INDEX: 28.93 KG/M2 | HEART RATE: 84 BPM | HEIGHT: 66 IN | RESPIRATION RATE: 20 BRPM

## 2019-03-27 DIAGNOSIS — F33.1 MAJOR DEPRESSIVE DISORDER, RECURRENT EPISODE, MODERATE (HCC): Primary | ICD-10-CM

## 2019-03-27 DIAGNOSIS — F41.1 GENERALIZED ANXIETY DISORDER: ICD-10-CM

## 2019-03-27 DIAGNOSIS — R51.9 NONINTRACTABLE HEADACHE, UNSPECIFIED CHRONICITY PATTERN, UNSPECIFIED HEADACHE TYPE: Primary | ICD-10-CM

## 2019-03-27 PROCEDURE — 90834 PSYTX W PT 45 MINUTES: CPT | Performed by: SOCIAL WORKER

## 2019-03-27 PROCEDURE — 96372 THER/PROPH/DIAG INJ SC/IM: CPT

## 2019-03-27 PROCEDURE — 96375 TX/PRO/DX INJ NEW DRUG ADDON: CPT

## 2019-03-27 PROCEDURE — 99283 EMERGENCY DEPT VISIT LOW MDM: CPT

## 2019-03-27 PROCEDURE — 96374 THER/PROPH/DIAG INJ IV PUSH: CPT

## 2019-03-27 RX ORDER — METOCLOPRAMIDE HYDROCHLORIDE 5 MG/ML
10 INJECTION INTRAMUSCULAR; INTRAVENOUS ONCE
Status: COMPLETED | OUTPATIENT
Start: 2019-03-27 | End: 2019-03-28

## 2019-03-27 RX ORDER — SODIUM CHLORIDE 0.9 % (FLUSH) 0.9 %
10 SYRINGE (ML) INJECTION AS NEEDED
Status: DISCONTINUED | OUTPATIENT
Start: 2019-03-27 | End: 2019-03-28 | Stop reason: HOSPADM

## 2019-03-27 RX ORDER — KETOROLAC TROMETHAMINE 30 MG/ML
30 INJECTION, SOLUTION INTRAMUSCULAR; INTRAVENOUS EVERY 6 HOURS PRN
Status: DISCONTINUED | OUTPATIENT
Start: 2019-03-27 | End: 2019-03-28 | Stop reason: HOSPADM

## 2019-03-27 RX ORDER — SUMATRIPTAN 6 MG/.5ML
6 INJECTION, SOLUTION SUBCUTANEOUS ONCE
Status: COMPLETED | OUTPATIENT
Start: 2019-03-27 | End: 2019-03-28

## 2019-03-27 NOTE — PROGRESS NOTES
Date of Service: March 28, 2019   Time In:  12:30 PM  Time Out:  1:15 PM      PROGRESS NOTE  Data:  Jose Bobo is a 53 y.o. male who met with the undersigned for a regularly scheduled individual outpatient therapy session at the [facility].    HPI:    Jose reports that he is dealing with the same old thing.  It seems like he cannot get anything done, because he is pulled in so many directions by so many people who want him to do what they want him to do.  Patient gave example of trying to fix a problem with a truck recently when his son called and wanted him to go and  a car out of town.  Patient ended up going with his son to get the car and did not finish his truck.  Similar things happen all the time.  He thinks that because he does not have a job that he goes to every day, his family think that he has nothing to do and should be available at the drop of a hat to help them.  In addition his wife has no idea how long it takes to do the kind of work he does, so she is always complaining that he should get more done.    Clinical Maneuvering/Intervention:  Assisted patient in processing above session content; acknowledged and normalized patient’s thoughts, feelings, and concerns.  Divided empathy and support as patient processed the above content.  Therapist noted that we discuss the same pattern every session.  Recommended that patient practice the skill of setting boundaries, i.e. saying no.  Supported patient in setting his own priorities, and saying no to others when necessary.  Suggested that many times he may be able to negotiate with people about the timing of things they want him to do.  For instance he might have told his son that he would  a car if son would help him fix his truck.  Patient acknowledged the potential in such an approach, but added that he has never been one to ask for help, and always wants to make other people happy and avoid any confrontation.    Allowed patient to  freely discuss issues without interruption or judgment. Provided safe, confidential environment to facilitate the development of positive therapeutic relationship and encourage open, honest communication. Assisted patient in identifying risk factors which would indicate the need for higher level of care including thoughts to harm self or others and/or self-harming behavior and encouraged patient to contact this office, call 911, or present to the nearest emergency room should any of these events occur. Discussed crisis intervention services and means to access.  Patient adamantly and convincingly denies current suicidal or homicidal ideation or perceptual disturbance.    Assessment     Diagnoses and all orders for this visit:    Major depressive disorder, recurrent episode, moderate (CMS/HCC)    Generalized anxiety disorder               Mental Status Exam  Hygiene:  good  Dress:  casual  Attitude:  Cooperative  Motor Activity:  Fidgety.  Speech:  Normal  Mood:  anxious  Affect:  anxious  Thought Processes:  Goal directed and Linear  Thought Content:  normal  Suicidal Thoughts:  denies  Homicidal Thoughts:  denies  Crisis Safety Plan: yes, to come to the emergency room.  Hallucinations:  denies    Patient's Support Network Includes:  wife, children and extended family    Progress toward goal: Not at goal    Functional Status: Moderate impairment     Prognosis: Guarded with Ongoing Treatment      Plan   She will continue in individual outpatient therapy bimonthly with focus on improved functioning and coping skills, maintaining stability, and avoiding decompensation and the need for higher level of care.    Patient will adhere to medication regimen as prescribed and report any side effects. Patient will contact this office, call 911 or present to the nearest emergency room should suicidal or homicidal ideations occur. Provide Cognitive Behavioral Therapy and Integrative Therapy to improve functioning, maintain  stability, and avoid decompensation and the need for higher level of care.          Return in about 2 weeks (around 4/10/2019).      This document signed by Viktoria Ching LCSW, March 28, 2019 12:01 PM

## 2019-03-28 PROCEDURE — 25010000002 METOCLOPRAMIDE PER 10 MG: Performed by: PHYSICIAN ASSISTANT

## 2019-03-28 PROCEDURE — 25010000002 KETOROLAC TROMETHAMINE PER 15 MG: Performed by: PHYSICIAN ASSISTANT

## 2019-03-28 PROCEDURE — 96375 TX/PRO/DX INJ NEW DRUG ADDON: CPT

## 2019-03-28 PROCEDURE — 96372 THER/PROPH/DIAG INJ SC/IM: CPT

## 2019-03-28 PROCEDURE — 96374 THER/PROPH/DIAG INJ IV PUSH: CPT

## 2019-03-28 RX ADMIN — KETOROLAC TROMETHAMINE 30 MG: 30 INJECTION, SOLUTION INTRAMUSCULAR at 00:07

## 2019-03-28 RX ADMIN — METOCLOPRAMIDE 10 MG: 5 INJECTION, SOLUTION INTRAMUSCULAR; INTRAVENOUS at 00:06

## 2019-03-28 RX ADMIN — SODIUM CHLORIDE 1000 ML: 9 INJECTION, SOLUTION INTRAVENOUS at 00:05

## 2019-03-28 RX ADMIN — SUMATRIPTAN 6 MG: 6 INJECTION, SOLUTION SUBCUTANEOUS at 00:11

## 2019-04-09 DIAGNOSIS — F41.1 GENERALIZED ANXIETY DISORDER: ICD-10-CM

## 2019-04-09 DIAGNOSIS — F33.1 MAJOR DEPRESSIVE DISORDER, RECURRENT EPISODE, MODERATE (HCC): ICD-10-CM

## 2019-04-09 DIAGNOSIS — F51.05 INSOMNIA DUE TO MENTAL DISORDER: ICD-10-CM

## 2019-04-09 RX ORDER — HYDROXYZINE PAMOATE 25 MG/1
25 CAPSULE ORAL EVERY 6 HOURS PRN
Qty: 60 CAPSULE | Refills: 1 | Status: SHIPPED | OUTPATIENT
Start: 2019-04-09 | End: 2019-04-23 | Stop reason: SDUPTHER

## 2019-04-09 RX ORDER — QUETIAPINE FUMARATE 25 MG/1
12.5-25 TABLET, FILM COATED ORAL 2 TIMES DAILY PRN
Qty: 60 TABLET | Refills: 1 | Status: SHIPPED | OUTPATIENT
Start: 2019-04-09 | End: 2019-04-23 | Stop reason: SDUPTHER

## 2019-04-09 RX ORDER — QUETIAPINE FUMARATE 100 MG/1
100 TABLET, FILM COATED ORAL NIGHTLY PRN
Qty: 30 TABLET | Refills: 1 | Status: SHIPPED | OUTPATIENT
Start: 2019-04-09 | End: 2019-04-23 | Stop reason: SDUPTHER

## 2019-04-09 RX ORDER — LAMOTRIGINE 100 MG/1
100 TABLET ORAL DAILY
Qty: 30 TABLET | Refills: 1 | Status: SHIPPED | OUTPATIENT
Start: 2019-04-09 | End: 2019-04-23 | Stop reason: SDUPTHER

## 2019-04-09 RX ORDER — SERTRALINE HYDROCHLORIDE 100 MG/1
150 TABLET, FILM COATED ORAL
Qty: 45 TABLET | Refills: 1 | Status: SHIPPED | OUTPATIENT
Start: 2019-04-09 | End: 2019-04-23 | Stop reason: SDUPTHER

## 2019-04-09 NOTE — TELEPHONE ENCOUNTER
R/S patient from 4/11/19 to 5/7/19 please fill till that date patient is doing well and wanted to follow up in a month

## 2019-04-23 ENCOUNTER — OFFICE VISIT (OUTPATIENT)
Dept: PSYCHIATRY | Facility: CLINIC | Age: 54
End: 2019-04-23

## 2019-04-23 VITALS
DIASTOLIC BLOOD PRESSURE: 73 MMHG | SYSTOLIC BLOOD PRESSURE: 120 MMHG | HEIGHT: 66 IN | WEIGHT: 190.4 LBS | HEART RATE: 95 BPM | BODY MASS INDEX: 30.6 KG/M2

## 2019-04-23 DIAGNOSIS — F41.1 GENERALIZED ANXIETY DISORDER: ICD-10-CM

## 2019-04-23 DIAGNOSIS — F51.05 INSOMNIA DUE TO MENTAL DISORDER: ICD-10-CM

## 2019-04-23 DIAGNOSIS — F33.1 MAJOR DEPRESSIVE DISORDER, RECURRENT EPISODE, MODERATE (HCC): Primary | ICD-10-CM

## 2019-04-23 PROCEDURE — 99214 OFFICE O/P EST MOD 30 MIN: CPT | Performed by: NURSE PRACTITIONER

## 2019-04-23 RX ORDER — QUETIAPINE FUMARATE 25 MG/1
12.5-25 TABLET, FILM COATED ORAL 2 TIMES DAILY PRN
Qty: 60 TABLET | Refills: 1 | Status: SHIPPED | OUTPATIENT
Start: 2019-04-23 | End: 2019-05-21 | Stop reason: SDUPTHER

## 2019-04-23 RX ORDER — SERTRALINE HYDROCHLORIDE 100 MG/1
150 TABLET, FILM COATED ORAL
Qty: 45 TABLET | Refills: 1 | Status: SHIPPED | OUTPATIENT
Start: 2019-04-23 | End: 2019-05-21 | Stop reason: SDUPTHER

## 2019-04-23 RX ORDER — HYDROXYZINE PAMOATE 25 MG/1
25 CAPSULE ORAL EVERY 6 HOURS PRN
Qty: 60 CAPSULE | Refills: 1 | Status: SHIPPED | OUTPATIENT
Start: 2019-04-23 | End: 2019-05-21

## 2019-04-23 RX ORDER — QUETIAPINE FUMARATE 100 MG/1
100 TABLET, FILM COATED ORAL NIGHTLY PRN
Qty: 30 TABLET | Refills: 1 | Status: SHIPPED | OUTPATIENT
Start: 2019-04-23 | End: 2019-05-21 | Stop reason: SDUPTHER

## 2019-04-23 RX ORDER — TRAZODONE HYDROCHLORIDE 50 MG/1
50-100 TABLET ORAL NIGHTLY PRN
Qty: 60 TABLET | Refills: 0 | Status: SHIPPED | OUTPATIENT
Start: 2019-04-23 | End: 2019-05-21 | Stop reason: SDUPTHER

## 2019-04-23 RX ORDER — LAMOTRIGINE 150 MG/1
150 TABLET ORAL DAILY
Qty: 30 TABLET | Refills: 1 | Status: SHIPPED | OUTPATIENT
Start: 2019-04-23 | End: 2019-05-21 | Stop reason: SDUPTHER

## 2019-04-23 NOTE — PROGRESS NOTES
"Agustina Bobo is a 53 y.o. male is here today for medication management follow-up.    Chief Complaint: Depression and Anxiety     History of Present Illness   Patient presents for follow-up. Patient reports he is having difficulty falling asleep until about 4 am and will sleep until noon. He reports irritability.  He rates depression and anxiety 4/10 with 10 being the worst. He denies panic attacks. He reports mood instability and continues to struggle with motivation and interest. He denies paranoia.  He denies suicidal and homicidal ideations. He denies auditory and visual hallucinations. Appetite is good. Patient reports compliance with medications and tolerating without side effects. He reports he has been trying to get outside and work in the yard now that the weather in nice. BP has improved since PCP adjusted BP medication. He continues to have muscle aches and pain and takes flexeril as needed.     The following portions of the patient's history were reviewed and updated as appropriate: allergies, current medications, past family history, past medical history, past social history, past surgical history and problem list.    Review of Systems   Constitutional: Negative.    HENT: Positive for dental problem.    Eyes: Negative.    Respiratory: Negative.    Cardiovascular: Negative.    Gastrointestinal: Negative.    Endocrine: Negative.    Genitourinary: Negative.    Musculoskeletal: Positive for arthralgias and myalgias.   Skin: Negative.    Allergic/Immunologic: Negative.    Neurological: Negative.    Hematological: Negative.    Psychiatric/Behavioral: Positive for decreased concentration and sleep disturbance.       Objective   Physical Exam   Constitutional: He appears well-developed and well-nourished. No distress.   Skin: He is not diaphoretic.   Vitals reviewed.    Blood pressure 120/73, pulse 95, height 167.6 cm (66\"), weight 86.4 kg (190 lb 6.4 oz).    Medication List:   Current " Outpatient Medications   Medication Sig Dispense Refill   • aspirin 81 MG chewable tablet Chew 81 mg Daily.     • cyclobenzaprine (FLEXERIL) 5 MG tablet      • hydrOXYzine pamoate (VISTARIL) 25 MG capsule Take 1 capsule by mouth Every 6 (Six) Hours As Needed for Anxiety. 60 capsule 1   • ipratropium (ATROVENT) 0.02 % nebulizer solution      • labetalol (NORMODYNE) 300 MG tablet Take 300 mg by mouth 2 (Two) Times a Day.     • lamoTRIgine (LaMICtal) 150 MG tablet Take 1 tablet by mouth Daily. 30 tablet 1   • lisinopril (PRINIVIL,ZESTRIL) 20 MG tablet Take 10 mg by mouth Daily.     • magnesium oxide (MAGOX) 400 (241.3 Mg) MG tablet tablet Take 400 mg by mouth Daily.     • Omega-3 Fatty Acids (FISH OIL) 1000 MG capsule capsule Take 1,000 mg by mouth Daily With Breakfast.     • pantoprazole (PROTONIX) 40 MG EC tablet Take 40 mg by mouth Daily.     • potassium chloride (K-DUR) 10 MEQ CR tablet Take 10 mEq by mouth 1 (One) Time.     • Pramipexole Dihydrochloride (MIRAPEX PO) Take  by mouth.     • QUEtiapine (SEROquel) 100 MG tablet Take 1 tablet by mouth At Night As Needed (sleep/anxiety/paranoia). 30 tablet 1   • QUEtiapine (SEROquel) 25 MG tablet Take 0.5-1 tablets by mouth 2 (Two) Times a Day As Needed (anxiety/paranoia). 60 tablet 1   • sertraline (ZOLOFT) 100 MG tablet Take 1.5 tablets by mouth every night at bedtime. 45 tablet 1   • spironolactone (ALDACTONE) 25 MG tablet Take  by mouth 2 (Two) Times a Day.     • varenicline (CHANTIX STARTING MONTH PAK) 0.5 MG X 11 & 1 MG X 42 tablet Take 0.5 mg one daily on days 1-3 and and 0.5 mg twice daily on days 4-7.Then 1 mg twice daily for a total of 12 weeks. 53 tablet 0   • vitamin D (ERGOCALCIFEROL) 10736 units capsule capsule Take 50,000 Units by mouth 1 (One) Time Per Week. Prior to [facility] Admission, Patient was on: takes on sundays     • albuterol (ACCUNEB) 1.25 MG/3ML nebulizer solution      • lisinopril (PRINIVIL,ZESTRIL) 10 MG tablet      • traZODone (DESYREL) 50 MG  tablet Take 1-2 tablets by mouth At Night As Needed for Sleep. 60 tablet 0     No current facility-administered medications for this visit.        Labs:   No results found for this or any previous visit (from the past 2016 hour(s)).      Mental Status Exam:   Hygiene:   fair  Cooperation:  Cooperative  Eye Contact:  Good  Psychomotor Behavior:  Appropriate  Affect:  Restricted  Hopelessness: Denies  Speech:  Normal  Thought Process:  Goal directed and Linear  Thought Content:  Normal  Suicidal:  None  Homicidal:  None  Hallucinations:  None  Delusion:  None  Memory:  Intact  Orientation:  Person, Place, Time and Situation  Reliability:  fair  Insight:  Fair  Judgement:  Fair  Impulse Control:  Fair  Physical/Medical Issues:  Yes HTN    Assessment/Plan   Diagnoses and all orders for this visit:    Major depressive disorder, recurrent episode, moderate (CMS/HCC)  -     lamoTRIgine (LaMICtal) 150 MG tablet; Take 1 tablet by mouth Daily.  -     sertraline (ZOLOFT) 100 MG tablet; Take 1.5 tablets by mouth every night at bedtime.    Generalized anxiety disorder  -     hydrOXYzine pamoate (VISTARIL) 25 MG capsule; Take 1 capsule by mouth Every 6 (Six) Hours As Needed for Anxiety.  -     QUEtiapine (SEROquel) 100 MG tablet; Take 1 tablet by mouth At Night As Needed (sleep/anxiety/paranoia).  -     QUEtiapine (SEROquel) 25 MG tablet; Take 0.5-1 tablets by mouth 2 (Two) Times a Day As Needed (anxiety/paranoia).  -     sertraline (ZOLOFT) 100 MG tablet; Take 1.5 tablets by mouth every night at bedtime.    Insomnia due to mental disorder  -     QUEtiapine (SEROquel) 100 MG tablet; Take 1 tablet by mouth At Night As Needed (sleep/anxiety/paranoia).  -     traZODone (DESYREL) 50 MG tablet; Take 1-2 tablets by mouth At Night As Needed for Sleep.          Body mass index is 30.73 kg/m².  Patient was educated on healthier and more balanced diet choices and encouraged exercise within physical limitations.  Functionality: pt having  significant impairment in important areas of daily functioning.  Prognosis: Guarded dependent on medication/follow up and treatment plan compliance.    Impression: Patient continues to struggle with irritability and sleep issues     Plan:  1) Continue Zoloft to 150 mg po QHS for depression and anxiety. .   2) Continue hydroxyzine 25 mg po every 6 hrs prn anxiety.  3) Continue psychotherapy with Viktoria Ching LCSW  4) Continue Seroquel 25 mg 1/2-1 tablet BID prn anxiety.  5) Continue Seroquel 100 mg one tablet QHS prn anxiety and sleep disturbance.   7) Increase Lamictal 150 mg po daily. Patient instructed to stop medication should rash develop and contact prescriber.   8) RTC 1 month   9) Begin Trazodone 50 mg 1-2 po QHS prn sleep disturbance     Discussed medication options.  Reviewed the risks, benefits, and side effects of the medications; patient acknowledged and verbally consented.  Patient is agreeable to call the [facility].  Patient is aware to call 911 or go to the nearest ER should begin having SI/HI.

## 2019-05-21 ENCOUNTER — OFFICE VISIT (OUTPATIENT)
Dept: PSYCHIATRY | Facility: CLINIC | Age: 54
End: 2019-05-21

## 2019-05-21 VITALS
HEIGHT: 66 IN | BODY MASS INDEX: 31.15 KG/M2 | HEART RATE: 90 BPM | SYSTOLIC BLOOD PRESSURE: 139 MMHG | WEIGHT: 193.8 LBS | DIASTOLIC BLOOD PRESSURE: 85 MMHG

## 2019-05-21 DIAGNOSIS — F41.1 GENERALIZED ANXIETY DISORDER: ICD-10-CM

## 2019-05-21 DIAGNOSIS — F33.1 MAJOR DEPRESSIVE DISORDER, RECURRENT EPISODE, MODERATE (HCC): Primary | ICD-10-CM

## 2019-05-21 DIAGNOSIS — F51.05 INSOMNIA DUE TO MENTAL DISORDER: ICD-10-CM

## 2019-05-21 PROCEDURE — 99213 OFFICE O/P EST LOW 20 MIN: CPT | Performed by: NURSE PRACTITIONER

## 2019-05-21 PROCEDURE — 90833 PSYTX W PT W E/M 30 MIN: CPT | Performed by: NURSE PRACTITIONER

## 2019-05-21 RX ORDER — QUETIAPINE FUMARATE 25 MG/1
12.5-25 TABLET, FILM COATED ORAL 2 TIMES DAILY PRN
Qty: 60 TABLET | Refills: 1 | Status: SHIPPED | OUTPATIENT
Start: 2019-05-21 | End: 2019-10-03 | Stop reason: SDUPTHER

## 2019-05-21 RX ORDER — QUETIAPINE FUMARATE 100 MG/1
100 TABLET, FILM COATED ORAL NIGHTLY PRN
Qty: 30 TABLET | Refills: 1 | Status: SHIPPED | OUTPATIENT
Start: 2019-05-21 | End: 2019-07-16 | Stop reason: SDUPTHER

## 2019-05-21 RX ORDER — TIZANIDINE 4 MG/1
TABLET ORAL
COMMUNITY
Start: 2019-05-17

## 2019-05-21 RX ORDER — TRAZODONE HYDROCHLORIDE 50 MG/1
50-100 TABLET ORAL NIGHTLY PRN
Qty: 60 TABLET | Refills: 1 | Status: SHIPPED | OUTPATIENT
Start: 2019-05-21 | End: 2019-07-16 | Stop reason: SDUPTHER

## 2019-05-21 RX ORDER — ONDANSETRON 8 MG/1
TABLET, ORALLY DISINTEGRATING ORAL
COMMUNITY
Start: 2019-04-17

## 2019-05-21 RX ORDER — HYDROXYZINE HYDROCHLORIDE 25 MG/1
TABLET, FILM COATED ORAL
COMMUNITY
Start: 2019-04-30 | End: 2019-05-21 | Stop reason: SDUPTHER

## 2019-05-21 RX ORDER — SERTRALINE HYDROCHLORIDE 100 MG/1
150 TABLET, FILM COATED ORAL
Qty: 45 TABLET | Refills: 1 | Status: SHIPPED | OUTPATIENT
Start: 2019-05-21 | End: 2019-07-16 | Stop reason: SDUPTHER

## 2019-05-21 RX ORDER — LAMOTRIGINE 150 MG/1
150 TABLET ORAL DAILY
Qty: 30 TABLET | Refills: 1 | Status: SHIPPED | OUTPATIENT
Start: 2019-05-21 | End: 2019-07-16 | Stop reason: SDUPTHER

## 2019-05-21 RX ORDER — HYDROXYZINE HYDROCHLORIDE 25 MG/1
25 TABLET, FILM COATED ORAL 3 TIMES DAILY PRN
Qty: 90 TABLET | Refills: 1 | Status: SHIPPED | OUTPATIENT
Start: 2019-05-21 | End: 2019-07-16 | Stop reason: SDUPTHER

## 2019-05-21 RX ORDER — LORATADINE 10 MG/1
TABLET ORAL
COMMUNITY
Start: 2019-05-17

## 2019-05-21 NOTE — PROGRESS NOTES
Agustina Bobo is a 53 y.o. male is here today for medication management follow-up.    Chief Complaint: Depression and Anxiety     History of Present Illness   Patient presents for follow-up. He continues to have some irritability at times. Patient reports he has been a lot of bad news lately which has affected his mood. He reports  are telling him he needs to plead guilty to a drug charge he isn't guilty of. He is not sure when he has to go to court. His hearing was last month. Patient has paranoia regarding this situation and feels he was set up. He reports someone who is working with the police hid meth in his car. He reports he doesn't trust others. He reports he tried to help get his son off drugs and he kept bringing people to the home and patient would have to make them leave. Patient reports conflict with wife as she believes he is guilty. Sleep is good. Appetite is good. Patient reports compliance with medications and tolerating without side effects. No further issues with BP. Patient reports he has started Zanaflex for muscle spasms which has helped some.     The following portions of the patient's history were reviewed and updated as appropriate: allergies, current medications, past family history, past medical history, past social history, past surgical history and problem list.    Review of Systems   Constitutional: Negative.    HENT: Positive for dental problem.    Eyes: Negative.    Respiratory: Negative.    Cardiovascular: Negative.    Gastrointestinal: Negative.    Endocrine: Negative.    Genitourinary: Negative.    Musculoskeletal: Positive for arthralgias and myalgias.   Skin: Negative.    Allergic/Immunologic: Negative.    Neurological: Negative.    Hematological: Negative.    Psychiatric/Behavioral: The patient is nervous/anxious.        Objective   Physical Exam   Constitutional: He appears well-developed and well-nourished. No distress.   Skin: He is not diaphoretic.  "  Vitals reviewed.    Blood pressure 139/85, pulse 90, height 167.6 cm (66\"), weight 87.9 kg (193 lb 12.8 oz).    Medication List:   Current Outpatient Medications   Medication Sig Dispense Refill   • albuterol (ACCUNEB) 1.25 MG/3ML nebulizer solution      • aspirin 81 MG chewable tablet Chew 81 mg Daily.     • hydrOXYzine (ATARAX) 25 MG tablet Take 1 tablet by mouth 3 (Three) Times a Day As Needed for Anxiety. 90 tablet 1   • ipratropium (ATROVENT) 0.02 % nebulizer solution      • labetalol (NORMODYNE) 300 MG tablet Take 300 mg by mouth 2 (Two) Times a Day.     • lamoTRIgine (LaMICtal) 150 MG tablet Take 1 tablet by mouth Daily. 30 tablet 1   • lisinopril (PRINIVIL,ZESTRIL) 20 MG tablet Take 10 mg by mouth Daily.     • loratadine (CLARITIN) 10 MG tablet      • ondansetron ODT (ZOFRAN-ODT) 8 MG disintegrating tablet      • pantoprazole (PROTONIX) 40 MG EC tablet Take 40 mg by mouth Daily.     • potassium chloride (K-DUR) 10 MEQ CR tablet Take 10 mEq by mouth 1 (One) Time.     • QUEtiapine (SEROquel) 100 MG tablet Take 1 tablet by mouth At Night As Needed (sleep/anxiety/paranoia). 30 tablet 1   • QUEtiapine (SEROquel) 25 MG tablet Take 0.5-1 tablets by mouth 2 (Two) Times a Day As Needed (anxiety/paranoia). 60 tablet 1   • sertraline (ZOLOFT) 100 MG tablet Take 1.5 tablets by mouth every night at bedtime. 45 tablet 1   • tiZANidine (ZANAFLEX) 4 MG tablet      • traZODone (DESYREL) 50 MG tablet Take 1-2 tablets by mouth At Night As Needed for Sleep. 60 tablet 1   • varenicline (CHANTIX STARTING MONTH PAK) 0.5 MG X 11 & 1 MG X 42 tablet Take 0.5 mg one daily on days 1-3 and and 0.5 mg twice daily on days 4-7.Then 1 mg twice daily for a total of 12 weeks. 53 tablet 0   • vitamin D (ERGOCALCIFEROL) 21796 units capsule capsule Take 50,000 Units by mouth 1 (One) Time Per Week. Prior to [facility] Admission, Patient was on: takes on sundays     • amLODIPine (NORVASC) 5 MG tablet Take 1 tablet by mouth Daily. 30 tablet 5   • " cyclobenzaprine (FLEXERIL) 5 MG tablet      • hydrochlorothiazide (MICROZIDE) 12.5 MG capsule Take 1 capsule by mouth Daily. 30 capsule 5   • magnesium oxide (MAGOX) 400 (241.3 Mg) MG tablet tablet Take 400 mg by mouth Daily.     • Omega-3 Fatty Acids (FISH OIL) 1000 MG capsule capsule Take 1,000 mg by mouth Daily With Breakfast.     • Pramipexole Dihydrochloride (MIRAPEX PO) Take  by mouth.     • spironolactone (ALDACTONE) 25 MG tablet Take  by mouth 2 (Two) Times a Day.       No current facility-administered medications for this visit.        Labs:   No results found for this or any previous visit (from the past 2016 hour(s)).      Mental Status Exam:   Hygiene:   fair  Cooperation:  Cooperative  Eye Contact:  Good  Psychomotor Behavior:  Appropriate  Affect:  Restricted  Hopelessness: Denies  Speech:  Normal  Thought Process:  Goal directed and Linear  Thought Content:  Normal  Suicidal:  None  Homicidal:  None  Hallucinations:  None  Delusion:  None  Memory:  Intact  Orientation:  Person, Place, Time and Situation  Reliability:  fair  Insight:  Fair  Judgement:  Fair  Impulse Control:  Fair  Physical/Medical Issues:  Yes HTN    Assessment/Plan   Diagnoses and all orders for this visit:    Major depressive disorder, recurrent episode, moderate (CMS/HCC)  -     lamoTRIgine (LaMICtal) 150 MG tablet; Take 1 tablet by mouth Daily.  -     sertraline (ZOLOFT) 100 MG tablet; Take 1.5 tablets by mouth every night at bedtime.    Generalized anxiety disorder  -     hydrOXYzine (ATARAX) 25 MG tablet; Take 1 tablet by mouth 3 (Three) Times a Day As Needed for Anxiety.  -     QUEtiapine (SEROquel) 100 MG tablet; Take 1 tablet by mouth At Night As Needed (sleep/anxiety/paranoia).  -     QUEtiapine (SEROquel) 25 MG tablet; Take 0.5-1 tablets by mouth 2 (Two) Times a Day As Needed (anxiety/paranoia).  -     sertraline (ZOLOFT) 100 MG tablet; Take 1.5 tablets by mouth every night at bedtime.    Insomnia due to mental disorder  -      QUEtiapine (SEROquel) 100 MG tablet; Take 1 tablet by mouth At Night As Needed (sleep/anxiety/paranoia).  -     traZODone (DESYREL) 50 MG tablet; Take 1-2 tablets by mouth At Night As Needed for Sleep.          Body mass index is 31.28 kg/m².  Patient was educated on healthier and more balanced diet choices and encouraged exercise within physical limitations.  Functionality: pt having some impairment in important areas of daily functioning.  Prognosis: Guarded dependent on medication/follow up and treatment plan compliance.    Impression: Worsening of mood related to legal issues.     Plan:  1) Continue Zoloft to 150 mg po QHS for depression and anxiety. .   2) Continue hydroxyzine 25 mg po every 6 hrs prn anxiety.  3) Encouraged patient to continue psychotherapy.   4) Continue Seroquel 25 mg 1/2-1 tablet BID prn anxiety.  5) Continue Seroquel 100 mg one tablet QHS prn anxiety and sleep disturbance.   7) Continue Lamictal 150 mg po daily. Patient instructed to stop medication should rash develop and contact prescriber.   8) RTC 2 months   9) Begin Trazodone 50 mg 1-2 po QHS prn sleep disturbance     Discussed medication options.  Reviewed the risks, benefits, and side effects of the medications; patient acknowledged and verbally consented.  Patient is agreeable to call the [facility].  Patient is aware to call 911 or go to the nearest ER should begin having SI/HI.     1:18 PM - 1:41 PM - SUPPORTIVE PSYCHOTHERAPY: continuing efforts to promote the therapeutic alliance, address the patient’s issues, and strengthen self awareness, insights, and coping skills. Allowed patient time to express his concerns and fears regarding his legal issues and possible incarceration. Patient reports it was helpful to have someone to talk to about these issues. He reports lack of support at home. Encouraged psychotherapy.

## 2019-05-22 ENCOUNTER — OFFICE VISIT (OUTPATIENT)
Dept: CARDIOLOGY | Facility: CLINIC | Age: 54
End: 2019-05-22

## 2019-05-22 VITALS
BODY MASS INDEX: 30.7 KG/M2 | HEIGHT: 66 IN | SYSTOLIC BLOOD PRESSURE: 158 MMHG | HEART RATE: 101 BPM | WEIGHT: 191 LBS | DIASTOLIC BLOOD PRESSURE: 86 MMHG | OXYGEN SATURATION: 97 %

## 2019-05-22 DIAGNOSIS — R07.9 CHEST PAIN, UNSPECIFIED TYPE: Primary | ICD-10-CM

## 2019-05-22 DIAGNOSIS — I10 ESSENTIAL HYPERTENSION: ICD-10-CM

## 2019-05-22 DIAGNOSIS — R06.09 DYSPNEA ON EXERTION: ICD-10-CM

## 2019-05-22 PROCEDURE — 99214 OFFICE O/P EST MOD 30 MIN: CPT | Performed by: INTERNAL MEDICINE

## 2019-05-22 RX ORDER — HYDROCHLOROTHIAZIDE 12.5 MG/1
12.5 CAPSULE, GELATIN COATED ORAL DAILY
Qty: 30 CAPSULE | Refills: 5 | Status: SHIPPED | OUTPATIENT
Start: 2019-05-22 | End: 2019-12-17 | Stop reason: SDUPTHER

## 2019-05-22 RX ORDER — AMLODIPINE BESYLATE 5 MG/1
5 TABLET ORAL DAILY
Qty: 30 TABLET | Refills: 5 | Status: SHIPPED | OUTPATIENT
Start: 2019-05-22

## 2019-05-22 RX ORDER — HYDROCHLOROTHIAZIDE 12.5 MG/1
12.5 CAPSULE, GELATIN COATED ORAL DAILY
COMMUNITY
End: 2019-05-22 | Stop reason: SDUPTHER

## 2019-05-22 RX ORDER — AMLODIPINE BESYLATE 5 MG/1
5 TABLET ORAL DAILY
COMMUNITY
End: 2019-05-22 | Stop reason: SDUPTHER

## 2019-05-22 NOTE — PROGRESS NOTES
[facility] CARDIOLOGY  [address]  [place] [address]  Phone: [phone]  Fax: [phone]    05/22/2019    Chief Complaint   Patient presents with   • Chest Pain   • Hypertension        History:   Jose Bobo is a 53 y.o. male seen in followup, hypertension and LDL. He is not having any active cardiac complaints today.  His blood pressure is slightly elevated.  States that it has been elevated at home.  He is currently taking Chantix, but is still smoking 1-2 cigarettes occasionally.        Past Medical History:   Diagnosis Date   • Arthritis    • Elevated cholesterol    • GERD (gastroesophageal reflux disease)    • Hypertension        Past Surgical History:   Procedure Laterality Date   • COLONOSCOPY     • COLONOSCOPY N/A 8/16/2018    Procedure: COLONOSCOPY;  Surgeon: Negrito Goldberg MD;  Location: [facility];  Service: Gastroenterology   • ENDOSCOPY N/A 8/16/2018    Procedure: ESOPHAGOGASTRODUODENOSCOPY WITH ANESTHESIA;  Surgeon: Negrito Goldberg MD;  Location: [facility];  Service: Gastroenterology   • VASECTOMY  1993        Past Social History:  Social History     Socioeconomic History   • Marital status:      Spouse name: Not on file   • Number of children: Not on file   • Years of education: Not on file   • Highest education level: Not on file   Tobacco Use   • Smoking status: Current Every Day Smoker     Packs/day: 2.00     Years: 30.00     Pack years: 60.00     Types: Cigarettes   • Smokeless tobacco: Never Used   Substance and Sexual Activity   • Alcohol use: No   • Drug use: No   • Sexual activity: Defer     Birth control/protection: None       Past Family History:  Family History   Problem Relation Age of Onset   • Cancer Sister         breast   • Hypertension Other    • Heart disease Neg Hx        Review of Systems:   Review of Systems   Constitution: Positive for malaise/fatigue and weight gain. Negative for diaphoresis, fever, weakness and  weight loss.   HENT: Positive for congestion. Negative for nosebleeds and sore throat.    Eyes: Negative for blurred vision and visual disturbance.   Cardiovascular: Positive for dyspnea on exertion. Negative for chest pain, claudication, irregular heartbeat, leg swelling, orthopnea, palpitations, paroxysmal nocturnal dyspnea and syncope.   Respiratory: Positive for cough, shortness of breath and wheezing. Negative for hemoptysis, sleep disturbances due to breathing, snoring and sputum production.    Endocrine: Negative for cold intolerance, heat intolerance, polydipsia, polyphagia and polyuria.   Hematologic/Lymphatic: Negative for bleeding problem.   Skin: Negative for dry skin, itching and rash.   Musculoskeletal: Positive for muscle cramps and muscle weakness. Negative for myalgias.   Gastrointestinal: Positive for nausea. Negative for abdominal pain, constipation, diarrhea, heartburn, hematemesis, hematochezia, hemorrhoids, melena and vomiting.   Genitourinary: Negative for hematuria and nocturia.   Neurological: Positive for excessive daytime sleepiness, dizziness, headaches and light-headedness. Negative for focal weakness, numbness, seizures and vertigo.   Psychiatric/Behavioral: Positive for depression. Negative for substance abuse and suicidal ideas.   Allergic/Immunologic: Positive for environmental allergies.         Current Outpatient Medications   Medication Sig Dispense Refill   • albuterol (ACCUNEB) 1.25 MG/3ML nebulizer solution      • aspirin 81 MG chewable tablet Chew 81 mg Daily.     • cyclobenzaprine (FLEXERIL) 5 MG tablet      • hydrOXYzine (ATARAX) 25 MG tablet Take 1 tablet by mouth 3 (Three) Times a Day As Needed for Anxiety. 90 tablet 1   • ipratropium (ATROVENT) 0.02 % nebulizer solution      • labetalol (NORMODYNE) 300 MG tablet Take 300 mg by mouth 2 (Two) Times a Day.     • lamoTRIgine (LaMICtal) 150 MG tablet Take 1 tablet by mouth Daily. 30 tablet 1   • lisinopril (PRINIVIL,ZESTRIL)  "20 MG tablet Take 10 mg by mouth Daily.     • loratadine (CLARITIN) 10 MG tablet      • magnesium oxide (MAGOX) 400 (241.3 Mg) MG tablet tablet Take 400 mg by mouth Daily.     • Omega-3 Fatty Acids (FISH OIL) 1000 MG capsule capsule Take 1,000 mg by mouth Daily With Breakfast.     • ondansetron ODT (ZOFRAN-ODT) 8 MG disintegrating tablet      • pantoprazole (PROTONIX) 40 MG EC tablet Take 40 mg by mouth Daily.     • potassium chloride (K-DUR) 10 MEQ CR tablet Take 10 mEq by mouth 1 (One) Time.     • Pramipexole Dihydrochloride (MIRAPEX PO) Take  by mouth.     • QUEtiapine (SEROquel) 100 MG tablet Take 1 tablet by mouth At Night As Needed (sleep/anxiety/paranoia). 30 tablet 1   • QUEtiapine (SEROquel) 25 MG tablet Take 0.5-1 tablets by mouth 2 (Two) Times a Day As Needed (anxiety/paranoia). 60 tablet 1   • sertraline (ZOLOFT) 100 MG tablet Take 1.5 tablets by mouth every night at bedtime. 45 tablet 1   • spironolactone (ALDACTONE) 25 MG tablet Take  by mouth 2 (Two) Times a Day.     • tiZANidine (ZANAFLEX) 4 MG tablet      • traZODone (DESYREL) 50 MG tablet Take 1-2 tablets by mouth At Night As Needed for Sleep. 60 tablet 1   • varenicline (CHANTIX STARTING MONTH PAK) 0.5 MG X 11 & 1 MG X 42 tablet Take 0.5 mg one daily on days 1-3 and and 0.5 mg twice daily on days 4-7.Then 1 mg twice daily for a total of 12 weeks. 53 tablet 0   • vitamin D (ERGOCALCIFEROL) 01750 units capsule capsule Take 50,000 Units by mouth 1 (One) Time Per Week. Prior to [facility] Admission, Patient was on: takes on sundays       No current facility-administered medications for this visit.         No Known Allergies    Objective     /86 (BP Location: Left arm, Patient Position: Sitting)   Pulse 101   Ht 167.6 cm (66\")   Wt 86.6 kg (191 lb)   SpO2 97%   BMI 30.83 kg/m²     Physical Exam    DATA:      Results for orders placed during the hospital encounter of 10/02/18   Adult Transthoracic Echo Complete W/ Cont if Necessary Per Protocol "    Narrative · Left ventricular systolic function is normal. Estimated EF appears to be   in the range of 56 - 60%.  · Left ventricular diastolic function is normal. Normal left atrial   pressure.  · Normal right ventricular cavity size and systolic function noted.  · Trace mitral valve regurgitation is present  · There is no evidence of pericardial effusion         Results for orders placed during the hospital encounter of 10/02/18   Stress Test With Myocardial Perfusion (1 Day)    Narrative · Myocardial perfusion imaging indicates a normal myocardial perfusion   study with no evidence of ischemia.  · Left ventricular ejection fraction is normal (Calculated EF = 60%). TID   0.98  · GI artifact is present.  · Pt walked on treadmill for 7 minutes and 30 secs, 10 METS achieving   maximum heart rate 165 bpm which represents 87.27 % of APMHR, no chest   pain, normal hemodynamic response and no ishcemic EKG changes  · Findings consistent with a normal ECG stress test.  · Impressions are consistent with a low risk study.         Results for orders placed during the hospital encounter of 10/02/18   Stress Test With Myocardial Perfusion (1 Day)    Narrative · Myocardial perfusion imaging indicates a normal myocardial perfusion   study with no evidence of ischemia.  · Left ventricular ejection fraction is normal (Calculated EF = 60%). TID   0.98  · GI artifact is present.  · Pt walked on treadmill for 7 minutes and 30 secs, 10 METS achieving   maximum heart rate 165 bpm which represents 87.27 % of APMHR, no chest   pain, normal hemodynamic response and no ishcemic EKG changes  · Findings consistent with a normal ECG stress test.  · Impressions are consistent with a low risk study.          Procedures       Jose was seen today for chest pain and hypertension.    Diagnoses and all orders for this visit:    Chest pain, unspecified type    Essential hypertension    Dyspnea on exertion    Other orders  -     amLODIPine  (NORVASC) 5 MG tablet; Take 1 tablet by mouth Daily.  -     hydrochlorothiazide (MICROZIDE) 12.5 MG capsule; Take 1 capsule by mouth Daily.          Assessment:  1. Hypertension.  2. Edema  3. Smoking cessation      Plan:  1. Start Norvasc 5 mg daily.  2. Start HCTZ 12.5 mg daily.  3. Follow 6 months.            Recommended increase activity to 30 minutes of walking daily, most days of the week.  Counseled the patient for >3 minutes regarding smoking cessation.  Discussed tobacco use relationship to cardiac disease progression.  Offered smoking cessation medications.  Discussed diet and weight loss with patient.        Patient's Body mass index is 30.83 kg/m². BMI is above normal parameters. Recommendations include: nutrition counseling.         Thank you for allowing me to participate in the care of Jose Bobo. Feel free to contact me directly with any further questions or concerns.          Bear Thomas MD, FACC  Interventional Cardiology

## 2019-06-19 ENCOUNTER — TRANSCRIBE ORDERS (OUTPATIENT)
Dept: ADMINISTRATIVE | Facility: HOSPITAL | Age: 54
End: 2019-06-19

## 2019-06-19 DIAGNOSIS — M54.16 LUMBAR RADICULOPATHY: Primary | ICD-10-CM

## 2019-06-20 ENCOUNTER — HOSPITAL ENCOUNTER (OUTPATIENT)
Dept: MRI IMAGING | Facility: HOSPITAL | Age: 54
Discharge: HOME | End: 2019-06-20
Admitting: NURSE PRACTITIONER

## 2019-06-20 DIAGNOSIS — M54.16 LUMBAR RADICULOPATHY: ICD-10-CM

## 2019-06-20 PROCEDURE — 72148 MRI LUMBAR SPINE W/O DYE: CPT

## 2019-06-20 PROCEDURE — 72148 MRI LUMBAR SPINE W/O DYE: CPT | Performed by: RADIOLOGY

## 2019-07-11 DIAGNOSIS — F51.05 INSOMNIA DUE TO MENTAL DISORDER: ICD-10-CM

## 2019-07-12 RX ORDER — TRAZODONE HYDROCHLORIDE 50 MG/1
TABLET ORAL
Qty: 60 TABLET | Refills: 0 | OUTPATIENT
Start: 2019-07-12

## 2019-07-16 ENCOUNTER — OFFICE VISIT (OUTPATIENT)
Dept: PSYCHIATRY | Facility: CLINIC | Age: 54
End: 2019-07-16

## 2019-07-16 VITALS
WEIGHT: 185.4 LBS | HEART RATE: 81 BPM | SYSTOLIC BLOOD PRESSURE: 110 MMHG | BODY MASS INDEX: 29.8 KG/M2 | HEIGHT: 66 IN | DIASTOLIC BLOOD PRESSURE: 65 MMHG

## 2019-07-16 DIAGNOSIS — F33.1 MAJOR DEPRESSIVE DISORDER, RECURRENT EPISODE, MODERATE (HCC): ICD-10-CM

## 2019-07-16 DIAGNOSIS — F41.1 GENERALIZED ANXIETY DISORDER: ICD-10-CM

## 2019-07-16 DIAGNOSIS — F51.05 INSOMNIA DUE TO MENTAL DISORDER: ICD-10-CM

## 2019-07-16 PROCEDURE — 99214 OFFICE O/P EST MOD 30 MIN: CPT | Performed by: NURSE PRACTITIONER

## 2019-07-16 RX ORDER — LAMOTRIGINE 100 MG/1
100 TABLET ORAL 2 TIMES DAILY
Qty: 60 TABLET | Refills: 1 | Status: SHIPPED | OUTPATIENT
Start: 2019-07-16 | End: 2019-09-26 | Stop reason: SDUPTHER

## 2019-07-16 RX ORDER — TAMSULOSIN HYDROCHLORIDE 0.4 MG/1
CAPSULE ORAL
COMMUNITY
Start: 2019-07-11

## 2019-07-16 RX ORDER — TRAZODONE HYDROCHLORIDE 50 MG/1
50-100 TABLET ORAL NIGHTLY PRN
Qty: 60 TABLET | Refills: 1 | Status: SHIPPED | OUTPATIENT
Start: 2019-07-16 | End: 2019-09-26 | Stop reason: SDUPTHER

## 2019-07-16 RX ORDER — SERTRALINE HYDROCHLORIDE 100 MG/1
200 TABLET, FILM COATED ORAL
Qty: 30 TABLET | Refills: 1 | Status: SHIPPED | OUTPATIENT
Start: 2019-07-16 | End: 2019-09-20 | Stop reason: SDUPTHER

## 2019-07-16 RX ORDER — QUETIAPINE FUMARATE 100 MG/1
100 TABLET, FILM COATED ORAL NIGHTLY PRN
Qty: 30 TABLET | Refills: 1 | Status: SHIPPED | OUTPATIENT
Start: 2019-07-16 | End: 2019-09-26 | Stop reason: SDUPTHER

## 2019-07-16 RX ORDER — HYDROXYZINE HYDROCHLORIDE 25 MG/1
25 TABLET, FILM COATED ORAL 3 TIMES DAILY PRN
Qty: 90 TABLET | Refills: 1 | Status: SHIPPED | OUTPATIENT
Start: 2019-07-16 | End: 2019-09-26 | Stop reason: SDUPTHER

## 2019-07-16 RX ORDER — DIPHENOXYLATE HYDROCHLORIDE AND ATROPINE SULFATE 2.5; .025 MG/1; MG/1
TABLET ORAL
COMMUNITY
Start: 2019-06-17

## 2019-07-16 NOTE — PROGRESS NOTES
"Agustina Bobo is a 54 y.o. male is here today for medication management follow-up.    Chief Complaint: Depression and Anxiety     History of Present Illness   Patient presents for follow-up. Patient shares he pleaded guilty and got two years probation. Patient continues to struggle with depression, low mood and lack of interest and motivation. He reports staying in the house most of the time. He reports he continues to struggle with irritable mood and conflict with spouse. He reports Seroquel seems to be managing his anxiety and paranoia. He reports he will take hydroxyzine at times for anxiety and it is helpful. He reports he is sleeping good since adding Trazodone last visit. Appetite is good. Patient reports compliance with medications and tolerating without side effects. He denies SI/HI/AH/VH.     The following portions of the patient's history were reviewed and updated as appropriate: allergies, current medications, past family history, past medical history, past social history, past surgical history and problem list.    Review of Systems   Constitutional: Negative.    HENT: Positive for dental problem.    Eyes: Negative.    Respiratory: Negative.    Cardiovascular: Negative.    Gastrointestinal: Negative.    Endocrine: Negative.    Genitourinary: Negative.    Musculoskeletal: Positive for arthralgias and myalgias.   Skin: Negative.    Allergic/Immunologic: Negative.    Neurological: Negative.    Hematological: Negative.    Psychiatric/Behavioral: The patient is nervous/anxious.        Objective   Physical Exam   Constitutional: He appears well-developed and well-nourished. No distress.   Skin: He is not diaphoretic.   Vitals reviewed.    Blood pressure 110/65, pulse 81, height 167.6 cm (66\"), weight 84.1 kg (185 lb 6.4 oz).    Medication List:   Current Outpatient Medications   Medication Sig Dispense Refill   • albuterol (ACCUNEB) 1.25 MG/3ML nebulizer solution      • amLODIPine (NORVASC) 5 " MG tablet Take 1 tablet by mouth Daily. 30 tablet 5   • aspirin 81 MG chewable tablet Chew 81 mg Daily.     • diphenoxylate-atropine (LOMOTIL) 2.5-0.025 MG per tablet      • hydrochlorothiazide (MICROZIDE) 12.5 MG capsule Take 1 capsule by mouth Daily. 30 capsule 5   • hydrOXYzine (ATARAX) 25 MG tablet Take 1 tablet by mouth 3 (Three) Times a Day As Needed for Anxiety. 90 tablet 1   • ipratropium (ATROVENT) 0.02 % nebulizer solution      • labetalol (NORMODYNE) 300 MG tablet Take 300 mg by mouth 2 (Two) Times a Day.     • lamoTRIgine (LaMICtal) 100 MG tablet Take 1 tablet by mouth 2 (Two) Times a Day. 60 tablet 1   • lisinopril (PRINIVIL,ZESTRIL) 20 MG tablet Take 10 mg by mouth Daily.     • loratadine (CLARITIN) 10 MG tablet      • ondansetron ODT (ZOFRAN-ODT) 8 MG disintegrating tablet      • pantoprazole (PROTONIX) 40 MG EC tablet Take 40 mg by mouth Daily.     • Pramipexole Dihydrochloride (MIRAPEX PO) Take  by mouth.     • QUEtiapine (SEROquel) 100 MG tablet Take 1 tablet by mouth At Night As Needed (sleep/anxiety/paranoia). 30 tablet 1   • QUEtiapine (SEROquel) 25 MG tablet Take 0.5-1 tablets by mouth 2 (Two) Times a Day As Needed (anxiety/paranoia). 60 tablet 1   • sertraline (ZOLOFT) 100 MG tablet Take 2 tablets by mouth every night at bedtime. 30 tablet 1   • spironolactone (ALDACTONE) 25 MG tablet Take  by mouth 2 (Two) Times a Day.     • tamsulosin (FLOMAX) 0.4 MG capsule 24 hr capsule      • tiZANidine (ZANAFLEX) 4 MG tablet      • traZODone (DESYREL) 50 MG tablet Take 1-2 tablets by mouth At Night As Needed for Sleep. 60 tablet 1   • varenicline (CHANTIX STARTING MONTH DINO) 0.5 MG X 11 & 1 MG X 42 tablet Take 0.5 mg one daily on days 1-3 and and 0.5 mg twice daily on days 4-7.Then 1 mg twice daily for a total of 12 weeks. 53 tablet 0   • cyclobenzaprine (FLEXERIL) 5 MG tablet      • magnesium oxide (MAGOX) 400 (241.3 Mg) MG tablet tablet Take 400 mg by mouth Daily.     • Omega-3 Fatty Acids (FISH OIL)  1000 MG capsule capsule Take 1,000 mg by mouth Daily With Breakfast.     • potassium chloride (K-DUR) 10 MEQ CR tablet Take 10 mEq by mouth 1 (One) Time.     • vitamin D (ERGOCALCIFEROL) 12164 units capsule capsule Take 50,000 Units by mouth 1 (One) Time Per Week. Prior to [facility] [place], Patient was on: takes on sundays       No current facility-administered medications for this visit.        Labs:   No results found for this or any previous visit (from the past 2016 hour(s)).      Mental Status Exam:   Hygiene:   fair  Cooperation:  Cooperative  Eye Contact:  Good  Psychomotor Behavior:  Appropriate  Affect:  Restricted  Hopelessness: Denies  Speech:  Normal  Thought Process:  Goal directed and Linear  Thought Content:  Normal  Suicidal:  None  Homicidal:  None  Hallucinations:  None  Delusion:  None  Memory:  Intact  Orientation:  Person, Place, Time and Situation  Reliability:  fair  Insight:  Fair  Judgement:  Fair  Impulse Control:  Fair  Physical/Medical Issues:  Yes HTN    Assessment/Plan   Diagnoses and all orders for this visit:    Generalized anxiety disorder  -     hydrOXYzine (ATARAX) 25 MG tablet; Take 1 tablet by mouth 3 (Three) Times a Day As Needed for Anxiety.  -     QUEtiapine (SEROquel) 100 MG tablet; Take 1 tablet by mouth At Night As Needed (sleep/anxiety/paranoia).  -     sertraline (ZOLOFT) 100 MG tablet; Take 2 tablets by mouth every night at bedtime.  -     CBC & Differential; Future  -     Hemoglobin A1c; Future  -     TSH; Future  -     Comprehensive Metabolic Panel; Future  -     Lipid Panel; Future    Major depressive disorder, recurrent episode, moderate (CMS/HCC)  -     lamoTRIgine (LaMICtal) 100 MG tablet; Take 1 tablet by mouth 2 (Two) Times a Day.  -     sertraline (ZOLOFT) 100 MG tablet; Take 2 tablets by mouth every night at bedtime.  -     CBC & Differential; Future  -     Hemoglobin A1c; Future  -     TSH; Future  -     Comprehensive Metabolic Panel; Future  -     Lipid  Panel; Future  -     Vitamin D 25 Hydroxy; Future    Insomnia due to mental disorder  -     QUEtiapine (SEROquel) 100 MG tablet; Take 1 tablet by mouth At Night As Needed (sleep/anxiety/paranoia).  -     traZODone (DESYREL) 50 MG tablet; Take 1-2 tablets by mouth At Night As Needed for Sleep.          Body mass index is 29.92 kg/m².  Patient was educated on healthier and more balanced diet choices and encouraged exercise within physical limitations.  Functionality: pt having some impairment in important areas of daily functioning.  Prognosis: Guarded dependent on medication/follow up and treatment plan compliance.    Impression: Worsening of mood and depression this visit.    Plan:  1) Increase Zoloft to 200 mg po QHS for depression and anxiety. .   2) Continue hydroxyzine 25 mg po every 6 hrs prn anxiety.  3) Encouraged patient to continue psychotherapy.   4) Continue Seroquel 25 mg 1/2-1 tablet BID prn anxiety.  5) Continue Seroquel 100 mg one tablet QHS prn anxiety and sleep disturbance.   7) Increase Lamictal 100 mg po BID. Patient instructed to stop medication should rash develop and contact prescriber.   8) RTC 6 weeks   9) Continue Trazodone 50 mg 1-2 po QHS prn sleep disturbance   10) Patient to have fasting labs done. He reports he has not had labs checked this year.     Discussed medication options.  Reviewed the risks, benefits, and side effects of the medications; patient acknowledged and verbally consented.  Patient is agreeable to call the [facility].  Patient is aware to call 911 or go to the nearest ER should begin having SI/HI.

## 2019-08-13 ENCOUNTER — TRANSCRIBE ORDERS (OUTPATIENT)
Dept: ADMINISTRATIVE | Facility: HOSPITAL | Age: 54
End: 2019-08-13

## 2019-08-13 DIAGNOSIS — M48.02 SPINAL STENOSIS IN CERVICAL REGION: Primary | ICD-10-CM

## 2019-08-26 ENCOUNTER — HOSPITAL ENCOUNTER (OUTPATIENT)
Dept: MRI IMAGING | Facility: HOSPITAL | Age: 54
Discharge: HOME | End: 2019-08-26
Admitting: PSYCHIATRY & NEUROLOGY

## 2019-08-26 DIAGNOSIS — M48.02 SPINAL STENOSIS IN CERVICAL REGION: ICD-10-CM

## 2019-08-26 PROCEDURE — 72141 MRI NECK SPINE W/O DYE: CPT | Performed by: RADIOLOGY

## 2019-08-26 PROCEDURE — 72141 MRI NECK SPINE W/O DYE: CPT

## 2019-09-20 DIAGNOSIS — F41.1 GENERALIZED ANXIETY DISORDER: ICD-10-CM

## 2019-09-20 DIAGNOSIS — F33.1 MAJOR DEPRESSIVE DISORDER, RECURRENT EPISODE, MODERATE (HCC): ICD-10-CM

## 2019-09-23 RX ORDER — SERTRALINE HYDROCHLORIDE 100 MG/1
TABLET, FILM COATED ORAL
Qty: 30 TABLET | Refills: 0 | Status: SHIPPED | OUTPATIENT
Start: 2019-09-23 | End: 2019-10-03 | Stop reason: SDUPTHER

## 2019-09-26 DIAGNOSIS — F33.1 MAJOR DEPRESSIVE DISORDER, RECURRENT EPISODE, MODERATE (HCC): ICD-10-CM

## 2019-09-26 DIAGNOSIS — F41.1 GENERALIZED ANXIETY DISORDER: ICD-10-CM

## 2019-09-26 DIAGNOSIS — F51.05 INSOMNIA DUE TO MENTAL DISORDER: ICD-10-CM

## 2019-09-26 RX ORDER — TRAZODONE HYDROCHLORIDE 50 MG/1
50-100 TABLET ORAL NIGHTLY PRN
Qty: 60 TABLET | Refills: 1 | Status: SHIPPED | OUTPATIENT
Start: 2019-09-26 | End: 2019-10-03 | Stop reason: SDUPTHER

## 2019-09-26 RX ORDER — HYDROXYZINE HYDROCHLORIDE 25 MG/1
25 TABLET, FILM COATED ORAL 3 TIMES DAILY PRN
Qty: 90 TABLET | Refills: 1 | Status: SHIPPED | OUTPATIENT
Start: 2019-09-26 | End: 2019-10-03 | Stop reason: SDUPTHER

## 2019-09-26 RX ORDER — QUETIAPINE FUMARATE 100 MG/1
100 TABLET, FILM COATED ORAL NIGHTLY PRN
Qty: 30 TABLET | Refills: 1 | Status: SHIPPED | OUTPATIENT
Start: 2019-09-26 | End: 2019-10-03 | Stop reason: SDUPTHER

## 2019-09-26 RX ORDER — LAMOTRIGINE 100 MG/1
100 TABLET ORAL 2 TIMES DAILY
Qty: 60 TABLET | Refills: 1 | Status: SHIPPED | OUTPATIENT
Start: 2019-09-26 | End: 2019-10-03 | Stop reason: SDUPTHER

## 2019-09-27 ENCOUNTER — TRANSCRIBE ORDERS (OUTPATIENT)
Dept: ADMINISTRATIVE | Facility: HOSPITAL | Age: 54
End: 2019-09-27

## 2019-09-27 ENCOUNTER — HOSPITAL ENCOUNTER (OUTPATIENT)
Dept: RESPIRATORY THERAPY | Facility: HOSPITAL | Age: 54
Discharge: HOME | End: 2019-09-27
Admitting: NURSE PRACTITIONER

## 2019-09-27 DIAGNOSIS — Z01.818 PRE-OP EXAM: ICD-10-CM

## 2019-09-27 DIAGNOSIS — Z01.818 PRE-OP EXAM: Primary | ICD-10-CM

## 2019-09-27 PROCEDURE — 93005 ELECTROCARDIOGRAM TRACING: CPT | Performed by: NURSE PRACTITIONER

## 2019-09-27 PROCEDURE — 93010 ELECTROCARDIOGRAM REPORT: CPT | Performed by: INTERNAL MEDICINE

## 2019-09-30 ENCOUNTER — TRANSCRIBE ORDERS (OUTPATIENT)
Dept: ADMINISTRATIVE | Facility: HOSPITAL | Age: 54
End: 2019-09-30

## 2019-09-30 DIAGNOSIS — N17.9 ACUTE RENAL FAILURE, UNSPECIFIED ACUTE RENAL FAILURE TYPE (HCC): Primary | ICD-10-CM

## 2019-10-03 ENCOUNTER — OFFICE VISIT (OUTPATIENT)
Dept: PSYCHIATRY | Facility: CLINIC | Age: 54
End: 2019-10-03

## 2019-10-03 VITALS
HEART RATE: 82 BPM | HEIGHT: 66 IN | BODY MASS INDEX: 30.05 KG/M2 | DIASTOLIC BLOOD PRESSURE: 77 MMHG | SYSTOLIC BLOOD PRESSURE: 133 MMHG | WEIGHT: 187 LBS

## 2019-10-03 DIAGNOSIS — F33.1 MAJOR DEPRESSIVE DISORDER, RECURRENT EPISODE, MODERATE (HCC): ICD-10-CM

## 2019-10-03 DIAGNOSIS — F41.1 GENERALIZED ANXIETY DISORDER: Primary | ICD-10-CM

## 2019-10-03 DIAGNOSIS — F51.05 INSOMNIA DUE TO MENTAL DISORDER: ICD-10-CM

## 2019-10-03 PROCEDURE — 96127 BRIEF EMOTIONAL/BEHAV ASSMT: CPT | Performed by: NURSE PRACTITIONER

## 2019-10-03 PROCEDURE — 99213 OFFICE O/P EST LOW 20 MIN: CPT | Performed by: NURSE PRACTITIONER

## 2019-10-03 RX ORDER — LAMOTRIGINE 100 MG/1
100 TABLET ORAL 2 TIMES DAILY
Qty: 60 TABLET | Refills: 2 | Status: SHIPPED | OUTPATIENT
Start: 2019-10-03 | End: 2019-12-19 | Stop reason: SDUPTHER

## 2019-10-03 RX ORDER — HYDROXYZINE HYDROCHLORIDE 25 MG/1
25 TABLET, FILM COATED ORAL 3 TIMES DAILY PRN
Qty: 90 TABLET | Refills: 2 | Status: SHIPPED | OUTPATIENT
Start: 2019-10-03 | End: 2020-01-29 | Stop reason: SDUPTHER

## 2019-10-03 RX ORDER — QUETIAPINE FUMARATE 25 MG/1
12.5-25 TABLET, FILM COATED ORAL 2 TIMES DAILY PRN
Qty: 60 TABLET | Refills: 2 | Status: SHIPPED | OUTPATIENT
Start: 2019-10-03 | End: 2019-12-19

## 2019-10-03 RX ORDER — QUETIAPINE FUMARATE 100 MG/1
100 TABLET, FILM COATED ORAL NIGHTLY PRN
Qty: 30 TABLET | Refills: 2 | Status: SHIPPED | OUTPATIENT
Start: 2019-10-03 | End: 2019-12-19 | Stop reason: SDUPTHER

## 2019-10-03 RX ORDER — SERTRALINE HYDROCHLORIDE 100 MG/1
200 TABLET, FILM COATED ORAL DAILY
Qty: 60 TABLET | Refills: 2 | Status: SHIPPED | OUTPATIENT
Start: 2019-10-03 | End: 2019-12-19 | Stop reason: SDUPTHER

## 2019-10-03 RX ORDER — TRAZODONE HYDROCHLORIDE 50 MG/1
50-100 TABLET ORAL NIGHTLY PRN
Qty: 60 TABLET | Refills: 2 | Status: SHIPPED | OUTPATIENT
Start: 2019-10-03 | End: 2019-12-19 | Stop reason: SDUPTHER

## 2019-10-03 NOTE — PROGRESS NOTES
"    Agustina Bobo is a 54 y.o. male is here today for medication management follow-up.    Chief Complaint: f/u Depression and Anxiety     History of Present Illness   Patient presents to the clinic today for follow-up appointment for depression and anxiety. He reports mood stable. Symptoms of depression and anxiety adequately managed with current medications.  Sleep continues to be good with Trazodone. Appetite is good. Patient reports compliance with medications and tolerating without side effects. He denies suicidal and homicidal ideations. He denies auditory and visual hallucinations.  Patient scheduled to have neck surgery at  on 10/16/19. He reports during his pre-op testing it was found that he has some problems with his kidney function and he has since followed up with a nephrologist. He reports nephrologist told him kidneys were functioning at 30 percent and he was taken off Lisinopril.     The following portions of the patient's history were reviewed and updated as appropriate: allergies, current medications, past family history, past medical history, past social history, past surgical history and problem list.    Review of Systems   Constitutional: Negative.    HENT: Positive for dental problem.    Eyes: Negative.    Respiratory: Negative.    Cardiovascular: Negative.    Gastrointestinal: Negative.    Endocrine: Negative.    Genitourinary: Negative.    Musculoskeletal: Positive for arthralgias and myalgias.   Skin: Negative.    Allergic/Immunologic: Negative.    Neurological: Negative.    Hematological: Negative.    Psychiatric/Behavioral: Negative.        Objective   Physical Exam   Constitutional: He appears well-developed and well-nourished. No distress.   Skin: He is not diaphoretic.   Vitals reviewed.    Blood pressure 133/77, pulse 82, height 167.6 cm (66\"), weight 84.8 kg (187 lb).    Medication List:   Current Outpatient Medications   Medication Sig Dispense Refill   • albuterol " (ACCUNEB) 1.25 MG/3ML nebulizer solution      • amLODIPine (NORVASC) 5 MG tablet Take 1 tablet by mouth Daily. (Patient taking differently: Take 10 mg by mouth Daily.) 30 tablet 5   • diphenoxylate-atropine (LOMOTIL) 2.5-0.025 MG per tablet      • hydrochlorothiazide (MICROZIDE) 12.5 MG capsule Take 1 capsule by mouth Daily. 30 capsule 5   • hydrOXYzine (ATARAX) 25 MG tablet Take 1 tablet by mouth 3 (Three) Times a Day As Needed for Anxiety. 90 tablet 2   • ipratropium (ATROVENT) 0.02 % nebulizer solution      • labetalol (NORMODYNE) 300 MG tablet Take 300 mg by mouth 2 (Two) Times a Day.     • lamoTRIgine (LaMICtal) 100 MG tablet Take 1 tablet by mouth 2 (Two) Times a Day. 60 tablet 2   • loratadine (CLARITIN) 10 MG tablet      • Omega-3 Fatty Acids (FISH OIL) 1000 MG capsule capsule Take 1,000 mg by mouth Daily With Breakfast.     • ondansetron ODT (ZOFRAN-ODT) 8 MG disintegrating tablet      • pantoprazole (PROTONIX) 40 MG EC tablet Take 40 mg by mouth Daily.     • potassium chloride (K-DUR) 10 MEQ CR tablet Take 10 mEq by mouth 1 (One) Time.     • Pramipexole Dihydrochloride (MIRAPEX PO) Take  by mouth.     • QUEtiapine (SEROquel) 100 MG tablet Take 1 tablet by mouth At Night As Needed (sleep/anxiety/paranoia). 30 tablet 2   • QUEtiapine (SEROquel) 25 MG tablet Take 0.5-1 tablets by mouth 2 (Two) Times a Day As Needed (anxiety/paranoia). 60 tablet 2   • sertraline (ZOLOFT) 100 MG tablet Take 2 tablets by mouth Daily. 60 tablet 2   • spironolactone (ALDACTONE) 25 MG tablet Take  by mouth 2 (Two) Times a Day.     • tamsulosin (FLOMAX) 0.4 MG capsule 24 hr capsule      • tiZANidine (ZANAFLEX) 4 MG tablet      • traZODone (DESYREL) 50 MG tablet Take 1-2 tablets by mouth At Night As Needed for Sleep. 60 tablet 2   • varenicline (CHANTIX STARTING MONTH DINO) 0.5 MG X 11 & 1 MG X 42 tablet Take 0.5 mg one daily on days 1-3 and and 0.5 mg twice daily on days 4-7.Then 1 mg twice daily for a total of 12 weeks. 53 tablet 0      No current facility-administered medications for this visit.        Labs:   No results found for this or any previous visit (from the past 2016 hour(s)).      Mental Status Exam:   Hygiene:   fair  Cooperation:  Cooperative  Eye Contact:  Good  Psychomotor Behavior:  Appropriate  Affect:  Appropriate  Hopelessness: Denies  Speech:  Normal  Thought Process:  Goal directed and Linear  Thought Content:  Normal  Suicidal:  None  Homicidal:  None  Hallucinations:  None  Delusion:  None  Memory:  Intact  Orientation:  Person, Place, Time and Situation  Reliability:  fair  Insight:  Fair  Judgement:  Fair  Impulse Control:  Fair  Physical/Medical Issues:  Yes HTN    Assessment/Plan   Diagnoses and all orders for this visit:    Generalized anxiety disorder  -     hydrOXYzine (ATARAX) 25 MG tablet; Take 1 tablet by mouth 3 (Three) Times a Day As Needed for Anxiety.  -     QUEtiapine (SEROquel) 100 MG tablet; Take 1 tablet by mouth At Night As Needed (sleep/anxiety/paranoia).  -     QUEtiapine (SEROquel) 25 MG tablet; Take 0.5-1 tablets by mouth 2 (Two) Times a Day As Needed (anxiety/paranoia).  -     sertraline (ZOLOFT) 100 MG tablet; Take 2 tablets by mouth Daily.    Major depressive disorder, recurrent episode, moderate (CMS/HCC)  -     lamoTRIgine (LaMICtal) 100 MG tablet; Take 1 tablet by mouth 2 (Two) Times a Day.  -     sertraline (ZOLOFT) 100 MG tablet; Take 2 tablets by mouth Daily.    Insomnia due to mental disorder  -     QUEtiapine (SEROquel) 100 MG tablet; Take 1 tablet by mouth At Night As Needed (sleep/anxiety/paranoia).  -     traZODone (DESYREL) 50 MG tablet; Take 1-2 tablets by mouth At Night As Needed for Sleep.          Body mass index is 30.18 kg/m².  Patient was educated on healthier and more balanced diet choices and encouraged exercise within physical limitations.  Functionality: pt having improvement in important areas of daily functioning.  Prognosis: Good dependent on medication/follow up and  treatment plan compliance.    Impression: Jose is experiencing significant improvement in mood along with symptoms of depression and anxiety since last visit. Since his last visit he has been scheduled for neck surgery at  on 10/16/19 due to disc problems. He was also found to have problems with his kidney functioning during pre-op testing.     Plan:  1) Continue Zoloft to 200 mg po QHS for depression and anxiety. .   2) Continue hydroxyzine 25 mg po every 6 hrs prn anxiety.  3) Encouraged patient to continue psychotherapy.   4) Continue Seroquel 25 mg 1/2-1 tablet BID prn anxiety.  5) Continue Seroquel 100 mg one tablet QHS prn anxiety and sleep disturbance.   7) Continue Lamictal 100 mg po BID. Patient instructed to stop medication should rash develop and contact prescriber.   8) RTC 3 months   9) Continue Trazodone 50 mg 1-2 po QHS prn sleep disturbance     37154 - PHQ9 completed with patient this visit.       Discussed medication options.  Reviewed the risks, benefits, and side effects of the medications; patient acknowledged and verbally consented.  Patient is agreeable to call the [facility].  Patient is aware to call 911 or go to the nearest ER should begin having SI/HI.

## 2019-10-08 ENCOUNTER — TRANSCRIBE ORDERS (OUTPATIENT)
Dept: ADMINISTRATIVE | Facility: HOSPITAL | Age: 54
End: 2019-10-08

## 2019-10-08 ENCOUNTER — LAB (OUTPATIENT)
Dept: LAB | Facility: HOSPITAL | Age: 54
End: 2019-10-08

## 2019-10-08 ENCOUNTER — HOSPITAL ENCOUNTER (OUTPATIENT)
Dept: ULTRASOUND IMAGING | Facility: HOSPITAL | Age: 54
Discharge: HOME | End: 2019-10-08
Admitting: INTERNAL MEDICINE

## 2019-10-08 DIAGNOSIS — N17.9 ACUTE RENAL FAILURE, UNSPECIFIED ACUTE RENAL FAILURE TYPE (HCC): ICD-10-CM

## 2019-10-08 DIAGNOSIS — N17.9 ACUTE RENAL FAILURE, UNSPECIFIED ACUTE RENAL FAILURE TYPE (HCC): Primary | ICD-10-CM

## 2019-10-08 LAB
ALBUMIN SERPL-MCNC: 4.4 G/DL (ref 3.5–5.2)
ALBUMIN/GLOB SERPL: 1.5 G/DL
ALP SERPL-CCNC: 64 U/L (ref 39–117)
ALT SERPL W P-5'-P-CCNC: 20 U/L (ref 1–41)
ANION GAP SERPL CALCULATED.3IONS-SCNC: 13.8 MMOL/L (ref 5–15)
AST SERPL-CCNC: 15 U/L (ref 1–40)
BILIRUB SERPL-MCNC: 0.2 MG/DL (ref 0.2–1.2)
BUN BLD-MCNC: 9 MG/DL (ref 6–20)
BUN/CREAT SERPL: 6.5 (ref 7–25)
CALCIUM SPEC-SCNC: 9.6 MG/DL (ref 8.6–10.5)
CHLORIDE SERPL-SCNC: 101 MMOL/L (ref 98–107)
CO2 SERPL-SCNC: 25.2 MMOL/L (ref 22–29)
CREAT BLD-MCNC: 1.38 MG/DL (ref 0.76–1.27)
GFR SERPL CREATININE-BSD FRML MDRD: 53 ML/MIN/1.73
GLOBULIN UR ELPH-MCNC: 2.9 GM/DL
GLUCOSE BLD-MCNC: 116 MG/DL (ref 65–99)
POTASSIUM BLD-SCNC: 4.1 MMOL/L (ref 3.5–5.2)
PROT SERPL-MCNC: 7.3 G/DL (ref 6–8.5)
SODIUM BLD-SCNC: 140 MMOL/L (ref 136–145)

## 2019-10-08 PROCEDURE — 82570 ASSAY OF URINE CREATININE: CPT

## 2019-10-08 PROCEDURE — 76775 US EXAM ABDO BACK WALL LIM: CPT

## 2019-10-08 PROCEDURE — 82043 UR ALBUMIN QUANTITATIVE: CPT

## 2019-10-08 PROCEDURE — 76775 US EXAM ABDO BACK WALL LIM: CPT | Performed by: RADIOLOGY

## 2019-10-08 PROCEDURE — 81003 URINALYSIS AUTO W/O SCOPE: CPT

## 2019-10-08 PROCEDURE — 80053 COMPREHEN METABOLIC PANEL: CPT

## 2019-10-08 PROCEDURE — 36415 COLL VENOUS BLD VENIPUNCTURE: CPT

## 2019-10-09 LAB
ALBUMIN UR-MCNC: <1.2 MG/DL
BILIRUB UR QL STRIP: NEGATIVE
CLARITY UR: CLEAR
COLOR UR: YELLOW
CREAT UR-MCNC: 49.4 MG/DL
GLUCOSE UR STRIP-MCNC: NEGATIVE MG/DL
HGB UR QL STRIP.AUTO: NEGATIVE
KETONES UR QL STRIP: NEGATIVE
LEUKOCYTE ESTERASE UR QL STRIP.AUTO: NEGATIVE
MICROALBUMIN/CREAT UR: NORMAL MG/G{CREAT}
NITRITE UR QL STRIP: NEGATIVE
PH UR STRIP.AUTO: 6 [PH] (ref 5–8)
PROT UR QL STRIP: NEGATIVE
SP GR UR STRIP: 1.01 (ref 1–1.03)
UROBILINOGEN UR QL STRIP: NORMAL

## 2019-10-28 ENCOUNTER — LAB (OUTPATIENT)
Dept: LAB | Facility: HOSPITAL | Age: 54
End: 2019-10-28

## 2019-10-28 ENCOUNTER — TRANSCRIBE ORDERS (OUTPATIENT)
Dept: ADMINISTRATIVE | Facility: HOSPITAL | Age: 54
End: 2019-10-28

## 2019-10-28 DIAGNOSIS — M50.00 CERVICAL DISC DISORDER WITH MYELOPATHY: ICD-10-CM

## 2019-10-28 DIAGNOSIS — D69.9 BLEEDING TENDENCY (HCC): ICD-10-CM

## 2019-10-28 DIAGNOSIS — M50.00 CERVICAL DISC DISORDER WITH MYELOPATHY: Primary | ICD-10-CM

## 2019-10-28 LAB
ANION GAP SERPL CALCULATED.3IONS-SCNC: 14.4 MMOL/L (ref 5–15)
APTT PPP: 44.6 SECONDS (ref 23.8–36.1)
BASOPHILS # BLD AUTO: 0.12 10*3/MM3 (ref 0–0.2)
BASOPHILS NFR BLD AUTO: 1.3 % (ref 0–1.5)
BUN BLD-MCNC: 9 MG/DL (ref 6–20)
BUN/CREAT SERPL: 7.5 (ref 7–25)
CALCIUM SPEC-SCNC: 9.7 MG/DL (ref 8.6–10.5)
CHLORIDE SERPL-SCNC: 99 MMOL/L (ref 98–107)
CO2 SERPL-SCNC: 25.6 MMOL/L (ref 22–29)
CREAT BLD-MCNC: 1.2 MG/DL (ref 0.76–1.27)
DEPRECATED RDW RBC AUTO: 42.4 FL (ref 37–54)
EOSINOPHIL # BLD AUTO: 0.31 10*3/MM3 (ref 0–0.4)
EOSINOPHIL NFR BLD AUTO: 3.3 % (ref 0.3–6.2)
ERYTHROCYTE [DISTWIDTH] IN BLOOD BY AUTOMATED COUNT: 14.2 % (ref 12.3–15.4)
GFR SERPL CREATININE-BSD FRML MDRD: 63 ML/MIN/1.73
GLUCOSE BLD-MCNC: 103 MG/DL (ref 65–99)
HCT VFR BLD AUTO: 34 % (ref 37.5–51)
HGB BLD-MCNC: 11.4 G/DL (ref 13–17.7)
IMM GRANULOCYTES # BLD AUTO: 0.17 10*3/MM3 (ref 0–0.05)
IMM GRANULOCYTES NFR BLD AUTO: 1.8 % (ref 0–0.5)
INR PPP: 0.94 (ref 0.9–1.1)
LYMPHOCYTES # BLD AUTO: 2.38 10*3/MM3 (ref 0.7–3.1)
LYMPHOCYTES NFR BLD AUTO: 25.3 % (ref 19.6–45.3)
MCH RBC QN AUTO: 27.6 PG (ref 26.6–33)
MCHC RBC AUTO-ENTMCNC: 33.5 G/DL (ref 31.5–35.7)
MCV RBC AUTO: 82.3 FL (ref 79–97)
MONOCYTES # BLD AUTO: 0.75 10*3/MM3 (ref 0.1–0.9)
MONOCYTES NFR BLD AUTO: 8 % (ref 5–12)
NEUTROPHILS # BLD AUTO: 5.68 10*3/MM3 (ref 1.7–7)
NEUTROPHILS NFR BLD AUTO: 60.3 % (ref 42.7–76)
NRBC BLD AUTO-RTO: 0 /100 WBC (ref 0–0.2)
PLATELET # BLD AUTO: 447 10*3/MM3 (ref 140–450)
PMV BLD AUTO: 9.2 FL (ref 6–12)
POTASSIUM BLD-SCNC: 4 MMOL/L (ref 3.5–5.2)
PROTHROMBIN TIME: 13 SECONDS (ref 11–15.4)
RBC # BLD AUTO: 4.13 10*6/MM3 (ref 4.14–5.8)
SODIUM BLD-SCNC: 139 MMOL/L (ref 136–145)
WBC NRBC COR # BLD: 9.41 10*3/MM3 (ref 3.4–10.8)

## 2019-10-28 PROCEDURE — 85730 THROMBOPLASTIN TIME PARTIAL: CPT

## 2019-10-28 PROCEDURE — 80048 BASIC METABOLIC PNL TOTAL CA: CPT | Performed by: NURSE PRACTITIONER

## 2019-10-28 PROCEDURE — 36415 COLL VENOUS BLD VENIPUNCTURE: CPT | Performed by: NURSE PRACTITIONER

## 2019-10-28 PROCEDURE — 85025 COMPLETE CBC W/AUTO DIFF WBC: CPT

## 2019-10-28 PROCEDURE — 85610 PROTHROMBIN TIME: CPT

## 2019-12-03 ENCOUNTER — OFFICE VISIT (OUTPATIENT)
Dept: CARDIOLOGY | Facility: CLINIC | Age: 54
End: 2019-12-03

## 2019-12-03 VITALS
WEIGHT: 190.8 LBS | BODY MASS INDEX: 30.67 KG/M2 | OXYGEN SATURATION: 94 % | HEART RATE: 96 BPM | SYSTOLIC BLOOD PRESSURE: 117 MMHG | DIASTOLIC BLOOD PRESSURE: 74 MMHG | HEIGHT: 66 IN

## 2019-12-03 DIAGNOSIS — I10 ESSENTIAL HYPERTENSION: Primary | ICD-10-CM

## 2019-12-03 PROCEDURE — 99213 OFFICE O/P EST LOW 20 MIN: CPT | Performed by: INTERNAL MEDICINE

## 2019-12-03 PROCEDURE — 99406 BEHAV CHNG SMOKING 3-10 MIN: CPT | Performed by: INTERNAL MEDICINE

## 2019-12-03 NOTE — PROGRESS NOTES
[facility] CARDIOLOGY  [address]  [place] [address]  Phone: [phone]  Fax: [phone]    12/03/2019    Chief Complaint   Patient presents with   • Hypertension   • Edema        History:   Jose Bobo is a 54 y.o. male seen in followup, 6 months.  She has a history of hypertension, GERD, tobacco abuse and dyslipidemia.  He denies any chest pain, shortness of breath or palpitations. He recently had surgery on his neck with improvement of his pain.  He continues to smoke daily.      The charta and medications were reviewed today.     Past Medical History:   Diagnosis Date   • Arthritis    • Elevated cholesterol    • GERD (gastroesophageal reflux disease)    • Hypertension        Past Surgical History:   Procedure Laterality Date   • COLONOSCOPY     • COLONOSCOPY N/A 8/16/2018    Procedure: COLONOSCOPY;  Surgeon: Negrito Goldberg MD;  Location: [facility];  Service: Gastroenterology   • ENDOSCOPY N/A 8/16/2018    Procedure: ESOPHAGOGASTRODUODENOSCOPY WITH ANESTHESIA;  Surgeon: Negrito Goldberg MD;  Location: [facility];  Service: Gastroenterology   • VASECTOMY  1993        Past Social History:  Social History     Socioeconomic History   • Marital status:      Spouse name: Not on file   • Number of children: Not on file   • Years of education: Not on file   • Highest education level: Not on file   Tobacco Use   • Smoking status: Current Every Day Smoker     Packs/day: 0.25     Years: 30.00     Pack years: 7.50     Types: Cigarettes   • Smokeless tobacco: Never Used   • Tobacco comment: states 2 cigarettes a day   Substance and Sexual Activity   • Alcohol use: No   • Drug use: No   • Sexual activity: Defer     Birth control/protection: None       Past Family History:  Family History   Problem Relation Age of Onset   • Cancer Sister         breast   • Hypertension Other    • Heart disease Neg Hx        Review of Systems:   Review of Systems   Constitution:  Positive for diaphoresis and malaise/fatigue. Negative for fever, weakness, weight gain and weight loss.   HENT: Negative for congestion, nosebleeds and sore throat.    Eyes: Negative for blurred vision and visual disturbance.   Cardiovascular: Positive for dyspnea on exertion. Negative for chest pain, claudication, irregular heartbeat, leg swelling, orthopnea, palpitations, paroxysmal nocturnal dyspnea and syncope.   Respiratory: Positive for snoring and wheezing. Negative for cough, hemoptysis, shortness of breath, sleep disturbances due to breathing and sputum production.    Endocrine: Negative for cold intolerance, heat intolerance, polydipsia, polyphagia and polyuria.   Hematologic/Lymphatic: Negative for bleeding problem.   Skin: Negative for dry skin, itching and rash.   Musculoskeletal: Positive for muscle cramps. Negative for muscle weakness and myalgias.   Gastrointestinal: Negative for abdominal pain, constipation, diarrhea, heartburn, hematemesis, hematochezia, hemorrhoids, melena, nausea and vomiting.   Genitourinary: Negative for hematuria and nocturia.   Neurological: Positive for vertigo. Negative for excessive daytime sleepiness, dizziness, focal weakness, headaches, light-headedness, numbness and seizures.   Psychiatric/Behavioral: Positive for depression. Negative for substance abuse and suicidal ideas.   Allergic/Immunologic: Positive for environmental allergies.         Current Outpatient Medications   Medication Sig Dispense Refill   • albuterol (ACCUNEB) 1.25 MG/3ML nebulizer solution      • amLODIPine (NORVASC) 5 MG tablet Take 1 tablet by mouth Daily. (Patient taking differently: Take 10 mg by mouth Daily.) 30 tablet 5   • diphenoxylate-atropine (LOMOTIL) 2.5-0.025 MG per tablet      • hydrochlorothiazide (MICROZIDE) 12.5 MG capsule Take 1 capsule by mouth Daily. 30 capsule 5   • hydrOXYzine (ATARAX) 25 MG tablet Take 1 tablet by mouth 3 (Three) Times a Day As Needed for Anxiety. 90 tablet 2  "  • ipratropium (ATROVENT) 0.02 % nebulizer solution      • labetalol (NORMODYNE) 300 MG tablet Take 300 mg by mouth 2 (Two) Times a Day.     • lamoTRIgine (LaMICtal) 100 MG tablet Take 1 tablet by mouth 2 (Two) Times a Day. 60 tablet 2   • ondansetron ODT (ZOFRAN-ODT) 8 MG disintegrating tablet      • pantoprazole (PROTONIX) 40 MG EC tablet Take 40 mg by mouth Daily.     • potassium chloride (K-DUR) 10 MEQ CR tablet Take 10 mEq by mouth 1 (One) Time.     • Pramipexole Dihydrochloride (MIRAPEX PO) Take  by mouth.     • QUEtiapine (SEROquel) 100 MG tablet Take 1 tablet by mouth At Night As Needed (sleep/anxiety/paranoia). 30 tablet 2   • QUEtiapine (SEROquel) 25 MG tablet Take 0.5-1 tablets by mouth 2 (Two) Times a Day As Needed (anxiety/paranoia). 60 tablet 2   • sertraline (ZOLOFT) 100 MG tablet Take 2 tablets by mouth Daily. 60 tablet 2   • tamsulosin (FLOMAX) 0.4 MG capsule 24 hr capsule      • tiZANidine (ZANAFLEX) 4 MG tablet      • traZODone (DESYREL) 50 MG tablet Take 1-2 tablets by mouth At Night As Needed for Sleep. 60 tablet 2   • varenicline (CHANTIX STARTING MONTH PAK) 0.5 MG X 11 & 1 MG X 42 tablet Take 0.5 mg one daily on days 1-3 and and 0.5 mg twice daily on days 4-7.Then 1 mg twice daily for a total of 12 weeks. 53 tablet 0   • loratadine (CLARITIN) 10 MG tablet        No current facility-administered medications for this visit.         No Known Allergies    Objective     /74 (BP Location: Left arm, Patient Position: Sitting)   Pulse 96   Ht 167.6 cm (66\")   Wt 86.5 kg (190 lb 12.8 oz)   SpO2 94%   BMI 30.80 kg/m²     Physical Exam   Constitutional: He is oriented to person, place, and time. He appears well-developed and well-nourished.   HENT:   Head: Normocephalic.   Eyes: Pupils are equal, round, and reactive to light.   Neck: Neck supple. No thyromegaly present.   Cardiovascular: Normal rate and regular rhythm.   Pulmonary/Chest: Effort normal and breath sounds normal.   Abdominal: " Bowel sounds are normal.   Musculoskeletal: He exhibits no edema.   Neurological: He is alert and oriented to person, place, and time.   Skin: Skin is warm.   Psychiatric: He has a normal mood and affect. Judgment normal.       DATA:      Results for orders placed during the hospital encounter of 10/02/18   Adult Transthoracic Echo Complete W/ Cont if Necessary Per Protocol    Narrative · Left ventricular systolic function is normal. Estimated EF appears to be   in the range of 56 - 60%.  · Left ventricular diastolic function is normal. Normal left atrial   pressure.  · Normal right ventricular cavity size and systolic function noted.  · Trace mitral valve regurgitation is present  · There is no evidence of pericardial effusion         Results for orders placed during the hospital encounter of 10/02/18   Stress Test With Myocardial Perfusion (1 Day)    Narrative · Myocardial perfusion imaging indicates a normal myocardial perfusion   study with no evidence of ischemia.  · Left ventricular ejection fraction is normal (Calculated EF = 60%). TID   0.98  · GI artifact is present.  · Pt walked on treadmill for 7 minutes and 30 secs, 10 METS achieving   maximum heart rate 165 bpm which represents 87.27 % of APMHR, no chest   pain, normal hemodynamic response and no ishcemic EKG changes  · Findings consistent with a normal ECG stress test.  · Impressions are consistent with a low risk study.         Results for orders placed during the hospital encounter of 10/02/18   Stress Test With Myocardial Perfusion (1 Day)    Narrative · Myocardial perfusion imaging indicates a normal myocardial perfusion   study with no evidence of ischemia.  · Left ventricular ejection fraction is normal (Calculated EF = 60%). TID   0.98  · GI artifact is present.  · Pt walked on treadmill for 7 minutes and 30 secs, 10 METS achieving   maximum heart rate 165 bpm which represents 87.27 % of APMHR, no chest   pain, normal hemodynamic response and no  ishcemic EKG changes  · Findings consistent with a normal ECG stress test.  · Impressions are consistent with a low risk study.          Assessment:  Essential hypertension stable  Chronic tobacco abuse      Plan:  Continue current medications as prescribed  Follow up in 6 months    Recommended increase activity to 30 minutes of walking daily, most days of the week.  Counseled the patient for 3-5 minutes regarding smoking cessation.  Discussed tobacco use relationship to cardiac disease progression.  Offered smoking cessation medications.  Discussed diet and weight loss with patient.        Patient's Body mass index is 30.8 kg/m². BMI is above normal parameters. Recommendations include: educational material.       No Follow-up on file.    Thank you for allowing me to participate in the care of Jose Bobo. Feel free to contact me directly with any further questions or concerns.          Bear Thomas MD, FACC  Interventional Cardiology    GIANCARLO Negrete, acting as scribe for Bear Thomas MD, FACC, [employer].     12/03/19  4:39 PM

## 2019-12-17 RX ORDER — HYDROCHLOROTHIAZIDE 12.5 MG/1
12.5 CAPSULE, GELATIN COATED ORAL DAILY
Qty: 30 CAPSULE | Refills: 5 | Status: SHIPPED | OUTPATIENT
Start: 2019-12-17 | End: 2020-07-13 | Stop reason: SDUPTHER

## 2019-12-19 ENCOUNTER — OFFICE VISIT (OUTPATIENT)
Dept: PSYCHIATRY | Facility: CLINIC | Age: 54
End: 2019-12-19

## 2019-12-19 VITALS
BODY MASS INDEX: 30.92 KG/M2 | SYSTOLIC BLOOD PRESSURE: 141 MMHG | HEIGHT: 66 IN | HEART RATE: 98 BPM | WEIGHT: 192.4 LBS | DIASTOLIC BLOOD PRESSURE: 86 MMHG

## 2019-12-19 DIAGNOSIS — F51.05 INSOMNIA DUE TO MENTAL DISORDER: ICD-10-CM

## 2019-12-19 DIAGNOSIS — F41.1 GENERALIZED ANXIETY DISORDER: ICD-10-CM

## 2019-12-19 DIAGNOSIS — F33.1 MAJOR DEPRESSIVE DISORDER, RECURRENT EPISODE, MODERATE (HCC): ICD-10-CM

## 2019-12-19 PROCEDURE — 99214 OFFICE O/P EST MOD 30 MIN: CPT | Performed by: NURSE PRACTITIONER

## 2019-12-19 RX ORDER — SERTRALINE HYDROCHLORIDE 100 MG/1
200 TABLET, FILM COATED ORAL DAILY
Qty: 60 TABLET | Refills: 2 | Status: SHIPPED | OUTPATIENT
Start: 2019-12-19 | End: 2020-01-29 | Stop reason: SDUPTHER

## 2019-12-19 RX ORDER — LAMOTRIGINE 150 MG/1
150 TABLET ORAL 2 TIMES DAILY
Qty: 60 TABLET | Refills: 1 | Status: SHIPPED | OUTPATIENT
Start: 2019-12-19 | End: 2020-01-29 | Stop reason: SDUPTHER

## 2019-12-19 RX ORDER — QUETIAPINE FUMARATE 100 MG/1
100 TABLET, FILM COATED ORAL NIGHTLY PRN
Qty: 30 TABLET | Refills: 2 | Status: SHIPPED | OUTPATIENT
Start: 2019-12-19 | End: 2020-01-29 | Stop reason: SDUPTHER

## 2019-12-19 RX ORDER — TRAZODONE HYDROCHLORIDE 50 MG/1
50-100 TABLET ORAL NIGHTLY PRN
Qty: 60 TABLET | Refills: 2 | Status: SHIPPED | OUTPATIENT
Start: 2019-12-19 | End: 2020-01-29 | Stop reason: SDUPTHER

## 2019-12-19 NOTE — PROGRESS NOTES
"Agustina Bobo is a 54 y.o. male is here today for medication management follow-up.    Chief Complaint: f/u Depression and Anxiety     History of Present Illness   Patient presents to the clinic today for follow-up appointment for depression and anxiety. He reports more irritable, isolating himself from others, feeling useless. He reports not sleeping as well as he was. He reports he is only taking Seroquel at bedtime when needed for his mood and sleep. Appetite is good. Patient reports compliance with medications and tolerating without side effects. He denies suicidal and homicidal ideations. He denies auditory and visual hallucinations. He reports had neck surgery in October and reports it has helped especially with his left shoulder and arm pain.     The following portions of the patient's history were reviewed and updated as appropriate: allergies, current medications, past family history, past medical history, past social history, past surgical history and problem list.    Review of Systems   Constitutional: Negative.    HENT: Positive for dental problem.    Eyes: Negative.    Respiratory: Negative.    Cardiovascular: Negative.    Gastrointestinal: Negative.    Endocrine: Negative.    Genitourinary: Negative.    Musculoskeletal: Positive for arthralgias and myalgias.   Skin: Negative.    Allergic/Immunologic: Negative.    Neurological: Negative.    Hematological: Negative.    Psychiatric/Behavioral: Positive for agitation, dysphoric mood and sleep disturbance.       Objective   Physical Exam   Constitutional: He appears well-developed and well-nourished. No distress.   Skin: He is not diaphoretic.   Vitals reviewed.    Blood pressure 141/86, pulse 98, height 167.6 cm (66\"), weight 87.3 kg (192 lb 6.4 oz).    Medication List:   Current Outpatient Medications   Medication Sig Dispense Refill   • albuterol (ACCUNEB) 1.25 MG/3ML nebulizer solution      • amLODIPine (NORVASC) 5 MG tablet Take 1 " tablet by mouth Daily. (Patient taking differently: Take 10 mg by mouth Daily.) 30 tablet 5   • diphenoxylate-atropine (LOMOTIL) 2.5-0.025 MG per tablet      • hydroCHLOROthiazide (MICROZIDE) 12.5 MG capsule Take 1 capsule by mouth Daily. 30 capsule 5   • hydrOXYzine (ATARAX) 25 MG tablet Take 1 tablet by mouth 3 (Three) Times a Day As Needed for Anxiety. 90 tablet 2   • ipratropium (ATROVENT) 0.02 % nebulizer solution      • labetalol (NORMODYNE) 300 MG tablet Take 300 mg by mouth 2 (Two) Times a Day.     • lamoTRIgine (LaMICtal) 150 MG tablet Take 1 tablet by mouth 2 (Two) Times a Day. 60 tablet 1   • loratadine (CLARITIN) 10 MG tablet      • ondansetron ODT (ZOFRAN-ODT) 8 MG disintegrating tablet      • pantoprazole (PROTONIX) 40 MG EC tablet Take 40 mg by mouth Daily.     • potassium chloride (K-DUR) 10 MEQ CR tablet Take 10 mEq by mouth 1 (One) Time.     • Pramipexole Dihydrochloride (MIRAPEX PO) Take  by mouth.     • QUEtiapine (SEROquel) 100 MG tablet Take 1 tablet by mouth At Night As Needed (sleep/anxiety/paranoia). 30 tablet 2   • sertraline (ZOLOFT) 100 MG tablet Take 2 tablets by mouth Daily. 60 tablet 2   • tamsulosin (FLOMAX) 0.4 MG capsule 24 hr capsule      • tiZANidine (ZANAFLEX) 4 MG tablet      • traZODone (DESYREL) 50 MG tablet Take 1-2 tablets by mouth At Night As Needed for Sleep. 60 tablet 2   • varenicline (CHANTIX STARTING MONTH PAK) 0.5 MG X 11 & 1 MG X 42 tablet Take 0.5 mg one daily on days 1-3 and and 0.5 mg twice daily on days 4-7.Then 1 mg twice daily for a total of 12 weeks. 53 tablet 0     No current facility-administered medications for this visit.        Labs:   Recent Results (from the past 2016 hour(s))   Comprehensive Metabolic Panel    Collection Time: 10/08/19 10:44 AM   Result Value Ref Range    Glucose 116 (H) 65 - 99 mg/dL    BUN 9 6 - 20 mg/dL    Creatinine 1.38 (H) 0.76 - 1.27 mg/dL    Sodium 140 136 - 145 mmol/L    Potassium 4.1 3.5 - 5.2 mmol/L    Chloride 101 98 - 107  mmol/L    CO2 25.2 22.0 - 29.0 mmol/L    Calcium 9.6 8.6 - 10.5 mg/dL    Total Protein 7.3 6.0 - 8.5 g/dL    Albumin 4.40 3.50 - 5.20 g/dL    ALT (SGPT) 20 1 - 41 U/L    AST (SGOT) 15 1 - 40 U/L    Alkaline Phosphatase 64 39 - 117 U/L    Total Bilirubin 0.2 0.2 - 1.2 mg/dL    eGFR Non African Amer 53 (L) >60 mL/min/1.73    Globulin 2.9 gm/dL    A/G Ratio 1.5 g/dL    BUN/Creatinine Ratio 6.5 (L) 7.0 - 25.0    Anion Gap 13.8 5.0 - 15.0 mmol/L   Urinalysis With Microscopic If Indicated (No Culture) - Urine, Clean Catch    Collection Time: 10/08/19  1:34 PM   Result Value Ref Range    Color, UA Yellow Yellow, Straw    Appearance, UA Clear Clear    pH, UA 6.0 5.0 - 8.0    Specific Gravity, UA 1.008 1.005 - 1.030    Glucose, UA Negative Negative    Ketones, UA Negative Negative    Bilirubin, UA Negative Negative    Blood, UA Negative Negative    Protein, UA Negative Negative    Leuk Esterase, UA Negative Negative    Nitrite, UA Negative Negative    Urobilinogen, UA 0.2 E.U./dL 0.2 - 1.0 E.U./dL   Microalbumin / Creatinine Urine Ratio - Urine, Clean Catch    Collection Time: 10/08/19  1:34 PM   Result Value Ref Range    Microalbumin/Creatinine Ratio      Creatinine, Urine 49.4 mg/dL    Microalbumin, Urine <1.2 mg/dL   Basic Metabolic Panel    Collection Time: 10/28/19 11:26 AM   Result Value Ref Range    Glucose 103 (H) 65 - 99 mg/dL    BUN 9 6 - 20 mg/dL    Creatinine 1.20 0.76 - 1.27 mg/dL    Sodium 139 136 - 145 mmol/L    Potassium 4.0 3.5 - 5.2 mmol/L    Chloride 99 98 - 107 mmol/L    CO2 25.6 22.0 - 29.0 mmol/L    Calcium 9.7 8.6 - 10.5 mg/dL    eGFR Non African Amer 63 >60 mL/min/1.73    BUN/Creatinine Ratio 7.5 7.0 - 25.0    Anion Gap 14.4 5.0 - 15.0 mmol/L   aPTT    Collection Time: 10/28/19 11:26 AM   Result Value Ref Range    PTT 44.6 (H) 23.8 - 36.1 seconds   Protime-INR    Collection Time: 10/28/19 11:26 AM   Result Value Ref Range    Protime 13.0 11.0 - 15.4 Seconds    INR 0.94 0.90 - 1.10   CBC Auto  Differential    Collection Time: 10/28/19 11:26 AM   Result Value Ref Range    WBC 9.41 3.40 - 10.80 10*3/mm3    RBC 4.13 (L) 4.14 - 5.80 10*6/mm3    Hemoglobin 11.4 (L) 13.0 - 17.7 g/dL    Hematocrit 34.0 (L) 37.5 - 51.0 %    MCV 82.3 79.0 - 97.0 fL    MCH 27.6 26.6 - 33.0 pg    MCHC 33.5 31.5 - 35.7 g/dL    RDW 14.2 12.3 - 15.4 %    RDW-SD 42.4 37.0 - 54.0 fl    MPV 9.2 6.0 - 12.0 fL    Platelets 447 140 - 450 10*3/mm3    Neutrophil % 60.3 42.7 - 76.0 %    Lymphocyte % 25.3 19.6 - 45.3 %    Monocyte % 8.0 5.0 - 12.0 %    Eosinophil % 3.3 0.3 - 6.2 %    Basophil % 1.3 0.0 - 1.5 %    Immature Grans % 1.8 (H) 0.0 - 0.5 %    Neutrophils, Absolute 5.68 1.70 - 7.00 10*3/mm3    Lymphocytes, Absolute 2.38 0.70 - 3.10 10*3/mm3    Monocytes, Absolute 0.75 0.10 - 0.90 10*3/mm3    Eosinophils, Absolute 0.31 0.00 - 0.40 10*3/mm3    Basophils, Absolute 0.12 0.00 - 0.20 10*3/mm3    Immature Grans, Absolute 0.17 (H) 0.00 - 0.05 10*3/mm3    nRBC 0.0 0.0 - 0.2 /100 WBC         Mental Status Exam:   Hygiene:   fair  Cooperation:  Cooperative  Eye Contact:  Good  Psychomotor Behavior:  Appropriate  Affect:  Appropriate  Hopelessness: Denies  Speech:  Normal  Thought Process:  Goal directed and Linear  Thought Content:  Normal  Suicidal:  None  Homicidal:  None  Hallucinations:  None  Delusion:  None  Memory:  Intact  Orientation:  Person, Place, Time and Situation  Reliability:  fair  Insight:  Fair  Judgement:  Fair  Impulse Control:  Fair  Physical/Medical Issues:  Yes HTN    Assessment/Plan   Diagnoses and all orders for this visit:    Major depressive disorder, recurrent episode, moderate (CMS/HCC)  -     lamoTRIgine (LaMICtal) 150 MG tablet; Take 1 tablet by mouth 2 (Two) Times a Day.  -     sertraline (ZOLOFT) 100 MG tablet; Take 2 tablets by mouth Daily.    Generalized anxiety disorder  -     QUEtiapine (SEROquel) 100 MG tablet; Take 1 tablet by mouth At Night As Needed (sleep/anxiety/paranoia).  -     sertraline (ZOLOFT) 100  MG tablet; Take 2 tablets by mouth Daily.    Insomnia due to mental disorder  -     QUEtiapine (SEROquel) 100 MG tablet; Take 1 tablet by mouth At Night As Needed (sleep/anxiety/paranoia).  -     traZODone (DESYREL) 50 MG tablet; Take 1-2 tablets by mouth At Night As Needed for Sleep.          Body mass index is 31.05 kg/m².  Patient was educated on healthier and more balanced diet choices and encouraged exercise within physical limitations.  Functionality: pt having impairment in important areas of daily functioning.  Prognosis: Good dependent on medication/follow up and treatment plan compliance.    Impression: Jose is experiencing worsening in mood, depression and sleep since last visit.       Plan:  1) Continue Zoloft to 200 mg po QHS for depression and anxiety. .   2) Continue hydroxyzine 25 mg po every 6 hrs prn anxiety.  3) Encouraged patient to continue psychotherapy.   4) Continue Seroquel 100 mg one tablet QHS prn anxiety and sleep disturbance. Labs done in 10/19  5) Continue Lamictal 100 mg po BID. Patient instructed to stop medication should rash develop and contact prescriber.   6) RTC 6 weeks   7) Continue Trazodone 50 mg 1-2 po QHS prn sleep disturbance     Problem: Depression and Anxiety  Intervention: Continue Zoloft, Seroquel, Trazodone and increase Lamictal and monitor for side effects  Long-term goal: Overall improvement in functioning per patient's self-report.  Short-term goal: Jose will adhere to medication regimen and 6 week follow-up appointment.       Discussed medication options.  Reviewed the risks, benefits, and side effects of the medications; patient acknowledged and verbally consented.  Patient is agreeable to call the [facility].  Patient is aware to call 911 or go to the nearest ER should begin having SI/HI.

## 2020-01-29 ENCOUNTER — OFFICE VISIT (OUTPATIENT)
Dept: PSYCHIATRY | Facility: CLINIC | Age: 55
End: 2020-01-29

## 2020-01-29 VITALS
DIASTOLIC BLOOD PRESSURE: 87 MMHG | HEART RATE: 95 BPM | BODY MASS INDEX: 32.3 KG/M2 | WEIGHT: 201 LBS | SYSTOLIC BLOOD PRESSURE: 146 MMHG | HEIGHT: 66 IN

## 2020-01-29 DIAGNOSIS — F33.1 MAJOR DEPRESSIVE DISORDER, RECURRENT EPISODE, MODERATE (HCC): ICD-10-CM

## 2020-01-29 DIAGNOSIS — F41.1 GENERALIZED ANXIETY DISORDER: Primary | ICD-10-CM

## 2020-01-29 DIAGNOSIS — F51.05 INSOMNIA DUE TO MENTAL DISORDER: ICD-10-CM

## 2020-01-29 PROCEDURE — 99213 OFFICE O/P EST LOW 20 MIN: CPT | Performed by: NURSE PRACTITIONER

## 2020-01-29 RX ORDER — HYDROXYZINE HYDROCHLORIDE 25 MG/1
25 TABLET, FILM COATED ORAL 3 TIMES DAILY PRN
Qty: 90 TABLET | Refills: 2 | Status: SHIPPED | OUTPATIENT
Start: 2020-01-29 | End: 2020-10-14

## 2020-01-29 RX ORDER — SERTRALINE HYDROCHLORIDE 100 MG/1
200 TABLET, FILM COATED ORAL DAILY
Qty: 60 TABLET | Refills: 2 | Status: SHIPPED | OUTPATIENT
Start: 2020-01-29 | End: 2020-10-14 | Stop reason: SDUPTHER

## 2020-01-29 RX ORDER — QUETIAPINE FUMARATE 100 MG/1
100 TABLET, FILM COATED ORAL NIGHTLY PRN
Qty: 30 TABLET | Refills: 2 | Status: SHIPPED | OUTPATIENT
Start: 2020-01-29 | End: 2020-10-14 | Stop reason: SDUPTHER

## 2020-01-29 RX ORDER — TRAZODONE HYDROCHLORIDE 50 MG/1
50-100 TABLET ORAL NIGHTLY PRN
Qty: 60 TABLET | Refills: 2 | Status: SHIPPED | OUTPATIENT
Start: 2020-01-29 | End: 2020-10-14 | Stop reason: SDUPTHER

## 2020-01-29 RX ORDER — LAMOTRIGINE 150 MG/1
150 TABLET ORAL 2 TIMES DAILY
Qty: 60 TABLET | Refills: 2 | Status: SHIPPED | OUTPATIENT
Start: 2020-01-29 | End: 2020-10-14 | Stop reason: SDUPTHER

## 2020-07-06 NOTE — PROGRESS NOTES
[facility] CARDIOLOGY  [address]  [place] [address]  Phone: [phone]  Fax: [phone]    07/13/2020    No chief complaint on file.  Patient didn't bring list of medication or bottles with him. Unsure what he takes.     History:   Jose Bobo is a 55 y.o. male seen in followup, he has hx of HTN , was started on labetalol since last visit and his BP has been controlled but his HR is still in higher 90's and he has episodes of palpitations. He also drinks about 4-5 cans of Coke everyday which I have instructed to cut down.    Past Medical History:   Diagnosis Date   • Arthritis    • Elevated cholesterol    • GERD (gastroesophageal reflux disease)    • Hypertension        Past Surgical History:   Procedure Laterality Date   • COLONOSCOPY     • COLONOSCOPY N/A 8/16/2018    Procedure: COLONOSCOPY;  Surgeon: Negrito Goldberg MD;  Location: [facility];  Service: Gastroenterology   • ENDOSCOPY N/A 8/16/2018    Procedure: ESOPHAGOGASTRODUODENOSCOPY WITH ANESTHESIA;  Surgeon: Negrito Goldberg MD;  Location: [facility];  Service: Gastroenterology   • NECK SURGERY     • VASECTOMY  1993        Past Social History:  Social History     Socioeconomic History   • Marital status:      Spouse name: Not on file   • Number of children: Not on file   • Years of education: Not on file   • Highest education level: Not on file   Tobacco Use   • Smoking status: Current Every Day Smoker     Packs/day: 0.25     Years: 30.00     Pack years: 7.50     Types: Cigarettes   • Smokeless tobacco: Never Used   • Tobacco comment: states 2 cigarettes a day   Substance and Sexual Activity   • Alcohol use: No   • Drug use: No   • Sexual activity: Defer     Birth control/protection: None       Past Family History:  Family History   Problem Relation Age of Onset   • Cancer Sister         breast   • Hypertension Other    • Heart disease Neg Hx        Review of Systems:   Review of Systems    Constitution: Negative.   HENT: Negative.    Eyes: Negative.    Cardiovascular: Positive for claudication and palpitations. Negative for chest pain.   Respiratory: Negative for shortness of breath.    Endocrine: Negative.    Skin: Negative.    Musculoskeletal: Negative.    Gastrointestinal: Negative.    Genitourinary: Negative.    Neurological: Negative.    Psychiatric/Behavioral: Negative.    Allergic/Immunologic: Negative.          Current Outpatient Medications   Medication Sig Dispense Refill   • albuterol (ACCUNEB) 1.25 MG/3ML nebulizer solution      • amLODIPine (NORVASC) 5 MG tablet Take 1 tablet by mouth Daily. (Patient taking differently: Take 10 mg by mouth Daily.) 30 tablet 5   • diphenoxylate-atropine (LOMOTIL) 2.5-0.025 MG per tablet      • hydroCHLOROthiazide (MICROZIDE) 12.5 MG capsule Take 1 capsule by mouth Daily. 30 capsule 5   • hydrOXYzine (ATARAX) 25 MG tablet Take 1 tablet by mouth 3 (Three) Times a Day As Needed for Anxiety. 90 tablet 2   • ipratropium (ATROVENT) 0.02 % nebulizer solution      • labetalol (NORMODYNE) 300 MG tablet Take 300 mg by mouth 2 (Two) Times a Day.     • lamoTRIgine (LaMICtal) 150 MG tablet Take 1 tablet by mouth 2 (Two) Times a Day. 60 tablet 2   • loratadine (CLARITIN) 10 MG tablet      • ondansetron ODT (ZOFRAN-ODT) 8 MG disintegrating tablet      • pantoprazole (PROTONIX) 40 MG EC tablet Take 40 mg by mouth Daily.     • potassium chloride (K-DUR) 10 MEQ CR tablet Take 10 mEq by mouth 1 (One) Time.     • Pramipexole Dihydrochloride (MIRAPEX PO) Take  by mouth.     • QUEtiapine (SEROquel) 100 MG tablet Take 1 tablet by mouth At Night As Needed (sleep/anxiety/paranoia). 30 tablet 2   • sertraline (ZOLOFT) 100 MG tablet Take 2 tablets by mouth Daily. 60 tablet 2   • tamsulosin (FLOMAX) 0.4 MG capsule 24 hr capsule      • tiZANidine (ZANAFLEX) 4 MG tablet      • traZODone (DESYREL) 50 MG tablet Take 1-2 tablets by mouth At Night As Needed for Sleep. 60 tablet 2   •  varenicline (CHANTIX STARTING MONTH PAK) 0.5 MG X 11 & 1 MG X 42 tablet Take 0.5 mg one daily on days 1-3 and and 0.5 mg twice daily on days 4-7.Then 1 mg twice daily for a total of 12 weeks. 53 tablet 0     No current facility-administered medications for this visit.         No Known Allergies    Objective     There were no vitals taken for this visit.    Physical Exam    DATA:      Results for orders placed during the hospital encounter of 10/02/18   Adult Transthoracic Echo Complete W/ Cont if Necessary Per Protocol    Narrative · Left ventricular systolic function is normal. Estimated EF appears to be   in the range of 56 - 60%.  · Left ventricular diastolic function is normal. Normal left atrial   pressure.  · Normal right ventricular cavity size and systolic function noted.  · Trace mitral valve regurgitation is present  · There is no evidence of pericardial effusion         Results for orders placed during the hospital encounter of 10/02/18   Stress Test With Myocardial Perfusion (1 Day)    Narrative · Myocardial perfusion imaging indicates a normal myocardial perfusion   study with no evidence of ischemia.  · Left ventricular ejection fraction is normal (Calculated EF = 60%). TID   0.98  · GI artifact is present.  · Pt walked on treadmill for 7 minutes and 30 secs, 10 METS achieving   maximum heart rate 165 bpm which represents 87.27 % of APMHR, no chest   pain, normal hemodynamic response and no ishcemic EKG changes  · Findings consistent with a normal ECG stress test.  · Impressions are consistent with a low risk study.         Results for orders placed during the hospital encounter of 10/02/18   Stress Test With Myocardial Perfusion (1 Day)    Narrative · Myocardial perfusion imaging indicates a normal myocardial perfusion   study with no evidence of ischemia.  · Left ventricular ejection fraction is normal (Calculated EF = 60%). TID   0.98  · GI artifact is present.  · Pt walked on treadmill for 7  minutes and 30 secs, 10 METS achieving   maximum heart rate 165 bpm which represents 87.27 % of APMHR, no chest   pain, normal hemodynamic response and no ishcemic EKG changes  · Findings consistent with a normal ECG stress test.  · Impressions are consistent with a low risk study.          Procedures       Jose was seen today for follow-up and rapid heart rate.    Diagnoses and all orders for this visit:    Palpitations  -     Adult Transthoracic Echo Complete W/ Cont if Necessary Per Protocol; Future  -     Cardiac Event Monitor; Future  -     Comprehensive Metabolic Panel; Future  -     Lipid Panel; Future  -     TSH; Future          Assessment:  1. Palpitations  2. HTN, controlled                 Recommended increase activity to 30 minutes of walking daily, most days of the week.  Counseled the patient for 3-5 minutes regarding smoking cessation.  Discussed tobacco use relationship to cardiac disease progression.  Offered smoking cessation medications.  Discussed diet and weight loss with patient.        Patient's There is no height or weight on file to calculate BMI. BMI is above normal parameters. Recommendations include: educational material.       No follow-ups on file.    Thank you for allowing me to participate in the care of Jose Bobo. Feel free to contact me directly with any further questions or concerns.          Bear Thomas MD, [facility]  Interventional Cardiology

## 2020-07-13 ENCOUNTER — OFFICE VISIT (OUTPATIENT)
Dept: CARDIOLOGY | Facility: CLINIC | Age: 55
End: 2020-07-13

## 2020-07-13 VITALS
OXYGEN SATURATION: 98 % | WEIGHT: 204 LBS | DIASTOLIC BLOOD PRESSURE: 68 MMHG | SYSTOLIC BLOOD PRESSURE: 110 MMHG | HEIGHT: 66 IN | BODY MASS INDEX: 32.78 KG/M2 | HEART RATE: 92 BPM

## 2020-07-13 DIAGNOSIS — R00.2 PALPITATIONS: Primary | ICD-10-CM

## 2020-07-13 PROCEDURE — 99214 OFFICE O/P EST MOD 30 MIN: CPT | Performed by: INTERNAL MEDICINE

## 2020-07-13 RX ORDER — HYDROCHLOROTHIAZIDE 12.5 MG/1
12.5 CAPSULE, GELATIN COATED ORAL DAILY
Qty: 30 CAPSULE | Refills: 5 | Status: SHIPPED | OUTPATIENT
Start: 2020-07-13

## 2020-07-22 ENCOUNTER — HOSPITAL ENCOUNTER (OUTPATIENT)
Dept: CARDIOLOGY | Facility: HOSPITAL | Age: 55
Discharge: HOME | End: 2020-07-22
Admitting: INTERNAL MEDICINE

## 2020-07-22 DIAGNOSIS — R00.2 PALPITATIONS: ICD-10-CM

## 2020-07-22 PROCEDURE — 93306 TTE W/DOPPLER COMPLETE: CPT

## 2020-07-22 PROCEDURE — 93306 TTE W/DOPPLER COMPLETE: CPT | Performed by: INTERNAL MEDICINE

## 2020-07-27 LAB
BH CV ECHO MEAS - % IVS THICK: 53.7 %
BH CV ECHO MEAS - % LVPW THICK: 34.8 %
BH CV ECHO MEAS - ACS: 2.3 CM
BH CV ECHO MEAS - AO MAX PG: 7.2 MMHG
BH CV ECHO MEAS - AO MEAN PG: 3 MMHG
BH CV ECHO MEAS - AO ROOT AREA (BSA CORRECTED): 1.4
BH CV ECHO MEAS - AO ROOT AREA: 6.2 CM^2
BH CV ECHO MEAS - AO ROOT DIAM: 2.8 CM
BH CV ECHO MEAS - AO V2 MAX: 134 CM/SEC
BH CV ECHO MEAS - AO V2 MEAN: 86.3 CM/SEC
BH CV ECHO MEAS - AO V2 VTI: 26 CM
BH CV ECHO MEAS - BSA(HAYCOCK): 2.1 M^2
BH CV ECHO MEAS - BSA: 2 M^2
BH CV ECHO MEAS - BZI_BMI: 32.9 KILOGRAMS/M^2
BH CV ECHO MEAS - BZI_METRIC_HEIGHT: 167.6 CM
BH CV ECHO MEAS - BZI_METRIC_WEIGHT: 92.5 KG
BH CV ECHO MEAS - EDV(CUBED): 115.5 ML
BH CV ECHO MEAS - EDV(MOD-SP4): 43.9 ML
BH CV ECHO MEAS - EDV(TEICH): 111.2 ML
BH CV ECHO MEAS - EF(CUBED): 45.8 %
BH CV ECHO MEAS - EF(MOD-SP4): 66.7 %
BH CV ECHO MEAS - EF(TEICH): 38.2 %
BH CV ECHO MEAS - ESV(CUBED): 62.6 ML
BH CV ECHO MEAS - ESV(MOD-SP4): 14.6 ML
BH CV ECHO MEAS - ESV(TEICH): 68.8 ML
BH CV ECHO MEAS - FS: 18.5 %
BH CV ECHO MEAS - IVS/LVPW: 1
BH CV ECHO MEAS - IVSD: 0.95 CM
BH CV ECHO MEAS - IVSS: 1.5 CM
BH CV ECHO MEAS - LA DIMENSION: 2.8 CM
BH CV ECHO MEAS - LA/AO: 1
BH CV ECHO MEAS - LV DIASTOLIC VOL/BSA (35-75): 21.8 ML/M^2
BH CV ECHO MEAS - LV MASS(C)D: 158.4 GRAMS
BH CV ECHO MEAS - LV MASS(C)DI: 78.5 GRAMS/M^2
BH CV ECHO MEAS - LV MASS(C)S: 194.3 GRAMS
BH CV ECHO MEAS - LV MASS(C)SI: 96.3 GRAMS/M^2
BH CV ECHO MEAS - LV SYSTOLIC VOL/BSA (12-30): 7.2 ML/M^2
BH CV ECHO MEAS - LVIDD: 4.9 CM
BH CV ECHO MEAS - LVIDS: 4 CM
BH CV ECHO MEAS - LVLD AP4: 7.4 CM
BH CV ECHO MEAS - LVLS AP4: 6.1 CM
BH CV ECHO MEAS - LVOT AREA (M): 2.8 CM^2
BH CV ECHO MEAS - LVOT AREA: 2.8 CM^2
BH CV ECHO MEAS - LVOT DIAM: 1.9 CM
BH CV ECHO MEAS - LVPWD: 0.91 CM
BH CV ECHO MEAS - LVPWS: 1.2 CM
BH CV ECHO MEAS - MV A MAX VEL: 83.1 CM/SEC
BH CV ECHO MEAS - MV E MAX VEL: 39.7 CM/SEC
BH CV ECHO MEAS - MV E/A: 0.48
BH CV ECHO MEAS - PA ACC TIME: 0.08 SEC
BH CV ECHO MEAS - PA PR(ACCEL): 42.6 MMHG
BH CV ECHO MEAS - SI(AO): 79.4 ML/M^2
BH CV ECHO MEAS - SI(CUBED): 26.2 ML/M^2
BH CV ECHO MEAS - SI(MOD-SP4): 14.5 ML/M^2
BH CV ECHO MEAS - SI(TEICH): 21 ML/M^2
BH CV ECHO MEAS - SV(AO): 160.1 ML
BH CV ECHO MEAS - SV(CUBED): 52.9 ML
BH CV ECHO MEAS - SV(MOD-SP4): 29.3 ML
BH CV ECHO MEAS - SV(TEICH): 42.5 ML
MAXIMAL PREDICTED HEART RATE: 163 BPM
STRESS TARGET HR: 139 BPM

## 2020-07-30 ENCOUNTER — TELEPHONE (OUTPATIENT)
Dept: CARDIOLOGY | Facility: CLINIC | Age: 55
End: 2020-07-30

## 2020-07-30 NOTE — TELEPHONE ENCOUNTER
Called Jose, spoke with Vivien (listed on HIPAA) to let her know that Jose's echo was normal if she would let him know. She is aware and understands.

## 2020-07-30 NOTE — TELEPHONE ENCOUNTER
----- Message from Bear Thomas MD sent at 7/29/2020  2:20 PM EDT -----  Please call patient to and tell him his test was normal.   Thanks.

## 2020-08-12 ENCOUNTER — TELEPHONE (OUTPATIENT)
Dept: CARDIOLOGY | Facility: CLINIC | Age: 55
End: 2020-08-12

## 2020-08-12 ENCOUNTER — CLINICAL SUPPORT NO REQUIREMENTS (OUTPATIENT)
Dept: CARDIOLOGY | Facility: CLINIC | Age: 55
End: 2020-08-12

## 2020-08-12 DIAGNOSIS — R00.2 PALPITATIONS: Primary | ICD-10-CM

## 2020-08-12 PROCEDURE — 93270 REMOTE 30 DAY ECG REV/REPORT: CPT | Performed by: INTERNAL MEDICINE

## 2020-08-12 PROCEDURE — 93272 ECG/REVIEW INTERPRET ONLY: CPT | Performed by: INTERNAL MEDICINE

## 2020-08-12 NOTE — TELEPHONE ENCOUNTER
----- Message from Bear Thomas MD sent at 8/11/2020  2:00 PM EDT -----  Please call patient to and tell him his test was normal.   Thanks.

## 2020-10-14 ENCOUNTER — OFFICE VISIT (OUTPATIENT)
Dept: PSYCHIATRY | Facility: CLINIC | Age: 55
End: 2020-10-14

## 2020-10-14 VITALS
BODY MASS INDEX: 31.64 KG/M2 | WEIGHT: 196 LBS | DIASTOLIC BLOOD PRESSURE: 79 MMHG | SYSTOLIC BLOOD PRESSURE: 129 MMHG | TEMPERATURE: 97.8 F | HEART RATE: 93 BPM

## 2020-10-14 DIAGNOSIS — F33.1 MAJOR DEPRESSIVE DISORDER, RECURRENT EPISODE, MODERATE (HCC): ICD-10-CM

## 2020-10-14 DIAGNOSIS — F51.05 INSOMNIA DUE TO MENTAL DISORDER: ICD-10-CM

## 2020-10-14 DIAGNOSIS — F41.1 GENERALIZED ANXIETY DISORDER: ICD-10-CM

## 2020-10-14 PROCEDURE — 99214 OFFICE O/P EST MOD 30 MIN: CPT | Performed by: NURSE PRACTITIONER

## 2020-10-14 PROCEDURE — 90833 PSYTX W PT W E/M 30 MIN: CPT | Performed by: NURSE PRACTITIONER

## 2020-10-14 RX ORDER — TRAZODONE HYDROCHLORIDE 50 MG/1
50-100 TABLET ORAL NIGHTLY PRN
Qty: 60 TABLET | Refills: 2 | Status: SHIPPED | OUTPATIENT
Start: 2020-10-14 | End: 2021-01-06 | Stop reason: SDUPTHER

## 2020-10-14 RX ORDER — QUETIAPINE FUMARATE 100 MG/1
100 TABLET, FILM COATED ORAL NIGHTLY PRN
Qty: 30 TABLET | Refills: 2 | Status: SHIPPED | OUTPATIENT
Start: 2020-10-14 | End: 2021-01-06 | Stop reason: SDUPTHER

## 2020-10-14 RX ORDER — LAMOTRIGINE 150 MG/1
150 TABLET ORAL DAILY
Qty: 30 TABLET | Refills: 2 | Status: SHIPPED | OUTPATIENT
Start: 2020-10-14 | End: 2021-01-06 | Stop reason: SDUPTHER

## 2020-10-14 RX ORDER — SERTRALINE HYDROCHLORIDE 100 MG/1
200 TABLET, FILM COATED ORAL DAILY
Qty: 60 TABLET | Refills: 2 | Status: SHIPPED | OUTPATIENT
Start: 2020-10-14 | End: 2021-01-06 | Stop reason: SDUPTHER

## 2020-10-14 NOTE — PROGRESS NOTES
"  Agustina Bobo is a 55 y.o. male is here today for medication management follow-up at [facility], this is the first encounter with patient since beng transferred via [facility] for continuation of treatment.    Chief Complaint: Depression and anxiety    History of Present Illness  He states that he feels like he is doing ok, but he doesn't feel like he has much \"get and go to do nothing\".  He states that he is not taking the 2nd dose of the lamictal and zoloft which could be contributing to his lack of motivation.  He denies any SE or problems with the medications.  He rates his depression about 3/10 and anxiety about 5/10 with 10 being the worse.  He states that the anxiety increases when he gets out in the public.  He states that the seroquel and trazodone has been helpful for his sleep; he is getting about 8-10 hours per night with no NM.  Appetite has been good, he states that he has been trying to lose weight by eating healthier with a noted 8 pound weight loss.  Denies any new stressors.  He states that his health has been \"pretty good\" but deals with  and \"wish I could breathe a little better\".  Denies any AV hallucinations, denies any SI/HI.     The following portions of the patient's history were reviewed and updated as appropriate: allergies, current medications, past family history, past medical history, past social history, past surgical history and problem list.    Review of Systems   Constitutional: Negative for appetite change, chills, diaphoresis, fatigue, fever and unexpected weight change.   HENT: Negative for hearing loss, sore throat, trouble swallowing and voice change.    Eyes: Negative for photophobia and visual disturbance.   Respiratory: Negative for cough, chest tightness and shortness of breath.    Cardiovascular: Negative for chest pain and palpitations.   Gastrointestinal: Negative for abdominal pain, constipation, nausea and vomiting.   Endocrine: " Negative for cold intolerance and heat intolerance.   Genitourinary: Negative for dysuria and frequency.   Musculoskeletal: Positive for arthralgias. Negative for back pain, joint swelling and neck stiffness.        Left shoulder pain   Skin: Negative for color change and wound.   Allergic/Immunologic: Negative for environmental allergies and immunocompromised state.   Neurological: Negative for dizziness, tremors, seizures, syncope, weakness, light-headedness and headaches.   Hematological: Negative for adenopathy. Does not bruise/bleed easily.       Objective   Physical Exam  Vitals signs reviewed.   Constitutional:       General: He is not in acute distress.     Appearance: He is well-developed.   Neurological:      Mental Status: He is alert.      Coordination: Coordination normal.      Gait: Gait normal.       Blood pressure 129/79, pulse 93, temperature 97.8 °F (36.6 °C), weight 88.9 kg (196 lb). Body mass index is 31.64 kg/m².    Medication List:   Current Outpatient Medications   Medication Sig Dispense Refill   • albuterol (ACCUNEB) 1.25 MG/3ML nebulizer solution      • amLODIPine (NORVASC) 5 MG tablet Take 1 tablet by mouth Daily. (Patient taking differently: Take 10 mg by mouth Daily.) 30 tablet 5   • diphenoxylate-atropine (LOMOTIL) 2.5-0.025 MG per tablet      • hydroCHLOROthiazide (MICROZIDE) 12.5 MG capsule Take 1 capsule by mouth Daily. 30 capsule 5   • ipratropium (ATROVENT) 0.02 % nebulizer solution      • labetalol (NORMODYNE) 300 MG tablet Take 300 mg by mouth 2 (Two) Times a Day.     • lamoTRIgine (LaMICtal) 150 MG tablet Take 1 tablet by mouth Daily. 30 tablet 2   • loratadine (CLARITIN) 10 MG tablet      • ondansetron ODT (ZOFRAN-ODT) 8 MG disintegrating tablet      • pantoprazole (PROTONIX) 40 MG EC tablet Take 40 mg by mouth Daily.     • potassium chloride (K-DUR) 10 MEQ CR tablet Take 10 mEq by mouth 1 (One) Time.     • Pramipexole Dihydrochloride (MIRAPEX PO) Take  by mouth.     •  QUEtiapine (SEROquel) 100 MG tablet Take 1 tablet by mouth At Night As Needed (sleep/anxiety/paranoia). 30 tablet 2   • sertraline (ZOLOFT) 100 MG tablet Take 2 tablets by mouth Daily. 60 tablet 2   • tamsulosin (FLOMAX) 0.4 MG capsule 24 hr capsule      • tiZANidine (ZANAFLEX) 4 MG tablet      • traZODone (DESYREL) 50 MG tablet Take 1-2 tablets by mouth At Night As Needed for Sleep. 60 tablet 2   • varenicline (CHANTIX STARTING MONTH PAK) 0.5 MG X 11 & 1 MG X 42 tablet Take 0.5 mg one daily on days 1-3 and and 0.5 mg twice daily on days 4-7.Then 1 mg twice daily for a total of 12 weeks. 53 tablet 0     No current facility-administered medications for this visit.        Mental Status Exam:   Hygiene:   fair  Cooperation:  Cooperative  Eye Contact:  Fair  Psychomotor Behavior:  Appropriate  Affect:  Appropriate  Hopelessness: Denies  Speech:  Normal  Thought Process:  Goal directed and Linear  Thought Content:  Mood congruent  Suicidal:  None  Homicidal:  None  Hallucinations:  None  Delusion:  None  Memory:  Intact  Orientation:  Person, Place, Time and Situation  Reliability:  fair  Insight:  Fair  Judgement:  Fair  Impulse Control:  Fair  Physical/Medical Issues:  No     Assessment/Plan   Problems Addressed this Visit     None      Visit Diagnoses     Major depressive disorder, recurrent episode, moderate (CMS/HCC)        Relevant Medications    lamoTRIgine (LaMICtal) 150 MG tablet    QUEtiapine (SEROquel) 100 MG tablet    sertraline (ZOLOFT) 100 MG tablet    traZODone (DESYREL) 50 MG tablet    Generalized anxiety disorder        Relevant Medications    QUEtiapine (SEROquel) 100 MG tablet    sertraline (ZOLOFT) 100 MG tablet    traZODone (DESYREL) 50 MG tablet    Insomnia due to mental disorder        Relevant Medications    QUEtiapine (SEROquel) 100 MG tablet    sertraline (ZOLOFT) 100 MG tablet    traZODone (DESYREL) 50 MG tablet      Diagnoses       Codes Comments    Major depressive disorder, recurrent  episode, moderate (CMS/HCC)     ICD-10-CM: F33.1  ICD-9-CM: 296.32     Generalized anxiety disorder     ICD-10-CM: F41.1  ICD-9-CM: 300.02     Insomnia due to mental disorder     ICD-10-CM: F51.05  ICD-9-CM: 300.9, 327.02         Discussed medication options. Continue the zoloft for anxiety and depression, trazodone for sleep, and seroquel for anxiety and sleep, and decrease the lamcital to once daily since that is all he is currently taking.  Reviewed the risks, benefits, and side effects of the medications; patient acknowledged and verbally consented.  Patient is agreeable to call the [facility] with any worsening of symptoms.  Patient is aware to call 911 or go to the nearest ER should begin having SI/HI.     Prognosis:  Guarded dependent on medication, follow up appointment and treatment plan compliance.    Functionality:  Fair.  Symptoms are mostly under control with tweaks made to medication to assist with continued fatigue during the day.    Start Time:  310p    Stop Time: 340p    30 minutes face to face with patient  was spent for psychotherapy. Assisted patient in processing patient’s MDD and anxiety. Acknowledged and normalized patient’s thoughts, feelings, and concerns. Utilized cognitive behavioral therapy to assist the patient in recognizing more appropriate coping mechanisms when he becomes agitated/anxious which are proven effective in reducing the severity of frequency of symptoms. Strongly urged to continue to eat better balanced and healthier foods in reducing further health risks. Allowed patient to freely discuss issues without interruption or judgment.     Return in 12 weeks

## 2021-01-06 ENCOUNTER — OFFICE VISIT (OUTPATIENT)
Dept: PSYCHIATRY | Facility: CLINIC | Age: 56
End: 2021-01-06

## 2021-01-06 VITALS
TEMPERATURE: 97.1 F | HEART RATE: 82 BPM | HEIGHT: 66 IN | DIASTOLIC BLOOD PRESSURE: 65 MMHG | BODY MASS INDEX: 31.66 KG/M2 | SYSTOLIC BLOOD PRESSURE: 102 MMHG | WEIGHT: 197 LBS

## 2021-01-06 DIAGNOSIS — F51.05 INSOMNIA DUE TO MENTAL DISORDER: ICD-10-CM

## 2021-01-06 DIAGNOSIS — F33.1 MAJOR DEPRESSIVE DISORDER, RECURRENT EPISODE, MODERATE (HCC): Primary | ICD-10-CM

## 2021-01-06 DIAGNOSIS — F41.1 GENERALIZED ANXIETY DISORDER: ICD-10-CM

## 2021-01-06 PROCEDURE — 99214 OFFICE O/P EST MOD 30 MIN: CPT | Performed by: NURSE PRACTITIONER

## 2021-01-06 RX ORDER — QUETIAPINE FUMARATE 100 MG/1
100 TABLET, FILM COATED ORAL NIGHTLY PRN
Qty: 30 TABLET | Refills: 2 | Status: SHIPPED | OUTPATIENT
Start: 2021-01-06

## 2021-01-06 RX ORDER — TRAZODONE HYDROCHLORIDE 50 MG/1
50-100 TABLET ORAL NIGHTLY PRN
Qty: 60 TABLET | Refills: 2 | Status: SHIPPED | OUTPATIENT
Start: 2021-01-06

## 2021-01-06 RX ORDER — LAMOTRIGINE 150 MG/1
150 TABLET ORAL DAILY
Qty: 30 TABLET | Refills: 2 | Status: SHIPPED | OUTPATIENT
Start: 2021-01-06

## 2021-01-06 RX ORDER — SERTRALINE HYDROCHLORIDE 100 MG/1
200 TABLET, FILM COATED ORAL DAILY
Qty: 60 TABLET | Refills: 2 | Status: SHIPPED | OUTPATIENT
Start: 2021-01-06

## 2021-01-06 NOTE — TREATMENT PLAN
Multi-Disciplinary Problems (from Behavioral Health Treatment Plan)    Active Problems     Problem: Anxiety  Start Date: 01/06/21    Problem Details: The patient self-scales this problem as a 5 with 10 being the worst.              Problem: Depression  Start Date: 01/06/21    Problem Details: The patient self-scales this problem as a 5 with 10 being the worst.                           I have discussed and reviewed this treatment plan with the patient.  It has been printed for signatures.

## 2021-01-06 NOTE — PROGRESS NOTES
"  Agustina Bobo is a 55 y.o. male is here today for medication management follow-up at [facility].    Chief Complaint: Depression and anxiety    History of Present Illness He states that he is doing ok with his current medications, states that he has been taking his medications with no SE or problems.  He states that he just doesn't want to get out of the house, he states that the COVID doesn't really bother him, he states that once he gets out its ok but just to get out he \"just don't want to\".  He rates his depression about 5/10 with 10 being the worse, he states that he feels \"margoth\" sad, he feels like his energy levels are low.  He rates his anxiety about 5/10 with 10 being the worse, he states that he feels stressed, he has a difficult time trying to describe how he feels.  \"I just don't want to get out\".  He feels like his sleep is \"pretty good\", he is getting between 7-8 hours per night with no NM.  Appetite has been good with stable weight.  He states that he would like to lose weight, discussed how to lose weight and stay active.  Denies any new health issues.  States that he is worried about \"everything\".  Denies any AV hallucinations but states he hears something like a \"radio station playing off in the distance\" when it gets quiet, denies any SI/HI.      The following portions of the patient's history were reviewed and updated as appropriate: allergies, current medications, past family history, past medical history, past social history, past surgical history and problem list.    Review of Systems   Constitutional: Negative for appetite change, chills, diaphoresis, fatigue, fever and unexpected weight change.   HENT: Negative for hearing loss, sore throat, trouble swallowing and voice change.    Eyes: Negative for photophobia and visual disturbance.   Respiratory: Negative for cough, chest tightness and shortness of breath.    Cardiovascular: Negative for chest pain and " "palpitations.   Gastrointestinal: Negative for abdominal pain, constipation, nausea and vomiting.   Endocrine: Negative for cold intolerance and heat intolerance.   Genitourinary: Negative for dysuria and frequency.   Musculoskeletal: Positive for arthralgias. Negative for back pain, joint swelling and neck stiffness.        Left shoulder pain   Skin: Negative for color change and wound.   Allergic/Immunologic: Negative for environmental allergies and immunocompromised state.   Neurological: Negative for dizziness, tremors, seizures, syncope, weakness, light-headedness and headaches.   Hematological: Negative for adenopathy. Does not bruise/bleed easily.       Objective   Physical Exam  Vitals signs reviewed.   Constitutional:       General: He is not in acute distress.     Appearance: He is well-developed.   Neurological:      Mental Status: He is alert.      Coordination: Coordination normal.      Gait: Gait normal.       Blood pressure 102/65, pulse 82, temperature 97.1 °F (36.2 °C), height 167.6 cm (66\"), weight 89.4 kg (197 lb). Body mass index is 31.8 kg/m².    Medication List:   Current Outpatient Medications   Medication Sig Dispense Refill   • albuterol (ACCUNEB) 1.25 MG/3ML nebulizer solution      • amLODIPine (NORVASC) 5 MG tablet Take 1 tablet by mouth Daily. (Patient taking differently: Take 10 mg by mouth Daily.) 30 tablet 5   • diphenoxylate-atropine (LOMOTIL) 2.5-0.025 MG per tablet      • hydroCHLOROthiazide (MICROZIDE) 12.5 MG capsule Take 1 capsule by mouth Daily. 30 capsule 5   • ipratropium (ATROVENT) 0.02 % nebulizer solution      • labetalol (NORMODYNE) 300 MG tablet Take 300 mg by mouth 2 (Two) Times a Day.     • lamoTRIgine (LaMICtal) 150 MG tablet Take 1 tablet by mouth Daily. 30 tablet 2   • loratadine (CLARITIN) 10 MG tablet      • ondansetron ODT (ZOFRAN-ODT) 8 MG disintegrating tablet      • pantoprazole (PROTONIX) 40 MG EC tablet Take 40 mg by mouth Daily.     • potassium chloride " (K-DUR) 10 MEQ CR tablet Take 10 mEq by mouth 1 (One) Time.     • Pramipexole Dihydrochloride (MIRAPEX PO) Take  by mouth.     • QUEtiapine (SEROquel) 100 MG tablet Take 1 tablet by mouth At Night As Needed (sleep/anxiety/paranoia). 30 tablet 2   • sertraline (ZOLOFT) 100 MG tablet Take 2 tablets by mouth Daily. 60 tablet 2   • tamsulosin (FLOMAX) 0.4 MG capsule 24 hr capsule      • tiZANidine (ZANAFLEX) 4 MG tablet      • traZODone (DESYREL) 50 MG tablet Take 1-2 tablets by mouth At Night As Needed for Sleep. 60 tablet 2   • varenicline (CHANTIX STARTING MONTH PAK) 0.5 MG X 11 & 1 MG X 42 tablet Take 0.5 mg one daily on days 1-3 and and 0.5 mg twice daily on days 4-7.Then 1 mg twice daily for a total of 12 weeks. 53 tablet 0     No current facility-administered medications for this visit.        Mental Status Exam:   Hygiene:   fair  Cooperation:  Cooperative  Eye Contact:  Fair  Psychomotor Behavior:  Appropriate  Affect:  Appropriate  Hopelessness: Denies  Speech:  Normal  Thought Process:  Goal directed and Linear  Thought Content:  Mood congruent  Suicidal:  None  Homicidal:  None  Hallucinations:  None  Delusion:  None  Memory:  Intact  Orientation:  Person, Place, Time and Situation  Reliability:  fair  Insight:  Fair  Judgement:  Fair  Impulse Control:  Fair  Physical/Medical Issues:  No     Assessment/Plan   Problems Addressed this Visit     None      Visit Diagnoses     Major depressive disorder, recurrent episode, moderate (CMS/HCC)    -  Primary    Relevant Medications    lamoTRIgine (LaMICtal) 150 MG tablet    QUEtiapine (SEROquel) 100 MG tablet    sertraline (ZOLOFT) 100 MG tablet    traZODone (DESYREL) 50 MG tablet    Generalized anxiety disorder        Relevant Medications    QUEtiapine (SEROquel) 100 MG tablet    sertraline (ZOLOFT) 100 MG tablet    traZODone (DESYREL) 50 MG tablet    Insomnia due to mental disorder        Relevant Medications    QUEtiapine (SEROquel) 100 MG tablet    sertraline  (ZOLOFT) 100 MG tablet    traZODone (DESYREL) 50 MG tablet      Diagnoses       Codes Comments    Major depressive disorder, recurrent episode, moderate (CMS/HCC)    -  Primary ICD-10-CM: F33.1  ICD-9-CM: 296.32     Generalized anxiety disorder     ICD-10-CM: F41.1  ICD-9-CM: 300.02     Insomnia due to mental disorder     ICD-10-CM: F51.05  ICD-9-CM: 300.9, 327.02         Discussed medication options. Continue the zoloft for anxiety and depression, trazodone for sleep, and seroquel for anxiety and sleep, and lamcital to once daily since that is all he is currently taking.  Reviewed the risks, benefits, and side effects of the medications; patient acknowledged and verbally consented.  Patient is agreeable to call the [facility] with any worsening of symptoms.  Patient is aware to call 911 or go to the nearest ER should begin having SI/HI.     Prognosis:  Guarded dependent on medication, follow up appointment and treatment plan compliance.    Functionality:  Fair.  Symptoms are mostly under control with tweaks made to medication to assist with continued fatigue during the day.    Treatment plan completed on 1/6/2020    Return in 12 weeks

## 2021-03-18 ENCOUNTER — BULK ORDERING (OUTPATIENT)
Dept: CASE MANAGEMENT | Facility: OTHER | Age: 56
End: 2021-03-18

## 2021-03-18 DIAGNOSIS — Z23 IMMUNIZATION DUE: ICD-10-CM

## 2021-10-28 ENCOUNTER — APPOINTMENT (OUTPATIENT)
Dept: CT IMAGING | Facility: HOSPITAL | Age: 56
End: 2021-10-28

## 2021-10-28 ENCOUNTER — HOSPITAL ENCOUNTER (EMERGENCY)
Facility: HOSPITAL | Age: 56
Discharge: HOME | End: 2021-10-28
Attending: EMERGENCY MEDICINE | Admitting: EMERGENCY MEDICINE

## 2021-10-28 VITALS
RESPIRATION RATE: 18 BRPM | HEART RATE: 101 BPM | WEIGHT: 198 LBS | BODY MASS INDEX: 32.99 KG/M2 | TEMPERATURE: 98.2 F | DIASTOLIC BLOOD PRESSURE: 77 MMHG | HEIGHT: 65 IN | SYSTOLIC BLOOD PRESSURE: 145 MMHG | OXYGEN SATURATION: 99 %

## 2021-10-28 DIAGNOSIS — M54.41 ACUTE RIGHT-SIDED LOW BACK PAIN WITH RIGHT-SIDED SCIATICA: Primary | ICD-10-CM

## 2021-10-28 PROCEDURE — 96372 THER/PROPH/DIAG INJ SC/IM: CPT

## 2021-10-28 PROCEDURE — 25010000002 METHYLPREDNISOLONE PER 125 MG: Performed by: PHYSICIAN ASSISTANT

## 2021-10-28 PROCEDURE — 99283 EMERGENCY DEPT VISIT LOW MDM: CPT

## 2021-10-28 PROCEDURE — 25010000002 KETOROLAC TROMETHAMINE PER 15 MG: Performed by: PHYSICIAN ASSISTANT

## 2021-10-28 PROCEDURE — 72131 CT LUMBAR SPINE W/O DYE: CPT | Performed by: RADIOLOGY

## 2021-10-28 PROCEDURE — 25010000002 ORPHENADRINE CITRATE PER 60 MG: Performed by: PHYSICIAN ASSISTANT

## 2021-10-28 PROCEDURE — 72131 CT LUMBAR SPINE W/O DYE: CPT

## 2021-10-28 RX ORDER — KETOROLAC TROMETHAMINE 10 MG/1
10 TABLET, FILM COATED ORAL EVERY 6 HOURS PRN
Qty: 12 TABLET | Refills: 0 | Status: SHIPPED | OUTPATIENT
Start: 2021-10-28

## 2021-10-28 RX ORDER — METHYLPREDNISOLONE SODIUM SUCCINATE 125 MG/2ML
80 INJECTION, POWDER, LYOPHILIZED, FOR SOLUTION INTRAMUSCULAR; INTRAVENOUS ONCE
Status: COMPLETED | OUTPATIENT
Start: 2021-10-28 | End: 2021-10-28

## 2021-10-28 RX ORDER — KETOROLAC TROMETHAMINE 30 MG/ML
30 INJECTION, SOLUTION INTRAMUSCULAR; INTRAVENOUS ONCE
Status: COMPLETED | OUTPATIENT
Start: 2021-10-28 | End: 2021-10-28

## 2021-10-28 RX ORDER — ORPHENADRINE CITRATE 30 MG/ML
60 INJECTION INTRAMUSCULAR; INTRAVENOUS ONCE
Status: COMPLETED | OUTPATIENT
Start: 2021-10-28 | End: 2021-10-28

## 2021-10-28 RX ADMIN — ORPHENADRINE CITRATE 60 MG: 60 INJECTION INTRAMUSCULAR; INTRAVENOUS at 13:18

## 2021-10-28 RX ADMIN — METHYLPREDNISOLONE SODIUM SUCCINATE 80 MG: 125 INJECTION, POWDER, FOR SOLUTION INTRAMUSCULAR; INTRAVENOUS at 13:18

## 2021-10-28 RX ADMIN — KETOROLAC TROMETHAMINE 30 MG: 30 INJECTION, SOLUTION INTRAMUSCULAR at 13:18

## 2023-08-06 ENCOUNTER — APPOINTMENT (OUTPATIENT)
Dept: GENERAL RADIOLOGY | Facility: HOSPITAL | Age: 58
End: 2023-08-06
Payer: COMMERCIAL

## 2023-08-06 ENCOUNTER — APPOINTMENT (OUTPATIENT)
Dept: MRI IMAGING | Facility: HOSPITAL | Age: 58
End: 2023-08-06
Payer: MEDICARE

## 2023-08-06 ENCOUNTER — APPOINTMENT (OUTPATIENT)
Dept: CT IMAGING | Facility: HOSPITAL | Age: 58
End: 2023-08-06
Payer: COMMERCIAL

## 2023-08-06 ENCOUNTER — APPOINTMENT (OUTPATIENT)
Dept: CARDIOLOGY | Facility: HOSPITAL | Age: 58
End: 2023-08-06
Payer: COMMERCIAL

## 2023-08-06 ENCOUNTER — HOSPITAL ENCOUNTER (OUTPATIENT)
Facility: HOSPITAL | Age: 58
Setting detail: OBSERVATION
Discharge: HOME | End: 2023-08-07
Attending: EMERGENCY MEDICINE | Admitting: HOSPITALIST
Payer: COMMERCIAL

## 2023-08-06 DIAGNOSIS — I63.81 MULTIPLE LACUNAR INFARCTS: Primary | ICD-10-CM

## 2023-08-06 PROBLEM — R29.818 TRANSIENT NEUROLOGICAL SYMPTOMS: Status: ACTIVE | Noted: 2023-08-06

## 2023-08-06 LAB
ABO GROUP BLD: NORMAL
ABO GROUP BLD: NORMAL
ALBUMIN SERPL-MCNC: 4.4 G/DL (ref 3.5–5.2)
ALBUMIN/GLOB SERPL: 1.4 G/DL
ALP SERPL-CCNC: 73 U/L (ref 39–117)
ALT SERPL W P-5'-P-CCNC: 23 U/L (ref 1–41)
ANION GAP SERPL CALCULATED.3IONS-SCNC: 15.8 MMOL/L (ref 5–15)
ANION GAP SERPL CALCULATED.3IONS-SCNC: 8.2 MMOL/L (ref 5–15)
APTT PPP: 37.7 SECONDS (ref 26.5–34.5)
AST SERPL-CCNC: 19 U/L (ref 1–40)
BASOPHILS # BLD AUTO: 0.16 10*3/MM3 (ref 0–0.2)
BASOPHILS NFR BLD AUTO: 1.4 % (ref 0–1.5)
BH CV ECHO MEAS - ACS: 2.1 CM
BH CV ECHO MEAS - AO MAX PG: 6.6 MMHG
BH CV ECHO MEAS - AO MEAN PG: 3 MMHG
BH CV ECHO MEAS - AO ROOT DIAM: 3.5 CM
BH CV ECHO MEAS - AO V2 MAX: 128 CM/SEC
BH CV ECHO MEAS - AO V2 VTI: 19.5 CM
BH CV ECHO MEAS - AVA(I,D): 2.8 CM2
BH CV ECHO MEAS - EDV(MOD-SP4): 58.1 ML
BH CV ECHO MEAS - EF(MOD-SP4): 56.1 %
BH CV ECHO MEAS - ESV(MOD-SP4): 25.5 ML
BH CV ECHO MEAS - LA DIMENSION: 3.3 CM
BH CV ECHO MEAS - LAT PEAK E' VEL: 11.3 CM/SEC
BH CV ECHO MEAS - LV DIASTOLIC VOL/BSA (35-75): 31.9 CM2
BH CV ECHO MEAS - LV MAX PG: 3.9 MMHG
BH CV ECHO MEAS - LV MEAN PG: 2 MMHG
BH CV ECHO MEAS - LV SYSTOLIC VOL/BSA (12-30): 14 CM2
BH CV ECHO MEAS - LV V1 MAX: 99.1 CM/SEC
BH CV ECHO MEAS - LV V1 VTI: 16 CM
BH CV ECHO MEAS - LVOT AREA: 3.5 CM2
BH CV ECHO MEAS - LVOT DIAM: 2.1 CM
BH CV ECHO MEAS - MED PEAK E' VEL: 6.6 CM/SEC
BH CV ECHO MEAS - MV A MAX VEL: 101 CM/SEC
BH CV ECHO MEAS - MV E MAX VEL: 55.7 CM/SEC
BH CV ECHO MEAS - MV E/A: 0.55
BH CV ECHO MEAS - PA ACC TIME: 0.08 SEC
BH CV ECHO MEAS - PI END-D VEL: 0 CM/SEC
BH CV ECHO MEAS - SI(MOD-SP4): 17.9 ML/M2
BH CV ECHO MEAS - SV(LVOT): 55.4 ML
BH CV ECHO MEAS - SV(MOD-SP4): 32.6 ML
BH CV ECHO MEAS - TAPSE (>1.6): 1.8 CM
BH CV ECHO MEASUREMENTS AVERAGE E/E' RATIO: 6.22
BILIRUB SERPL-MCNC: 0.5 MG/DL (ref 0–1.2)
BLD GP AB SCN SERPL QL: NEGATIVE
BUN SERPL-MCNC: 12 MG/DL (ref 8–23)
BUN SERPL-MCNC: 13 MG/DL (ref 8–23)
BUN/CREAT SERPL: 8.4 (ref 7–25)
BUN/CREAT SERPL: 9.8 (ref 7–25)
CALCIUM SPEC-SCNC: 10.2 MG/DL (ref 8.6–10.5)
CALCIUM SPEC-SCNC: 9.3 MG/DL (ref 8.6–10.5)
CHLORIDE SERPL-SCNC: 102 MMOL/L (ref 98–107)
CHLORIDE SERPL-SCNC: 106 MMOL/L (ref 98–107)
CHOLEST SERPL-MCNC: 140 MG/DL (ref 0–200)
CO2 SERPL-SCNC: 22.2 MMOL/L (ref 22–29)
CO2 SERPL-SCNC: 22.8 MMOL/L (ref 22–29)
CREAT SERPL-MCNC: 1.33 MG/DL (ref 0.76–1.27)
CREAT SERPL-MCNC: 1.43 MG/DL (ref 0.76–1.27)
DEPRECATED RDW RBC AUTO: 39.2 FL (ref 37–54)
EGFRCR SERPLBLD CKD-EPI 2021: 56.1 ML/MIN/1.73
EGFRCR SERPLBLD CKD-EPI 2021: 61.2 ML/MIN/1.73
EOSINOPHIL # BLD AUTO: 0.21 10*3/MM3 (ref 0–0.4)
EOSINOPHIL NFR BLD AUTO: 1.9 % (ref 0.3–6.2)
ERYTHROCYTE [DISTWIDTH] IN BLOOD BY AUTOMATED COUNT: 13.3 % (ref 12.3–15.4)
FLUAV RNA RESP QL NAA+PROBE: NOT DETECTED
FLUBV RNA ISLT QL NAA+PROBE: NOT DETECTED
GEN 5 2HR TROPONIN T REFLEX: 13 NG/L
GLOBULIN UR ELPH-MCNC: 3.2 GM/DL
GLUCOSE BLDC GLUCOMTR-MCNC: 139 MG/DL (ref 70–130)
GLUCOSE SERPL-MCNC: 101 MG/DL (ref 65–99)
GLUCOSE SERPL-MCNC: 116 MG/DL (ref 65–99)
HBA1C MFR BLD: 5.9 % (ref 4.8–5.6)
HCT VFR BLD AUTO: 43.3 % (ref 37.5–51)
HDLC SERPL-MCNC: 41 MG/DL (ref 40–60)
HGB BLD-MCNC: 14.7 G/DL (ref 13–17.7)
HOLD SPECIMEN: NORMAL
HOLD SPECIMEN: NORMAL
IMM GRANULOCYTES # BLD AUTO: 0.08 10*3/MM3 (ref 0–0.05)
IMM GRANULOCYTES NFR BLD AUTO: 0.7 % (ref 0–0.5)
INR PPP: 0.99 (ref 0.9–1.1)
LDLC SERPL CALC-MCNC: 80 MG/DL (ref 0–100)
LDLC/HDLC SERPL: 1.91 {RATIO}
LYMPHOCYTES # BLD AUTO: 3.73 10*3/MM3 (ref 0.7–3.1)
LYMPHOCYTES NFR BLD AUTO: 33.5 % (ref 19.6–45.3)
MAGNESIUM SERPL-MCNC: 1.8 MG/DL (ref 1.6–2.4)
MAGNESIUM SERPL-MCNC: 2.1 MG/DL (ref 1.6–2.4)
MCH RBC QN AUTO: 27.7 PG (ref 26.6–33)
MCHC RBC AUTO-ENTMCNC: 33.9 G/DL (ref 31.5–35.7)
MCV RBC AUTO: 81.5 FL (ref 79–97)
MONOCYTES # BLD AUTO: 0.9 10*3/MM3 (ref 0.1–0.9)
MONOCYTES NFR BLD AUTO: 8.1 % (ref 5–12)
NEUTROPHILS NFR BLD AUTO: 54.4 % (ref 42.7–76)
NEUTROPHILS NFR BLD AUTO: 6.07 10*3/MM3 (ref 1.7–7)
NRBC BLD AUTO-RTO: 0 /100 WBC (ref 0–0.2)
PLATELET # BLD AUTO: 529 10*3/MM3 (ref 140–450)
PMV BLD AUTO: 9.7 FL (ref 6–12)
POTASSIUM SERPL-SCNC: 3.4 MMOL/L (ref 3.5–5.2)
POTASSIUM SERPL-SCNC: 3.7 MMOL/L (ref 3.5–5.2)
PROT SERPL-MCNC: 7.6 G/DL (ref 6–8.5)
PROTHROMBIN TIME: 13.6 SECONDS (ref 12.1–14.7)
QT INTERVAL: 370 MS
QT INTERVAL: 408 MS
QTC INTERVAL: 459 MS
QTC INTERVAL: 464 MS
RBC # BLD AUTO: 5.31 10*6/MM3 (ref 4.14–5.8)
RH BLD: POSITIVE
RH BLD: POSITIVE
SARS-COV-2 RNA RESP QL NAA+PROBE: NOT DETECTED
SODIUM SERPL-SCNC: 137 MMOL/L (ref 136–145)
SODIUM SERPL-SCNC: 140 MMOL/L (ref 136–145)
T&S EXPIRATION DATE: NORMAL
TRIGL SERPL-MCNC: 104 MG/DL (ref 0–150)
TROPONIN T DELTA: -5 NG/L
TROPONIN T SERPL HS-MCNC: 18 NG/L
TROPONIN T SERPL HS-MCNC: 19 NG/L
VLDLC SERPL-MCNC: 19 MG/DL (ref 5–40)
WBC NRBC COR # BLD: 11.15 10*3/MM3 (ref 3.4–10.8)
WHOLE BLOOD HOLD COAG: NORMAL
WHOLE BLOOD HOLD SPECIMEN: NORMAL

## 2023-08-06 PROCEDURE — 87636 SARSCOV2 & INF A&B AMP PRB: CPT | Performed by: EMERGENCY MEDICINE

## 2023-08-06 PROCEDURE — 85610 PROTHROMBIN TIME: CPT | Performed by: EMERGENCY MEDICINE

## 2023-08-06 PROCEDURE — 0042T HC CT CEREBRAL PERFUSION W/WO CONTRAST: CPT

## 2023-08-06 PROCEDURE — 83735 ASSAY OF MAGNESIUM: CPT | Performed by: HOSPITALIST

## 2023-08-06 PROCEDURE — 70200 X-RAY EXAM OF EYE SOCKETS: CPT | Performed by: RADIOLOGY

## 2023-08-06 PROCEDURE — G0378 HOSPITAL OBSERVATION PER HR: HCPCS

## 2023-08-06 PROCEDURE — 86900 BLOOD TYPING SEROLOGIC ABO: CPT | Performed by: EMERGENCY MEDICINE

## 2023-08-06 PROCEDURE — 86901 BLOOD TYPING SEROLOGIC RH(D): CPT

## 2023-08-06 PROCEDURE — 86901 BLOOD TYPING SEROLOGIC RH(D): CPT | Performed by: EMERGENCY MEDICINE

## 2023-08-06 PROCEDURE — 84484 ASSAY OF TROPONIN QUANT: CPT | Performed by: HOSPITALIST

## 2023-08-06 PROCEDURE — 83036 HEMOGLOBIN GLYCOSYLATED A1C: CPT

## 2023-08-06 PROCEDURE — 85730 THROMBOPLASTIN TIME PARTIAL: CPT | Performed by: EMERGENCY MEDICINE

## 2023-08-06 PROCEDURE — 99285 EMERGENCY DEPT VISIT HI MDM: CPT

## 2023-08-06 PROCEDURE — 99223 1ST HOSP IP/OBS HIGH 75: CPT | Performed by: PSYCHIATRY & NEUROLOGY

## 2023-08-06 PROCEDURE — 70551 MRI BRAIN STEM W/O DYE: CPT

## 2023-08-06 PROCEDURE — 36415 COLL VENOUS BLD VENIPUNCTURE: CPT

## 2023-08-06 PROCEDURE — 85025 COMPLETE CBC W/AUTO DIFF WBC: CPT | Performed by: EMERGENCY MEDICINE

## 2023-08-06 PROCEDURE — 96372 THER/PROPH/DIAG INJ SC/IM: CPT

## 2023-08-06 PROCEDURE — 70551 MRI BRAIN STEM W/O DYE: CPT | Performed by: RADIOLOGY

## 2023-08-06 PROCEDURE — 93005 ELECTROCARDIOGRAM TRACING: CPT | Performed by: STUDENT IN AN ORGANIZED HEALTH CARE EDUCATION/TRAINING PROGRAM

## 2023-08-06 PROCEDURE — 93306 TTE W/DOPPLER COMPLETE: CPT

## 2023-08-06 PROCEDURE — 70498 CT ANGIOGRAPHY NECK: CPT | Performed by: RADIOLOGY

## 2023-08-06 PROCEDURE — 80061 LIPID PANEL: CPT

## 2023-08-06 PROCEDURE — 86850 RBC ANTIBODY SCREEN: CPT | Performed by: EMERGENCY MEDICINE

## 2023-08-06 PROCEDURE — 83735 ASSAY OF MAGNESIUM: CPT | Performed by: STUDENT IN AN ORGANIZED HEALTH CARE EDUCATION/TRAINING PROGRAM

## 2023-08-06 PROCEDURE — 70498 CT ANGIOGRAPHY NECK: CPT

## 2023-08-06 PROCEDURE — 86900 BLOOD TYPING SEROLOGIC ABO: CPT

## 2023-08-06 PROCEDURE — 70496 CT ANGIOGRAPHY HEAD: CPT | Performed by: RADIOLOGY

## 2023-08-06 PROCEDURE — 93005 ELECTROCARDIOGRAM TRACING: CPT | Performed by: EMERGENCY MEDICINE

## 2023-08-06 PROCEDURE — 70496 CT ANGIOGRAPHY HEAD: CPT

## 2023-08-06 PROCEDURE — 84484 ASSAY OF TROPONIN QUANT: CPT | Performed by: EMERGENCY MEDICINE

## 2023-08-06 PROCEDURE — 70450 CT HEAD/BRAIN W/O DYE: CPT

## 2023-08-06 PROCEDURE — 25510000001 IOPAMIDOL PER 1 ML: Performed by: EMERGENCY MEDICINE

## 2023-08-06 PROCEDURE — 71045 X-RAY EXAM CHEST 1 VIEW: CPT

## 2023-08-06 PROCEDURE — 71045 X-RAY EXAM CHEST 1 VIEW: CPT | Performed by: RADIOLOGY

## 2023-08-06 PROCEDURE — 99233 SBSQ HOSP IP/OBS HIGH 50: CPT | Performed by: STUDENT IN AN ORGANIZED HEALTH CARE EDUCATION/TRAINING PROGRAM

## 2023-08-06 PROCEDURE — 25010000002 HEPARIN (PORCINE) PER 1000 UNITS: Performed by: HOSPITALIST

## 2023-08-06 PROCEDURE — 0042T CT CEREBRAL PERFUSION W WO CONTRAST: CPT | Performed by: RADIOLOGY

## 2023-08-06 PROCEDURE — 70200 X-RAY EXAM OF EYE SOCKETS: CPT

## 2023-08-06 PROCEDURE — 80053 COMPREHEN METABOLIC PANEL: CPT | Performed by: HOSPITALIST

## 2023-08-06 RX ORDER — BISACODYL 5 MG/1
5 TABLET, DELAYED RELEASE ORAL DAILY PRN
Status: DISCONTINUED | OUTPATIENT
Start: 2023-08-06 | End: 2023-08-07 | Stop reason: HOSPADM

## 2023-08-06 RX ORDER — ASPIRIN 81 MG/1
81 TABLET, CHEWABLE ORAL DAILY
Status: DISCONTINUED | OUTPATIENT
Start: 2023-08-07 | End: 2023-08-07 | Stop reason: HOSPADM

## 2023-08-06 RX ORDER — AMOXICILLIN 250 MG
2 CAPSULE ORAL 2 TIMES DAILY
Status: DISCONTINUED | OUTPATIENT
Start: 2023-08-06 | End: 2023-08-07 | Stop reason: HOSPADM

## 2023-08-06 RX ORDER — BISACODYL 10 MG
10 SUPPOSITORY, RECTAL RECTAL DAILY PRN
Status: DISCONTINUED | OUTPATIENT
Start: 2023-08-06 | End: 2023-08-07 | Stop reason: HOSPADM

## 2023-08-06 RX ORDER — SODIUM CHLORIDE 0.9 % (FLUSH) 0.9 %
10 SYRINGE (ML) INJECTION AS NEEDED
Status: DISCONTINUED | OUTPATIENT
Start: 2023-08-06 | End: 2023-08-07 | Stop reason: HOSPADM

## 2023-08-06 RX ORDER — POTASSIUM CHLORIDE 20 MEQ/1
20 TABLET, EXTENDED RELEASE ORAL ONCE
Status: COMPLETED | OUTPATIENT
Start: 2023-08-06 | End: 2023-08-06

## 2023-08-06 RX ORDER — ASPIRIN 81 MG/1
324 TABLET, CHEWABLE ORAL ONCE
Status: COMPLETED | OUTPATIENT
Start: 2023-08-06 | End: 2023-08-06

## 2023-08-06 RX ORDER — ATORVASTATIN CALCIUM 40 MG/1
40 TABLET, FILM COATED ORAL NIGHTLY
Status: DISCONTINUED | OUTPATIENT
Start: 2023-08-06 | End: 2023-08-07 | Stop reason: HOSPADM

## 2023-08-06 RX ORDER — HEPARIN SODIUM 5000 [USP'U]/ML
5000 INJECTION, SOLUTION INTRAVENOUS; SUBCUTANEOUS EVERY 12 HOURS SCHEDULED
Status: DISCONTINUED | OUTPATIENT
Start: 2023-08-06 | End: 2023-08-07 | Stop reason: HOSPADM

## 2023-08-06 RX ORDER — SODIUM CHLORIDE 9 MG/ML
40 INJECTION, SOLUTION INTRAVENOUS AS NEEDED
Status: DISCONTINUED | OUTPATIENT
Start: 2023-08-06 | End: 2023-08-07 | Stop reason: HOSPADM

## 2023-08-06 RX ORDER — POLYETHYLENE GLYCOL 3350 17 G/17G
17 POWDER, FOR SOLUTION ORAL DAILY PRN
Status: DISCONTINUED | OUTPATIENT
Start: 2023-08-06 | End: 2023-08-07 | Stop reason: HOSPADM

## 2023-08-06 RX ORDER — SODIUM CHLORIDE 0.9 % (FLUSH) 0.9 %
10 SYRINGE (ML) INJECTION EVERY 12 HOURS SCHEDULED
Status: DISCONTINUED | OUTPATIENT
Start: 2023-08-06 | End: 2023-08-07 | Stop reason: HOSPADM

## 2023-08-06 RX ORDER — SODIUM CHLORIDE 9 MG/ML
75 INJECTION, SOLUTION INTRAVENOUS CONTINUOUS
Status: DISCONTINUED | OUTPATIENT
Start: 2023-08-06 | End: 2023-08-07 | Stop reason: HOSPADM

## 2023-08-06 RX ORDER — NITROGLYCERIN 0.4 MG/1
0.4 TABLET SUBLINGUAL
Status: DISCONTINUED | OUTPATIENT
Start: 2023-08-06 | End: 2023-08-07 | Stop reason: HOSPADM

## 2023-08-06 RX ADMIN — DOCUSATE SODIUM 50 MG AND SENNOSIDES 8.6 MG 2 TABLET: 8.6; 5 TABLET, FILM COATED ORAL at 08:04

## 2023-08-06 RX ADMIN — IOPAMIDOL 150 ML: 755 INJECTION, SOLUTION INTRAVENOUS at 01:42

## 2023-08-06 RX ADMIN — POTASSIUM CHLORIDE 20 MEQ: 1500 TABLET, EXTENDED RELEASE ORAL at 05:24

## 2023-08-06 RX ADMIN — Medication 10 ML: at 08:05

## 2023-08-06 RX ADMIN — HEPARIN SODIUM 5000 UNITS: 5000 INJECTION INTRAVENOUS; SUBCUTANEOUS at 21:42

## 2023-08-06 RX ADMIN — SODIUM CHLORIDE 75 ML/HR: 9 INJECTION, SOLUTION INTRAVENOUS at 08:01

## 2023-08-06 RX ADMIN — ATORVASTATIN CALCIUM 40 MG: 40 TABLET, FILM COATED ORAL at 05:35

## 2023-08-06 RX ADMIN — ATORVASTATIN CALCIUM 40 MG: 40 TABLET, FILM COATED ORAL at 21:42

## 2023-08-06 RX ADMIN — HEPARIN SODIUM 5000 UNITS: 5000 INJECTION INTRAVENOUS; SUBCUTANEOUS at 08:04

## 2023-08-06 RX ADMIN — SODIUM CHLORIDE 1000 ML: 9 INJECTION, SOLUTION INTRAVENOUS at 03:47

## 2023-08-06 RX ADMIN — Medication 10 ML: at 21:44

## 2023-08-06 RX ADMIN — ASPIRIN 324 MG: 81 TABLET, CHEWABLE ORAL at 05:23

## 2023-08-06 NOTE — PROGRESS NOTES
Patient seen and examined this morning in follow up for TIA vs CVA after admission early this morning. MRI performed today and showed no acute abnormalities. Echo today showed normal LV systolic function, LV EF 56-60%, grade I diastolic dysfunction, saline tests negative for evidence of cardiac shunt. Neurology has evaluated the patient and feel his symptoms were likely due to TIA, small vessel disease. Recommendation is to continue DAPT for 21 days and then continue aspirin thereafter. Continue monitoring on telemetry. Patient's was noted to have acute renal insufficiency on admission this morning with creatinine 1.43. Baseline creatinine is unclear as last labs available in epic prior to this admission were from 2019, but in [id number] creatinine was 0.8 to 1.20. Continue IV fluids for now and repeat chemistry panel in a.m. If renal function is stable/improving and he has no recurrence of neurological symptoms he likely will be stable for discharge tomorrow.    Berta Alfred DO  08/06/23 13:46 EDT

## 2023-08-06 NOTE — PROGRESS NOTES
Stroke Progress Note       Chief Complaint:  right side weakness    Subjective    Subjective     Subjective:  No weakness today     Review of Systems   Constitutional: No fatigue  HENT: Negative for nosebleeds and rhinorrhea.    Eyes: Negative for redness.   Respiratory: Negative for cough.    Gastrointestinal: Negative for anal bleeding.   Endocrine: Negative for polydipsia.   Genitourinary: Negative for enuresis and urgency.   Musculoskeletal: Negative for joint swelling.   Neurological: Negative for tremors.   Psychiatric/Behavioral: Negative for hallucinations.      Objective      Temp:  [98.1 øF (36.7 øC)-98.2 øF (36.8 øC)] 98.1 øF (36.7 øC)  Heart Rate:  [] 78  Resp:  [20] 20  BP: (114-169)/(77-99) 162/92    NEURO( Exam is performed with help of hospital staff at bed side and observed by me via audio-video interface)    MENTAL STATUS: AAOx3, memory intact, fund of knowledge appropriate    LANG/SPEECH: Naming and repetition intact, fluent, follows 3-step commands    CRANIAL NERVES:    Pupils equal and reactive, EOMI intact, no gaze deviation, no nystagmus  No facial droop, cough and gag intact, shoulder shrug intact, tongue midline     MOTOR:  Moves all extremities equally    SENSORY: Normal to light touch all throughout     COORDINATION: Normal finger to nose and heel to shin, no tremor, no dysmetria    STATION: Not assessed due to patient condition    GAIT: Not assessed due to patient condition     Results Review:    I reviewed the patient's new clinical results.    Lab Results (last 24 hours)       Procedure Component Value Units Date/Time    High Sensitivity Troponin T [[id number]]  (Abnormal) Collected: 08/06/23 0628    Specimen: Blood from Arm, Right Updated: 08/06/23 0741     HS Troponin T 18 ng/L     Narrative:      High Sensitive Troponin T Reference Range:  <10.0 ng/L- Negative Female for AMI  <15.0 ng/L- Negative Male for AMI  >=10 - Abnormal Female indicating possible myocardial injury.  >=15 -  Abnormal Male indicating possible myocardial injury.   Clinicians would have to utilize clinical acumen, EKG, Troponin, and serial changes to determine if it is an Acute Myocardial Infarction or myocardial injury due to an underlying chronic condition.         Lipid Panel [[id number]] Collected: 08/06/23 0628    Specimen: Blood from Arm, Right Updated: 08/06/23 0652     Total Cholesterol 140 mg/dL      Triglycerides 104 mg/dL      HDL Cholesterol 41 mg/dL      LDL Cholesterol  80 mg/dL      VLDL Cholesterol 19 mg/dL      LDL/HDL Ratio 1.91    Narrative:      Cholesterol Reference Ranges  (U.S. Department of Health and Human Services ATP III Classifications)    Desirable          <200 mg/dL  Borderline High    200-239 mg/dL  High Risk          >240 mg/dL      Triglyceride Reference Ranges  (U.S. Department of Health and Human Services ATP III Classifications)    Normal           <150 mg/dL  Borderline High  150-199 mg/dL  High             200-499 mg/dL  Very High        >500 mg/dL    HDL Reference Ranges  (U.S. Department of Health and Human Services ATP III Classifications)    Low     <40 mg/dl (major risk factor for CHD)  High    >60 mg/dl ('negative' risk factor for CHD)        LDL Reference Ranges  (U.S. Department of Health and Human Services ATP III Classifications)    Optimal          <100 mg/dL  Near Optimal     100-129 mg/dL  Borderline High  130-159 mg/dL  High             160-189 mg/dL  Very High        >189 mg/dL    COVID PRE-OP / PRE-PROCEDURE SCREENING ORDER (NO ISOLATION) - Swab, Nasopharynx [[id number]]  (Normal) Collected: 08/06/23 0617    Specimen: Swab from Nasopharynx Updated: 08/06/23 0649    Narrative:      The following orders were created for panel order COVID PRE-OP / PRE-PROCEDURE SCREENING ORDER (NO ISOLATION) - Swab, Nasopharynx.  Procedure                               Abnormality         Status                     ---------                               -----------         ------                      COVID-19 and FLU A/B PCR...[[id number]]  Normal              Final result                 Please view results for these tests on the individual orders.    COVID-19 and FLU A/B PCR - Swab, Nasopharynx [[id number]]  (Normal) Collected: 08/06/23 0617    Specimen: Swab from Nasopharynx Updated: 08/06/23 0649     COVID19 Not Detected     Influenza A PCR Not Detected     Influenza B PCR Not Detected    Narrative:      Fact sheet for providers: https://www.fda.gov/media/636256/download    Fact sheet for patients: https://www.fda.gov/media/351098/download    Test performed by PCR.    Hemoglobin A1c [[id number]]  (Abnormal) Collected: 08/06/23 0628    Specimen: Blood from Arm, Right Updated: 08/06/23 0643     Hemoglobin A1C 5.90 %     Narrative:      Hemoglobin A1C Ranges:    Increased Risk for Diabetes  5.7% to 6.4%  Diabetes                     >= 6.5%  Diabetic Goal                < 7.0%    Magnesium [[id number]]  (Normal) Collected: 08/06/23 0137    Specimen: Blood from Arm, Right Updated: 08/06/23 0525     Magnesium 1.8 mg/dL     [facility] [[id number]] Collected: 08/06/23 0137    Specimen: Blood from Arm, Right Updated: 08/06/23 0245    Narrative:      The following orders were created for panel order [facility].  Procedure                               Abnormality         Status                     ---------                               -----------         ------                     Green Top (Gel)[[id number]]                                  Final result               Lavender Top[[id number]]                                     Final result               Gold Top - SST[[id number]]                                   Final result               Light Blue Top[[id number]]                                   Final result                 Please view results for these tests on the individual orders.    Green Top (Gel) [[id number]] Collected: 08/06/23 0137    Specimen: Blood from Arm, Right Updated: 08/06/23 0245     Extra  Tube Hold for add-ons.     Comment: Auto resulted.       Lavender Top [[id number]] Collected: 08/06/23 0137    Specimen: Blood from Arm, Right Updated: 08/06/23 0245     Extra Tube hold for add-on     Comment: Auto resulted       Gold Top - SST [[id number]] Collected: 08/06/23 0137    Specimen: Blood from Arm, Right Updated: 08/06/23 0245     Extra Tube Hold for add-ons.     Comment: Auto resulted.       Light Blue Top [[id number]] Collected: 08/06/23 0137    Specimen: Blood from Arm, Right Updated: 08/06/23 0245     Extra Tube Hold for add-ons.     Comment: Auto resulted       Comprehensive Metabolic Panel [[id number]]  (Abnormal) Collected: 08/06/23 0137    Specimen: Blood from Arm, Right Updated: 08/06/23 0209     Glucose 116 mg/dL      BUN 12 mg/dL      Creatinine 1.43 mg/dL      Sodium 140 mmol/L      Potassium 3.4 mmol/L      Comment: Slight hemolysis detected by analyzer. Results may be affected.        Chloride 102 mmol/L      CO2 22.2 mmol/L      Calcium 10.2 mg/dL      Total Protein 7.6 g/dL      Albumin 4.4 g/dL      ALT (SGPT) 23 U/L      AST (SGOT) 19 U/L      Alkaline Phosphatase 73 U/L      Total Bilirubin 0.5 mg/dL      Globulin 3.2 gm/dL      A/G Ratio 1.4 g/dL      BUN/Creatinine Ratio 8.4     Anion Gap 15.8 mmol/L      eGFR 56.1 mL/min/1.73     Narrative:      GFR Normal >60  Chronic Kidney Disease <60  Kidney Failure <15      Single High Sensitivity Troponin T [[id number]]  (Abnormal) Collected: 08/06/23 0137    Specimen: Blood from Arm, Right Updated: 08/06/23 0209     HS Troponin T 19 ng/L     Narrative:      High Sensitive Troponin T Reference Range:  <10.0 ng/L- Negative Female for AMI  <15.0 ng/L- Negative Male for AMI  >=10 - Abnormal Female indicating possible myocardial injury.  >=15 - Abnormal Male indicating possible myocardial injury.   Clinicians would have to utilize clinical acumen, EKG, Troponin, and serial changes to determine if it is an Acute Myocardial Infarction or myocardial  injury due to an underlying chronic condition.         Protime-INR [[id number]]  (Normal) Collected: 08/06/23 0137    Specimen: Blood from Arm, Right Updated: 08/06/23 0208     Protime 13.6 Seconds      INR 0.99    Narrative:      Suggested INR therapeutic range for stable oral anticoagulant therapy:    Low Intensity therapy:   1.5-2.0  Moderate Intensity therapy:   2.0-3.0  High Intensity therapy:   2.5-4.0    aPTT [[id number]]  (Abnormal) Collected: 08/06/23 0137    Specimen: Blood from Arm, Right Updated: 08/06/23 0208     PTT 37.7 seconds     Narrative:      PTT Heparin Therapeutic Range:  59 - 95 seconds      POC Glucose Once [[id number]]  (Abnormal) Resulted: 08/06/23 0131    Specimen: Blood Updated: 08/06/23 0142     Glucose 139 mg/dL     CBC & Differential [[id number]]  (Abnormal) Collected: 08/06/23 0137    Specimen: Blood from Arm, Right Updated: 08/06/23 0142    Narrative:      The following orders were created for panel order CBC & Differential.  Procedure                               Abnormality         Status                     ---------                               -----------         ------                     CBC Auto Differential[[id number]]        Abnormal            Final result                 Please view results for these tests on the individual orders.    CBC Auto Differential [[id number]]  (Abnormal) Collected: 08/06/23 0137    Specimen: Blood from Arm, Right Updated: 08/06/23 0142     WBC 11.15 10*3/mm3      RBC 5.31 10*6/mm3      Hemoglobin 14.7 g/dL      Hematocrit 43.3 %      MCV 81.5 fL      MCH 27.7 pg      MCHC 33.9 g/dL      RDW 13.3 %      RDW-SD 39.2 fl      MPV 9.7 fL      Platelets 529 10*3/mm3      Neutrophil % 54.4 %      Lymphocyte % 33.5 %      Monocyte % 8.1 %      Eosinophil % 1.9 %      Basophil % 1.4 %      Immature Grans % 0.7 %      Neutrophils, Absolute 6.07 10*3/mm3      Lymphocytes, Absolute 3.73 10*3/mm3      Monocytes, Absolute 0.90 10*3/mm3      Eosinophils, Absolute  0.21 10*3/mm3      Basophils, Absolute 0.16 10*3/mm3      Immature Grans, Absolute 0.08 10*3/mm3      nRBC 0.0 /100 WBC           XR Orbits 4+ View    Result Date: 8/6/2023   1.  No acute fracture or dislocation. 2.  No acute foreign body. 3.  No foreign body identified in the orbits. 4.  Cleared for MRI.  This report was finalized on 8/6/2023 7:51 AM by Gera Richey MD.      CT Angiogram Neck    Result Date: 8/6/2023   1.  Normal CTA examination of the neck. 2.  No occluded arterial segment. 3.  No dissection.   This report was finalized on 8/6/2023 3:26 AM by Gera Richey MD.      MRI Brain Without Contrast    Result Date: 8/6/2023  Impression:  No acute intracranial abnormality.,  This report was finalized on 8/6/2023 10:44 AM by Mario Cuevas MD.      XR Chest 1 View    Result Date: 8/6/2023   NO ACUTE ABNORMALITY IN THE CHEST.  This report was finalized on 8/6/2023 6:47 AM by Andie Connelly MD.      CT Head Without Contrast Stroke Protocol    Result Date: 8/6/2023   1.  Mild generalized brain volume loss with mild chronic small vessel ischemic type changes in the white matter. 2.  Lacunar infarcts in right basal ganglia could represent subacute to chronic infarcts. 3.  No acute hemorrhage, mass, or edema. 4.  No hydrocephalus. 5.  Minimal mucosal thickening in the ethmoid air cells.  This report was finalized on 8/6/2023 1:56 AM by Gera Richey MD.      CT Angiogram Head w AI Analysis of LVO    Result Date: 8/6/2023   1.  Normal CTA examination of the brain. 2.  No large vessel occlusion. 3.  No aneurysm or vascular malformation.  This report was finalized on 8/6/2023 3:25 AM by Gera iRchey MD.      CT CEREBRAL PERFUSION WITH & WITHOUT CONTRAST    Result Date: 8/6/2023   1.  No evidence of ischemia. 2.  Normal brain perfusion scan.    This report was finalized on 8/6/2023 3:22 AM by Gera Richey MD.     Results for orders placed during the hospital encounter of 07/22/20    Adult Transthoracic Echo  Complete W/ Cont if Necessary Per Protocol    Interpretation Summary  ú Left ventricular systolic function is normal. Estimated EF appears to be in the range of 56 - 60%  ú Left ventricular diastolic dysfunction (grade I) consistent with impaired relaxation.  ú No significant functional valvular abnormalities  ú There is no evidence of pericardial effusion            Assessment/Plan     Assessment/Plan:    Transient ischemic attack, likely small vessel disease   -DAPT for 21 days followed by aspirin full dose.     Hypertension- normal blood pressure goals     Hyperlipidemia- continue high intensity statin Lipitor 80     No furtherwork up from stroke stand point.       Discussed signs and symptoms of stroke and when to call 911. Instructed to follow a low fat diet including the Mediterranean diet. Instructed to take all medications daily as prescribed. Encouraged 30 minutes of exercise 3-4 times a week as tolerated. Stay well hydrated. Discussed potential side effects of new medications and signs and symptoms to report.   Patient  verbalizes understanding and agrees with plan.        Cullen Boggs MD  08/06/23  11:02 EDT  Visit is via A/V interface

## 2023-08-06 NOTE — CONSULTS
DOS: 2023  NAME: Jose Bobo   : 1965  PCP: Nancy Majano APRN  CC: Right-sided weakness  Referring MD: Vladimir Torres MD, Butch Rios MD    Neurological Problem and Interval History:  58 y.o. right-handed white male with a Hx of hypertension, hyperlipidemia, arthritis and prior stroke noticed 2 days ago that he was weak on the right side and was falling and was brought to the emergency room.  Code stroke was initially called but was canceled because it was not clear whether it is exactly a code the reason being the events started happening almost 2 days ago.  The initial CT of the brain was reviewed which was showing an old lacunar infarct in the right basal ganglia.  The CT angiogram of the head and neck with contrast did not show any large vessel occlusion and it was noted that the patient was feeling better and his vitals are all stable and there was no speech impediment and no facial droop noticeable and no numbness in any extremities or face noted.  There was no drift noticeable in any of his extremities and he was able to get out of the bed and stand up by the side of the bed independently and his Romberg was negative.  I discussed with Dr. Rios the emergency room physician that at this point NIH stroke scale is 0 and as all these events happened 2 days ago we would like to get the MRI performed, and if it is unremarkable he can go back to the institution from very came and get work-up as an outpatient if so desired.  As he passed his bedside swallow he was given a baby aspirin.    Past Medical/Surgical Hx:  Past Medical History:   Diagnosis Date    Arthritis     Elevated cholesterol     GERD (gastroesophageal reflux disease)     Hypertension     Right-sided lacunar infarction 2023     Past Surgical History:   Procedure Laterality Date    COLONOSCOPY      COLONOSCOPY N/A 2018    Procedure: COLONOSCOPY;  Surgeon: Negrito Goldberg MD;  Location: [facility];   Service: Gastroenterology    ENDOSCOPY N/A 8/16/2018    Procedure: ESOPHAGOGASTRODUODENOSCOPY WITH ANESTHESIA;  Surgeon: Negrito Goldberg MD;  [place] [facility] [place];  Service: Gastroenterology    NECK SURGERY      VASECTOMY  1993       Review of Systems:    Constitutional: Cooperative gentleman currently laying in the gurney very comfortably and was able to sit up without any issues.  Cardiovascular: No chest pain or palpitations noted.  Respiratory: No shortness of breath noted.  Gastrointestinal: No nausea and vomiting noted.  Genitourinary: No bladder incontinence noted.  Musculoskeletal: No aches and pains in the muscles or joints noted.  Dermatological: No skin breakdown noted.  Neurological: No focal neurological deficits with NIH stroke scale of 0.  Psychiatric: No major anxiety or depression noted.  Ophthalmological: No visual changes noted.          Medications On Admission  Medications Prior to Admission   Medication Sig Dispense Refill Last Dose    albuterol (ACCUNEB) 1.25 MG/3ML nebulizer solution        amLODIPine (NORVASC) 5 MG tablet Take 1 tablet by mouth Daily. (Patient taking differently: Take 10 mg by mouth Daily.) 30 tablet 5     diphenoxylate-atropine (LOMOTIL) 2.5-0.025 MG per tablet        hydroCHLOROthiazide (MICROZIDE) 12.5 MG capsule Take 1 capsule by mouth Daily. 30 capsule 5     ipratropium (ATROVENT) 0.02 % nebulizer solution        ketorolac (TORADOL) 10 MG tablet Take 1 tablet by mouth Every 6 (Six) Hours As Needed for Moderate Pain . 12 tablet 0     labetalol (NORMODYNE) 300 MG tablet Take 300 mg by mouth 2 (Two) Times a Day.       lamoTRIgine (LaMICtal) 150 MG tablet Take 1 tablet by mouth Daily. 30 tablet 2     loratadine (CLARITIN) 10 MG tablet        ondansetron ODT (ZOFRAN-ODT) 8 MG disintegrating tablet        pantoprazole (PROTONIX) 40 MG EC tablet Take 40 mg by mouth Daily.       potassium chloride (K-DUR) 10 MEQ CR tablet Take 10 mEq by mouth 1 (One) Time.        Pramipexole Dihydrochloride (MIRAPEX PO) Take  by mouth.       QUEtiapine (SEROquel) 100 MG tablet Take 1 tablet by mouth At Night As Needed (sleep/anxiety/paranoia). 30 tablet 2     sertraline (ZOLOFT) 100 MG tablet Take 2 tablets by mouth Daily. 60 tablet 2     tamsulosin (FLOMAX) 0.4 MG capsule 24 hr capsule        tiZANidine (ZANAFLEX) 4 MG tablet        traZODone (DESYREL) 50 MG tablet Take 1-2 tablets by mouth At Night As Needed for Sleep. 60 tablet 2     varenicline (CHANTIX STARTING MONTH PAK) 0.5 MG X 11 & 1 MG X 42 tablet Take 0.5 mg one daily on days 1-3 and and 0.5 mg twice daily on days 4-7.Then 1 mg twice daily for a total of 12 weeks. 53 tablet 0        Allergies:  No Known Allergies    Social Hx:  Social History     Socioeconomic History    Marital status:    Tobacco Use    Smoking status: Every Day     Packs/day: 0.25     Years: 30.00     Pack years: 7.50     Types: Cigarettes    Smokeless tobacco: Never    Tobacco comments:     states 2 cigarettes a day   Substance and Sexual Activity    Alcohol use: No    Drug use: No    Sexual activity: Defer     Birth control/protection: None       Family Hx:  Family History   Problem Relation Age of Onset    Cancer Sister         breast    Hypertension Other     Heart disease Neg Hx        Review of Imaging (Interpretation of images not reports): The CT angiogram of the head was noted as follows:    FINDINGS:     Patent distal vertebral arteries.  Patent supraclinoid ICA segments.  Patent right and left anterior cerebral arteries, middle cerebral  arteries, and posterior cerebral arteries.  No large vessel occlusion identified.  No aneurysm or vascular malformation.  Patent anterior communicating artery.  The right and left posterior communicating arteries may be patent but  are very small in size.     IMPRESSION:     1.  Normal CTA examination of the brain.  2.  No large vessel occlusion.  3.  No aneurysm or vascular malformation.    CT Head Without  Contrast Stroke Protocol    Result Date: 8/6/2023  The brain performed without IV contrast on August 6, 2023. Examination was performed with 3 mm axial imaging and 3 mm sagittal and coronal reconstruction images. Examination was performed according to as low as reasonably achievable protocol.  HISTORY: Neurologic deficit. Acute onset symptoms. Possible stroke.  FINDINGS:  Mild generalized brain volume loss consistent with age. Vague low attenuation area noted in the right basal ganglia suspicious for small subacute chronic lacunar infarcts seen best on image 17, series 2. No hydrocephalus. No shift of midline structures. Minimal mucosal thickening in the ethmoid air cells. Clear mastoid air cells. No fracture or foreign body.      Impression:  1.  Mild generalized brain volume loss with mild chronic small vessel ischemic type changes in the white matter. 2.  Lacunar infarcts in right basal ganglia could represent subacute to chronic infarcts. 3.  No acute hemorrhage, mass, or edema. 4.  No hydrocephalus. 5.  Minimal mucosal thickening in the ethmoid air cells.  This report was finalized on 8/6/2023 1:56 AM by Gera Richey MD.          The CT angiogram of the neck was also reviewed that showed the following:        FINDINGS:     Patent aortic arch with no dissection.  Patent right and left common carotid arteries and vertebral arteries.  Patent right and left internal and external carotid arteries with no  focal stenosis.  The vertebral arteries are symmetric in size.  No dissection.  Patent basilar artery.  Patent supraclinoid ICA segments.     IMPRESSION:     1.  Normal CTA examination of the neck.  2.  No occluded arterial segment.  3.  No dissection.    Additional Tests Performed: The CT perfusion scan was reviewed that shows the following:    FINDINGS:     There is symmetric blood flow and blood volume of both cerebral  hemispheres. No focal abnormality and MTT and TTP color maps. No  evidence of ischemia.    "  IMPRESSION:     1.  No evidence of ischemia.  2.  Normal brain perfusion scan.    Results for orders placed during the hospital encounter of 07/22/20    Adult Transthoracic Echo Complete W/ Cont if Necessary Per Protocol    Interpretation Summary  ú Left ventricular systolic function is normal. Estimated EF appears to be in the range of 56 - 60%  ú Left ventricular diastolic dysfunction (grade I) consistent with impaired relaxation.  ú No significant functional valvular abnormalities  ú There is no evidence of pericardial effusion          Laboratory Results:   Lab Results   Component Value Date    GLUCOSE 116 (H) 08/06/2023    CALCIUM 10.2 08/06/2023     08/06/2023    K 3.4 (L) 08/06/2023    CO2 22.2 08/06/2023     08/06/2023    BUN 12 08/06/2023    CREATININE 1.43 (H) 08/06/2023    EGFRIFNONA 63 10/28/2019    BCR 8.4 08/06/2023    ANIONGAP 15.8 (H) 08/06/2023     Lab Results   Component Value Date    WBC 11.15 (H) 08/06/2023    HGB 14.7 08/06/2023    HCT 43.3 08/06/2023    MCV 81.5 08/06/2023     (H) 08/06/2023     Lab Results   Component Value Date    CHOL 140 08/06/2023     Lab Results   Component Value Date    HDL 41 08/06/2023    HDL 44 (L) 02/09/2015    HDL 41 (L) 06/10/2014     Lab Results   Component Value Date    LDL 80 08/06/2023     (H) 02/09/2015     (H) 06/10/2014     Lab Results   Component Value Date    TRIG 104 08/06/2023    TRIG 206 (H) 02/09/2015    TRIG 287 (H) 06/10/2014     Lab Results   Component Value Date    HGBA1C 5.90 (H) 08/06/2023     Lab Results   Component Value Date    INR 0.99 08/06/2023    INR 0.94 10/28/2019    PROTIME 13.6 08/06/2023    PROTIME 13.0 10/28/2019            Physical Examination:  /98   Pulse 85   Temp 98.2 øF (36.8 øC) (Oral)   Resp 20   Ht 167.6 cm (66\")   Wt 79.4 kg (175 lb)   SpO2 100%   BMI 28.25 kg/mý   General Appearance:   Well developed, well nourished, well groomed, alert, and " cooperative.  HEENT: Normocephalic.  Has corrective lenses.  Neck and Spine: Normal range of motion.  Normal alignment. No mass or tenderness. No bruits.    Extremities:    No edema or deformities. Normal joint ROM.   Skin:    No rashes or birth marks.    Neurological examination:  Higher Integrative  Function: Oriented to time, place and person. Normal registration, recall, attention span and concentration. Normal language including comprehension, spontaneous speech, repetition, reading, writing, naming and vocabulary. No neglect with normal visual-spatial function and construction. Normal fund of knowledge and higher integrative function.  CN II:  Pupils are equal, round, and reactive to light. Normal visual acuity and visual fields.    CN III IV VI: Extraocular movements are full without nystagmus.   CN V:  Normal facial sensation and strength of muscles of mastication.  CN VII:  Facial movements are symmetric. No weakness.  CN VIII: Auditory acuity is normal.  CN IX & X: Symmetric palatal movement.  CN XI:  Sternocleidomastoid and trapezius are normal.  No weakness.  CN XII:  The tongue is midline.  No atrophy or fasciculations.  Motor:  Normal muscle strength, bulk and tone in upper and lower extremities.  No fasciculations, rigidity, spasticity, or abnormal movements.  Sensation: Normal to light touch, pinprick, vibration, temperature, and proprioception in arms and legs. Normal graphesthesia and no extinction on DSS.  Station and Gait: Normal gait and station.  The Romberg is negative.  Coordination:  Finger to nose test shows no dysmetria.  Rapid alternating movements are normal.  Heel to shin normal.    NIHSS:    Baseline  0-->Alert: keenly responsive  0-->Answers both questions correctly  0-->Performs both tasks correctly  0=normal  0=No visual loss  0=Normal symmetric movement  0-->No drift: limb holds 90 (or 45) degrees for full 10 secs  0-->No drift: limb holds 90 (or 45) degrees for full 10  secs  0-->No drift: limb holds 90 (or 45) degrees for full 10 secs  0-->No drift: limb holds 90 (or 45) degrees for full 10 secs  0=Absent  0=Normal; no sensory loss  0=No aphasia, normal  0=Normal  0=No abnormality    Total score: 0    Diagnoses / Discussion:  58 y.o. who presents with Sx of strokelike symptoms which were not present when I evaluated the patient.  When Dr. Rios saw the patient he did not appreciate much weakness.  The current NIH stroke scale is 0.    Plan:  Aspirin 81 mg  Plavix 75 mg with a loading dose of 300 mg initially as he passed the bedside swallow.  Hydrate  Neuro checks  Check lipid panel, Hgb A1C  Lipitor 80 mg  Non-pharmacological DVT prophylaxis  TTE  MRI of the brain with stroke protocol currently pending  Telemetry Unit  PT/OT/ST  Stroke Education  Blood pressure control : Keep the systolic blood pressure between 120 and 140 and the diastolic between 70 and 80.  Goal LDL <70-recommend high dose statins-   Serum glucose < 140     Discussed signs and symptoms of stroke and when to call 911. Instructed to follow a low fat diet including the Mediterranean diet. Instructed to take all medications daily as prescribed. Encouraged 30 minutes of exercise 3-4 times a week as tolerated. Stay well hydrated. Discussed potential side effects of new medications and signs and symptoms to report. If patient is currently using tobacco products, smoking cessation was encouraged. Reviewed stroke risk factors and stroke prevention plan. Patient and the emergency room team verbalizes understanding and agrees with plan.     I have discussed the above with the patient and Dr. Butch Rios, the emergency room physician  Time spent with patient: 70 minutes in face-to-face evaluation and management of the patient using the dedicated telemedicine device without any interruption with the help of Yana the emergency room nurse with the patient located at the [facility] and myself at  a remote location.    Electronically signed by Julia Evangelista MD, 08/06/23, 7:20 AM EDT.    Dictated using Dragon dictation.

## 2023-08-06 NOTE — PLAN OF CARE
Goal Outcome Evaluation:    Pt is resting in bed with no s/s of distress noted. VSS. IV access maintained. IV fluids infusing per order. Guard at bedside. Will continue with plan of care.

## 2023-08-06 NOTE — PAYOR COMM NOTE
"CONTACT:  JOYCE BARRETT MSN, RN  UTILIZATION MANAGEMENT DEPT.  [facility]  [address] [facility], [address]  PHONE:  [phone]  FAX: [phone]    CLINICAL FOR INPATIENT AUTHORIZATION REQUEST    REFERENCE # [id number]     Beba Noguera (58 y.o. Male)       Date of Birth   1965    Social Security Number       Address   [address] [place] [id number]    Home Phone   [phone]    MRN   [id number]       Hinduism   Adventism    Marital Status                               Admission Date   8/6/23    Admission Type   Emergency    Admitting Provider   Vladimir Torres MD    Attending Provider   Vladimir Torres MD    Department, Room/Bed   [facility] EMERGENCY DEPARTMENT, 102/02       Discharge Date       Discharge Disposition       Discharge Destination                                 Attending Provider: Vladimir Torres MD    Allergies: No Known Allergies    Isolation: None   Infection: COVID Screen (preop/placement) (08/06/23)   Code Status: CPR    Ht: 167.6 cm (66\")   Wt: 79.4 kg (175 lb)    Admission Cmt: None   Principal Problem: Right-sided lacunar infarction [I63.81]                   Active Insurance as of 8/6/2023       Primary Coverage       Payor Plan Insurance Group Employer/Plan Group    [facility]        Payor Plan Address Payor Plan Phone Number Payor Plan Fax Number Effective Dates    [address] [phone]      [facility] [id number]         Subscriber Name Subscriber Birth Date Member ID       BEBA NOGUERA 1965                Secondary Coverage       Payor Plan Insurance Group Employer/Plan Group    [place] [employer] MEDICARE REPLACEMENT [facility] DUAL COMPLETE MEDICARE REPLACEMENT [id number]       Payor Plan Address Payor Plan Phone Number Payor Plan Fax Number Effective Dates    [address] [phone]  1/1/2023 - None Entered    [facility] [address]  " "       Subscriber Name Subscriber Birth Date Member ID       BEBA NOGUERA 1965 [id number]                     Emergency Contacts        (Rel.) Home Phone Work Phone Mobile Phone    Vivien Noguera (Spouse) [phone] -- --                 History & Physical        Kindra Kaufman PA-C at 23 0512               [facility] Medicine Services  HISTORY & PHYSICAL    Patient Identification:  Name:  Beba Noguera  Age:  58 y.o.  Sex:  male  :  1965  MRN:  [id number]   Visit Number:  [id number]  Admit Date: 2023   Primary Care Physician:  Nancy Majano APRN     Subjective     Chief complaint:   Chief Complaint   Patient presents with    Stroke     History of presenting illness:   Patient is a 58 y.o. male with past medical history significant for hypertension, hyperlipidemia, GERD, arthritis that presented to the [facility] emergency department for evaluation of right-sided weakness.  [facility] with complaints of right-sided weakness.  Code stroke was called.  Work-up in ED significant for subacute versus chronic lacunar infarcts right basal ganglia.  Neurology was consulted in the ED, however have not received response from neurology for recommendations.    Patient examined at bedside in ED. patient states 2 nights ago he felt \"off\", but last night he began to notice the right side of his face was weak and felt like he could not fully open his right eye.  He states he did not necessarily feel weak in his right extremities, but they did not feel completely normal.  He states his symptoms have improved throughout the night.  Denies any similar symptoms in the past.  Denies any known history of prior stroke.  Patient does note a history of kidney disease, he states he has an appointment later this month to assess his kidneys.    Upon arrival to the ED, vitals were temperature 98.2, , RR 20, /82, SPO2 96% " on room air.  Labs significant for potassium 3.4, creatinine 1.43, GFR 56.1, platelets 529.    CT head without contrast shows mild generalized brain volume loss with mild chronic small vessel ischemic type changes in the white matter.  Lacunar infarcts in the right basal ganglia could represent subacute to chronic infarcts.  No acute hemorrhage, mass, or edema.  No hydrocephalus.  Minimal mucosal thickening in the ethmoid air cells.    CT angiogram of head shows normal CTA examination of the brain.  No large vessel occlusion.  No aneurysm or vascular malformation.  CT angiogram of neck shows normal CT examination of the neck.  No occluded arterial segment.  No dissection.    CT perfusion shows no evidence of ischemia.  Normal brain perfusion scan.    Patient has been admitted to the Telemetry unit.   ---------------------------------------------------------------------------------------------------------------------   Review of Systems   Constitutional:  Negative for chills, diaphoresis, fatigue and fever.   Eyes:  Negative for visual disturbance.   Respiratory:  Negative for cough, shortness of breath and wheezing.    Cardiovascular:  Negative for chest pain, palpitations and leg swelling.   Gastrointestinal:  Negative for abdominal pain, constipation, diarrhea, nausea and vomiting.   Genitourinary:  Negative for difficulty urinating, dysuria and flank pain.   Musculoskeletal:  Negative for arthralgias, myalgias and neck stiffness.   Skin:  Negative for rash and wound.   Neurological:  Positive for weakness (right sided facial weakness) and light-headedness. Negative for dizziness.   Psychiatric/Behavioral:  Negative for agitation and confusion.     ---------------------------------------------------------------------------------------------------------------------   Past Medical History:   Diagnosis Date    Arthritis     Elevated cholesterol     GERD (gastroesophageal reflux disease)     Hypertension      Right-sided lacunar infarction 8/6/2023     Past Surgical History:   Procedure Laterality Date    COLONOSCOPY      COLONOSCOPY N/A 8/16/2018    Procedure: COLONOSCOPY;  Surgeon: Negrito Goldberg MD;  Location: [facility];  Service: Gastroenterology    ENDOSCOPY N/A 8/16/2018    Procedure: ESOPHAGOGASTRODUODENOSCOPY WITH ANESTHESIA;  Surgeon: Negrito Goldberg MD;  Location: [facility];  Service: Gastroenterology    NECK SURGERY      VASECTOMY  1993     Family History   Problem Relation Age of Onset    Cancer Sister         breast    Hypertension Other     Heart disease Neg Hx      Social History     Socioeconomic History    Marital status:    Tobacco Use    Smoking status: Every Day     Packs/day: 0.25     Years: 30.00     Pack years: 7.50     Types: Cigarettes    Smokeless tobacco: Never    Tobacco comments:     states 2 cigarettes a day   Substance and Sexual Activity    Alcohol use: No    Drug use: No    Sexual activity: Defer     Birth control/protection: None     ---------------------------------------------------------------------------------------------------------------------   Allergies:  Patient has no known allergies.  ---------------------------------------------------------------------------------------------------------------------   Medications below are reported home medications pulling from within the system; at this time, these medications have not been reconciled unless otherwise specified and are in the verification process for further verifcation as current home medications.    Prior to Admission Medications       Prescriptions Last Dose Informant Patient Reported? Taking?    albuterol (ACCUNEB) 1.25 MG/3ML nebulizer solution   Yes No    amLODIPine (NORVASC) 5 MG tablet   No No    Take 1 tablet by mouth Daily.    Patient taking differently:  Take 10 mg by mouth Daily.    diphenoxylate-atropine (LOMOTIL) 2.5-0.025 MG per tablet   Yes No    hydroCHLOROthiazide (MICROZIDE) 12.5 MG  capsule   No No    Take 1 capsule by mouth Daily.    ipratropium (ATROVENT) 0.02 % nebulizer solution   Yes No    ketorolac (TORADOL) 10 MG tablet   No No    Take 1 tablet by mouth Every 6 (Six) Hours As Needed for Moderate Pain .    labetalol (NORMODYNE) 300 MG tablet  Pharmacy Yes No    Take 300 mg by mouth 2 (Two) Times a Day.    lamoTRIgine (LaMICtal) 150 MG tablet   No No    Take 1 tablet by mouth Daily.    loratadine (CLARITIN) 10 MG tablet   Yes No    ondansetron ODT (ZOFRAN-ODT) 8 MG disintegrating tablet   Yes No    pantoprazole (PROTONIX) 40 MG EC tablet  Pharmacy Yes No    Take 40 mg by mouth Daily.    potassium chloride (K-DUR) 10 MEQ CR tablet  Self Yes No    Take 10 mEq by mouth 1 (One) Time.    Pramipexole Dihydrochloride (MIRAPEX PO)   Yes No    Take  by mouth.    QUEtiapine (SEROquel) 100 MG tablet   No No    Take 1 tablet by mouth At Night As Needed (sleep/anxiety/paranoia).    sertraline (ZOLOFT) 100 MG tablet   No No    Take 2 tablets by mouth Daily.    tamsulosin (FLOMAX) 0.4 MG capsule 24 hr capsule   Yes No    tiZANidine (ZANAFLEX) 4 MG tablet   Yes No    traZODone (DESYREL) 50 MG tablet   No No    Take 1-2 tablets by mouth At Night As Needed for Sleep.    varenicline (CHANTIX STARTING MONTH DINO) 0.5 MG X 11 & 1 MG X 42 tablet   No No    Take 0.5 mg one daily on days 1-3 and and 0.5 mg twice daily on days 4-7.Then 1 mg twice daily for a total of 12 weeks.          ---------------------------------------------------------------------------------------------------------------------    Objective     [facility] Scheduled Meds:  [START ON 8/7/2023] aspirin, 81 mg, Oral, Daily  atorvastatin, 40 mg, Oral, Nightly           Current listed hospital scheduled medications may not yet reflect those currently placed in orders that are signed and held, awaiting patient's arrival to floor/unit.     ---------------------------------------------------------------------------------------------------------------------   Vital Signs:  Temp:  [98.2 øF (36.8 øC)] 98.2 øF (36.8 øC)  Heart Rate:  [] 84  Resp:  [20] 20  BP: (114-149)/(77-99) 145/89  Mean Arterial Pressure (Non-Invasive) for the past 24 hrs (Last 3 readings):   Noninvasive MAP (mmHg)   08/06/23 0400 123   08/06/23 0345 113   08/06/23 0330 99     SpO2 Percentage    08/06/23 0330 08/06/23 0345 08/06/23 0400   SpO2: 97% 100% 100%     SpO2:  [96 %-100 %] 100 %  on   ;   Device (Oxygen Therapy): room air    Body mass index is 28.25 kg/mý.  Wt Readings from Last 3 Encounters:   08/06/23 79.4 kg (175 lb)   10/28/21 89.8 kg (198 lb)   01/06/21 89.4 kg (197 lb)     ---------------------------------------------------------------------------------------------------------------------   Physical Exam:  Physical Exam  Constitutional:       General: He is not in acute distress.     Appearance: Normal appearance.   HENT:      Head: Normocephalic and atraumatic.      Right Ear: External ear normal.      Left Ear: External ear normal.      Nose: No nasal deformity.      Mouth/Throat:      Lips: Pink.      Mouth: Mucous membranes are moist.   Eyes:      Extraocular Movements: Extraocular movements intact.      Conjunctiva/sclera: Conjunctivae normal.      Pupils: Pupils are equal, round, and reactive to light.   Cardiovascular:      Rate and Rhythm: Normal rate and regular rhythm.      Pulses:           Dorsalis pedis pulses are 2+ on the right side and 2+ on the left side.      Heart sounds: Normal heart sounds.   Pulmonary:      Effort: Pulmonary effort is normal.      Breath sounds: Normal breath sounds. No wheezing, rhonchi or rales.   Abdominal:      General: Abdomen is flat. Bowel sounds are normal.      Palpations: Abdomen is soft.      Tenderness: There is no guarding or rebound.   Genitourinary:     Comments: No ulloa catheter in place   Musculoskeletal:       Cervical back: Neck supple. Normal range of motion.      Right lower leg: No edema.      Left lower leg: No edema.   Skin:     General: Skin is warm and dry.   Neurological:      General: No focal deficit present.      Mental Status: He is alert and oriented to person, place, and time.      Cranial Nerves: No facial asymmetry.      Sensory: No sensory deficit.      Motor: No weakness.   Psychiatric:         Mood and Affect: Mood normal.         Behavior: Behavior normal.     ---------------------------------------------------------------------------------------------------------------------  EKG: Sinus rhythm.  ST depression in anterior leads.  Waiting formal cardiology read.        I have personally reviewed the EKG/Telemetry strip  ---------------------------------------------------------------------------------------------------------------------   Results from last 7 days   Lab Units 08/06/23 0137   HSTROP T ng/L 19*           Results from last 7 days   Lab Units 08/06/23 0137   WBC 10*3/mm3 11.15*   HEMOGLOBIN g/dL 14.7   HEMATOCRIT % 43.3   MCV fL 81.5   MCHC g/dL 33.9   PLATELETS 10*3/mm3 529*   INR  0.99     Results from last 7 days   Lab Units 08/06/23 0137   SODIUM mmol/L 140   POTASSIUM mmol/L 3.4*   CHLORIDE mmol/L 102   CO2 mmol/L 22.2   BUN mg/dL 12   CREATININE mg/dL 1.43*   CALCIUM mg/dL 10.2   GLUCOSE mg/dL 116*   ALBUMIN g/dL 4.4   BILIRUBIN mg/dL 0.5   ALK PHOS U/L 73   AST (SGOT) U/L 19   ALT (SGPT) U/L 23   Estimated Creatinine Clearance: 54.4 mL/min (A) (by C-G formula based on SCr of 1.43 mg/dL (H)).  No results found for: AMMONIA    Glucose   Date/Time Value Ref Range Status   08/06/2023 0131 139 (A) 70 - 130 mg/dL Final     No results found for: HGBA1C  Lab Results   Component Value Date    TSH 1.162 01/20/2015    FREET4 1.19 06/10/2014       No results found for: BLOODCX  No results found for: URINECX  No results found for: WOUNDCX  No results found for: STOOLCX  No results found  for: RESPCX  No results found for: MRSACX  Pain Management Panel  More data may exist         Latest Ref Rng & Units 10/8/2019 9/6/2014   Pain Management Panel   Creatinine, Urine mg/dL 49.4  -   Amphetamine, Urine Qual NEGATIVE - Negative    Barbiturates Screen, Urine NEGATIVE - Negative    Benzodiazepine Screen, Urine NEGATIVE - Negative    Cocaine Screen, Urine NEGATIVE - Negative    Methadone Screen , Urine NEGATIVE - Negative      I have personally reviewed the above laboratory results.   ---------------------------------------------------------------------------------------------------------------------  Imaging Results (Last 7 Days)       Procedure Component Value Units Date/Time    XR Chest 1 View [[id number]] Resulted: 08/06/23 0242     Updated: 08/06/23 0334    CT Angiogram Neck [[id number]] Collected: 08/06/23 0325     Updated: 08/06/23 0328    Narrative:      Procedure: CT angiography of the neck performed with IV contrast on  August 6, 2023. The examination was performed with 0.3 mm axial imaging  and sagittal and coronal reconstruction images. 3-D surface display  images were performed projected in multiple planes. The patient was  administered 150 cc of Isovue 370 contrast IV without complication.     HISTORY: Neurologic deficit. Possible stroke.     FINDINGS:     Patent aortic arch with no dissection.  Patent right and left common carotid arteries and vertebral arteries.  Patent right and left internal and external carotid arteries with no  focal stenosis.  The vertebral arteries are symmetric in size.  No dissection.  Patent basilar artery.  Patent supraclinoid ICA segments.       Impression:         1.  Normal CTA examination of the neck.  2.  No occluded arterial segment.  3.  No dissection.        This report was finalized on 8/6/2023 3:26 AM by Gera Richey MD.       CT Angiogram Head w AI Analysis of LVO [[id number]] Collected: 08/06/23 0323     Updated: 08/06/23 0327    Narrative:       Procedure: CT angiography of the brain performed with IV contrast on  August 6, 2023. The patient was administered 150 cc of Isovue 370  contrast IV without complication. The examination was performed with  thin section 0.75 mm axial imaging and sagittal and coronal  reconstruction images. 3-D surface display images were performed  projected in multiple planes.     HISTORY: Neurologic deficit. Possible stroke.     FINDINGS:     Patent distal vertebral arteries.  Patent supraclinoid ICA segments.  Patent right and left anterior cerebral arteries, middle cerebral  arteries, and posterior cerebral arteries.  No large vessel occlusion identified.  No aneurysm or vascular malformation.  Patent anterior communicating artery.  The right and left posterior communicating arteries may be patent but  are very small in size.       Impression:         1.  Normal CTA examination of the brain.  2.  No large vessel occlusion.  3.  No aneurysm or vascular malformation.     This report was finalized on 8/6/2023 3:25 AM by Gera Richey MD.       CT CEREBRAL PERFUSION WITH & WITHOUT CONTRAST [[id number]] Collected: 08/06/23 0320     Updated: 08/06/23 0325    Narrative:      Procedure: CT perfusion examination performed with dynamic CT imaging of  the brain during infusion of IV contrast. Images were acquired with  exposure modulation control and/or intraoperative reconstruction. CT  perfusion imaging analysis protocol performed utilizing color mapping.  The patient was administered IV contrast without complication.     HISTORY: Possible stroke.     FINDINGS:     There is symmetric blood flow and blood volume of both cerebral  hemispheres. No focal abnormality and MTT and TTP color maps. No  evidence of ischemia.       Impression:         1.  No evidence of ischemia.  2.  Normal brain perfusion scan.           This report was finalized on 8/6/2023 3:22 AM by Gera Richey MD.       CT Head Without Contrast Stroke Protocol [[id number]]  Collected: 08/06/23 0154     Updated: 08/06/23 0158    Narrative:      The brain performed without IV contrast on August 6, 2023. Examination  was performed with 3 mm axial imaging and 3 mm sagittal and coronal  reconstruction images. Examination was performed according to as low as  reasonably achievable protocol.     HISTORY: Neurologic deficit. Acute onset symptoms. Possible stroke.     FINDINGS:     Mild generalized brain volume loss consistent with age.  Vague low attenuation area noted in the right basal ganglia suspicious  for small subacute chronic lacunar infarcts seen best on image 17,  series 2.  No hydrocephalus.  No shift of midline structures.  Minimal mucosal thickening in the ethmoid air cells.  Clear mastoid air cells.  No fracture or foreign body.       Impression:         1.  Mild generalized brain volume loss with mild chronic small vessel  ischemic type changes in the white matter.  2.  Lacunar infarcts in right basal ganglia could represent subacute to  chronic infarcts.  3.  No acute hemorrhage, mass, or edema.  4.  No hydrocephalus.  5.  Minimal mucosal thickening in the ethmoid air cells.     This report was finalized on 8/6/2023 1:56 AM by Gera Richey MD.             I have personally reviewed the above radiology results.     Last Echocardiogram:  Results for orders placed during the hospital encounter of 07/22/20    Adult Transthoracic Echo Complete W/ Cont if Necessary Per Protocol    Interpretation Summary  ú Left ventricular systolic function is normal. Estimated EF appears to be in the range of 56 - 60%  ú Left ventricular diastolic dysfunction (grade I) consistent with impaired relaxation.  ú No significant functional valvular abnormalities  ú There is no evidence of pericardial effusion    ---------------------------------------------------------------------------------------------------------------------    Assessment & Plan      Active Hospital Problems    Diagnosis  POA     **Right-sided lacunar infarction [I63.81]  Yes     TIA vs CVA, POA  Hyperlipidemia  Patient presented with symptoms of right-sided weakness in the ED  Imagining showed no acute versus chronic lacunar infarct in right basal ganglia  No neurology recommendations at this time.    Inpatient neurology consult, assistance appreciated  ASA and statin  PT/OT/SLP consults  Case management consulted  Check HA1c and lipid panel   TTE and MRI ordered  Neuro checks every 2 hrs  NHISS every shift change  Continuous cardiac monitoring  Renal insufficiency, chronicity unknown, POA  Mild hypokalemia, POA  Patient presented with creatinine 1.43, GFR 56.1.  Most recent labs for comparison from 2019 unsure of baseline renal function.  Patient received 1 L fluid bolus in the ED  Continuous IV NS at 75 mL/h  Potassium replacement ordered  Recheck CMP    F/E/N: NS at 75 mL/h.  Continue to monitor electrolytes and replace as indicated.  N.p.o. pending RN dysphagia screen.    ---------------------------------------------------  DVT Prophylaxis: Subcu heparin  GI Prophylaxis: Bowel regimen  Activity: Up with assistance    The patient is considered to be a high risk patient due to: High for CVA    INPATIENT status due to the need for care which can only be reasonably provided in an hospital setting such as aggressive/expedited ancillary services and/or consultation services, the necessity for IV medications, close physician monitoring and/or the possible need for procedures.  In such, I feel patient's risk for adverse outcomes and need for care warrant INPATIENT evaluation and predict the patient's care encounter to likely last beyond 2 midnights.      Code Status: Full code    Disposition/Discharge planning: pending clinical course    I have discussed the patient's assessment and plan with Dr. Torres.      Kindra Kaufman PA-C  Hospitalist Service -- [facility]       08/06/23  05:25 EDT    Attending Physician:   Brian.      Electronically signed by Kindra Kaufman PA-C at 08/06/23 0553          Emergency Department Notes        Saúl Tee PCT at 08/06/23 0118          Pt left for CT with ER staff    Electronically signed by Saúl Tee PCT at 08/06/23 0121       Saúl Tee PCT at 08/06/23 0117          Called Code Kurt per Dr. Armstrong    Electronically signed by Saúl Tee PCT at 08/06/23 0121       Vital Signs (last day)       Date/Time Temp Temp src Pulse Resp BP Patient Position SpO2    08/06/23 0530 -- -- 91 -- 169/97 -- 98    08/06/23 0400 -- -- 84 -- 145/89 -- 100    08/06/23 0345 -- -- 92 -- 129/99 -- 100    08/06/23 0330 -- -- 93 -- 114/87 -- 97    08/06/23 0315 -- -- 90 -- 129/88 -- 100    08/06/23 0300 -- -- 94 -- 126/84 -- 100    08/06/23 0245 -- -- 97 -- 114/77 -- 100    08/06/23 0230 -- -- 101 -- 149/95 -- 99    08/06/23 0215 -- -- 103 -- 125/83 -- 99    08/06/23 0150 98.2 (36.8) Oral 105 20 145/82 Lying 96          Intake & Output (last day)       None          Medication Administration Report for Jeramie Jose as of 08/06/23 0605     Legend:    Given Hold Not Given Due Canceled Entry Other Actions    Time Time (Time) Time Time-Action         Discontinued     Completed     Future     MAR Hold     Linked             Medications 08/05/23 08/06/23      atorvastatin (LIPITOR) tablet 40 mg  Dose: 40 mg  Freq: Nightly Route: PO  Start: 08/06/23 0525   Admin Instructions:   Avoid grapefruit juice.     0535-Given     2100             Future Medications  Medications 08/05/23 08/06/23       aspirin chewable tablet 81 mg  Dose: 81 mg  Freq: Daily Route: PO  Start: 08/07/23 0900   Admin Instructions:   Herbal/drug interaction: Avoid use with ginkgo biloba. Based on patient request - if ordered for moderate or severe pain, provider allows for administration of a medication prescribed for a lower pain scale.  Do not exceed 4 grams of aspirin in a 24 hr period.    If given for pain, use the following  pain scale:   Mild Pain = Pain Score of 1-3, CPOT 1-2  Moderate Pain = Pain Score of 4-6, CPOT 3-4  Severe Pain = Pain Score of 7-10, CPOT 5-8        Completed Medications  Medications 08/05/23 08/06/23       aspirin chewable tablet 324 mg  Dose: 324 mg  Freq: Once Route: PO  Start: 08/06/23 0524   End: 08/06/23 0523   Admin Instructions:   Herbal/drug interaction: Avoid use with ginkgo biloba. Based on patient request - if ordered for moderate or severe pain, provider allows for administration of a medication prescribed for a lower pain scale.  Do not exceed 4 grams of aspirin in a 24 hr period.    If given for pain, use the following pain scale:   Mild Pain = Pain Score of 1-3, CPOT 1-2  Moderate Pain = Pain Score of 4-6, CPOT 3-4  Severe Pain = Pain Score of 7-10, CPOT 5-8     0523-Given               iopamidol (ISOVUE-370) 76 % injection 100 mL  Dose: 100 mL  Freq: Once in Imaging Route: IV  Start: 08/06/23 0157   End: 08/06/23 0142     0142-Given               potassium chloride (K-DUR,KLOR-CON) CR tablet 20 mEq  Dose: 20 mEq  Freq: Once Route: PO  Start: 08/06/23 0531   End: 08/06/23 0524 0524-Given               sodium chloride 0.9 % bolus 1,000 mL  Dose: 1,000 mL  Freq: Once Route: IV  Start: 08/06/23 0351   End: 08/06/23 0417     0347-New Bag     0417-Stopped                        and   Medication Administration Report for Jose Bobo as of 08/06/23 0605     Legend:    Given Hold Not Given Due Canceled Entry Other Actions    Time Time (Time) Time Time-Action         Discontinued     Completed     Future     MAR Hold     Linked             Medications 08/05/23 08/06/23               Lab Results (all)       Procedure Component Value Units Date/Time    Magnesium [[id number]]  (Normal) Collected: 08/06/23 0137    Specimen: Blood from Arm, Right Updated: 08/06/23 0525     Magnesium 1.8 mg/dL     [place] Draw [[id number]] Collected: 08/06/23 0137    Specimen: Blood from Arm, Right Updated: 08/06/23 0245     Narrative:      The following orders were created for panel order [place] Draw.  Procedure                               Abnormality         Status                     ---------                               -----------         ------                     Green Top (Gel)[[id number]]                                  Final result               Lavender Top[[id number]]                                     Final result               Gold Top - SST[[id number]]                                   Final result               Light Blue Top[[id number]]                                   Final result                 Please view results for these tests on the individual orders.    Green Top (Gel) [[id number]] Collected: 08/06/23 0137    Specimen: Blood from Arm, Right Updated: 08/06/23 0245     Extra Tube Hold for add-ons.     Comment: Auto resulted.       Lavender Top [[id number]] Collected: 08/06/23 0137    Specimen: Blood from Arm, Right Updated: 08/06/23 0245     Extra Tube hold for add-on     Comment: Auto resulted       Gold Top - SST [[id number]] Collected: 08/06/23 0137    Specimen: Blood from Arm, Right Updated: 08/06/23 0245     Extra Tube Hold for add-ons.     Comment: Auto resulted.       Light Blue Top [[id number]] Collected: 08/06/23 0137    Specimen: Blood from Arm, Right Updated: 08/06/23 0245     Extra Tube Hold for add-ons.     Comment: Auto resulted       Comprehensive Metabolic Panel [[id number]]  (Abnormal) Collected: 08/06/23 0137    Specimen: Blood from Arm, Right Updated: 08/06/23 0209     Glucose 116 mg/dL      BUN 12 mg/dL      Creatinine 1.43 mg/dL      Sodium 140 mmol/L      Potassium 3.4 mmol/L      Comment: Slight hemolysis detected by analyzer. Results may be affected.        Chloride 102 mmol/L      CO2 22.2 mmol/L      Calcium 10.2 mg/dL      Total Protein 7.6 g/dL      Albumin 4.4 g/dL      ALT (SGPT) 23 U/L      AST (SGOT) 19 U/L      Alkaline Phosphatase 73 U/L      Total Bilirubin 0.5 mg/dL      Globulin  3.2 gm/dL      A/G Ratio 1.4 g/dL      BUN/Creatinine Ratio 8.4     Anion Gap 15.8 mmol/L      eGFR 56.1 mL/min/1.73     Narrative:      GFR Normal >60  Chronic Kidney Disease <60  Kidney Failure <15      Single High Sensitivity Troponin T [[id number]]  (Abnormal) Collected: 08/06/23 0137    Specimen: Blood from Arm, Right Updated: 08/06/23 0209     HS Troponin T 19 ng/L     Narrative:      High Sensitive Troponin T Reference Range:  <10.0 ng/L- Negative Female for AMI  <15.0 ng/L- Negative Male for AMI  >=10 - Abnormal Female indicating possible myocardial injury.  >=15 - Abnormal Male indicating possible myocardial injury.   Clinicians would have to utilize clinical acumen, EKG, Troponin, and serial changes to determine if it is an Acute Myocardial Infarction or myocardial injury due to an underlying chronic condition.         Protime-INR [[id number]]  (Normal) Collected: 08/06/23 0137    Specimen: Blood from Arm, Right Updated: 08/06/23 0208     Protime 13.6 Seconds      INR 0.99    Narrative:      Suggested INR therapeutic range for stable oral anticoagulant therapy:    Low Intensity therapy:   1.5-2.0  Moderate Intensity therapy:   2.0-3.0  High Intensity therapy:   2.5-4.0    aPTT [[id number]]  (Abnormal) Collected: 08/06/23 0137    Specimen: Blood from Arm, Right Updated: 08/06/23 0208     PTT 37.7 seconds     Narrative:      PTT Heparin Therapeutic Range:  59 - 95 seconds      POC Glucose Once [[id number]]  (Abnormal) Resulted: 08/06/23 0131    Specimen: Blood Updated: 08/06/23 0142     Glucose 139 mg/dL     CBC & Differential [[id number]]  (Abnormal) Collected: 08/06/23 0137    Specimen: Blood from Arm, Right Updated: 08/06/23 0142    Narrative:      The following orders were created for panel order CBC & Differential.  Procedure                               Abnormality         Status                     ---------                               -----------         ------                     CBC Auto  Differential[[id number]]        Abnormal            Final result                 Please view results for these tests on the individual orders.    CBC Auto Differential [[id number]]  (Abnormal) Collected: 08/06/23 0137    Specimen: Blood from Arm, Right Updated: 08/06/23 0142     WBC 11.15 10*3/mm3      RBC 5.31 10*6/mm3      Hemoglobin 14.7 g/dL      Hematocrit 43.3 %      MCV 81.5 fL      MCH 27.7 pg      MCHC 33.9 g/dL      RDW 13.3 %      RDW-SD 39.2 fl      MPV 9.7 fL      Platelets 529 10*3/mm3      Neutrophil % 54.4 %      Lymphocyte % 33.5 %      Monocyte % 8.1 %      Eosinophil % 1.9 %      Basophil % 1.4 %      Immature Grans % 0.7 %      Neutrophils, Absolute 6.07 10*3/mm3      Lymphocytes, Absolute 3.73 10*3/mm3      Monocytes, Absolute 0.90 10*3/mm3      Eosinophils, Absolute 0.21 10*3/mm3      Basophils, Absolute 0.16 10*3/mm3      Immature Grans, Absolute 0.08 10*3/mm3      nRBC 0.0 /100 WBC           Imaging Results (All)       Procedure Component Value Units Date/Time    XR Orbits 4+ View [[id number]] Resulted: 08/06/23 0551     Updated: 08/06/23 0551    MRI Brain Without Contrast [[id number]] Resulted: 08/06/23 0539     Updated: 08/06/23 0539    XR Chest 1 View [[id number]] Resulted: 08/06/23 0242     Updated: 08/06/23 0334    CT Angiogram Neck [[id number]] Collected: 08/06/23 0325     Updated: 08/06/23 0328    Narrative:      Procedure: CT angiography of the neck performed with IV contrast on  August 6, 2023. The examination was performed with 0.3 mm axial imaging  and sagittal and coronal reconstruction images. 3-D surface display  images were performed projected in multiple planes. The patient was  administered 150 cc of Isovue 370 contrast IV without complication.     HISTORY: Neurologic deficit. Possible stroke.     FINDINGS:     Patent aortic arch with no dissection.  Patent right and left common carotid arteries and vertebral arteries.  Patent right and left internal and external carotid arteries  with no  focal stenosis.  The vertebral arteries are symmetric in size.  No dissection.  Patent basilar artery.  Patent supraclinoid ICA segments.       Impression:         1.  Normal CTA examination of the neck.  2.  No occluded arterial segment.  3.  No dissection.        This report was finalized on 8/6/2023 3:26 AM by Gera Richey MD.       CT Angiogram Head w AI Analysis of LVO [[id number]] Collected: 08/06/23 0323     Updated: 08/06/23 0327    Narrative:      Procedure: CT angiography of the brain performed with IV contrast on  August 6, 2023. The patient was administered 150 cc of Isovue 370  contrast IV without complication. The examination was performed with  thin section 0.75 mm axial imaging and sagittal and coronal  reconstruction images. 3-D surface display images were performed  projected in multiple planes.     HISTORY: Neurologic deficit. Possible stroke.     FINDINGS:     Patent distal vertebral arteries.  Patent supraclinoid ICA segments.  Patent right and left anterior cerebral arteries, middle cerebral  arteries, and posterior cerebral arteries.  No large vessel occlusion identified.  No aneurysm or vascular malformation.  Patent anterior communicating artery.  The right and left posterior communicating arteries may be patent but  are very small in size.       Impression:         1.  Normal CTA examination of the brain.  2.  No large vessel occlusion.  3.  No aneurysm or vascular malformation.     This report was finalized on 8/6/2023 3:25 AM by Gera Richey MD.       CT CEREBRAL PERFUSION WITH & WITHOUT CONTRAST [[id number]] Collected: 08/06/23 0320     Updated: 08/06/23 0325    Narrative:      Procedure: CT perfusion examination performed with dynamic CT imaging of  the brain during infusion of IV contrast. Images were acquired with  exposure modulation control and/or intraoperative reconstruction. CT  perfusion imaging analysis protocol performed utilizing color mapping.  The patient was  administered IV contrast without complication.     HISTORY: Possible stroke.     FINDINGS:     There is symmetric blood flow and blood volume of both cerebral  hemispheres. No focal abnormality and MTT and TTP color maps. No  evidence of ischemia.       Impression:         1.  No evidence of ischemia.  2.  Normal brain perfusion scan.           This report was finalized on 8/6/2023 3:22 AM by Gera Richey MD.       CT Head Without Contrast Stroke Protocol [[id number]] Collected: 08/06/23 0154     Updated: 08/06/23 0158    Narrative:      The brain performed without IV contrast on August 6, 2023. Examination  was performed with 3 mm axial imaging and 3 mm sagittal and coronal  reconstruction images. Examination was performed according to as low as  reasonably achievable protocol.     HISTORY: Neurologic deficit. Acute onset symptoms. Possible stroke.     FINDINGS:     Mild generalized brain volume loss consistent with age.  Vague low attenuation area noted in the right basal ganglia suspicious  for small subacute chronic lacunar infarcts seen best on image 17,  series 2.  No hydrocephalus.  No shift of midline structures.  Minimal mucosal thickening in the ethmoid air cells.  Clear mastoid air cells.  No fracture or foreign body.       Impression:         1.  Mild generalized brain volume loss with mild chronic small vessel  ischemic type changes in the white matter.  2.  Lacunar infarcts in right basal ganglia could represent subacute to  chronic infarcts.  3.  No acute hemorrhage, mass, or edema.  4.  No hydrocephalus.  5.  Minimal mucosal thickening in the ethmoid air cells.     This report was finalized on 8/6/2023 1:56 AM by Gera Richey MD.             Orders (all)        Start     Ordered    08/07/23 0900  aspirin chewable tablet 81 mg  Daily         08/06/23 0509    08/06/23 0600  Lipid Panel  Morning Draw         08/06/23 0559    08/06/23 0600  Hemoglobin A1c  Morning Draw         08/06/23 0559     08/06/23 0554  COVID PRE-OP / PRE-PROCEDURE SCREENING ORDER (NO ISOLATION) - Swab, Nasopharynx  Once         08/06/23 0553    08/06/23 0554  COVID-19 and FLU A/B PCR - Swab, Nasopharynx  PROCEDURE ONCE         08/06/23 0554    08/06/23 0549  XR Orbits 4+ View  1 Time Imaging         08/06/23 0549    08/06/23 0531  potassium chloride (K-DUR,KLOR-CON) CR tablet 20 mEq  Once         08/06/23 0515    08/06/23 0525  atorvastatin (LIPITOR) tablet 40 mg  Nightly         08/06/23 0509    08/06/23 0524  aspirin chewable tablet 324 mg  Once         08/06/23 0508    08/06/23 0515  Magnesium  STAT         08/06/23 0514    08/06/23 0514  Magnesium Low Dose Replacement - Follow Nurse / BPA Driven Protocol  As Needed         08/06/23 0514    08/06/23 0513  Code Status and Medical Interventions:  Continuous         08/06/23 0516    08/06/23 0512  Inpatient Admission  Once         08/06/23 0516    08/06/23 0506  MRI Brain Without Contrast  1 Time Imaging         08/06/23 0506    08/06/23 0351  sodium chloride 0.9 % bolus 1,000 mL  Once         08/06/23 0335    08/06/23 0242  ABO RH Specimen Verification  STAT         08/06/23 0241    08/06/23 0200  Neuro Checks  Every 2 Hours      Comments: On arrival and handoff, increase frequency as clinically indicated or for thrombolytic administration, otherwise Q2 hours. Documentation of arrival assessment and any neuro change required.    08/06/23 0122    08/06/23 0157  iopamidol (ISOVUE-370) 76 % injection 100 mL  Once in Imaging         08/06/23 0141    08/06/23 0123  Initiate Department's Acute Stroke Process (Team D, Code 19, etc.)  Once         08/06/23 0122    08/06/23 0123  Inpatient Neurology Consult Stroke  Once        Specialty:  Neurology  Provider:  (Not yet assigned)    08/06/23 0122    08/06/23 0123  Inpatient Neurology Consult Stroke  Once        Specialty:  Neurology  Provider:  (Not yet assigned)    08/06/23 0122    08/06/23 0123  Perform NIH Stroke Scale  Once         Comments: Perform NIH Stroke Scale frequency: Stat, prior to thrombolytic/intervention, repeat as needed per protocol.    08/06/23 0122    08/06/23 0123  Measure Actual Weight  Once         08/06/23 0122 08/06/23 0123  Notify MD for SBP < 80 or > 200  Until Discontinued        Comments: Do not treat until SBP >= 220, unless patient is a thrombolytic candidate.    08/06/23 0122    08/06/23 0123  Notify Provider for SBP greater than 140 if hemorrhagic stroke  Once        Comments: Notify Provider for SBP greater than 140 if hemorrhagic Stroke and seek Nicardipine infusion for Hemorrhagic CVA order.    08/06/23 0122    08/06/23 0123  Head of Bed 30 Degrees or Less  Until Discontinued         08/06/23 0122 08/06/23 0123  Cardiac Monitoring  Continuous,   Status:  Canceled        Comments: Follow Standing Orders As Outlined in Process Instructions (Open Order Report to View Full Instructions)    08/06/23 0122    08/06/23 0123  Undress and Gown  Once         08/06/23 0122 08/06/23 0123  NPO Diet NPO Type: Strict NPO  Diet Effective Now         08/06/23 0122 08/06/23 0123  Cardiac Monitoring  Continuous        Comments: Follow Standing Orders As Outlined in Process Instructions (Open Order Report to View Full Instructions)    08/06/23 0122 08/06/23 0123  Continuous Pulse Oximetry  Continuous         08/06/23 0122 08/06/23 0123  Vital Signs  Per Hospital Policy        Comments: Every 30 minutes - Increase frequency as clinically indicated or for thrombolytic administration.    08/06/23 0122    08/06/23 0123  No Hypotonic Fluids  Until Discontinued         08/06/23 0122 08/06/23 0123  Nursing Dysphagia Screening (Complete Prior to Giving anything PO)  Once         08/06/23 0122 08/06/23 0123  RN to Place Order SLP Consult (IF swallow screen failed) - Eval & Treat Choosing Reason of RN Dysphagia Screen Failed  Once         08/06/23 0122 08/06/23 0123  CT Head Without Contrast Stroke Protocol  1  Time Imaging         08/06/23 0122    08/06/23 0123  CT Angiogram Head w AI Analysis of LVO  1 Time Imaging        Comments: Neuro deficit, acute, stroke suspected    08/06/23 0122    08/06/23 0123  CT Angiogram Neck  1 Time Imaging        Comments: Neuro deficit, acute, stroke suspected    08/06/23 0122    08/06/23 0123  CT CEREBRAL PERFUSION WITH & WITHOUT CONTRAST  1 Time Imaging        Comments: Neuro deficit, acute, stroke suspected    08/06/23 0122    08/06/23 0123  XR Chest 1 View  1 Time Imaging        Comments: Do not delay CT Head to obtain    08/06/23 0122    08/06/23 0123  ECG 12 Lead Stroke Evaluation  Once        Comments: Do not delay CT Head to obtain    08/06/23 0122    08/06/23 0123  Insert Large-Bore Peripheral IV - RIGHT AC Preferred  Once         08/06/23 0122    08/06/23 0123  [place] Draw  Once         08/06/23 0122    08/06/23 0123  POC Glucose Once  Once        Comments: Complete no more than 45 minutes prior to patient eating      08/06/23 0122    08/06/23 0123  CBC & Differential  Once         08/06/23 0122    08/06/23 0123  Comprehensive Metabolic Panel  Once         08/06/23 0122    08/06/23 0123  Protime-INR  Once         08/06/23 0122    08/06/23 0123  aPTT  Once         08/06/23 0122    08/06/23 0123  Single High Sensitivity Troponin T  Once         08/06/23 0122    08/06/23 0123  Type & Screen  Once         08/06/23 0122    08/06/23 0123  Green Top (Gel)  PROCEDURE ONCE         08/06/23 0122    08/06/23 0123  Lavender Top  PROCEDURE ONCE         08/06/23 0122    08/06/23 0123  Gold Top - SST  PROCEDURE ONCE         08/06/23 0122    08/06/23 0123  Light Blue Top  PROCEDURE ONCE         08/06/23 0122    08/06/23 0123  CBC Auto Differential  PROCEDURE ONCE         08/06/23 0122    08/06/23 0122  sodium chloride 0.9 % flush 10 mL  As Needed         08/06/23 0122    Unscheduled  Oxygen Therapy- Nasal Cannula; Titrate 1-6 LPM Per SpO2; 90 - 95%  Continuous PRN       08/06/23 0122    Signed  and Held  Vital Signs  Every 8 Hours        Comments: Per per hospital policy    Signed and Held    Signed and Held  Notify Physician (with Parameters)  Until Discontinued         Signed and Held    Signed and Held  Oral Care  2 Times Daily       Signed and Held    Signed and Held  Insert Peripheral IV  Once         Signed and Held    Signed and Held  Saline Lock & Maintain IV Access  Continuous         Signed and Held    Signed and Held  sodium chloride 0.9 % flush 10 mL  Every 12 Hours Scheduled         Signed and Held    Signed and Held  sodium chloride 0.9 % flush 10 mL  As Needed         Signed and Held    Signed and Held  sodium chloride 0.9 % infusion 40 mL  As Needed         Signed and Held    Signed and Held  sennosides-docusate (PERICOLACE) 8.6-50 MG per tablet 2 tablet  2 Times Daily        See Hyperspace for full Linked Orders Report.    Signed and Held    Signed and Held  polyethylene glycol (MIRALAX) packet 17 g  Daily PRN        See Hyperspace for full Linked Orders Report.    Signed and Held    Signed and Held  bisacodyl (DULCOLAX) EC tablet 5 mg  Daily PRN        See Hyperspace for full Linked Orders Report.    Signed and Held    Signed and Held  bisacodyl (DULCOLAX) suppository 10 mg  Daily PRN        See Hyperspace for full Linked Orders Report.    Signed and Held    Signed and Held  heparin (porcine) 5000 UNIT/ML injection 5,000 Units  Every 12 Hours Scheduled         Signed and Held    Signed and Held  Continuous Cardiac Monitoring  Continuous        Comments: Follow Standing Orders As Outlined in Process Instructions (Open Order Report to View Full Instructions)    Signed and Held    Signed and Held  nitroglycerin (NITROSTAT) SL tablet 0.4 mg  Every 5 Minutes PRN         Signed and Held    Signed and Held  Telemetry - Maintain IV Access  Continuous         Signed and Held    Signed and Held  Telemetry - Place Orders & Notify Provider of Results When Patient Experiences Acute Chest Pain,  Dysrhythmia or Respiratory Distress  Until Discontinued         Signed and Held    Signed and Held  May Be Off Telemetry for Tests  Continuous         Signed and Held    Signed and Held  Pulse Oximetry, Continuous  Continuous         Signed and Held    Signed and Held  Activity - Bed Rest With Exceptions (Specify)  Until Discontinued         Signed and Held    Signed and Held  Strict Intake & Output  Every 6 Hours       Signed and Held    Signed and Held  Daily Weights  Daily       Signed and Held    Signed and Held  Diet: Renal Diets, Cardiac Diets; Healthy Heart (2-3 Na+); Low Sodium (2-3g), Low Potassium, Low Phosphorus; Texture: Regular Texture (IDDSI 7); Fluid Consistency: Thin (IDDSI 0)  Diet Effective Now         Signed and Held    Signed and Held  Inpatient Neurology Consult Stroke  Once        Specialty:  Neurology  Provider:  (Not yet assigned)    Signed and Held    Signed and Held  CBC Auto Differential  Morning Draw         Signed and Held    Signed and Held  Comprehensive Metabolic Panel  Morning Draw         Signed and Held    Signed and Held  Basic Metabolic Panel  Timed         Signed and Held    Signed and Held  Adult Transthoracic Echo Complete w/ Color, Spectral and Contrast if necessary per protocol  Once         Signed and Held    Signed and Held  sodium chloride 0.9 % infusion  Continuous         Signed and Held    Signed and Held  Neuro Checks  Every 4 Hours       Signed and Held    Signed and Held  OT Consult: Eval & Treat  Once         Signed and Held    Signed and Held  PT Consult: Eval & Treat Post Stroke  Once         Signed and Held    Signed and Held  SLP Consult: Eval & Treat Other; post-stroke swallow and speech eval  Once         Signed and Held

## 2023-08-06 NOTE — H&P
"     [facility] Medicine Services  HISTORY & PHYSICAL    Patient Identification:  Name:  Jose Bobo  Age:  58 y.o.  Sex:  male  :  1965  MRN:  [id number]   Visit Number:  [id number]  Admit Date: 2023   Primary Care Physician:  Nancy Majano APRN     Subjective     Chief complaint:   Chief Complaint   Patient presents with    Stroke     History of presenting illness:   Patient is a 58 y.o. male with past medical history significant for hypertension, hyperlipidemia, GERD, arthritis that presented to the [facility] emergency department for evaluation of right-sided weakness.  Patient presented to the ED from local long-term, with complaints of right-sided weakness.  Code stroke was called.  Work-up in ED significant for subacute versus chronic lacunar infarcts right basal ganglia.  Neurology was consulted in the ED, however have not received response from neurology for recommendations.    Patient examined at bedside in ED. patient states 2 nights ago he felt \"off\", but last night he began to notice the right side of his face was weak and felt like he could not fully open his right eye.  He states he did not necessarily feel weak in his right extremities, but they did not feel completely normal.  He states his symptoms have improved throughout the night.  Denies any similar symptoms in the past.  Denies any known history of prior stroke.  Patient does note a history of kidney disease, he states he has an appointment later this month to assess his kidneys.    Upon arrival to the ED, vitals were temperature 98.2, , RR 20, /82, SPO2 96% on room air.  Labs significant for potassium 3.4, creatinine 1.43, GFR 56.1, platelets 529.    CT head without contrast shows mild generalized brain volume loss with mild chronic small vessel ischemic type changes in the white matter.  Lacunar infarcts in the right basal ganglia could represent subacute to chronic infarcts.  " No acute hemorrhage, mass, or edema.  No hydrocephalus.  Minimal mucosal thickening in the ethmoid air cells.    CT angiogram of head shows normal CTA examination of the brain.  No large vessel occlusion.  No aneurysm or vascular malformation.  CT angiogram of neck shows normal CT examination of the neck.  No occluded arterial segment.  No dissection.    CT perfusion shows no evidence of ischemia.  Normal brain perfusion scan.    Patient has been admitted to the Telemetry unit.   ---------------------------------------------------------------------------------------------------------------------   Review of Systems   Constitutional:  Negative for chills, diaphoresis, fatigue and fever.   Eyes:  Negative for visual disturbance.   Respiratory:  Negative for cough, shortness of breath and wheezing.    Cardiovascular:  Negative for chest pain, palpitations and leg swelling.   Gastrointestinal:  Negative for abdominal pain, constipation, diarrhea, nausea and vomiting.   Genitourinary:  Negative for difficulty urinating, dysuria and flank pain.   Musculoskeletal:  Negative for arthralgias, myalgias and neck stiffness.   Skin:  Negative for rash and wound.   Neurological:  Positive for weakness (right sided facial weakness) and light-headedness. Negative for dizziness.   Psychiatric/Behavioral:  Negative for agitation and confusion.     ---------------------------------------------------------------------------------------------------------------------   Past Medical History:   Diagnosis Date    Arthritis     Elevated cholesterol     GERD (gastroesophageal reflux disease)     Hypertension     Right-sided lacunar infarction 8/6/2023     Past Surgical History:   Procedure Laterality Date    COLONOSCOPY      COLONOSCOPY N/A 8/16/2018    Procedure: COLONOSCOPY;  Surgeon: Negrito Goldberg MD;  Location: [facility];  Service: Gastroenterology    ENDOSCOPY N/A 8/16/2018    Procedure: ESOPHAGOGASTRODUODENOSCOPY WITH  ANESTHESIA;  Surgeon: Negrito Goldberg MD;  Location: [facility];  Service: Gastroenterology    NECK SURGERY      VASECTOMY  1993     Family History   Problem Relation Age of Onset    Cancer Sister         breast    Hypertension Other     Heart disease Neg Hx      Social History     Socioeconomic History    Marital status:    Tobacco Use    Smoking status: Every Day     Packs/day: 0.25     Years: 30.00     Pack years: 7.50     Types: Cigarettes    Smokeless tobacco: Never    Tobacco comments:     states 2 cigarettes a day   Substance and Sexual Activity    Alcohol use: No    Drug use: No    Sexual activity: Defer     Birth control/protection: None     ---------------------------------------------------------------------------------------------------------------------   Allergies:  Patient has no known allergies.  ---------------------------------------------------------------------------------------------------------------------   Medications below are reported home medications pulling from within the system; at this time, these medications have not been reconciled unless otherwise specified and are in the verification process for further verifcation as current home medications.    Prior to Admission Medications       Prescriptions Last Dose Informant Patient Reported? Taking?    albuterol (ACCUNEB) 1.25 MG/3ML nebulizer solution   Yes No    amLODIPine (NORVASC) 5 MG tablet   No No    Take 1 tablet by mouth Daily.    Patient taking differently:  Take 10 mg by mouth Daily.    diphenoxylate-atropine (LOMOTIL) 2.5-0.025 MG per tablet   Yes No    hydroCHLOROthiazide (MICROZIDE) 12.5 MG capsule   No No    Take 1 capsule by mouth Daily.    ipratropium (ATROVENT) 0.02 % nebulizer solution   Yes No    ketorolac (TORADOL) 10 MG tablet   No No    Take 1 tablet by mouth Every 6 (Six) Hours As Needed for Moderate Pain .    labetalol (NORMODYNE) 300 MG tablet  Pharmacy Yes No    Take 300 mg by mouth 2 (Two) Times a  Day.    lamoTRIgine (LaMICtal) 150 MG tablet   No No    Take 1 tablet by mouth Daily.    loratadine (CLARITIN) 10 MG tablet   Yes No    ondansetron ODT (ZOFRAN-ODT) 8 MG disintegrating tablet   Yes No    pantoprazole (PROTONIX) 40 MG EC tablet  Pharmacy Yes No    Take 40 mg by mouth Daily.    potassium chloride (K-DUR) 10 MEQ CR tablet  Self Yes No    Take 10 mEq by mouth 1 (One) Time.    Pramipexole Dihydrochloride (MIRAPEX PO)   Yes No    Take  by mouth.    QUEtiapine (SEROquel) 100 MG tablet   No No    Take 1 tablet by mouth At Night As Needed (sleep/anxiety/paranoia).    sertraline (ZOLOFT) 100 MG tablet   No No    Take 2 tablets by mouth Daily.    tamsulosin (FLOMAX) 0.4 MG capsule 24 hr capsule   Yes No    tiZANidine (ZANAFLEX) 4 MG tablet   Yes No    traZODone (DESYREL) 50 MG tablet   No No    Take 1-2 tablets by mouth At Night As Needed for Sleep.    varenicline (CHANTIX STARTING MONTH DINO) 0.5 MG X 11 & 1 MG X 42 tablet   No No    Take 0.5 mg one daily on days 1-3 and and 0.5 mg twice daily on days 4-7.Then 1 mg twice daily for a total of 12 weeks.          ---------------------------------------------------------------------------------------------------------------------    Objective     Hospital Scheduled Meds:  [START ON 8/7/2023] aspirin, 81 mg, Oral, Daily  atorvastatin, 40 mg, Oral, Nightly           Current listed hospital scheduled medications may not yet reflect those currently placed in orders that are signed and held, awaiting patient's arrival to floor/unit.    ---------------------------------------------------------------------------------------------------------------------   Vital Signs:  Temp:  [98.2 øF (36.8 øC)] 98.2 øF (36.8 øC)  Heart Rate:  [] 84  Resp:  [20] 20  BP: (114-149)/(77-99) 145/89  Mean Arterial Pressure (Non-Invasive) for the past 24 hrs (Last 3 readings):   Noninvasive MAP (mmHg)   08/06/23 0400 123   08/06/23 0345 113   08/06/23 0330 99     SpO2 Percentage     08/06/23 0330 08/06/23 0345 08/06/23 0400   SpO2: 97% 100% 100%     SpO2:  [96 %-100 %] 100 %  on   ;   Device (Oxygen Therapy): room air    Body mass index is 28.25 kg/mý.  Wt Readings from Last 3 Encounters:   08/06/23 79.4 kg (175 lb)   10/28/21 89.8 kg (198 lb)   01/06/21 89.4 kg (197 lb)     ---------------------------------------------------------------------------------------------------------------------   Physical Exam:  Physical Exam  Constitutional:       General: He is not in acute distress.     Appearance: Normal appearance.   HENT:      Head: Normocephalic and atraumatic.      Right Ear: External ear normal.      Left Ear: External ear normal.      Nose: No nasal deformity.      Mouth/Throat:      Lips: Pink.      Mouth: Mucous membranes are moist.   Eyes:      Extraocular Movements: Extraocular movements intact.      Conjunctiva/sclera: Conjunctivae normal.      Pupils: Pupils are equal, round, and reactive to light.   Cardiovascular:      Rate and Rhythm: Normal rate and regular rhythm.      Pulses:           Dorsalis pedis pulses are 2+ on the right side and 2+ on the left side.      Heart sounds: Normal heart sounds.   Pulmonary:      Effort: Pulmonary effort is normal.      Breath sounds: Normal breath sounds. No wheezing, rhonchi or rales.   Abdominal:      General: Abdomen is flat. Bowel sounds are normal.      Palpations: Abdomen is soft.      Tenderness: There is no guarding or rebound.   Genitourinary:     Comments: No ulloa catheter in place   Musculoskeletal:      Cervical back: Neck supple. Normal range of motion.      Right lower leg: No edema.      Left lower leg: No edema.   Skin:     General: Skin is warm and dry.   Neurological:      General: No focal deficit present.      Mental Status: He is alert and oriented to person, place, and time.      Cranial Nerves: No facial asymmetry.      Sensory: No sensory deficit.      Motor: No weakness.   Psychiatric:         Mood and Affect: Mood  normal.         Behavior: Behavior normal.     ---------------------------------------------------------------------------------------------------------------------  EKG: Sinus rhythm.  ST depression in anterior leads.  Waiting formal cardiology read.        I have personally reviewed the EKG/Telemetry strip  ---------------------------------------------------------------------------------------------------------------------   Results from last 7 days   Lab Units 08/06/23 0137   HSTROP T ng/L 19*           Results from last 7 days   Lab Units 08/06/23 0137   WBC 10*3/mm3 11.15*   HEMOGLOBIN g/dL 14.7   HEMATOCRIT % 43.3   MCV fL 81.5   MCHC g/dL 33.9   PLATELETS 10*3/mm3 529*   INR  0.99     Results from last 7 days   Lab Units 08/06/23 0137   SODIUM mmol/L 140   POTASSIUM mmol/L 3.4*   CHLORIDE mmol/L 102   CO2 mmol/L 22.2   BUN mg/dL 12   CREATININE mg/dL 1.43*   CALCIUM mg/dL 10.2   GLUCOSE mg/dL 116*   ALBUMIN g/dL 4.4   BILIRUBIN mg/dL 0.5   ALK PHOS U/L 73   AST (SGOT) U/L 19   ALT (SGPT) U/L 23   Estimated Creatinine Clearance: 54.4 mL/min (A) (by C-G formula based on SCr of 1.43 mg/dL (H)).  No results found for: AMMONIA    Glucose   Date/Time Value Ref Range Status   08/06/2023 0131 139 (A) 70 - 130 mg/dL Final     No results found for: HGBA1C  Lab Results   Component Value Date    TSH 1.162 01/20/2015    FREET4 1.19 06/10/2014       No results found for: BLOODCX  No results found for: URINECX  No results found for: WOUNDCX  No results found for: STOOLCX  No results found for: RESPCX  No results found for: MRSACX  Pain Management Panel  More data may exist         Latest Ref Rng & Units 10/8/2019 9/6/2014   Pain Management Panel   Creatinine, Urine mg/dL 49.4  -   Amphetamine, Urine Qual NEGATIVE - Negative    Barbiturates Screen, Urine NEGATIVE - Negative    Benzodiazepine Screen, Urine NEGATIVE - Negative    Cocaine Screen, Urine NEGATIVE - Negative    Methadone Screen , Urine NEGATIVE - Negative       I have personally reviewed the above laboratory results.   ---------------------------------------------------------------------------------------------------------------------  Imaging Results (Last 7 Days)       Procedure Component Value Units Date/Time    XR Chest 1 View [[id number]] Resulted: 08/06/23 0242     Updated: 08/06/23 0334    CT Angiogram Neck [[id number]] Collected: 08/06/23 0325     Updated: 08/06/23 0328    Narrative:      Procedure: CT angiography of the neck performed with IV contrast on  August 6, 2023. The examination was performed with 0.3 mm axial imaging  and sagittal and coronal reconstruction images. 3-D surface display  images were performed projected in multiple planes. The patient was  administered 150 cc of Isovue 370 contrast IV without complication.     HISTORY: Neurologic deficit. Possible stroke.     FINDINGS:     Patent aortic arch with no dissection.  Patent right and left common carotid arteries and vertebral arteries.  Patent right and left internal and external carotid arteries with no  focal stenosis.  The vertebral arteries are symmetric in size.  No dissection.  Patent basilar artery.  Patent supraclinoid ICA segments.       Impression:         1.  Normal CTA examination of the neck.  2.  No occluded arterial segment.  3.  No dissection.        This report was finalized on 8/6/2023 3:26 AM by Gera Richey MD.       CT Angiogram Head w AI Analysis of LVO [[id number]] Collected: 08/06/23 0323     Updated: 08/06/23 0327    Narrative:      Procedure: CT angiography of the brain performed with IV contrast on  August 6, 2023. The patient was administered 150 cc of Isovue 370  contrast IV without complication. The examination was performed with  thin section 0.75 mm axial imaging and sagittal and coronal  reconstruction images. 3-D surface display images were performed  projected in multiple planes.     HISTORY: Neurologic deficit. Possible stroke.     FINDINGS:     Patent  distal vertebral arteries.  Patent supraclinoid ICA segments.  Patent right and left anterior cerebral arteries, middle cerebral  arteries, and posterior cerebral arteries.  No large vessel occlusion identified.  No aneurysm or vascular malformation.  Patent anterior communicating artery.  The right and left posterior communicating arteries may be patent but  are very small in size.       Impression:         1.  Normal CTA examination of the brain.  2.  No large vessel occlusion.  3.  No aneurysm or vascular malformation.     This report was finalized on 8/6/2023 3:25 AM by Gera Richey MD.       CT CEREBRAL PERFUSION WITH & WITHOUT CONTRAST [[id number]] Collected: 08/06/23 0320     Updated: 08/06/23 0325    Narrative:      Procedure: CT perfusion examination performed with dynamic CT imaging of  the brain during infusion of IV contrast. Images were acquired with  exposure modulation control and/or intraoperative reconstruction. CT  perfusion imaging analysis protocol performed utilizing color mapping.  The patient was administered IV contrast without complication.     HISTORY: Possible stroke.     FINDINGS:     There is symmetric blood flow and blood volume of both cerebral  hemispheres. No focal abnormality and MTT and TTP color maps. No  evidence of ischemia.       Impression:         1.  No evidence of ischemia.  2.  Normal brain perfusion scan.           This report was finalized on 8/6/2023 3:22 AM by Gera Richey MD.       CT Head Without Contrast Stroke Protocol [[id number]] Collected: 08/06/23 0154     Updated: 08/06/23 0158    Narrative:      The brain performed without IV contrast on August 6, 2023. Examination  was performed with 3 mm axial imaging and 3 mm sagittal and coronal  reconstruction images. Examination was performed according to as low as  reasonably achievable protocol.     HISTORY: Neurologic deficit. Acute onset symptoms. Possible stroke.     FINDINGS:     Mild generalized brain volume  loss consistent with age.  Vague low attenuation area noted in the right basal ganglia suspicious  for small subacute chronic lacunar infarcts seen best on image 17,  series 2.  No hydrocephalus.  No shift of midline structures.  Minimal mucosal thickening in the ethmoid air cells.  Clear mastoid air cells.  No fracture or foreign body.       Impression:         1.  Mild generalized brain volume loss with mild chronic small vessel  ischemic type changes in the white matter.  2.  Lacunar infarcts in right basal ganglia could represent subacute to  chronic infarcts.  3.  No acute hemorrhage, mass, or edema.  4.  No hydrocephalus.  5.  Minimal mucosal thickening in the ethmoid air cells.     This report was finalized on 8/6/2023 1:56 AM by Gera Richey MD.             I have personally reviewed the above radiology results.     Last Echocardiogram:  Results for orders placed during the hospital encounter of 07/22/20    Adult Transthoracic Echo Complete W/ Cont if Necessary Per Protocol    Interpretation Summary  ú Left ventricular systolic function is normal. Estimated EF appears to be in the range of 56 - 60%  ú Left ventricular diastolic dysfunction (grade I) consistent with impaired relaxation.  ú No significant functional valvular abnormalities  ú There is no evidence of pericardial effusion    ---------------------------------------------------------------------------------------------------------------------    Assessment & Plan      Active Hospital Problems    Diagnosis  POA    **Right-sided lacunar infarction [I63.81]  Yes     TIA vs CVA, POA  Hyperlipidemia  Patient presented with symptoms of right-sided weakness in the ED  Imagining showed no acute versus chronic lacunar infarct in right basal ganglia  No neurology recommendations at this time.    Inpatient neurology consult, assistance appreciated  ASA and statin  PT/OT/SLP consults  Case management consulted  Check HA1c and lipid panel   TTE and MRI  ordered  Neuro checks every 2 hrs  NHISS every shift change  Continuous cardiac monitoring  Renal insufficiency, chronicity unknown, POA  Mild hypokalemia, POA  Patient presented with creatinine 1.43, GFR 56.1.  Most recent labs for comparison from 2019 unsure of baseline renal function.  Patient received 1 L fluid bolus in the ED  Continuous IV NS at 75 mL/h  Potassium replacement ordered  Recheck CMP    F/E/N: NS at 75 mL/h.  Continue to monitor electrolytes and replace as indicated.  N.p.o. pending RN dysphagia screen.    ---------------------------------------------------  DVT Prophylaxis: Subcu heparin  GI Prophylaxis: Bowel regimen  Activity: Up with assistance    The patient is considered to be a high risk patient due to: High for CVA    INPATIENT status due to the need for care which can only be reasonably provided in an hospital setting such as aggressive/expedited ancillary services and/or consultation services, the necessity for IV medications, close physician monitoring and/or the possible need for procedures.  In such, I feel patient's risk for adverse outcomes and need for care warrant INPATIENT evaluation and predict the patient's care encounter to likely last beyond 2 midnights.      Code Status: Full code    Disposition/Discharge planning: pending clinical course    I have discussed the patient's assessment and plan with Dr. Torres.      Kindra Kaufman PA-C  Hospitalist Service -- [facility]       08/06/23  05:25 EDT    Attending Physician: Dr. Torres.

## 2023-08-07 VITALS
DIASTOLIC BLOOD PRESSURE: 73 MMHG | SYSTOLIC BLOOD PRESSURE: 125 MMHG | BODY MASS INDEX: 25.79 KG/M2 | OXYGEN SATURATION: 97 % | TEMPERATURE: 98.2 F | HEART RATE: 88 BPM | RESPIRATION RATE: 18 BRPM | HEIGHT: 66 IN | WEIGHT: 160.5 LBS

## 2023-08-07 PROBLEM — R29.818 TRANSIENT NEUROLOGICAL SYMPTOMS: Status: RESOLVED | Noted: 2023-08-06 | Resolved: 2023-08-07

## 2023-08-07 LAB
ALBUMIN SERPL-MCNC: 3.5 G/DL (ref 3.5–5.2)
ALBUMIN/GLOB SERPL: 1.4 G/DL
ALP SERPL-CCNC: 56 U/L (ref 39–117)
ALT SERPL W P-5'-P-CCNC: 12 U/L (ref 1–41)
ANION GAP SERPL CALCULATED.3IONS-SCNC: 7.1 MMOL/L (ref 5–15)
AST SERPL-CCNC: 15 U/L (ref 1–40)
BASOPHILS # BLD AUTO: 0.14 10*3/MM3 (ref 0–0.2)
BASOPHILS NFR BLD AUTO: 1.5 % (ref 0–1.5)
BILIRUB SERPL-MCNC: 0.2 MG/DL (ref 0–1.2)
BUN SERPL-MCNC: 14 MG/DL (ref 8–23)
BUN/CREAT SERPL: 10.9 (ref 7–25)
CALCIUM SPEC-SCNC: 9 MG/DL (ref 8.6–10.5)
CHLORIDE SERPL-SCNC: 111 MMOL/L (ref 98–107)
CO2 SERPL-SCNC: 23.9 MMOL/L (ref 22–29)
CREAT SERPL-MCNC: 1.29 MG/DL (ref 0.76–1.27)
DEPRECATED RDW RBC AUTO: 42.5 FL (ref 37–54)
EGFRCR SERPLBLD CKD-EPI 2021: 63.5 ML/MIN/1.73
EOSINOPHIL # BLD AUTO: 0.32 10*3/MM3 (ref 0–0.4)
EOSINOPHIL NFR BLD AUTO: 3.4 % (ref 0.3–6.2)
ERYTHROCYTE [DISTWIDTH] IN BLOOD BY AUTOMATED COUNT: 13.7 % (ref 12.3–15.4)
GLOBULIN UR ELPH-MCNC: 2.5 GM/DL
GLUCOSE SERPL-MCNC: 87 MG/DL (ref 65–99)
HCT VFR BLD AUTO: 36.7 % (ref 37.5–51)
HGB BLD-MCNC: 11.7 G/DL (ref 13–17.7)
IMM GRANULOCYTES # BLD AUTO: 0.06 10*3/MM3 (ref 0–0.05)
IMM GRANULOCYTES NFR BLD AUTO: 0.6 % (ref 0–0.5)
LYMPHOCYTES # BLD AUTO: 3.29 10*3/MM3 (ref 0.7–3.1)
LYMPHOCYTES NFR BLD AUTO: 34.5 % (ref 19.6–45.3)
MCH RBC QN AUTO: 27.1 PG (ref 26.6–33)
MCHC RBC AUTO-ENTMCNC: 31.9 G/DL (ref 31.5–35.7)
MCV RBC AUTO: 85.2 FL (ref 79–97)
MONOCYTES # BLD AUTO: 0.7 10*3/MM3 (ref 0.1–0.9)
MONOCYTES NFR BLD AUTO: 7.3 % (ref 5–12)
NEUTROPHILS NFR BLD AUTO: 5.02 10*3/MM3 (ref 1.7–7)
NEUTROPHILS NFR BLD AUTO: 52.7 % (ref 42.7–76)
NRBC BLD AUTO-RTO: 0 /100 WBC (ref 0–0.2)
PLATELET # BLD AUTO: 366 10*3/MM3 (ref 140–450)
PMV BLD AUTO: 9.6 FL (ref 6–12)
POTASSIUM SERPL-SCNC: 3.9 MMOL/L (ref 3.5–5.2)
PROT SERPL-MCNC: 6 G/DL (ref 6–8.5)
RBC # BLD AUTO: 4.31 10*6/MM3 (ref 4.14–5.8)
SODIUM SERPL-SCNC: 142 MMOL/L (ref 136–145)
WBC NRBC COR # BLD: 9.53 10*3/MM3 (ref 3.4–10.8)

## 2023-08-07 PROCEDURE — 96372 THER/PROPH/DIAG INJ SC/IM: CPT

## 2023-08-07 PROCEDURE — 25010000002 HEPARIN (PORCINE) PER 1000 UNITS: Performed by: HOSPITALIST

## 2023-08-07 PROCEDURE — 80053 COMPREHEN METABOLIC PANEL: CPT | Performed by: HOSPITALIST

## 2023-08-07 PROCEDURE — 92610 EVALUATE SWALLOWING FUNCTION: CPT

## 2023-08-07 PROCEDURE — 97161 PT EVAL LOW COMPLEX 20 MIN: CPT

## 2023-08-07 PROCEDURE — G0378 HOSPITAL OBSERVATION PER HR: HCPCS

## 2023-08-07 PROCEDURE — 85025 COMPLETE CBC W/AUTO DIFF WBC: CPT | Performed by: HOSPITALIST

## 2023-08-07 PROCEDURE — 97165 OT EVAL LOW COMPLEX 30 MIN: CPT

## 2023-08-07 PROCEDURE — 92523 SPEECH SOUND LANG COMPREHEN: CPT

## 2023-08-07 RX ORDER — LABETALOL 100 MG/1
100 TABLET, FILM COATED ORAL EVERY 12 HOURS SCHEDULED
Status: DISCONTINUED | OUTPATIENT
Start: 2023-08-07 | End: 2023-08-07 | Stop reason: HOSPADM

## 2023-08-07 RX ORDER — LABETALOL 100 MG/1
100 TABLET, FILM COATED ORAL EVERY 12 HOURS SCHEDULED
Qty: 60 TABLET | Refills: 0 | Status: SHIPPED | OUTPATIENT
Start: 2023-08-07

## 2023-08-07 RX ORDER — CLOPIDOGREL BISULFATE 75 MG/1
75 TABLET ORAL DAILY
Qty: 21 TABLET | Refills: 0 | Status: SHIPPED | OUTPATIENT
Start: 2023-08-07

## 2023-08-07 RX ORDER — GABAPENTIN 800 MG/1
800 TABLET ORAL 3 TIMES DAILY
COMMUNITY

## 2023-08-07 RX ORDER — TIOTROPIUM BROMIDE INHALATION SPRAY 3.12 UG/1
2 SPRAY, METERED RESPIRATORY (INHALATION)
COMMUNITY

## 2023-08-07 RX ORDER — CLOPIDOGREL BISULFATE 75 MG/1
75 TABLET ORAL DAILY
Qty: 21 TABLET | Refills: 0 | Status: CANCELLED | OUTPATIENT
Start: 2023-08-07

## 2023-08-07 RX ORDER — TIZANIDINE 4 MG/1
4 TABLET ORAL 2 TIMES DAILY PRN
Status: CANCELLED | OUTPATIENT
Start: 2023-08-07

## 2023-08-07 RX ORDER — ASPIRIN 81 MG/1
81 TABLET ORAL DAILY
Qty: 21 TABLET | Refills: 0 | Status: SHIPPED | OUTPATIENT
Start: 2023-08-07

## 2023-08-07 RX ORDER — GABAPENTIN 300 MG/1
600 CAPSULE ORAL EVERY 8 HOURS SCHEDULED
Status: CANCELLED | OUTPATIENT
Start: 2023-08-07

## 2023-08-07 RX ORDER — AMLODIPINE BESYLATE 10 MG/1
10 TABLET ORAL DAILY
COMMUNITY
End: 2023-08-07 | Stop reason: HOSPADM

## 2023-08-07 RX ORDER — ASPIRIN 81 MG/1
162 TABLET ORAL DAILY
Status: CANCELLED | OUTPATIENT
Start: 2023-08-07

## 2023-08-07 RX ORDER — ONDANSETRON 4 MG/1
4 TABLET, FILM COATED ORAL 2 TIMES DAILY PRN
Status: CANCELLED | OUTPATIENT
Start: 2023-08-07

## 2023-08-07 RX ORDER — ONDANSETRON 4 MG/1
4 TABLET, FILM COATED ORAL 2 TIMES DAILY PRN
COMMUNITY

## 2023-08-07 RX ORDER — CLOPIDOGREL BISULFATE 75 MG/1
75 TABLET ORAL DAILY
Status: DISCONTINUED | OUTPATIENT
Start: 2023-08-07 | End: 2023-08-07 | Stop reason: HOSPADM

## 2023-08-07 RX ORDER — FENOFIBRATE 160 MG/1
160 TABLET ORAL DAILY
COMMUNITY

## 2023-08-07 RX ORDER — ASPIRIN 81 MG/1
162 TABLET ORAL DAILY
Status: ON HOLD | COMMUNITY
End: 2023-08-07 | Stop reason: SDUPTHER

## 2023-08-07 RX ORDER — AMLODIPINE BESYLATE 10 MG/1
10 TABLET ORAL DAILY
Status: CANCELLED | OUTPATIENT
Start: 2023-08-07

## 2023-08-07 RX ORDER — CLOPIDOGREL BISULFATE 75 MG/1
75 TABLET ORAL DAILY
Qty: 30 TABLET | Refills: 0 | Status: SHIPPED | OUTPATIENT
Start: 2023-08-07 | End: 2023-08-07 | Stop reason: SDUPTHER

## 2023-08-07 RX ORDER — PANTOPRAZOLE SODIUM 40 MG/1
40 TABLET, DELAYED RELEASE ORAL DAILY
Status: CANCELLED | OUTPATIENT
Start: 2023-08-07

## 2023-08-07 RX ORDER — ATORVASTATIN CALCIUM 40 MG/1
80 TABLET, FILM COATED ORAL NIGHTLY
Qty: 30 TABLET | Refills: 0 | Status: CANCELLED | OUTPATIENT
Start: 2023-08-07

## 2023-08-07 RX ORDER — ATORVASTATIN CALCIUM 40 MG/1
80 TABLET, FILM COATED ORAL NIGHTLY
Qty: 60 TABLET | Refills: 0 | Status: SHIPPED | OUTPATIENT
Start: 2023-08-07

## 2023-08-07 RX ADMIN — HEPARIN SODIUM 5000 UNITS: 5000 INJECTION INTRAVENOUS; SUBCUTANEOUS at 08:31

## 2023-08-07 RX ADMIN — Medication 10 ML: at 08:31

## 2023-08-07 RX ADMIN — ASPIRIN 81 MG: 81 TABLET, CHEWABLE ORAL at 08:31

## 2023-08-07 RX ADMIN — SODIUM CHLORIDE 75 ML/HR: 9 INJECTION, SOLUTION INTRAVENOUS at 00:23

## 2023-08-07 RX ADMIN — LABETALOL HYDROCHLORIDE 100 MG: 100 TABLET, FILM COATED ORAL at 12:01

## 2023-08-07 RX ADMIN — CLOPIDOGREL 75 MG: 75 TABLET, FILM COATED ORAL at 12:01

## 2023-08-07 NOTE — PLAN OF CARE
Pt resting in bed. Guard at bedside. No complaints of pain. Vs stable. No changes from baseline. Continue care plan.

## 2023-08-07 NOTE — THERAPY EVALUATION
Acute Care - Physical Therapy Initial Evaluation   [facility]     Patient Name: Jose Bobo  : 1965  MRN: [id number]  Today's Date: 2023      Visit Dx:   No diagnosis found.  Patient Active Problem List   Diagnosis    Pharyngoesophageal dysphagia    Other constipation    Gastroesophageal reflux disease    History of colon polyps    Palpitations    Essential hypertension    Chest pain    Dyspnea    Right-sided lacunar infarction     Past Medical History:   Diagnosis Date    Arthritis     Elevated cholesterol     GERD (gastroesophageal reflux disease)     Hypertension     Right-sided lacunar infarction 2023     Past Surgical History:   Procedure Laterality Date    COLONOSCOPY      COLONOSCOPY N/A 2018    Procedure: COLONOSCOPY;  Surgeon: Negrito Goldberg MD;  Location: [facility];  Service: Gastroenterology    ENDOSCOPY N/A 2018    Procedure: ESOPHAGOGASTRODUODENOSCOPY WITH ANESTHESIA;  Surgeon: Negrito Goldberg MD;  Location: [facility];  Service: Gastroenterology    NECK SURGERY      VASECTOMY       PT Assessment (last 12 hours)       PT Evaluation and Treatment       Row Name 23 1112          Physical Therapy Time and Intention    Subjective Information no complaints  -KM     Document Type evaluation  -KM     Mode of Treatment physical therapy  -KM     Patient Effort good  -KM     Symptoms Noted During/After Treatment none  -KM       Row Name 23 1112          General Information    Patient Profile Reviewed yes  -KM     Patient Observations alert;cooperative;agree to therapy  -KM     Prior Level of Function independent:;all household mobility;ADL's  -KM     Existing Precautions/Restrictions no known precautions/restrictions  -KM     Risks Reviewed patient:;other:;LOB;nausea/vomiting;dizziness;increased discomfort  -KM     Benefits Reviewed patient:;other:;improve function;increase independence;increase strength;increase balance  -KM     Barriers to Rehab none  identified  -KM       Row Name 08/07/23 1112          Living Environment    Current Living Arrangements correctional facility  -KM     Primary Care Provided by self  -KM       Row Name 08/07/23 1112          Home Use of Assistive/Adaptive Equipment    Equipment Currently Used at Home none  -KM       Row Name 08/07/23 1112          Cognition    Affect/Mental Status (Cognition) WFL  -KM     Orientation Status (Cognition) oriented x 4  -KM     Follows Commands (Cognition) WFL  -KM       Row Name 08/07/23 1112          Range of Motion (ROM)    Range of Motion bilateral lower extremities;ROM is [facility] Name 08/07/23 1112          Strength (Manual Muscle Testing)    Strength (Manual Muscle Testing) bilateral lower extremities;strength is [facility] Name 08/07/23 1112          Bed Mobility    Bed Mobility bed mobility (all) activities  -KM     All Activities, [place] (Bed Mobility) independent  -KM       Row Name 08/07/23 1112          Transfers    Transfers sit-stand transfer;stand-sit transfer  -KM       Row Name 08/07/23 1112          Sit-Stand Transfer    Sit-Stand [place] (Transfers) supervision  -KM       Row Name 08/07/23 1112          Stand-Sit Transfer    Stand-Sit [place] (Transfers) supervision  -KM       Row Name 08/07/23 1112          Gait/Stairs (Locomotion)    Gait/Stairs Locomotion gait/ambulation independence;distance ambulated  -KM     [place] Level (Gait) standby assist  -KM     Distance in Feet (Gait) 60' in room  -KM     Pattern (Gait) step-through  -KM       Row Name 08/07/23 1112          Balance    Balance Assessment sitting static balance;standing dynamic balance  -KM     Static Sitting Balance independent  -KM     Position, Sitting Balance sitting edge of bed  -KM     Dynamic Standing Balance standby assist  -KM       Row Name 08/07/23 1112          Plan of Care Review    Plan of Care Reviewed With patient  -KM     Outcome Evaluation Pt. evaluation completed  during PT session. He was able to perform functional mobility skills independently. He ambulated short distance in room w/ no AD. He tolerated session well w/ high level mobility. PT signing off on pt. at this time.  -       Row Name 08/07/23 1112          Therapy Assessment/Plan (PT)    Functional Level at Time of Evaluation (PT) independent  -KM     Criteria for Skilled Interventions Met (PT) no;no problems identified which require skilled intervention;does not meet criteria for skilled intervention  -KM     Therapy Frequency (PT) evaluation only  -       Row Name 08/07/23 1112          Therapy Plan Review/Discharge Plan (PT)    Therapy Plan Review (PT) evaluation/treatment results reviewed;patient  -KM               User Key  (r) = Recorded By, (t) = Taken By, (c) = Cosigned By      Initials Name Provider Type    Darren Hurst, PT Physical Therapist                    Physical Therapy Education       Title: PT OT SLP Therapies (Done)       Topic: Physical Therapy (Done)       Point: Mobility training (Done)       Learning Progress Summary             Patient Acceptance, E,TB, VU by  at 8/7/2023 1116                         Point: Home exercise program (Done)       Learning Progress Summary             Patient Acceptance, E,TB, VU by  at 8/7/2023 1116                         Point: Body mechanics (Done)       Learning Progress Summary             Patient Acceptance, E,TB, VU by  at 8/7/2023 1116                         Point: Precautions (Done)       Learning Progress Summary             Patient Acceptance, E,TB, VU by  at 8/7/2023 1116                                         User Key       Initials Effective Dates Name Provider Type [facility] 05/24/22 -  Darren Mcgregor, DAE Physical Therapist PT                  PT Recommendation and Plan  Anticipated Discharge Disposition (PT): correctional facility  Therapy Frequency (PT): evaluation only  Plan of Care Reviewed With: patient  Outcome  Evaluation: Pt. evaluation completed during PT session. He was able to perform functional mobility skills independently. He ambulated short distance in room w/ no AD. He tolerated session well w/ high level mobility. PT signing off on pt. at this time.       Time Calculation:    PT Charges       Row Name 08/07/23 1107             Time Calculation    PT Received On 08/07/23  -TACHO                User Key  (r) = Recorded By, (t) = Taken By, (c) = Cosigned By      Initials Name Provider Type    Darren Hurst, PT Physical Therapist                  Therapy Charges for Today       Code Description Service Date Service Provider Modifiers Qty    86546809049  PT EVAL LOW COMPLEXITY 4 8/7/2023 Darren Mcgregor, PT GP 1            PT G-Codes  AM-PAC 6 Clicks Score (PT): 23    Darren Mcgregor PT  8/7/2023

## 2023-08-07 NOTE — THERAPY EVALUATION
Patient Name: Jose Bobo  : 1965    MRN: [id number]                              Today's Date: 2023       Admit Date: 2023    Visit Dx: No diagnosis found.  Patient Active Problem List   Diagnosis    Pharyngoesophageal dysphagia    Other constipation    Gastroesophageal reflux disease    History of colon polyps    Palpitations    Essential hypertension    Chest pain    Dyspnea    Right-sided lacunar infarction     Past Medical History:   Diagnosis Date    Arthritis     Elevated cholesterol     GERD (gastroesophageal reflux disease)     Hypertension     Right-sided lacunar infarction 2023     Past Surgical History:   Procedure Laterality Date    COLONOSCOPY      COLONOSCOPY N/A 2018    Procedure: COLONOSCOPY;  Surgeon: Negrito Goldberg MD;  Location: [facility] OR;  Service: Gastroenterology    ENDOSCOPY N/A 2018    Procedure: ESOPHAGOGASTRODUODENOSCOPY WITH ANESTHESIA;  Surgeon: Negrito Goldberg MD;  Location: [facility];  Service: Gastroenterology    NECK SURGERY      VASECTOMY        General Information       Row Name 23 1550          OT Time and Intention    Document Type evaluation  -LM     Mode of Treatment occupational therapy  -LM       Row Name 23 1559          General Information    Patient Profile Reviewed yes  -LM     Prior Level of Function independent:;ADL's;all household mobility;transfer  -LM     Existing Precautions/Restrictions no known precautions/restrictions  -LM     Barriers to Rehab none identified  -LM       Row Name 23 6809          Living Environment    People in Home facility resident  correctional facility  -LM       Row Name 23 1552          Cognition    Orientation Status (Cognition) oriented x 3  -LM               User Key  (r) = Recorded By, (t) = Taken By, (c) = Cosigned By      Initials Name Provider Type    LM Iza Davenport OT Occupational Therapist                     Mobility/ADL's       Row Name 23 1554           Transfers    Transfers toilet transfer  -LM       Row Name 08/07/23 1551          Toilet Transfer    [place] Level (Toilet Transfer) independent  -LM       Row Name 08/07/23 1551          Activities of Daily Living    BADL Assessment/Intervention bathing;upper body dressing;lower body dressing;grooming;feeding;toileting  -LM       Row Name 08/07/23 1551          Bathing Assessment/Intervention    [place] Level (Bathing) independent  -LM       Row Name 08/07/23 1551          Upper Body Dressing Assessment/Training    [place] Level (Upper Body Dressing) independent  -LM       Row Name 08/07/23 1551          Lower Body Dressing Assessment/Training    [place] Level (Lower Body Dressing) independent  -LM       Row Name 08/07/23 1551          Grooming Assessment/Training    [place] Level (Grooming) independent  -LM       Row Name 08/07/23 1551          Self-Feeding Assessment/Training    [place] Level (Feeding) independent  -LM       Row Name 08/07/23 1551          Toileting Assessment/Training    [place] Level (Toileting) independent  -LM               User Key  (r) = Recorded By, (t) = Taken By, (c) = Cosigned By      Initials Name Provider Type    LM Iza Davenport, OT Occupational Therapist                   Obj/Interventions       Row Name 08/07/23 1552          Sensory Assessment (Somatosensory)    Sensory Assessment (Somatosensory) sensation intact  -LM       Row Name 08/07/23 1552          Vision Assessment/Intervention    Visual Impairment/Limitations WFL  -LM       Row Name 08/07/23 1552          Range of Motion Comprehensive    General Range of Motion no range of motion deficits identified  -LM       Row Name 08/07/23 1552          Strength Comprehensive (MMT)    General Manual Muscle Testing (MMT) Assessment no strength deficits identified  -LM       Row Name 08/07/23 1552          Motor Skills    Motor Skills functional endurance;coordination  -LM     Coordination WNL   -LM     Functional Endurance G  -LM               User Key  (r) = Recorded By, (t) = Taken By, (c) = Cosigned By      Initials Name Provider Type    Iza Pappas, OT Occupational Therapist                   Goals/Plan    No documentation.                  Clinical Impression       Row Name 08/07/23 1553          Plan of Care Review    Plan of Care Reviewed With patient  -LM       Row Name 08/07/23 1553          Therapy Assessment/Plan (OT)    Criteria for Skilled Therapeutic Interventions Met (OT) no;no problems identified which require skilled intervention;does not meet criteria for skilled intervention  symptoms resolved  -LM     Therapy Frequency (OT) evaluation only  -LM       Row Name 08/07/23 1553          Positioning and Restraints    Post Treatment Position bed  -LM     In Bed call light within reach;with family/caregiver    -LM               User Key  (r) = Recorded By, (t) = Taken By, (c) = Cosigned By      Initials Name Provider Type    Iza Pappas, OT Occupational Therapist                   Outcome Measures       Row Name 08/07/23 1555          Modified McClain Scale    Pre-Stroke Modified Maggie Scale 0 - No Symptoms at all.  -LM     Modified [place] Scale 0 - No Symptoms at all.  -LM       Row Name 08/07/23 1555          Functional Assessment    Outcome Measure Options Modified Maggie  -LM               User Key  (r) = Recorded By, (t) = Taken By, (c) = Cosigned By      Initials Name Provider Type    Iza Pappas, OT Occupational Therapist                      OT Recommendation and Plan  Therapy Frequency (OT): evaluation only  Plan of Care Review  Plan of Care Reviewed With: patient     Time Calculation:          Therapy Charges for Today       Code Description Service Date Service Provider Modifiers Qty    51578288986  OT EVAL LOW COMPLEXITY 4 8/7/2023 Iza Davenport, OT GO 1                 Iza Davenport OT  8/7/2023

## 2023-08-07 NOTE — THERAPY EVALUATION
"Acute Care - Speech Language Pathology Initial Evaluation  [facility]  SPEECH/LANGUAGE/COGNITIVE ASSESSMENT     Patient Name: Jose Bobo  : 1965  MRN: [id number]  Today's Date: 2023             Admit Date: 2023     Jose Bobo  is seen at bedside this am on 3S to participate in a  s/l and cognitive assessment for safety/function in adls. He is positioned upright and centered in bed to cooperatively participate. All results and observations of this assessment are felt to be representative of his current functional status.His guard is present for this assessment.     Mr. Bobo presented to [facility] from [facility] for evaluion of new onset right side weakness. Code stroke was initiated. He was admitted for further evaluation and management.     PMH significant for constipation, RUPINDER, Colonic polyps, palpiations, HTN, chest pain, shortness of breath.     MRI brain 23 revealed \"No acute intracranial abnormality.'     He reports all symptoms have resolved.     Social History     Socioeconomic History    Marital status:    Tobacco Use    Smoking status: Every Day     Packs/day: 0.25     Years: 30.00     Pack years: 7.50     Types: Cigarettes    Smokeless tobacco: Never    Tobacco comments:     states 2 cigarettes a day   Vaping Use    Vaping Use: Every day    Substances: Nicotine    Devices: Disposable   Substance and Sexual Activity    Alcohol use: No    Drug use: No    Sexual activity: Defer     Birth control/protection: None        Diet Orders (active) (From admission, onward)       Start     Ordered    23 0730  Diet: Renal Diets, Cardiac Diets; Healthy Heart (2-3 Na+); Low Sodium (2-3g), Low Potassium, Low Phosphorus; Texture: Regular Texture (IDDSI 7); Fluid Consistency: Thin (IDDSI 0)  Diet Effective Now         23 0729                  He is observed on ra w/o complications.     Receptive language skills are intact. he is able to id common objects and personal body " "parts, follow simple and multi-step directives, understand complex \"wh\" questions and participate in conversational exchange w/o difficulties.    Expressive language skills are intact. He is able to complete automatic speech tasks, confrontation naming tasks, complete complex oe sentences, respond to complet oe \"wh\" questions, repeat at word/sentence and multiple digit levels, as well as participate in complex conversational exchange. No aphasia, anomia or verbal apraxia evidenced.    Mr. Bobo is independently oriented to person, place and time. Immediate, STM and LTM are wfl w/ pt recalling recent adls and providing personal information w/o delays. Thought organization, processing and problem solving are wfl w/ pt demonstrating appropriate comparing/contrasting, understanding of idiomatic language, convergent and divergent thinking skills.    Facial/oral structures are symmetrical upon observation. Oral mucosa are moist, pink and clean. Secretions are clear, thin and well controlled. OROM/JOSE is wfl w/o lingual deviation upon protrusion. Speech intensity, clarity and intelligibility are wfl w/o dysarthria or oral apraxia noted.    Graphic and reading skills are intact, premorbid baseline status. Pt is able to id isolated letters, simple and moderate words, as well as sentences and small paragraphs. Signature is legible.    Pragmatic skills are wfl w/ appropriate eye gaze patterns and visual tracking. Language is appropriate in context w/ topic initiation and maintenance independently. No left neglect evidenced. Humor response is intact w/ appropriate affect.    Visit Dx:  No diagnosis found.  Patient Active Problem List   Diagnosis    Pharyngoesophageal dysphagia    Other constipation    Gastroesophageal reflux disease    History of colon polyps    Palpitations    Essential hypertension    Chest pain    Dyspnea    Right-sided lacunar infarction     Past Medical History:   Diagnosis Date    Arthritis     Elevated " cholesterol     GERD (gastroesophageal reflux disease)     Hypertension     Right-sided lacunar infarction 2023     Past Surgical History:   Procedure Laterality Date    COLONOSCOPY      COLONOSCOPY N/A 2018    Procedure: COLONOSCOPY;  Surgeon: Negrito Goldberg MD;  Location: [facility] OR;  Service: Gastroenterology    ENDOSCOPY N/A 2018    Procedure: ESOPHAGOGASTRODUODENOSCOPY WITH ANESTHESIA;  Surgeon: Negrito Goldberg MD;  Location: [facility] OR;  Service: Gastroenterology    NECK SURGERY      VASECTOMY       EDUCATION  The patient has been educated in the following areas:   Cognitive Impairment Communication Impairment.    Impression: Mr. Bobo presents with speech, language and cognitive skills representative of his premorbid baseline status. No further formal SLP follow up warranted.     SLP Recommendation and Plan   No further formal SLP f/u recommended for s/l and cognitive skills. Pt will participate in further dysphagia evaluation. Please see full dysphagia note for details.    D/w Mr. Bobo results and recommendations w/ verbal understanding and agreement.    Thank you for allowing me to participate in the care of your patient-   Aydee Ceja M.S., CCC/SLP                                                           Time Calculation:   Therapy Charges for Today       Code Description Service Date Service Provider Modifiers Qty    28284262633 HC ST EVAL ORAL PHARYNG SWALLOW 4 2023 Aydee Ceja, MS CCC-SLP GN 1    70646457148 HC ST EVAL SPEECH AND PROD W LANG  4 2023 Aydee Ceja, MS CCC-SLP GN 1               Aydee Ceja MS CCC-SLP  2023   and Acute Care - Speech Language Pathology   Swallow Initial Evaluation  [facility]  CLINICAL DYSPHAGIA ASSESSMENT       Patient Name: Jose Bobo  : 1965  MRN: [id number]  Today's Date: 2023             Admit Date: 2023    Jose Bobo  is seen at bedside this this am  on 3S to assess  "safety/efficacy of swallowing fnx, determine safest/least restrictive diet tolerance.     Mr. Bobo presented to [facility] from [facility] for evaluion of new onset right side weakness. Code stroke was initiated. He was admitted for further evaluation and management.     PMH significant for constipation, RUPINDER, Colonic polyps, palpiations, HTN, chest pain, shortness of breath.     MRI brain 8/6/23 revealed \"No acute intracranial abnormality.'     He is referred per stroke protocol. Guard was present for this assessment.     Social History     Socioeconomic History    Marital status:    Tobacco Use    Smoking status: Every Day     Packs/day: 0.25     Years: 30.00     Pack years: 7.50     Types: Cigarettes    Smokeless tobacco: Never    Tobacco comments:     states 2 cigarettes a day   Vaping Use    Vaping Use: Every day    Substances: Nicotine    Devices: Disposable   Substance and Sexual Activity    Alcohol use: No    Drug use: No    Sexual activity: Defer     Birth control/protection: None      Diet Orders (active) (From admission, onward)       Start     Ordered    08/06/23 0730  Diet: Renal Diets, Cardiac Diets; Healthy Heart (2-3 Na+); Low Sodium (2-3g), Low Potassium, Low Phosphorus; Texture: Regular Texture (IDDSI 7); Fluid Consistency: Thin (IDDSI 0)  Diet Effective Now         08/06/23 0729                  He was observed on ra.    Mr. Bobo was just s/p shower this morning as I entered the room. He independently ambulated to edge of bed to sit upright for this assessment. He accepted multiple po presentations of ice chips solid cracker, puree and thin liquids via spoon, cup and straw.  He was able to self feed.     Facial/oral structures are symmetrical upon observation. Lingual protrusion reveals no deviation. Oral mucosa are moist, pink, and clean. Secretions are clear, thin, and well controlled. OROM/JOSE is wfl to imitate oral postures. Gag is not assessed. Volitional cough is intact w/ adequate "  intensity, clear in quality, non-productive. Voice is adequate in intensity, clear in quality w/ intelligible speech.    Upon po presentations, adequate bolus anticipation and acceptance w/ good labial seal for bolus clearance via spoon bowl, cup rim stability and suction via straw. Bolus formation, manipulation and control are wfl w/ rotary mastication pattern. A-p transit is timely w/o oral residue. No overt s/s aspiration before the swallow.     Pharyngeal swallow is timely w/ adequate hyolaryngeal elevation per palpation. No overt s/s aspiration evidenced across this evaluation. No silent aspiration suspected as pt is w/o changes in vocal quality, respirations or secretions post po presentations. He denies odynophagia.    Visit Dx:   No diagnosis found.  Patient Active Problem List   Diagnosis    Pharyngoesophageal dysphagia    Other constipation    Gastroesophageal reflux disease    History of colon polyps    Palpitations    Essential hypertension    Chest pain    Dyspnea    Right-sided lacunar infarction     Past Medical History:   Diagnosis Date    Arthritis     Elevated cholesterol     GERD (gastroesophageal reflux disease)     Hypertension     Right-sided lacunar infarction 8/6/2023     Past Surgical History:   Procedure Laterality Date    COLONOSCOPY      COLONOSCOPY N/A 8/16/2018    Procedure: COLONOSCOPY;  Surgeon: Negrito Goldberg MD;  Location: [facility];  Service: Gastroenterology    ENDOSCOPY N/A 8/16/2018    Procedure: ESOPHAGOGASTRODUODENOSCOPY WITH ANESTHESIA;  Surgeon: Negrito Goldberg MD;  Location: [facility];  Service: Gastroenterology    NECK SURGERY      VASECTOMY  1993     Impression: Mr. Bobo presents w/ wfl oropharyngeal swallow w/o s/s aspiration.No s/s indicative of silent aspiration.  No odynophagia reported.      He is recommended to continue least restrictive po diet of regular consistencies, thin liquids. No further formal SLP follow up recommended.     SLP Recommendation  and Plan   1. Continue least restrictive po diet of regular consistencies, thin liquids.    2. Medications whole in puree/thins.   3. Upright and centered for all po intake.  4. RUPINDER precautions.  5. Oral care protocol.  No further formal SLP f/u warranted at this time.    D/w Mr. Bobo results and recommendations w/ verbal agreement.    Thank you for allowing me to participate in the care of your patient-   Aydee Ceja M.S., CCC/SLP                                                                                          EDUCATION  The patient has been educated in the following areas:   Cognitive Impairment Communication Impairment.              Time Calculation:       Therapy Charges for Today       Code Description Service Date Service Provider Modifiers Qty    86397885696 HC ST EVAL ORAL PHARYNG SWALLOW 4 8/7/2023 Aydee Ceja, MS CCC-SLP GN 1    12505391456 HC ST EVAL SPEECH AND PROD W LANG  4 8/7/2023 Aydee Ceja, MS CCC-SLP GN 1                 Aydee Ceja, MS CCC-SLP  8/7/2023

## 2023-08-07 NOTE — CASE MANAGEMENT/SOCIAL WORK
Discharge Planning Assessment   [facility]     Patient Name: Jose Bobo  MRN: [id number]  Today's Date: 8/7/2023    Admit Date: 8/6/2023        Discharge Needs Assessment    No documentation.                  Discharge Plan       Row Name 08/07/23 1201       Plan    Final Discharge Disposition Code 01 - home or self-care    Final Note Pt is being discharged back to [facility] on this date. No other SS needs identified.                    CRUZ Van

## 2023-08-07 NOTE — DISCHARGE SUMMARY
Discharge Summary:    Date of Admission: 8/6/2023  Date of Discharge:  8/7/2023    PCP: Nancy Majano APRN    DISCHARGE DIAGNOSIS  -Transient ischemic attack, likely small vessel disease  -Subacute to chronic right sided lacunar infarct  -Hyperlipidemia  -Essential hypertension  -Grade I diastolic dysfunction  -Pre-diabetes A1c 5.90%  -Acute renal insufficiency, improved  -Mild hypokalemia, resolved    SECONDARY DIAGNOSES  Past Medical History:   Diagnosis Date    Arthritis     Elevated cholesterol     GERD (gastroesophageal reflux disease)     Hypertension     Right-sided lacunar infarction 8/6/2023       CONSULTS   Rosalba Evangelista/[facility] - Neurology    PROCEDURES PERFORMED  MRI brain without contrast:  Findings:     The ventricles and sulci are normal in size and configuration. There is  no acute infarct or intracerebral hemorrhage. No extra-axial blood or  fluid collection is present. No intracranial mass is identified. The  brainstem,posterior fossa and cervical medullary junction are preserved.  Normal intracranial intravascular flow voids are seen. The  orbits,periorbital and para-cavernous spaces are normal. No abnormality  of the skull base or calvarium is identified.     IMPRESSION:  Impression:     No acute intracranial abnormality.    HOSPITAL COURSE  Patient is a 58 y.o. male presented to [facility] complaining of right sided weakness.  Please see the admitting history and physical for further details.      The patient was admitted from the [facility] for right-sided facial weakness and difficulty opening his right eye.  Patient was brought to the emergency department for further evaluation and recommendations.  A code stroke was called in the emergency department.  CT scan of the head without contrast revealed mild generalized brain volume loss with chronic small vessel ischemic type changes in the white matter.  Lacunar infarcts were noted in the right basal  ganglia which could represent subacute to chronic infarcts.  MRI was without any acute intracranial abnormality as noted above.    Patient was evaluated by the telemetry-neurology services.  He was placed on aspirin and high intensity statin.  An echocardiogram and MRI of the brain without contrast were ordered.  The MRI was unremarkable as noted above.  The echocardiogram revealed an EF of 56 to 60%, grade 1 diastolic dysfunction, saline test results were negative, no left ventricular thrombus or mass was noted, no significant valvular heart disease was noted and there was no pericardial effusion.    Telemetry-neurology has recommended dual antiplatelet therapy with low-dose aspirin in combination with Plavix for 21 days; afterwards, the recommendation is for full dose aspirin monotherapy.    Patient was also noted to have some mild acute hypokalemia and acute renal insufficiency.  The patient received potassium supplementation and was placed on maintenance intravenous fluids with improvement in his creatinine today.    Patient reports not being able to take his labetalol since 7/25/2023.  I have requested that this be resumed upon discharge but will plan for a lower dose (100 mg p.o. twice daily rather than 300 mg daily) with recommendations to monitor the blood pressure moving forward.    The patient states that the right-sided facial weakness has resolved.  He has been ambulatory and does not report any other acute complications or concerns at this time.    It is felt that the patient has maximized the benefit of his inpatient hospital stay.  Patient will follow-up with neurology in approximately 4 weeks.  I have also requested a PCP follow-up in approximately 7 to 10 days postdischarge.    I did speak to Mino Godinez and nurse Wang via phone who work at the [facility] and explained patient's discharge medications and the importance of adhering to the anti-hypertensive medications,  "aspirin, plavix and atorvastatin at this time. Paper prescriptions of these medications were signed by  me at discharge and I did discuss with our pharmacists regarding ensuring that patient has samples of these medications for tonight and tomorrow while awaiting the prescriptions thereafter.    Patient was discharged from the hospital in hemodynamically stable condition.    CONDITION ON DISCHARGE  Stable.    VITAL SIGNS  /71 (BP Location: Left arm, Patient Position: Lying)   Pulse 78   Temp 98.1 øF (36.7 øC) (Oral)   Resp 18   Ht 167.6 cm (66\")   Wt 72.8 kg (160 lb 8 oz)   SpO2 96%   BMI 25.91 kg/mý   Objective:  General Appearance:  Comfortable, well-appearing and in no acute distress.    Vital signs: (most recent): Blood pressure 125/73, pulse 88, temperature 98.2 øF (36.8 øC), temperature source Oral, resp. rate 18, height 167.6 cm (66\"), weight 72.8 kg (160 lb 8 oz), SpO2 97 %.    HEENT: Normal HEENT exam.    Lungs:  Normal effort.  Breath sounds clear to auscultation.  No rales, wheezes or rhonchi.    Heart: Normal rate.  S1 normal and S2 normal.  No murmur, gallop or friction rub.   Chest: Symmetric chest wall expansion.   Abdomen: Abdomen is soft and non-distended.  Bowel sounds are normal.   There is no abdominal tenderness.     Extremities: There is no deformity or dependent edema.    Pulses: Distal pulses are intact.    Neurological: Patient is alert and oriented to person, place and time.  Normal strength.    Skin:  Warm and dry.  No rash or ulceration.         Present during visit: residential [facility]    DISCHARGE DISPOSITION   To residential Center    DISCHARGE MEDICATIONS     Discharge Medications        New Medications        Instructions Start Date   atorvastatin 40 MG tablet  Commonly known as: LIPITOR   80 mg, Oral, Nightly      clopidogrel 75 MG tablet  Commonly known as: PLAVIX   75 mg, Oral, Daily             Changes to Medications        Instructions Start Date   aspirin 81 MG EC " tablet  What changed: how much to take   81 mg, Oral, Daily      labetalol 100 MG tablet  Commonly known as: NORMODYNE  What changed:   medication strength  how much to take  when to take this   100 mg, Oral, Every 12 Hours Scheduled             Continue These Medications        Instructions Start Date   fenofibrate 160 MG tablet   160 mg, Oral, Daily      gabapentin 800 MG tablet  Commonly known as: NEURONTIN   800 mg, Oral, 3 Times Daily      ondansetron 4 MG tablet  Commonly known as: ZOFRAN   4 mg, Oral, 2 Times Daily PRN      pantoprazole 40 MG EC tablet  Commonly known as: PROTONIX   40 mg, Oral, Daily      Spiriva Respimat 2.5 MCG/ACT aerosol solution inhaler  Generic drug: tiotropium bromide monohydrate   2 puffs, Inhalation, Daily - RT      tiZANidine 4 MG tablet  Commonly known as: ZANAFLEX   4 mg, Oral, 2 Times Daily PRN             Stop These Medications      amLODIPine 10 MG tablet  Commonly known as: NORVASC              DISCHARGE DIET  cardiac diet, low fat, low cholesterol diet    ACTIVITY AT DISCHARGE   activity as tolerated and check blood pressure    Future Appointments   Date Time Provider Department Center   8/17/2023 12:00 PM [facility] 1 [facility] IS [facility]       Additional Instructions for the Follow-ups that You Need to Schedule       Discharge Follow-up with PCP   As directed       Currently Documented PCP:    Nancy Majano APRN    PCP Phone Number:    [phone]     Follow Up Details: 7-10 days after discharge with BMP; hospital follow up TIA        Discharge Follow-up with Specified Provider: Fortunato CENTENO; 1 Month   As directed      To: Fortunato CENETNO   Follow Up: 1 Month   Follow Up Details: hospital follow up TIA                TEST  RESULTS PENDING AT DISCHARGE       The ASCVD Risk score (Ivania LOMAS, et al., 2019) failed to calculate for the following reasons:    The patient has a prior MI or stroke diagnosis     Georgia Gonzalez  DO [facility]  08/07/23  11:14 EDT      Time: greater than 30 minutes.

## 2023-08-08 NOTE — ED PROVIDER NOTES
Subjective     History provided by:  Patient   used: No    Weakness - Generalized  Severity:  Mild  Onset quality:  Gradual  Duration:  7 days  Timing:  Intermittent  Progression:  Waxing and waning  Chronicity:  New  Context: not alcohol use, not allergies, not change in medication, not decreased sleep, not dehydration, not drug use, not increased activity, not pinched nerve, not recent infection, not stress and not urinary tract infection    Relieved by:  Nothing  Worsened by:  Nothing  Ineffective treatments:  None tried  Associated symptoms: sensory-motor deficit and stroke symptoms    Associated symptoms: no abdominal pain, no anorexia, no aphasia, no arthralgias, no ataxia, no chest pain, no cough, no diarrhea, no difficulty walking, no dizziness, no drooling, no dysphagia, no dysuria, no numbness in extremities, no falls, no fever, no foul-smelling urine, no frequency, no headaches, no hematochezia, no lethargy, no loss of consciousness, no melena, no myalgias, no nausea, no near-syncope, no seizures, no shortness of breath, no syncope, no urgency, no vision change and no vomiting    Risk factors: no anemia, no congestive heart failure, no coronary artery disease, no diabetes, no excessive menstruation, no family hx of stroke, no heart disease, no neurologic disease, no new medications and no recent stressors      Review of Systems   Constitutional:  Negative for activity change, appetite change, chills, diaphoresis, fatigue and fever.   HENT:  Negative for congestion, drooling, ear pain and sore throat.    Eyes:  Negative for redness.   Respiratory:  Negative for cough, chest tightness, shortness of breath and wheezing.    Cardiovascular:  Negative for chest pain, palpitations, leg swelling, syncope and near-syncope.   Gastrointestinal:  Negative for abdominal pain, anorexia, diarrhea, dysphagia, hematochezia, melena, nausea and vomiting.   Genitourinary:  Negative for dysuria, frequency  and urgency.   Musculoskeletal:  Negative for arthralgias, back pain, falls, myalgias and neck pain.   Skin:  Negative for pallor, rash and wound.   Neurological:  Positive for weakness. Negative for dizziness, seizures, loss of consciousness, speech difficulty and headaches.   Psychiatric/Behavioral:  Negative for agitation, behavioral problems, confusion and decreased concentration.    All other systems reviewed and are negative.    Past Medical History:   Diagnosis Date    Arthritis     Elevated cholesterol     GERD (gastroesophageal reflux disease)     Hypertension     Right-sided lacunar infarction 8/6/2023       No Known Allergies    Past Surgical History:   Procedure Laterality Date    COLONOSCOPY      COLONOSCOPY N/A 8/16/2018    Procedure: COLONOSCOPY;  Surgeon: Negrito Goldberg MD;  Location: [facility];  Service: Gastroenterology    ENDOSCOPY N/A 8/16/2018    Procedure: ESOPHAGOGASTRODUODENOSCOPY WITH ANESTHESIA;  Surgeon: Negrito Goldberg MD;  Location: [facility];  Service: Gastroenterology    NECK SURGERY      VASECTOMY  1993       Family History   Problem Relation Age of Onset    Cancer Sister         breast    Hypertension Other     Heart disease Neg Hx        Social History     Socioeconomic History    Marital status:    Tobacco Use    Smoking status: Every Day     Packs/day: 0.25     Years: 30.00     Pack years: 7.50     Types: Cigarettes    Smokeless tobacco: Never    Tobacco comments:     states 2 cigarettes a day   Vaping Use    Vaping Use: Every day    Substances: Nicotine    Devices: Disposable   Substance and Sexual Activity    Alcohol use: No    Drug use: No    Sexual activity: Defer     Birth control/protection: None           Objective   Physical Exam  Vitals and nursing note reviewed.   Constitutional:       General: He is not in acute distress.     Appearance: Normal appearance. He is well-developed. He is not toxic-appearing or diaphoretic.   HENT:      Head:  Normocephalic and atraumatic.      Right Ear: External ear normal.      Left Ear: External ear normal.      Nose: Nose normal.      Mouth/Throat:      Pharynx: No oropharyngeal exudate.      Tonsils: No tonsillar exudate.   Eyes:      General: Lids are normal. No visual field deficit.     Conjunctiva/sclera: Conjunctivae normal.      Pupils: Pupils are equal, round, and reactive to light.   Neck:      Thyroid: No thyromegaly.   Cardiovascular:      Rate and Rhythm: Normal rate and regular rhythm.      Pulses: Normal pulses.      Heart sounds: Normal heart sounds, S1 normal and S2 normal.   Pulmonary:      Effort: Pulmonary effort is normal. No tachypnea or respiratory distress.      Breath sounds: Normal breath sounds. No decreased breath sounds, wheezing or rales.   Chest:      Chest wall: No tenderness.   Abdominal:      General: Bowel sounds are normal. There is no distension.      Palpations: Abdomen is soft.      Tenderness: There is no abdominal tenderness. There is no guarding or rebound.   Musculoskeletal:         General: No tenderness or deformity. Normal range of motion.      Cervical back: Full passive range of motion without pain, normal range of motion and neck supple.   Lymphadenopathy:      Cervical: No cervical adenopathy.   Skin:     General: Skin is warm and dry.      Coloration: Skin is not pale.      Findings: No erythema or rash.   Neurological:      Mental Status: He is alert and oriented to person, place, and time.      GCS: GCS eye subscore is 4. GCS verbal subscore is 5. GCS motor subscore is 6.      Cranial Nerves: No cranial nerve deficit, dysarthria or facial asymmetry.      Sensory: Sensation is intact. No sensory deficit.      Comments: Had complained of Right Side Weakness and Facial Palsy.  Resolved at time of Evaluation.   Psychiatric:         Speech: Speech normal.         Behavior: Behavior normal.         Thought Content: Thought content normal.         Judgment: Judgment normal.        Procedures           ED Course  ED Course as of 08/08/23 1121   Sun Aug 06, 2023   0132 Called to speak with on-call telemetry neurologist Dr. Evangelista but was unable to connect.  Patient does not have a good timeframe of when symptoms started and is not a TNK candidate at this time.  Discussed with the hospitalist team and will admit at this time for complete stroke work-up and stroke evaluation by neurology.  Dr. Evangelista called ED and evaluated patient.  Will admit for TIA work up. [ES]   0206 CT Head Without Contrast Stroke Protocol    IMPRESSION:     1.  Mild generalized brain volume loss with mild chronic small vessel  ischemic type changes in the white matter.  2.  Lacunar infarcts in right basal ganglia could represent subacute to  chronic infarcts.  3.  No acute hemorrhage, mass, or edema.  4.  No hydrocephalus.  5.  Minimal mucosal thickening in the ethmoid air cells.   [ES]   0218 ECG 12 Lead Stroke Evaluation  Vent. Rate :  95 BPM     Atrial Rate :  95 BPM     P-R Int : 158 ms          QRS Dur :  90 ms      QT Int : 370 ms       P-R-T Axes :  75  63  46 degrees     QTc Int : 464 ms     Normal sinus rhythm  Normal ECG  When compared with ECG of 27-SEP-2019 14:02,  ST now depressed in Anterior leads  QT has lengthened   [ES]   0333 CT Head Without Contrast Stroke Protocol     IMPRESSION:     1.  Mild generalized brain volume loss with mild chronic small vessel  ischemic type changes in the white matter.  2.  Lacunar infarcts in right basal ganglia could represent subacute to  chronic infarcts.  3.  No acute hemorrhage, mass, or edema.  4.  No hydrocephalus.  5.  Minimal mucosal thickening in the ethmoid air cells.   [ES]   0333 CT Angiogram Head w AI Analysis of LVO  IMPRESSION:     1.  Normal CTA examination of the brain.  2.  No large vessel occlusion.  3.  No aneurysm or vascular malformation.   [ES]   0334 CT Angiogram Neck  IMPRESSION:     1.  Normal CTA examination of the neck.  2.  No occluded  arterial segment.  3.  No dissection.   [ES]   0334 CT CEREBRAL PERFUSION WITH & WITHOUT CONTRAST  IMPRESSION:     1.  No evidence of ischemia.  2.  Normal brain perfusion scan.   [ES]   0334 XR Chest 1 View  Vent. Rate :  95 BPM     Atrial Rate :  95 BPM     P-R Int : 158 ms          QRS Dur :  90 ms      QT Int : 370 ms       P-R-T Axes :  75  63  46 degrees     QTc Int : 464 ms     Normal sinus rhythm  Normal ECG  When compared with ECG of 27-SEP-2019 14:02,  ST now depressed in Anterior leads  QT has lengthened   [ES]      ED Course User Index  [ES] Butch Armstrong MD                                           Medical Decision Making  Called to speak with on-call telemetry neurologist Dr. Evangelista but was unable to connect.  Patient does not have a good timeframe of when symptoms started and is not a TNK candidate at this time.  Discussed with the hospitalist team and will admit at this time for complete stroke work-up and stroke evaluation by neurology.  Dr. Evangelista called ED and evaluated patient.  Will admit for TIA work up.    History provided by:  Patient   used: No    Weakness - Generalized  Severity:  Mild  Onset quality:  Gradual  Duration:  7 days  Timing:  Intermittent  Progression:  Waxing and waning  Chronicity:  New  Context: not alcohol use, not allergies, not change in medication, not decreased sleep, not dehydration, not drug use, not increased activity, not pinched nerve, not recent infection, not stress and not urinary tract infection    Relieved by:  Nothing  Worsened by:  Nothing  Ineffective treatments:  None tried  Associated symptoms: sensory-motor deficit and stroke symptoms    Associated symptoms: no abdominal pain, no anorexia, no aphasia, no arthralgias, no ataxia, no chest pain, no cough, no diarrhea, no difficulty walking, no dizziness, no drooling, no dysphagia, no dysuria, no numbness in extremities, no falls, no fever, no foul-smelling urine, no  frequency, no headaches, no hematochezia, no lethargy, no loss of consciousness, no melena, no myalgias, no nausea, no near-syncope, no seizures, no shortness of breath, no syncope, no urgency, no vision change and no vomiting    Risk factors: no anemia, no congestive heart failure, no coronary artery disease, no diabetes, no excessive menstruation, no family hx of stroke, no heart disease, no neurologic disease, no new medications and no recent stressors      Amount and/or Complexity of Data Reviewed  External Data Reviewed: labs, radiology, ECG and notes.  Labs: ordered. Decision-making details documented in ED Course.  Radiology: ordered and independent interpretation performed. Decision-making details documented in ED Course.  ECG/medicine tests: ordered and independent interpretation performed. Decision-making details documented in ED Course.    Risk  OTC drugs.  Prescription drug management.  Decision regarding hospitalization.        Final diagnoses:   Multiple lacunar infarcts       ED Disposition  ED Disposition       ED Disposition   Decision to Admit    Condition   --    Comment   Level of Care: Telemetry [5]   Diagnosis: Right-sided lacunar infarction [[id number]]   Admitting Physician: ENZO ALATORRE [[id number]]   Attending Physician: ENZO ALATORRE [[id number]]   Certification: I Certify That Inpatient Hospital Services Are Medically Necessary For Greater Than 2 Midnights                 Nancy Majano, JACOBO  [address] [facility] [address] [address]  [phone]      7-10 days after discharge with [facility]; hospital follow up TIA         Medication List        New Prescriptions      atorvastatin 40 MG tablet  Commonly known as: LIPITOR  Take 2 tablets by mouth Every Night.     clopidogrel 75 MG tablet  Commonly known as: PLAVIX  Take 1 tablet by mouth Daily.            Changed      aspirin 81 MG EC tablet  Take 1 tablet by mouth Daily.  What changed: how much to take     labetalol 100 MG  tablet  Commonly known as: NORMODYNE  Take 1 tablet by mouth Every 12 (Twelve) Hours.  What changed:   medication strength  how much to take  when to take this            Stop      amLODIPine 10 MG tablet  Commonly known as: NORVASC               Where to Get Your Medications        You can get these medications from any pharmacy    Bring a paper prescription for each of these medications  aspirin 81 MG EC tablet  atorvastatin 40 MG tablet  clopidogrel 75 MG tablet  labetalol 100 MG tablet            Butch Armstrong MD  08/08/23 5005

## 2023-08-17 ENCOUNTER — HOSPITAL ENCOUNTER (OUTPATIENT)
Dept: ULTRASOUND IMAGING | Facility: HOSPITAL | Age: 58
Discharge: HOME | End: 2023-08-17
Admitting: INTERNAL MEDICINE
Payer: MEDICARE

## 2023-08-17 DIAGNOSIS — N18.31 STAGE 3A CHRONIC KIDNEY DISEASE: ICD-10-CM

## 2023-08-17 PROCEDURE — 76775 US EXAM ABDO BACK WALL LIM: CPT

## 2023-12-02 ENCOUNTER — APPOINTMENT (OUTPATIENT)
Dept: ULTRASOUND IMAGING | Facility: HOSPITAL | Age: 58
End: 2023-12-02
Payer: COMMERCIAL

## 2023-12-02 ENCOUNTER — HOSPITAL ENCOUNTER (OUTPATIENT)
Facility: HOSPITAL | Age: 58
Discharge: HOME | End: 2023-12-04
Attending: EMERGENCY MEDICINE | Admitting: SURGERY
Payer: COMMERCIAL

## 2023-12-02 ENCOUNTER — APPOINTMENT (OUTPATIENT)
Dept: CT IMAGING | Facility: HOSPITAL | Age: 58
End: 2023-12-02
Payer: COMMERCIAL

## 2023-12-02 ENCOUNTER — APPOINTMENT (OUTPATIENT)
Dept: GENERAL RADIOLOGY | Facility: HOSPITAL | Age: 58
End: 2023-12-02
Payer: COMMERCIAL

## 2023-12-02 DIAGNOSIS — K81.9 CHOLECYSTITIS: ICD-10-CM

## 2023-12-02 DIAGNOSIS — K81.0 ACUTE CHOLECYSTITIS: Primary | ICD-10-CM

## 2023-12-02 LAB
ALBUMIN SERPL-MCNC: 4.4 G/DL (ref 3.5–5.2)
ALBUMIN/GLOB SERPL: 1.5 G/DL
ALP SERPL-CCNC: 85 U/L (ref 39–117)
ALT SERPL W P-5'-P-CCNC: 20 U/L (ref 1–41)
ANION GAP SERPL CALCULATED.3IONS-SCNC: 13.9 MMOL/L (ref 5–15)
AST SERPL-CCNC: 17 U/L (ref 1–40)
BASOPHILS # BLD AUTO: 0.14 10*3/MM3 (ref 0–0.2)
BASOPHILS NFR BLD AUTO: 1.1 % (ref 0–1.5)
BILIRUB SERPL-MCNC: 0.3 MG/DL (ref 0–1.2)
BUN SERPL-MCNC: 6 MG/DL (ref 8–23)
BUN/CREAT SERPL: 5.3 (ref 7–25)
CALCIUM SPEC-SCNC: 9.9 MG/DL (ref 8.6–10.5)
CHLORIDE SERPL-SCNC: 103 MMOL/L (ref 98–107)
CO2 SERPL-SCNC: 26.1 MMOL/L (ref 22–29)
CREAT SERPL-MCNC: 1.13 MG/DL (ref 0.76–1.27)
D DIMER PPP FEU-MCNC: 0.35 MCGFEU/ML (ref 0–0.6)
D-LACTATE SERPL-SCNC: 2.4 MMOL/L (ref 0.5–2)
D-LACTATE SERPL-SCNC: 3 MMOL/L (ref 0.5–2)
DEPRECATED RDW RBC AUTO: 41 FL (ref 37–54)
EGFRCR SERPLBLD CKD-EPI 2021: 74.4 ML/MIN/1.73
EOSINOPHIL # BLD AUTO: 0.36 10*3/MM3 (ref 0–0.4)
EOSINOPHIL NFR BLD AUTO: 2.8 % (ref 0.3–6.2)
ERYTHROCYTE [DISTWIDTH] IN BLOOD BY AUTOMATED COUNT: 13.2 % (ref 12.3–15.4)
GEN 5 2HR TROPONIN T REFLEX: 10 NG/L
GLOBULIN UR ELPH-MCNC: 2.9 GM/DL
GLUCOSE SERPL-MCNC: 112 MG/DL (ref 65–99)
HCT VFR BLD AUTO: 45.8 % (ref 37.5–51)
HGB BLD-MCNC: 14.7 G/DL (ref 13–17.7)
HOLD SPECIMEN: NORMAL
HOLD SPECIMEN: NORMAL
IMM GRANULOCYTES # BLD AUTO: 0.18 10*3/MM3 (ref 0–0.05)
IMM GRANULOCYTES NFR BLD AUTO: 1.4 % (ref 0–0.5)
LIPASE SERPL-CCNC: 45 U/L (ref 13–60)
LYMPHOCYTES # BLD AUTO: 3.05 10*3/MM3 (ref 0.7–3.1)
LYMPHOCYTES NFR BLD AUTO: 23.7 % (ref 19.6–45.3)
MCH RBC QN AUTO: 27.5 PG (ref 26.6–33)
MCHC RBC AUTO-ENTMCNC: 32.1 G/DL (ref 31.5–35.7)
MCV RBC AUTO: 85.8 FL (ref 79–97)
MONOCYTES # BLD AUTO: 0.91 10*3/MM3 (ref 0.1–0.9)
MONOCYTES NFR BLD AUTO: 7.1 % (ref 5–12)
NEUTROPHILS NFR BLD AUTO: 63.9 % (ref 42.7–76)
NEUTROPHILS NFR BLD AUTO: 8.23 10*3/MM3 (ref 1.7–7)
NRBC BLD AUTO-RTO: 0 /100 WBC (ref 0–0.2)
PLATELET # BLD AUTO: 392 10*3/MM3 (ref 140–450)
PMV BLD AUTO: 9.5 FL (ref 6–12)
POTASSIUM SERPL-SCNC: 3.8 MMOL/L (ref 3.5–5.2)
PROT SERPL-MCNC: 7.3 G/DL (ref 6–8.5)
QT INTERVAL: 402 MS
QT INTERVAL: 402 MS
QTC INTERVAL: 440 MS
QTC INTERVAL: 452 MS
RBC # BLD AUTO: 5.34 10*6/MM3 (ref 4.14–5.8)
SODIUM SERPL-SCNC: 143 MMOL/L (ref 136–145)
TROPONIN T DELTA: 3 NG/L
TROPONIN T SERPL HS-MCNC: 7 NG/L
WBC NRBC COR # BLD AUTO: 12.87 10*3/MM3 (ref 3.4–10.8)
WHOLE BLOOD HOLD COAG: NORMAL
WHOLE BLOOD HOLD SPECIMEN: NORMAL

## 2023-12-02 PROCEDURE — 87040 BLOOD CULTURE FOR BACTERIA: CPT | Performed by: EMERGENCY MEDICINE

## 2023-12-02 PROCEDURE — 71275 CT ANGIOGRAPHY CHEST: CPT | Performed by: RADIOLOGY

## 2023-12-02 PROCEDURE — 96361 HYDRATE IV INFUSION ADD-ON: CPT

## 2023-12-02 PROCEDURE — 71045 X-RAY EXAM CHEST 1 VIEW: CPT

## 2023-12-02 PROCEDURE — 96375 TX/PRO/DX INJ NEW DRUG ADDON: CPT

## 2023-12-02 PROCEDURE — 25010000002 HYDROMORPHONE 1 MG/ML SOLUTION: Performed by: EMERGENCY MEDICINE

## 2023-12-02 PROCEDURE — 93005 ELECTROCARDIOGRAM TRACING: CPT | Performed by: EMERGENCY MEDICINE

## 2023-12-02 PROCEDURE — 83690 ASSAY OF LIPASE: CPT | Performed by: EMERGENCY MEDICINE

## 2023-12-02 PROCEDURE — 96374 THER/PROPH/DIAG INJ IV PUSH: CPT

## 2023-12-02 PROCEDURE — 25810000003 LACTATED RINGERS SOLUTION: Performed by: SURGERY

## 2023-12-02 PROCEDURE — 80053 COMPREHEN METABOLIC PANEL: CPT | Performed by: EMERGENCY MEDICINE

## 2023-12-02 PROCEDURE — 85025 COMPLETE CBC W/AUTO DIFF WBC: CPT | Performed by: EMERGENCY MEDICINE

## 2023-12-02 PROCEDURE — 71275 CT ANGIOGRAPHY CHEST: CPT

## 2023-12-02 PROCEDURE — G0378 HOSPITAL OBSERVATION PER HR: HCPCS

## 2023-12-02 PROCEDURE — 71045 X-RAY EXAM CHEST 1 VIEW: CPT | Performed by: RADIOLOGY

## 2023-12-02 PROCEDURE — 99285 EMERGENCY DEPT VISIT HI MDM: CPT

## 2023-12-02 PROCEDURE — 25010000002 ONDANSETRON PER 1 MG: Performed by: EMERGENCY MEDICINE

## 2023-12-02 PROCEDURE — 83605 ASSAY OF LACTIC ACID: CPT | Performed by: EMERGENCY MEDICINE

## 2023-12-02 PROCEDURE — 25010000002 PIPERACILLIN SOD-TAZOBACTAM PER 1 G: Performed by: EMERGENCY MEDICINE

## 2023-12-02 PROCEDURE — 84484 ASSAY OF TROPONIN QUANT: CPT | Performed by: EMERGENCY MEDICINE

## 2023-12-02 PROCEDURE — 36415 COLL VENOUS BLD VENIPUNCTURE: CPT

## 2023-12-02 PROCEDURE — 25510000001 IOPAMIDOL PER 1 ML: Performed by: EMERGENCY MEDICINE

## 2023-12-02 PROCEDURE — 76705 ECHO EXAM OF ABDOMEN: CPT

## 2023-12-02 PROCEDURE — 85379 FIBRIN DEGRADATION QUANT: CPT | Performed by: EMERGENCY MEDICINE

## 2023-12-02 PROCEDURE — 25810000003 LACTATED RINGERS PER 1000 ML: Performed by: SURGERY

## 2023-12-02 PROCEDURE — 74177 CT ABD & PELVIS W/CONTRAST: CPT

## 2023-12-02 PROCEDURE — 74177 CT ABD & PELVIS W/CONTRAST: CPT | Performed by: RADIOLOGY

## 2023-12-02 RX ORDER — PHENOBARBITAL, HYOSCYAMINE SULFATE, ATROPINE SULFATE AND SCOPOLAMINE HYDROBROMIDE .0194; .1037; 16.2; .0065 MG/1; MG/1; MG/1; MG/1
2 TABLET ORAL ONCE
Status: COMPLETED | OUTPATIENT
Start: 2023-12-02 | End: 2023-12-02

## 2023-12-02 RX ORDER — ASPIRIN 81 MG/1
324 TABLET, CHEWABLE ORAL ONCE
Status: COMPLETED | OUTPATIENT
Start: 2023-12-02 | End: 2023-12-02

## 2023-12-02 RX ORDER — LIDOCAINE HYDROCHLORIDE 20 MG/ML
15 SOLUTION OROPHARYNGEAL ONCE
Status: COMPLETED | OUTPATIENT
Start: 2023-12-02 | End: 2023-12-02

## 2023-12-02 RX ORDER — ONDANSETRON 2 MG/ML
4 INJECTION INTRAMUSCULAR; INTRAVENOUS EVERY 6 HOURS PRN
Status: DISCONTINUED | OUTPATIENT
Start: 2023-12-02 | End: 2023-12-04 | Stop reason: HOSPADM

## 2023-12-02 RX ORDER — ALUMINA, MAGNESIA, AND SIMETHICONE 2400; 2400; 240 MG/30ML; MG/30ML; MG/30ML
15 SUSPENSION ORAL ONCE
Status: COMPLETED | OUTPATIENT
Start: 2023-12-02 | End: 2023-12-02

## 2023-12-02 RX ORDER — ONDANSETRON 2 MG/ML
4 INJECTION INTRAMUSCULAR; INTRAVENOUS ONCE
Status: COMPLETED | OUTPATIENT
Start: 2023-12-02 | End: 2023-12-02

## 2023-12-02 RX ORDER — MORPHINE SULFATE 2 MG/ML
2 INJECTION, SOLUTION INTRAMUSCULAR; INTRAVENOUS
Status: DISCONTINUED | OUTPATIENT
Start: 2023-12-02 | End: 2023-12-04 | Stop reason: HOSPADM

## 2023-12-02 RX ORDER — SODIUM CHLORIDE, SODIUM LACTATE, POTASSIUM CHLORIDE, CALCIUM CHLORIDE 600; 310; 30; 20 MG/100ML; MG/100ML; MG/100ML; MG/100ML
100 INJECTION, SOLUTION INTRAVENOUS CONTINUOUS
Status: DISCONTINUED | OUTPATIENT
Start: 2023-12-02 | End: 2023-12-04 | Stop reason: HOSPADM

## 2023-12-02 RX ADMIN — SODIUM CHLORIDE, POTASSIUM CHLORIDE, SODIUM LACTATE AND CALCIUM CHLORIDE 500 ML: 600; 310; 30; 20 INJECTION, SOLUTION INTRAVENOUS at 22:53

## 2023-12-02 RX ADMIN — PHENOBARBITAL, HYOSCYAMINE SULFATE, ATROPINE SULFATE, SCOPOLAMINE HYDROBROMIDE 32.4 MG: 16.2; .1037; .0194; .0065 TABLET ORAL at 17:24

## 2023-12-02 RX ADMIN — PIPERACILLIN SODIUM AND TAZOBACTAM SODIUM 3.38 G: 3; .375 INJECTION, POWDER, LYOPHILIZED, FOR SOLUTION INTRAVENOUS at 19:58

## 2023-12-02 RX ADMIN — HYDROMORPHONE HYDROCHLORIDE 1 MG: 1 INJECTION, SOLUTION INTRAMUSCULAR; INTRAVENOUS; SUBCUTANEOUS at 19:42

## 2023-12-02 RX ADMIN — LIDOCAINE HYDROCHLORIDE 15 ML: 20 SOLUTION ORAL; TOPICAL at 17:25

## 2023-12-02 RX ADMIN — SODIUM CHLORIDE, POTASSIUM CHLORIDE, SODIUM LACTATE AND CALCIUM CHLORIDE 100 ML/HR: 600; 310; 30; 20 INJECTION, SOLUTION INTRAVENOUS at 23:45

## 2023-12-02 RX ADMIN — SODIUM CHLORIDE, POTASSIUM CHLORIDE, SODIUM LACTATE AND CALCIUM CHLORIDE 2313 ML: 600; 310; 30; 20 INJECTION, SOLUTION INTRAVENOUS at 21:12

## 2023-12-02 RX ADMIN — IOPAMIDOL 85 ML: 755 INJECTION, SOLUTION INTRAVENOUS at 18:36

## 2023-12-02 RX ADMIN — ASPIRIN 324 MG: 81 TABLET, CHEWABLE ORAL at 17:24

## 2023-12-02 RX ADMIN — ONDANSETRON 4 MG: 2 INJECTION INTRAMUSCULAR; INTRAVENOUS at 19:42

## 2023-12-02 RX ADMIN — ALUMINUM HYDROXIDE, MAGNESIUM HYDROXIDE, AND DIMETHICONE 15 ML: 400; 400; 40 SUSPENSION ORAL at 17:25

## 2023-12-02 NOTE — ED PROVIDER NOTES
Subjective   History of Present Illness  Patient reports he has had chest pain which he describes as like something sitting on him in the lower sternum and epigastrium since 7 AM without interruption.  He says he has never had this before.  He has a history of hypertension, stroke, active smoker but as far as he knows no history of coronary artery disease or MI.  The chest pain is nonradiating nonpleuritic and nonexertional.  There is no associated nausea vomiting dizziness diaphoresis.  No abdominal pain other than the epigastric component.  No urinary or bowel symptoms.  No leg pain or swelling.  There is no associated dyspnea.        Review of Systems   Constitutional:  Negative for fever.   HENT:  Negative for trouble swallowing.    Eyes:  Negative for visual disturbance.   Respiratory:  Negative for cough and shortness of breath.    Cardiovascular:  Positive for chest pain.   Gastrointestinal:  Negative for blood in stool and diarrhea.   Endocrine: Negative for polydipsia.   Genitourinary:  Negative for dysuria, frequency and hematuria.   Musculoskeletal:  Negative for back pain.   Skin:  Negative for rash.   Neurological:  Negative for speech difficulty.   Hematological:  Does not bruise/bleed easily.   Psychiatric/Behavioral:  Negative for hallucinations and suicidal ideas.        Past Medical History:   Diagnosis Date    Arthritis     Elevated cholesterol     GERD (gastroesophageal reflux disease)     Hypertension     Right-sided lacunar infarction 8/6/2023       No Known Allergies    Past Surgical History:   Procedure Laterality Date    COLONOSCOPY      COLONOSCOPY N/A 8/16/2018    Procedure: COLONOSCOPY;  Surgeon: Negrito Goldberg MD;  Location: [facility];  Service: Gastroenterology    ENDOSCOPY N/A 8/16/2018    Procedure: ESOPHAGOGASTRODUODENOSCOPY WITH ANESTHESIA;  Surgeon: Negrito Goldberg MD;  Location: [facility];  Service: Gastroenterology    NECK SURGERY      VASECTOMY  1993       Family  History   Problem Relation Age of Onset    Cancer Sister         breast    Hypertension Other     Heart disease Neg Hx        Social History     Socioeconomic History    Marital status:    Tobacco Use    Smoking status: Every Day     Packs/day: 0.25     Years: 30.00     Additional pack years: 0.00     Total pack years: 7.50     Types: Cigarettes    Smokeless tobacco: Never    Tobacco comments:     states 2 cigarettes a day   Vaping Use    Vaping Use: Every day    Substances: Nicotine    Devices: Disposable   Substance and Sexual Activity    Alcohol use: No    Drug use: No    Sexual activity: Defer     Birth control/protection: None           Objective   Physical Exam  Constitutional:       General: He is not in acute distress.  HENT:      Head: Normocephalic and atraumatic.      Right Ear: External ear normal.      Left Ear: External ear normal.      Nose: Nose normal.      Mouth/Throat:      Mouth: Mucous membranes are moist.   Eyes:      Extraocular Movements: Extraocular movements intact.   Cardiovascular:      Rate and Rhythm: Normal rate and regular rhythm.      Heart sounds: Normal heart sounds. No murmur heard.  Pulmonary:      Effort: Pulmonary effort is normal.      Breath sounds: Normal breath sounds.   Abdominal:      Palpations: Abdomen is soft.      Tenderness: There is no abdominal tenderness. There is no guarding.   Musculoskeletal:         General: No signs of injury.      Cervical back: Neck supple.   Skin:     Capillary Refill: Capillary refill takes less than 2 seconds.   Neurological:      General: No focal deficit present.      Mental Status: He is alert.   Psychiatric:         Behavior: Behavior normal.         Procedures           ED Course  ED Course as of 12/02/23 2031   Sat Dec 02, 2023   1512 EKG performed at 1509 shows sinus rhythm with a rate of 72 axis within normal limits no acute ischemic changes [PL]   1938 Patient has had ongoing severe pain which she locates in the lower  sternal area.  EKG negative x 2 troponin negative x 2, decided to do chest abdomen pelvis CT and the only significant finding was an enlarged gallbladder with small amount of fluid.  I went back and reexamined him and pushed into the right upper quadrant and he did experience tenderness and when asked was that the same pain he said he thinks it is.  Discussed with Dr. Goldberg suggest we admit to his service. [PL]   2027 Pt did not meet sepsis criteria on arrival, but at this time, with a focus of potential infection and elevated lactate I am giving IVF bolus, in addition to the Abx which were ordered once the CT came back. [PL]      ED Course User Index  [PL] Yoko Conti MD                                             Medical Decision Making  Problems Addressed:  Cholecystitis: complicated acute illness or injury    Amount and/or Complexity of Data Reviewed  Labs: ordered.  Radiology: ordered.  ECG/medicine tests: ordered.    Risk  OTC drugs.  Prescription drug management.  Decision regarding hospitalization.        Final diagnoses:   Cholecystitis       ED Disposition  ED Disposition       ED Disposition   Decision to Admit    Condition   --    Comment   Level of Care: Med/Surg [1]   Diagnosis: Cholecystitis [545089]   Admitting Physician: KALA GOLDBERG [[id number]]   Isolate for COVID?: No [0]   Certification: I Certify That Inpatient Hospital Services Are Medically Necessary For Greater Than 2 Midnights                 No follow-up provider specified.       Medication List      No changes were made to your prescriptions during this visit.            Yoko Conti MD  12/03/23 0059

## 2023-12-03 ENCOUNTER — ANESTHESIA (OUTPATIENT)
Dept: PERIOP | Facility: HOSPITAL | Age: 58
End: 2023-12-03
Payer: MEDICARE

## 2023-12-03 ENCOUNTER — ANESTHESIA EVENT (OUTPATIENT)
Dept: PERIOP | Facility: HOSPITAL | Age: 58
End: 2023-12-03
Payer: MEDICARE

## 2023-12-03 PROBLEM — K81.0 ACUTE CHOLECYSTITIS: Status: ACTIVE | Noted: 2023-12-02

## 2023-12-03 LAB
D-LACTATE SERPL-SCNC: 1.2 MMOL/L (ref 0.5–2)
D-LACTATE SERPL-SCNC: 2.2 MMOL/L (ref 0.5–2)
D-LACTATE SERPL-SCNC: 2.8 MMOL/L (ref 0.5–2)

## 2023-12-03 PROCEDURE — 94640 AIRWAY INHALATION TREATMENT: CPT

## 2023-12-03 PROCEDURE — 83605 ASSAY OF LACTIC ACID: CPT | Performed by: EMERGENCY MEDICINE

## 2023-12-03 PROCEDURE — G0378 HOSPITAL OBSERVATION PER HR: HCPCS

## 2023-12-03 PROCEDURE — 96366 THER/PROPH/DIAG IV INF ADDON: CPT

## 2023-12-03 PROCEDURE — 96375 TX/PRO/DX INJ NEW DRUG ADDON: CPT

## 2023-12-03 PROCEDURE — 25010000002 FENTANYL CITRATE (PF) 50 MCG/ML SOLUTION: Performed by: NURSE ANESTHETIST, CERTIFIED REGISTERED

## 2023-12-03 PROCEDURE — 25010000002 ROPIVACAINE PER 1 MG: Performed by: ANESTHESIOLOGY

## 2023-12-03 PROCEDURE — 94799 UNLISTED PULMONARY SVC/PX: CPT

## 2023-12-03 PROCEDURE — 25010000002 PIPERACILLIN SOD-TAZOBACTAM PER 1 G: Performed by: SURGERY

## 2023-12-03 PROCEDURE — 96376 TX/PRO/DX INJ SAME DRUG ADON: CPT

## 2023-12-03 PROCEDURE — 25010000002 AMPICILLIN-SULBACTAM PER 1.5 G: Performed by: NURSE ANESTHETIST, CERTIFIED REGISTERED

## 2023-12-03 PROCEDURE — S2900 ROBOTIC SURGICAL SYSTEM: HCPCS | Performed by: SURGERY

## 2023-12-03 PROCEDURE — 94761 N-INVAS EAR/PLS OXIMETRY MLT: CPT

## 2023-12-03 PROCEDURE — 25010000002 ONDANSETRON PER 1 MG: Performed by: SURGERY

## 2023-12-03 PROCEDURE — 99222 1ST HOSP IP/OBS MODERATE 55: CPT | Performed by: SURGERY

## 2023-12-03 PROCEDURE — 25810000003 LACTATED RINGERS PER 1000 ML: Performed by: SURGERY

## 2023-12-03 PROCEDURE — 47562 LAPAROSCOPIC CHOLECYSTECTOMY: CPT | Performed by: SURGERY

## 2023-12-03 PROCEDURE — 25010000002 MEPERIDINE PER 100 MG: Performed by: NURSE ANESTHETIST, CERTIFIED REGISTERED

## 2023-12-03 PROCEDURE — 25010000002 NEOSTIGMINE 10 MG/10ML SOLUTION: Performed by: NURSE ANESTHETIST, CERTIFIED REGISTERED

## 2023-12-03 PROCEDURE — 96361 HYDRATE IV INFUSION ADD-ON: CPT

## 2023-12-03 PROCEDURE — 25010000002 INDOCYANINE GREEN 25 MG RECONSTITUTED SOLUTION: Performed by: SURGERY

## 2023-12-03 PROCEDURE — 25010000002 PROPOFOL 10 MG/ML EMULSION: Performed by: NURSE ANESTHETIST, CERTIFIED REGISTERED

## 2023-12-03 PROCEDURE — 25010000002 MORPHINE PER 10 MG: Performed by: SURGERY

## 2023-12-03 PROCEDURE — 25010000002 MIDAZOLAM PER 1 MG: Performed by: NURSE ANESTHETIST, CERTIFIED REGISTERED

## 2023-12-03 PROCEDURE — 96365 THER/PROPH/DIAG IV INF INIT: CPT

## 2023-12-03 DEVICE — HEMOLOK L 6 CLIPS/CART
Type: IMPLANTABLE DEVICE | Site: ABDOMEN | Status: FUNCTIONAL
Brand: WECK

## 2023-12-03 DEVICE — ARISTA AH ABSORBABLE HEMOSTATIC PARTICLES
Type: IMPLANTABLE DEVICE | Site: ABDOMEN | Status: FUNCTIONAL
Brand: ARISTA™ AH

## 2023-12-03 RX ORDER — KETOROLAC TROMETHAMINE 30 MG/ML
30 INJECTION, SOLUTION INTRAMUSCULAR; INTRAVENOUS EVERY 6 HOURS PRN
Status: DISCONTINUED | OUTPATIENT
Start: 2023-12-03 | End: 2023-12-03 | Stop reason: HOSPADM

## 2023-12-03 RX ORDER — CLOPIDOGREL BISULFATE 75 MG/1
75 TABLET ORAL DAILY
Status: DISCONTINUED | OUTPATIENT
Start: 2023-12-03 | End: 2023-12-04 | Stop reason: HOSPADM

## 2023-12-03 RX ORDER — NEOSTIGMINE METHYLSULFATE 1 MG/ML
INJECTION, SOLUTION INTRAVENOUS AS NEEDED
Status: DISCONTINUED | OUTPATIENT
Start: 2023-12-03 | End: 2023-12-03 | Stop reason: SURG

## 2023-12-03 RX ORDER — ATORVASTATIN CALCIUM 40 MG/1
80 TABLET, FILM COATED ORAL NIGHTLY
Status: DISCONTINUED | OUTPATIENT
Start: 2023-12-03 | End: 2023-12-04 | Stop reason: HOSPADM

## 2023-12-03 RX ORDER — ASPIRIN 81 MG/1
81 TABLET ORAL DAILY
Status: DISCONTINUED | OUTPATIENT
Start: 2023-12-03 | End: 2023-12-04 | Stop reason: HOSPADM

## 2023-12-03 RX ORDER — ROPIVACAINE HYDROCHLORIDE 5 MG/ML
INJECTION, SOLUTION EPIDURAL; INFILTRATION; PERINEURAL
Status: COMPLETED | OUTPATIENT
Start: 2023-12-03 | End: 2023-12-03

## 2023-12-03 RX ORDER — LABETALOL 100 MG/1
100 TABLET, FILM COATED ORAL EVERY 12 HOURS SCHEDULED
Status: DISCONTINUED | OUTPATIENT
Start: 2023-12-03 | End: 2023-12-04 | Stop reason: HOSPADM

## 2023-12-03 RX ORDER — ROCURONIUM BROMIDE 10 MG/ML
INJECTION, SOLUTION INTRAVENOUS AS NEEDED
Status: DISCONTINUED | OUTPATIENT
Start: 2023-12-03 | End: 2023-12-03 | Stop reason: SURG

## 2023-12-03 RX ORDER — OXYCODONE HYDROCHLORIDE AND ACETAMINOPHEN 5; 325 MG/1; MG/1
1 TABLET ORAL ONCE AS NEEDED
Status: DISCONTINUED | OUTPATIENT
Start: 2023-12-03 | End: 2023-12-03 | Stop reason: HOSPADM

## 2023-12-03 RX ORDER — FAMOTIDINE 10 MG/ML
INJECTION, SOLUTION INTRAVENOUS AS NEEDED
Status: DISCONTINUED | OUTPATIENT
Start: 2023-12-03 | End: 2023-12-03 | Stop reason: SURG

## 2023-12-03 RX ORDER — IPRATROPIUM BROMIDE AND ALBUTEROL SULFATE 2.5; .5 MG/3ML; MG/3ML
3 SOLUTION RESPIRATORY (INHALATION) ONCE AS NEEDED
Status: DISCONTINUED | OUTPATIENT
Start: 2023-12-03 | End: 2023-12-03 | Stop reason: HOSPADM

## 2023-12-03 RX ORDER — FENTANYL CITRATE 50 UG/ML
50 INJECTION, SOLUTION INTRAMUSCULAR; INTRAVENOUS
Status: COMPLETED | OUTPATIENT
Start: 2023-12-03 | End: 2023-12-03

## 2023-12-03 RX ORDER — SODIUM CHLORIDE 9 MG/ML
40 INJECTION, SOLUTION INTRAVENOUS AS NEEDED
Status: DISCONTINUED | OUTPATIENT
Start: 2023-12-03 | End: 2023-12-03 | Stop reason: HOSPADM

## 2023-12-03 RX ORDER — AMPICILLIN AND SULBACTAM 2; 1 G/1; G/1
INJECTION, POWDER, FOR SOLUTION INTRAMUSCULAR; INTRAVENOUS AS NEEDED
Status: DISCONTINUED | OUTPATIENT
Start: 2023-12-03 | End: 2023-12-03 | Stop reason: SURG

## 2023-12-03 RX ORDER — MIDAZOLAM HYDROCHLORIDE 1 MG/ML
INJECTION INTRAMUSCULAR; INTRAVENOUS AS NEEDED
Status: DISCONTINUED | OUTPATIENT
Start: 2023-12-03 | End: 2023-12-03 | Stop reason: SURG

## 2023-12-03 RX ORDER — FENOFIBRATE 145 MG/1
145 TABLET, COATED ORAL DAILY
Status: DISCONTINUED | OUTPATIENT
Start: 2023-12-03 | End: 2023-12-04 | Stop reason: HOSPADM

## 2023-12-03 RX ORDER — TIZANIDINE 4 MG/1
4 TABLET ORAL 2 TIMES DAILY PRN
Status: DISCONTINUED | OUTPATIENT
Start: 2023-12-03 | End: 2023-12-04 | Stop reason: HOSPADM

## 2023-12-03 RX ORDER — ONDANSETRON 4 MG/1
4 TABLET, FILM COATED ORAL 2 TIMES DAILY PRN
Status: DISCONTINUED | OUTPATIENT
Start: 2023-12-03 | End: 2023-12-04 | Stop reason: HOSPADM

## 2023-12-03 RX ORDER — INDOCYANINE GREEN AND WATER 25 MG
2.5 KIT INJECTION ONCE
Status: COMPLETED | OUTPATIENT
Start: 2023-12-03 | End: 2023-12-03

## 2023-12-03 RX ORDER — GABAPENTIN 400 MG/1
800 CAPSULE ORAL EVERY 8 HOURS SCHEDULED
Status: DISCONTINUED | OUTPATIENT
Start: 2023-12-03 | End: 2023-12-04 | Stop reason: HOSPADM

## 2023-12-03 RX ORDER — PANTOPRAZOLE SODIUM 40 MG/1
40 TABLET, DELAYED RELEASE ORAL DAILY
Status: DISCONTINUED | OUTPATIENT
Start: 2023-12-03 | End: 2023-12-04 | Stop reason: HOSPADM

## 2023-12-03 RX ORDER — SODIUM CHLORIDE 0.9 % (FLUSH) 0.9 %
3 SYRINGE (ML) INJECTION EVERY 12 HOURS SCHEDULED
Status: DISCONTINUED | OUTPATIENT
Start: 2023-12-03 | End: 2023-12-03 | Stop reason: HOSPADM

## 2023-12-03 RX ORDER — SODIUM CHLORIDE 0.9 % (FLUSH) 0.9 %
10 SYRINGE (ML) INJECTION AS NEEDED
Status: DISCONTINUED | OUTPATIENT
Start: 2023-12-03 | End: 2023-12-03 | Stop reason: HOSPADM

## 2023-12-03 RX ORDER — FENTANYL CITRATE 50 UG/ML
INJECTION, SOLUTION INTRAMUSCULAR; INTRAVENOUS AS NEEDED
Status: DISCONTINUED | OUTPATIENT
Start: 2023-12-03 | End: 2023-12-03 | Stop reason: SURG

## 2023-12-03 RX ORDER — MEPERIDINE HYDROCHLORIDE 25 MG/ML
12.5 INJECTION INTRAMUSCULAR; INTRAVENOUS; SUBCUTANEOUS
Status: COMPLETED | OUTPATIENT
Start: 2023-12-03 | End: 2023-12-03

## 2023-12-03 RX ORDER — PROPOFOL 10 MG/ML
VIAL (ML) INTRAVENOUS AS NEEDED
Status: DISCONTINUED | OUTPATIENT
Start: 2023-12-03 | End: 2023-12-03 | Stop reason: SURG

## 2023-12-03 RX ORDER — ACETAMINOPHEN 325 MG/1
650 TABLET ORAL ONCE
Status: DISCONTINUED | OUTPATIENT
Start: 2023-12-03 | End: 2023-12-03 | Stop reason: HOSPADM

## 2023-12-03 RX ORDER — SODIUM CHLORIDE, SODIUM LACTATE, POTASSIUM CHLORIDE, CALCIUM CHLORIDE 600; 310; 30; 20 MG/100ML; MG/100ML; MG/100ML; MG/100ML
100 INJECTION, SOLUTION INTRAVENOUS ONCE AS NEEDED
Status: DISCONTINUED | OUTPATIENT
Start: 2023-12-03 | End: 2023-12-03 | Stop reason: HOSPADM

## 2023-12-03 RX ORDER — ONDANSETRON 2 MG/ML
4 INJECTION INTRAMUSCULAR; INTRAVENOUS AS NEEDED
Status: DISCONTINUED | OUTPATIENT
Start: 2023-12-03 | End: 2023-12-03 | Stop reason: HOSPADM

## 2023-12-03 RX ORDER — PHENYLEPHRINE HCL IN 0.9% NACL 1 MG/10 ML
SYRINGE (ML) INTRAVENOUS AS NEEDED
Status: DISCONTINUED | OUTPATIENT
Start: 2023-12-03 | End: 2023-12-03 | Stop reason: SURG

## 2023-12-03 RX ORDER — MAGNESIUM HYDROXIDE 1200 MG/15ML
LIQUID ORAL AS NEEDED
Status: DISCONTINUED | OUTPATIENT
Start: 2023-12-03 | End: 2023-12-03 | Stop reason: HOSPADM

## 2023-12-03 RX ORDER — GLYCOPYRROLATE 0.2 MG/ML
INJECTION INTRAMUSCULAR; INTRAVENOUS AS NEEDED
Status: DISCONTINUED | OUTPATIENT
Start: 2023-12-03 | End: 2023-12-03 | Stop reason: SURG

## 2023-12-03 RX ADMIN — INDOCYANINE GREEN AND WATER 2.5 MG: KIT at 09:30

## 2023-12-03 RX ADMIN — GABAPENTIN 800 MG: 400 CAPSULE ORAL at 21:12

## 2023-12-03 RX ADMIN — NEOSTIGMINE METHYLSULFATE 2 MG: 0.5 INJECTION INTRAVENOUS at 10:48

## 2023-12-03 RX ADMIN — FENTANYL CITRATE 100 MCG: 50 INJECTION INTRAMUSCULAR; INTRAVENOUS at 09:55

## 2023-12-03 RX ADMIN — MEPERIDINE HYDROCHLORIDE 12.5 MG: 25 INJECTION INTRAMUSCULAR; INTRAVENOUS; SUBCUTANEOUS at 11:08

## 2023-12-03 RX ADMIN — Medication 200 MCG: at 10:15

## 2023-12-03 RX ADMIN — ASPIRIN 81 MG: 81 TABLET, COATED ORAL at 13:30

## 2023-12-03 RX ADMIN — PANTOPRAZOLE SODIUM 40 MG: 40 TABLET, DELAYED RELEASE ORAL at 13:30

## 2023-12-03 RX ADMIN — PROPOFOL 150 MG: 10 INJECTION, EMULSION INTRAVENOUS at 10:00

## 2023-12-03 RX ADMIN — FENTANYL CITRATE 50 MCG: 50 INJECTION, SOLUTION INTRAMUSCULAR; INTRAVENOUS at 11:05

## 2023-12-03 RX ADMIN — LABETALOL HYDROCHLORIDE 100 MG: 100 TABLET, FILM COATED ORAL at 13:30

## 2023-12-03 RX ADMIN — GLYCOPYRROLATE 0.4 MG: 0.4 INJECTION INTRAMUSCULAR; INTRAVENOUS at 10:48

## 2023-12-03 RX ADMIN — MORPHINE SULFATE 2 MG: 2 INJECTION, SOLUTION INTRAMUSCULAR; INTRAVENOUS at 06:40

## 2023-12-03 RX ADMIN — MEPERIDINE HYDROCHLORIDE 12.5 MG: 25 INJECTION INTRAMUSCULAR; INTRAVENOUS; SUBCUTANEOUS at 11:13

## 2023-12-03 RX ADMIN — SODIUM CHLORIDE, POTASSIUM CHLORIDE, SODIUM LACTATE AND CALCIUM CHLORIDE 100 ML/HR: 600; 310; 30; 20 INJECTION, SOLUTION INTRAVENOUS at 13:30

## 2023-12-03 RX ADMIN — MORPHINE SULFATE 2 MG: 2 INJECTION, SOLUTION INTRAMUSCULAR; INTRAVENOUS at 01:44

## 2023-12-03 RX ADMIN — FENOFIBRATE 145 MG: 145 TABLET ORAL at 13:30

## 2023-12-03 RX ADMIN — FENTANYL CITRATE 50 MCG: 50 INJECTION, SOLUTION INTRAMUSCULAR; INTRAVENOUS at 11:10

## 2023-12-03 RX ADMIN — SODIUM CHLORIDE, POTASSIUM CHLORIDE, SODIUM LACTATE AND CALCIUM CHLORIDE 100 ML/HR: 600; 310; 30; 20 INJECTION, SOLUTION INTRAVENOUS at 17:59

## 2023-12-03 RX ADMIN — CLOPIDOGREL BISULFATE 75 MG: 75 TABLET, FILM COATED ORAL at 13:30

## 2023-12-03 RX ADMIN — ROCURONIUM BROMIDE 25 MG: 10 SOLUTION INTRAVENOUS at 10:00

## 2023-12-03 RX ADMIN — ONDANSETRON 4 MG: 2 INJECTION INTRAMUSCULAR; INTRAVENOUS at 09:55

## 2023-12-03 RX ADMIN — MIDAZOLAM 2 MG: 1 INJECTION, SOLUTION INTRAMUSCULAR; INTRAVENOUS at 09:55

## 2023-12-03 RX ADMIN — FAMOTIDINE 20 MG: 10 INJECTION, SOLUTION INTRAVENOUS at 09:55

## 2023-12-03 RX ADMIN — TIOTROPIUM BROMIDE INHALATION SPRAY 2 PUFF: 3.12 SPRAY, METERED RESPIRATORY (INHALATION) at 13:18

## 2023-12-03 RX ADMIN — ATORVASTATIN CALCIUM 80 MG: 40 TABLET, FILM COATED ORAL at 21:12

## 2023-12-03 RX ADMIN — PIPERACILLIN SODIUM AND TAZOBACTAM SODIUM 3.38 G: 3; .375 INJECTION, POWDER, LYOPHILIZED, FOR SOLUTION INTRAVENOUS at 09:00

## 2023-12-03 RX ADMIN — ROPIVACAINE HYDROCHLORIDE 250 MG: 5 INJECTION, SOLUTION EPIDURAL; INFILTRATION; PERINEURAL at 10:07

## 2023-12-03 RX ADMIN — PIPERACILLIN SODIUM AND TAZOBACTAM SODIUM 3.38 G: 3; .375 INJECTION, POWDER, LYOPHILIZED, FOR SOLUTION INTRAVENOUS at 01:44

## 2023-12-03 RX ADMIN — SODIUM CHLORIDE, POTASSIUM CHLORIDE, SODIUM LACTATE AND CALCIUM CHLORIDE 100 ML/HR: 600; 310; 30; 20 INJECTION, SOLUTION INTRAVENOUS at 06:40

## 2023-12-03 RX ADMIN — MORPHINE SULFATE 2 MG: 2 INJECTION, SOLUTION INTRAMUSCULAR; INTRAVENOUS at 21:28

## 2023-12-03 RX ADMIN — GABAPENTIN 800 MG: 400 CAPSULE ORAL at 13:30

## 2023-12-03 RX ADMIN — MORPHINE SULFATE 2 MG: 2 INJECTION, SOLUTION INTRAMUSCULAR; INTRAVENOUS at 08:59

## 2023-12-03 RX ADMIN — ONDANSETRON 4 MG: 2 INJECTION INTRAMUSCULAR; INTRAVENOUS at 01:44

## 2023-12-03 RX ADMIN — LABETALOL HYDROCHLORIDE 100 MG: 100 TABLET, FILM COATED ORAL at 21:12

## 2023-12-03 RX ADMIN — AMPICILLIN SODIUM AND SULBACTAM SODIUM 3 G: 2; 1 INJECTION, POWDER, FOR SOLUTION INTRAMUSCULAR; INTRAVENOUS at 10:13

## 2023-12-03 NOTE — ANESTHESIA POSTPROCEDURE EVALUATION
Patient: Jose Bobo    Procedure Summary       Date: 12/03/23 Room / Location:  [facility] OR 04 /  [facility]    Anesthesia Start: 0955 Anesthesia Stop: 1055    Procedure: CHOLECYSTECTOMY LAPAROSCOPIC WITH DAVINCI ROBOT (Abdomen) Diagnosis:       Acute cholecystitis      (Acute cholecystitis [K81.0])    Surgeons: Negrito Goldberg MD Provider: Jordan Kat DO    Anesthesia Type: general, general with block ASA Status: 3            Anesthesia Type: general, general with block    Vitals  Vitals Value Taken Time   /87 12/03/23 1111   Temp 98.1 °F (36.7 °C) 12/03/23 1056   Pulse 73 12/03/23 1115   Resp 16 12/03/23 1111   SpO2 100 % 12/03/23 1115   Vitals shown include unfiled device data.        Post Anesthesia Care and Evaluation    Patient location during evaluation: PACU  Patient participation: complete - patient participated  Level of consciousness: awake and alert  Pain score: 1  Pain management: adequate    Airway patency: patent  Anesthetic complications: No anesthetic complications  PONV Status: controlled  Cardiovascular status: acceptable  Respiratory status: acceptable  Hydration status: acceptable  No anesthesia care post op

## 2023-12-03 NOTE — ANESTHESIA PROCEDURE NOTES
"Peripheral Block      Patient reassessed immediately prior to procedure    Patient location during procedure: OR  Reason for block: at surgeon's request and post-op pain management  Performed by  CRNA/CAA: Kalina Meade CRNA  Preanesthetic Checklist  Completed: patient identified, IV checked, site marked, risks and benefits discussed, surgical consent, monitors and equipment checked, pre-op evaluation and timeout performed  Prep:  Pt Position: supine  Sterile barriers:cap, gloves, sterile barriers and mask  Prep: ChloraPrep  Patient monitoring: blood pressure monitoring, continuous pulse oximetry and EKG  Procedure    Nursing cardiac assessment comments yes: Sedation, GA, Spinal,Epidural   Performed under: general  Guidance:ultrasound guided    ULTRASOUND INTERPRETATION.  Using ultrasound guidance a 20 G (20g 4\" Stimuplex) gauge needle was placed in close proximity to the nerve, at which point, under ultrasound guidance anesthetic was injected in the area of the nerve and spread of the anesthesia was seen on ultrasound in close proximity thereto.  There were no abnormalities seen on ultrasound; a digital image was taken; and the patient tolerated the procedure with no complications. Images:still images obtained    Laterality:Bilateral  Block Type:TAP  Injection Technique:single-shot  Needle Type:short-bevel  Needle Gauge:20 G  Resistance on Injection: none    Medications Used: ropivacaine (NAROPIN) injection 0.5 % - Peripheral Nerve, Transversus Abdominus Plane   250 mg - 12/3/2023 10:07:00 AM      Medications  Preservative Free Saline:1007ml  Comment:Block Injection:  Total volume divided equally between Right and Left block        Post Assessment  Injection Assessment: negative aspiration for heme, incremental injection and no paresthesia on injection  Patient Tolerance:comfortable throughout block  Complications:no  Additional Notes  The pt was in the supine position under general anesthesia.    Under " Ultrasound guidance, a BBraun 4inch 360 degree needle was advanced with Normal Saline hydro dissection of tissue.  The Internal Oblique and Transversus Abdominus muscles where visualized.  At or before the aponeurosis of Internal Oblique, local anesthetic spread was visualized in the Transversus Abdominus Plane. Injection was made incrementally with aspiration every 5 mls.  There was no  intravascular injection,  injection pressure was normal, there was no neural injection, and the procedure was completed without difficulty. The same procedure was completed for left and right sided tap blocks. Thank You.

## 2023-12-03 NOTE — PLAN OF CARE
Goal Outcome Evaluation:  Plan of Care Reviewed With: patient        Progress: no change  Outcome Evaluation: Pt resting in chair at this time. Pt up to chair this shift. Pt went down for Cholecystectomy today. Pt had complaints of mild pain but did not request pain medication. No acute changes or other complaints at this time. Will continue with plan of care. Guard at bedside.

## 2023-12-03 NOTE — OP NOTE
CHOLECYSTECTOMY LAPAROSCOPIC WITH DAVINCI ROBOT  Procedure Note    Jose Bobo  12/3/2023    Pre-op Diagnosis:   Acute cholecystitis [K81.0]    Post-op Diagnosis:     Post-Op Diagnosis Codes:     * Acute cholecystitis [K81.0]    Procedure(s):  CHOLECYSTECTOMY LAPAROSCOPIC WITH DAVINCI ROBOT    Surgeon(s):  Negrito Goldberg MD    Anesthesia: General    Staff:   Circulator: Kiley Vieyra RN  Scrub Person: Jah Galvan  Assistant: Tyler Dean    Findings: Acute cholecystitis, critical view of safety obtained    Operative Procedure: The patient was taken to the operating suite and placed supine on operating table.  Bilateral sequential compression devices were applied and general endotracheal anesthesia administered.  The abdomen was prepped and draped in usual sterile fashion.  Preoperative antibiotics were confirmed.  Timeout procedure was performed.  A Veress needle was inserted at Nath's point and saline drop test confirmed entry into the peritoneal space.  Pneumoperitoneum was established.  An 8 mm Optiview trocar was inserted through an infraumbilical incision.  The laparoscope was inserted and the Veress needle site was free of injury.  The Veress needle site was removed and upsized to an 8 mm robotic trocar.  A second 8 mm robotic trocar was placed in the anterior axillary line at the level of the umbilicus of the right abdomen.  A 5 mm Optiview trocar was then placed as far lateral as possible in the right abdomen for an assistant trocar.  At this time the robot was docked to the trocars and the robotic camera inserted.  The patient had been placed in reverse Trendelenburg with left lateral tilt prior to docking.  I then exited the sterile field and entered the robotic console.  My assistant placed a robotic hook in the right arm #1 and a fenestrated bipolar in the left arm #2.  My assistant grasped the fundus of the gallbladder and retracted anteriorly and superiorly.  The  gallbladder was distended and inflamed with omental adhesions to the fundus and infundibulum.  Needle decompression of the fundus was required for grasping the gallbladder..  Mild adhesions to the fundus and infundibulum were taken down with cautery hook.  The infundibulum was exposed and grasped and retracted laterally and inferiorly.  The peritoneum on the anterior posterior surface of the gallbladder was opened with the cautery hook.  The gallbladder was elevated from the gallbladder fossa for a portion and the cystic duct and artery were identified and dissected free circumferentially.  All adventitial tissue between the cystic duct and artery was dissected free with the cautery hook.  The cystic plate was visible from the anterior and posterior views with only the cystic duct and artery seen entering the gallbladder.  With the critical view of safety obtained Firefly confirmed no abnormal ductal abnormality and at this time the cystic artery was controlled with a single Hemoclip placed proximally on the artery and the cystic duct controlled with 2 hemoclips placed on the cystic duct stump side and one on the infundibular side of the duct. The artery was divided with the cautery hook with no evidence of bleeding.  The cystic duct was divided with the cut mode of the cautery hook with no evidence of cystic duct stump leak.  The gallbladder was then removed from the gallbladder fossa intact.  This was done with the cautery hook.  Firefly was used to confirm no evidence of duct of Luschka or accessory duct leakage.  There was no cystic duct stump leakage.  At this time robotic instruments were removed under direct visualization.  The robot was undocked and I entered the sterile field for completion of the case.  A 5 mm laparoscope was inserted through a robotic trocar and the gallbladder was placed in a 5 mm Endo Catch bag. It was removed through the infraumbilical fascial defect.  The gallbladder fossa was  hemostatic after cauterization of surface bleeding from the fossa and placement of Ryder onto the fossa.  And at this time all trocars were removed under direct visualization and pneumoperitoneum was evacuated.  The infraumbilical fascial defect was closed with a figure-of-eight Vicryl suture and all skin incisions closed with Monocryl subcuticular suture and dressed with Skin Affix.  The patient tolerated the procedure well.    Estimated Blood Loss: 10 mL    Specimens:   Gallbladder           Drains: None    Grafts/Implants: None    Complications: None      Negrito Goldberg MD     Date: 12/3/2023  Time: 10:48 EST

## 2023-12-03 NOTE — ANESTHESIA PREPROCEDURE EVALUATION
Anesthesia Evaluation     Patient summary reviewed and Nursing notes reviewed   no history of anesthetic complications:   NPO Solid Status: > 8 hours  NPO Liquid Status: > 8 hours           Airway   Mallampati: II  TM distance: >3 FB  Neck ROM: full  No difficulty expected  Dental    (+) edentulous    Pulmonary    (+) a smoker Current, Abstained day of surgery, cigarettes,shortness of breath, decreased breath sounds  Cardiovascular - normal exam  Exercise tolerance: poor (<4 METS)    ECG reviewed  PT is on anticoagulation therapy  Patient on routine beta blocker  Rhythm: regular  Rate: normal    (+) hypertension, PALMER, PVD, hyperlipidemia      Neuro/Psych- negative ROS  GI/Hepatic/Renal/Endo    (+) GERD    Musculoskeletal     Abdominal  - normal exam   Substance History - negative use     OB/GYN negative ob/gyn ROS         Other   arthritis,     ROS/Med Hx Other: Echo 8/6/2023:  ·  Left ventricular systolic function is normal. Left ventricular ejection fraction appears to be 56 - 60%.  ·  Left ventricular diastolic function is consistent with (grade I) impaired relaxation.  ·  No evidence of left ventricular thrombus or mass present.  ·  Saline test results are negative.  ·  No significant valvular heart disease noted.  No vegetations detected.  ·  There is no evidence of pericardial effusion.           Phys Exam Other: + heavy beard            Anesthesia Plan    ASA 3     general and general with block     (TAP blocks for post op pain control per surgeon request. )  intravenous induction     Anesthetic plan, risks, benefits, and alternatives have been provided, discussed and informed consent has been obtained with: patient.  Pre-procedure education provided  Plan discussed with CRNA.    CODE STATUS:

## 2023-12-03 NOTE — ANESTHESIA PROCEDURE NOTES
Airway  Urgency: elective    Date/Time: 12/3/2023 10:00 AM  End Time:12/3/2023 10:00 AM  Airway not difficult    General Information and Staff    Patient location during procedure: OR  Anesthesiologist: Jodran Kat DO    Indications and Patient Condition  Indications for airway management: airway protection    Preoxygenated: yes  MILS maintained throughout  Mask difficulty assessment: 0 - not attempted    Final Airway Details  Final airway type: endotracheal airway      Successful airway: ETT  Cuffed: yes   Successful intubation technique: direct laryngoscopy  Endotracheal tube insertion site: oral  Blade: Schafer  Blade size: 2  ETT size (mm): 7.0  Cormack-Lehane Classification: grade IIa - partial view of glottis  Placement verified by: chest auscultation and capnometry   Measured from: lips  Number of attempts at approach: 1  Assessment: lips, teeth, and gum same as pre-op and atraumatic intubation

## 2023-12-03 NOTE — PLAN OF CARE
Goal Outcome Evaluation:  Plan of Care Reviewed With: patient        Progress: no change  Outcome Evaluation: Patient arrived to the floor this shift from the ER. VSS. pt has rested in bed throughout the night. pt had complaints of pain, see MAR. Pt has remained NPO since arrival to unit. LR initated and maintained. pt is AOx4. Guard at bedside. no other pt complaints or concerns noted. no acute changes are noted at this time. will continue with plan of care.

## 2023-12-03 NOTE — H&P
[facility]   HISTORY AND PHYSICAL    Patient Name: Jose Bobo  : 1965  MRN: [id number]  Primary Care Physician:  Nancy Majano APRN  Date of admission: 2023    Subjective   Subjective     Chief Complaint: Abd pain    History of Present Illness  With acute onset abdominal pain beginning yesterday.  He came in with substernal and right upper quadrant pain.  Cardiac workup negative.  The patient pain seemed to localize to the right upper quadrant.  He has a leukocytosis.  No nausea vomiting or diarrhea reported.  CT shows dilated gallbladder with possible pericholecystic fluid and he is tender in the right upper quadrant consistent with cholecystitis.  Chest Pain   Pertinent negatives include no abdominal pain, cough, dizziness, fever, headaches, nausea, palpitations, shortness of breath, vomiting or weakness.   Pertinent negatives for past medical history include no seizures.       Review of Systems   Constitutional:  Negative for activity change, appetite change, chills and fever.   HENT:  Negative for sore throat and trouble swallowing.    Eyes:  Negative for visual disturbance.   Respiratory:  Negative for cough and shortness of breath.    Cardiovascular:  Positive for chest pain. Negative for palpitations.   Gastrointestinal:  Negative for abdominal distention, abdominal pain, blood in stool, constipation, diarrhea, nausea and vomiting.   Endocrine: Negative for cold intolerance and heat intolerance.   Genitourinary:  Negative for dysuria.   Musculoskeletal:  Negative for joint swelling.   Skin:  Negative for color change, rash and wound.   Allergic/Immunologic: Negative for immunocompromised state.   Neurological:  Negative for dizziness, seizures, weakness and headaches.   Hematological:  Negative for adenopathy. Does not bruise/bleed easily.   Psychiatric/Behavioral:  Negative for agitation and confusion.         Personal History     Past Medical History:   Diagnosis Date     Arthritis     Elevated cholesterol     GERD (gastroesophageal reflux disease)     Hypertension     Right-sided lacunar infarction 8/6/2023       Past Surgical History:   Procedure Laterality Date    COLONOSCOPY      COLONOSCOPY N/A 8/16/2018    Procedure: COLONOSCOPY;  Surgeon: Negrtio Goldberg MD;  Location: [facility] OR;  Service: Gastroenterology    ENDOSCOPY N/A 8/16/2018    Procedure: ESOPHAGOGASTRODUODENOSCOPY WITH ANESTHESIA;  Surgeon: Negrito Goldberg MD;  Location: [facility] OR;  Service: Gastroenterology    NECK SURGERY      VASECTOMY  1993       Family History: family history includes Cancer in his sister; Hypertension in an other family member. Otherwise pertinent FHx was reviewed and not pertinent to current issue.    Social History:  reports that he has been smoking cigarettes. He has a 7.50 pack-year smoking history. He has never used smokeless tobacco. He reports that he does not drink alcohol and does not use drugs.    Home Medications:  aspirin, atorvastatin, clopidogrel, fenofibrate, gabapentin, labetalol, ondansetron, pantoprazole, tiZANidine, and tiotropium bromide monohydrate    Allergies:  No Known Allergies    Objective    Objective     Vitals:   Temp:  [97.8 °F (36.6 °C)-99 °F (37.2 °C)] 99 °F (37.2 °C)  Heart Rate:  [56-97] 97  Resp:  [18-20] 20  BP: (142-199)/() 142/71    Physical Exam  Constitutional:       Appearance: He is well-developed.   HENT:      Head: Normocephalic and atraumatic.   Eyes:      Conjunctiva/sclera: Conjunctivae normal.      Pupils: Pupils are equal, round, and reactive to light.   Neck:      Thyroid: No thyromegaly.      Vascular: No JVD.      Trachea: No tracheal deviation.   Cardiovascular:      Rate and Rhythm: Normal rate and regular rhythm.      Heart sounds: No murmur heard.     No friction rub. No gallop.   Pulmonary:      Effort: Pulmonary effort is normal.      Breath sounds: Normal breath sounds.   Abdominal:      General: There is no distension.       Palpations: Abdomen is soft. There is no hepatomegaly or splenomegaly.      Tenderness: There is no abdominal tenderness.      Hernia: No hernia is present.   Musculoskeletal:         General: No deformity. Normal range of motion.      Cervical back: Neck supple.   Skin:     General: Skin is warm and dry.   Neurological:      Mental Status: He is alert and oriented to person, place, and time.         Result Review    Result Review:  I have personally reviewed the results from the time of this admission to 12/3/2023 08:48 EST and agree with these findings:  [x]  Laboratory list / accordion  []  Microbiology  [x]  Radiology  []  EKG/Telemetry   []  Cardiology/Vascular   []  Pathology  []  Old records  []  Other:        Assessment & Plan   Assessment / Plan     Brief Patient Summary:  Jose Bobo is a 58 y.o. male who presents with abdominal pain and lactic acidosis that is resolving with IV fluid resuscitation.  Pain is secondary to likely acute cholecystitis secondary to gallstones.    Active Hospital Problems:  Active Hospital Problems    Diagnosis     **Cholecystitis     Acute cholecystitis      Plan:   IV antibiotics  IV fluid resuscitation  OR for cholecystectomy    DVT prophylaxis:  No DVT prophylaxis order currently exists.    CODE STATUS:       Admission Status:  I believe this patient meets inpatient status.    Negrito Goldberg MD

## 2023-12-04 VITALS
TEMPERATURE: 98 F | HEART RATE: 91 BPM | WEIGHT: 168 LBS | HEIGHT: 66 IN | SYSTOLIC BLOOD PRESSURE: 107 MMHG | OXYGEN SATURATION: 98 % | BODY MASS INDEX: 27 KG/M2 | DIASTOLIC BLOOD PRESSURE: 62 MMHG | RESPIRATION RATE: 16 BRPM

## 2023-12-04 PROCEDURE — 94664 DEMO&/EVAL PT USE INHALER: CPT

## 2023-12-04 PROCEDURE — 94799 UNLISTED PULMONARY SVC/PX: CPT

## 2023-12-04 PROCEDURE — 25810000003 LACTATED RINGERS PER 1000 ML: Performed by: SURGERY

## 2023-12-04 PROCEDURE — 25010000002 MORPHINE PER 10 MG: Performed by: SURGERY

## 2023-12-04 PROCEDURE — 94761 N-INVAS EAR/PLS OXIMETRY MLT: CPT

## 2023-12-04 PROCEDURE — G0378 HOSPITAL OBSERVATION PER HR: HCPCS

## 2023-12-04 PROCEDURE — 99024 POSTOP FOLLOW-UP VISIT: CPT | Performed by: SURGERY

## 2023-12-04 RX ORDER — IBUPROFEN 600 MG/1
600 TABLET ORAL EVERY 6 HOURS PRN
Qty: 30 TABLET | Refills: 0 | Status: SHIPPED | OUTPATIENT
Start: 2023-12-04

## 2023-12-04 RX ORDER — ACETAMINOPHEN 325 MG/1
650 TABLET ORAL EVERY 4 HOURS PRN
Qty: 30 TABLET | Refills: 0 | Status: SHIPPED | OUTPATIENT
Start: 2023-12-04 | End: 2023-12-04 | Stop reason: SDUPTHER

## 2023-12-04 RX ORDER — TRAMADOL HYDROCHLORIDE 50 MG/1
50 TABLET ORAL EVERY 8 HOURS PRN
Qty: 8 TABLET | Refills: 0 | Status: SHIPPED | OUTPATIENT
Start: 2023-12-04 | End: 2023-12-04 | Stop reason: SDUPTHER

## 2023-12-04 RX ORDER — TRAMADOL HYDROCHLORIDE 50 MG/1
50 TABLET ORAL EVERY 8 HOURS PRN
Qty: 8 TABLET | Refills: 0 | Status: SHIPPED | OUTPATIENT
Start: 2023-12-04

## 2023-12-04 RX ORDER — IBUPROFEN 600 MG/1
600 TABLET ORAL EVERY 6 HOURS PRN
Qty: 30 TABLET | Refills: 0 | Status: SHIPPED | OUTPATIENT
Start: 2023-12-04 | End: 2023-12-04 | Stop reason: SDUPTHER

## 2023-12-04 RX ORDER — ACETAMINOPHEN 325 MG/1
650 TABLET ORAL EVERY 4 HOURS PRN
Qty: 30 TABLET | Refills: 0 | Status: SHIPPED | OUTPATIENT
Start: 2023-12-04

## 2023-12-04 RX ADMIN — PANTOPRAZOLE SODIUM 40 MG: 40 TABLET, DELAYED RELEASE ORAL at 08:13

## 2023-12-04 RX ADMIN — ASPIRIN 81 MG: 81 TABLET, COATED ORAL at 08:13

## 2023-12-04 RX ADMIN — FENOFIBRATE 145 MG: 145 TABLET ORAL at 08:13

## 2023-12-04 RX ADMIN — TIOTROPIUM BROMIDE INHALATION SPRAY 2 PUFF: 3.12 SPRAY, METERED RESPIRATORY (INHALATION) at 06:30

## 2023-12-04 RX ADMIN — CLOPIDOGREL BISULFATE 75 MG: 75 TABLET, FILM COATED ORAL at 08:13

## 2023-12-04 RX ADMIN — GABAPENTIN 800 MG: 400 CAPSULE ORAL at 05:17

## 2023-12-04 RX ADMIN — LABETALOL HYDROCHLORIDE 100 MG: 100 TABLET, FILM COATED ORAL at 08:13

## 2023-12-04 RX ADMIN — SODIUM CHLORIDE, POTASSIUM CHLORIDE, SODIUM LACTATE AND CALCIUM CHLORIDE 100 ML/HR: 600; 310; 30; 20 INJECTION, SOLUTION INTRAVENOUS at 04:21

## 2023-12-04 RX ADMIN — MORPHINE SULFATE 2 MG: 2 INJECTION, SOLUTION INTRAMUSCULAR; INTRAVENOUS at 08:20

## 2023-12-04 RX ADMIN — MORPHINE SULFATE 2 MG: 2 INJECTION, SOLUTION INTRAMUSCULAR; INTRAVENOUS at 04:25

## 2023-12-04 NOTE — PAYOR COMM NOTE
"CONTACT: ANA SEN RN  UTILIZATION MANAGEMENT DEPT.  [facility]  [address] [place] [address]  PHONE: [phone]  FAX: [phone]      CLINICAL INFORMATION AND DC NOTIFICATION    ADMITTED TO OBSERVATION STATUS ON  12/3/23 AND DISCHARGING ON 12/4/23      Beba Noguera (58 y.o. Male)       Date of Birth   1965    Social Security Number       Address   [address] [place], [place] [address]    Home Phone   [phone]    MRN   [id number]       [employer]   Catholic    Marital Status                               Admission Date   12/3/23    Admission Type   Emergency    Admitting Provider   Negrito Goldberg MD    Attending Provider   Negrito Goldberg MD    Department, Room/Bed   [address] 385/       Discharge Date       Discharge Disposition   Home or Self Care    Discharge Destination                                 Attending Provider: Negrito Goldberg MD    Allergies: No Known Allergies    Isolation: None   Infection: None   Code Status: Prior    Ht: 167.6 cm (66\")   Wt: 76.2 kg (168 lb)    Admission Cmt: None   Principal Problem: Cholecystitis [K81.9]                   Active Insurance as of 12/2/2023       Primary Coverage       Payor Plan Insurance Group Employer/Plan Group    MEDICARE MEDICARE A & B        Payor Plan Address Payor Plan Phone Number Payor Plan Fax Number Effective Dates     [id number] [phone]  12/1/2020 - 12/3/2023    [facility] [id number]         Subscriber Name Subscriber Birth Date Member ID       BEBA NOGUERA 1965 [id number]               Secondary Coverage       Payor Plan Insurance Group Employer/Plan Group    [facility]       Payor Plan Address Payor Plan Phone Number Payor Plan Fax Number Effective Dates    [address] [phone]  8/6/2023 - None Entered    [facility] [id number]         Subscriber Name Subscriber Birth Date Member ID    "    BEBA NOGUERA 1965                      Emergency Contacts        (Rel.) Home Phone Work Phone Mobile Phone    Vivien Noguera (Spouse) [phone] -- --                 History & Physical        House, Negrito Orozco MD at 23 0848          [facility]   HISTORY AND PHYSICAL    Patient Name: Beba Noguera  : 1965  MRN: [id number]  Primary Care Physician:  Nancy Majano APRN  Date of admission: 2023    Subjective  Subjective     Chief Complaint: Abd pain    History of Present Illness  With acute onset abdominal pain beginning yesterday.  He came in with substernal and right upper quadrant pain.  Cardiac workup negative.  The patient pain seemed to localize to the right upper quadrant.  He has a leukocytosis.  No nausea vomiting or diarrhea reported.  CT shows dilated gallbladder with possible pericholecystic fluid and he is tender in the right upper quadrant consistent with cholecystitis.  Chest Pain   Pertinent negatives include no abdominal pain, cough, dizziness, fever, headaches, nausea, palpitations, shortness of breath, vomiting or weakness.   Pertinent negatives for past medical history include no seizures.       Review of Systems   Constitutional:  Negative for activity change, appetite change, chills and fever.   HENT:  Negative for sore throat and trouble swallowing.    Eyes:  Negative for visual disturbance.   Respiratory:  Negative for cough and shortness of breath.    Cardiovascular:  Positive for chest pain. Negative for palpitations.   Gastrointestinal:  Negative for abdominal distention, abdominal pain, blood in stool, constipation, diarrhea, nausea and vomiting.   Endocrine: Negative for cold intolerance and heat intolerance.   Genitourinary:  Negative for dysuria.   Musculoskeletal:  Negative for joint swelling.   Skin:  Negative for color change, rash and wound.   Allergic/Immunologic: Negative for immunocompromised state.    Neurological:  Negative for dizziness, seizures, weakness and headaches.   Hematological:  Negative for adenopathy. Does not bruise/bleed easily.   Psychiatric/Behavioral:  Negative for agitation and confusion.         Personal History     Past Medical History:   Diagnosis Date    Arthritis     Elevated cholesterol     GERD (gastroesophageal reflux disease)     Hypertension     Right-sided lacunar infarction 8/6/2023       Past Surgical History:   Procedure Laterality Date    COLONOSCOPY      COLONOSCOPY N/A 8/16/2018    Procedure: COLONOSCOPY;  Surgeon: Negrito Goldberg MD;  Location: [facility] OR;  Service: Gastroenterology    ENDOSCOPY N/A 8/16/2018    Procedure: ESOPHAGOGASTRODUODENOSCOPY WITH ANESTHESIA;  Surgeon: Negrito Goldberg MD;  Location: [facility] OR;  Service: Gastroenterology    NECK SURGERY      VASECTOMY  1993       Family History: family history includes Cancer in his sister; Hypertension in an other family member. Otherwise pertinent FHx was reviewed and not pertinent to current issue.    Social History:  reports that he has been smoking cigarettes. He has a 7.50 pack-year smoking history. He has never used smokeless tobacco. He reports that he does not drink alcohol and does not use drugs.    Home Medications:  aspirin, atorvastatin, clopidogrel, fenofibrate, gabapentin, labetalol, ondansetron, pantoprazole, tiZANidine, and tiotropium bromide monohydrate    Allergies:  No Known Allergies    Objective   Objective     Vitals:   Temp:  [97.8 °F (36.6 °C)-99 °F (37.2 °C)] 99 °F (37.2 °C)  Heart Rate:  [56-97] 97  Resp:  [18-20] 20  BP: (142-199)/() 142/71    Physical Exam  Constitutional:       Appearance: He is well-developed.   HENT:      Head: Normocephalic and atraumatic.   Eyes:      Conjunctiva/sclera: Conjunctivae normal.      Pupils: Pupils are equal, round, and reactive to light.   Neck:      Thyroid: No thyromegaly.      Vascular: No JVD.      Trachea: No tracheal deviation.    Cardiovascular:      Rate and Rhythm: Normal rate and regular rhythm.      Heart sounds: No murmur heard.     No friction rub. No gallop.   Pulmonary:      Effort: Pulmonary effort is normal.      Breath sounds: Normal breath sounds.   Abdominal:      General: There is no distension.      Palpations: Abdomen is soft. There is no hepatomegaly or splenomegaly.      Tenderness: There is no abdominal tenderness.      Hernia: No hernia is present.   Musculoskeletal:         General: No deformity. Normal range of motion.      Cervical back: Neck supple.   Skin:     General: Skin is warm and dry.   Neurological:      Mental Status: He is alert and oriented to person, place, and time.         Result Review   Result Review:  I have personally reviewed the results from the time of this admission to 12/3/2023 08:48 EST and agree with these findings:  [x]  Laboratory list / accordion  []  Microbiology  [x]  Radiology  []  EKG/Telemetry   []  Cardiology/Vascular   []  Pathology  []  Old records  []  Other:        Assessment & Plan  Assessment / Plan     Brief Patient Summary:  Jose Bobo is a 58 y.o. male who presents with abdominal pain and lactic acidosis that is resolving with IV fluid resuscitation.  Pain is secondary to likely acute cholecystitis secondary to gallstones.    Active Hospital Problems:  Active Hospital Problems    Diagnosis     **Cholecystitis     Acute cholecystitis      Plan:   IV antibiotics  IV fluid resuscitation  OR for cholecystectomy    DVT prophylaxis:  No DVT prophylaxis order currently exists.    CODE STATUS:       Admission Status:  I believe this patient meets inpatient status.    Negrito Goldberg MD    Electronically signed by Negrito Goldberg MD at 12/03/23 0896          Emergency Department Notes        Lavonne Peña RN at 12/02/23 2119          Report called to RICK Gandhi 3N    Electronically signed by Lavonne Peña RN at 12/02/23 2112       Yoko Conti MD at 12/02/23  1508          Subjective   History of Present Illness  Patient reports he has had chest pain which he describes as like something sitting on him in the lower sternum and epigastrium since 7 AM without interruption.  He says he has never had this before.  He has a history of hypertension, stroke, active smoker but as far as he knows no history of coronary artery disease or MI.  The chest pain is nonradiating nonpleuritic and nonexertional.  There is no associated nausea vomiting dizziness diaphoresis.  No abdominal pain other than the epigastric component.  No urinary or bowel symptoms.  No leg pain or swelling.  There is no associated dyspnea.        Review of Systems   Constitutional:  Negative for fever.   HENT:  Negative for trouble swallowing.    Eyes:  Negative for visual disturbance.   Respiratory:  Negative for cough and shortness of breath.    Cardiovascular:  Positive for chest pain.   Gastrointestinal:  Negative for blood in stool and diarrhea.   Endocrine: Negative for polydipsia.   Genitourinary:  Negative for dysuria, frequency and hematuria.   Musculoskeletal:  Negative for back pain.   Skin:  Negative for rash.   Neurological:  Negative for speech difficulty.   Hematological:  Does not bruise/bleed easily.   Psychiatric/Behavioral:  Negative for hallucinations and suicidal ideas.        Past Medical History:   Diagnosis Date    Arthritis     Elevated cholesterol     GERD (gastroesophageal reflux disease)     Hypertension     Right-sided lacunar infarction 8/6/2023       No Known Allergies    Past Surgical History:   Procedure Laterality Date    COLONOSCOPY      COLONOSCOPY N/A 8/16/2018    Procedure: COLONOSCOPY;  Surgeon: Negrito Goldberg MD;  Location: [facility];  Service: Gastroenterology    ENDOSCOPY N/A 8/16/2018    Procedure: ESOPHAGOGASTRODUODENOSCOPY WITH ANESTHESIA;  Surgeon: Negrito Goldberg MD;  Location: [facility];  Service: Gastroenterology    NECK SURGERY      VASECTOMY  1993        Family History   Problem Relation Age of Onset    Cancer Sister         breast    Hypertension Other     Heart disease Neg Hx        Social History     Socioeconomic History    Marital status:    Tobacco Use    Smoking status: Every Day     Packs/day: 0.25     Years: 30.00     Additional pack years: 0.00     Total pack years: 7.50     Types: Cigarettes    Smokeless tobacco: Never    Tobacco comments:     states 2 cigarettes a day   Vaping Use    Vaping Use: Every day    Substances: Nicotine    Devices: Disposable   Substance and Sexual Activity    Alcohol use: No    Drug use: No    Sexual activity: Defer     Birth control/protection: None           Objective   Physical Exam  Constitutional:       General: He is not in acute distress.  HENT:      Head: Normocephalic and atraumatic.      Right Ear: External ear normal.      Left Ear: External ear normal.      Nose: Nose normal.      Mouth/Throat:      Mouth: Mucous membranes are moist.   Eyes:      Extraocular Movements: Extraocular movements intact.   Cardiovascular:      Rate and Rhythm: Normal rate and regular rhythm.      Heart sounds: Normal heart sounds. No murmur heard.  Pulmonary:      Effort: Pulmonary effort is normal.      Breath sounds: Normal breath sounds.   Abdominal:      Palpations: Abdomen is soft.      Tenderness: There is no abdominal tenderness. There is no guarding.   Musculoskeletal:         General: No signs of injury.      Cervical back: Neck supple.   Skin:     Capillary Refill: Capillary refill takes less than 2 seconds.   Neurological:      General: No focal deficit present.      Mental Status: He is alert.   Psychiatric:         Behavior: Behavior normal.         Procedures          ED Course  ED Course as of 12/02/23 2031   Sat Dec 02, 2023   1512 EKG performed at 1509 shows sinus rhythm with a rate of 72 axis within normal limits no acute ischemic changes [PL]   1938 Patient has had ongoing severe pain which she locates in  the lower sternal area.  EKG negative x 2 troponin negative x 2, decided to do chest abdomen pelvis CT and the only significant finding was an enlarged gallbladder with small amount of fluid.  I went back and reexamined him and pushed into the right upper quadrant and he did experience tenderness and when asked was that the same pain he said he thinks it is.  Discussed with Dr. Goldberg suggest we admit to his service. [PL]   2027 Pt did not meet sepsis criteria on arrival, but at this time, with a focus of potential infection and elevated lactate I am giving IVF bolus, in addition to the Abx which were ordered once the CT came back. [PL]      ED Course User Index  [PL] Yoko Conti MD                                             Medical Decision Making  Problems Addressed:  Cholecystitis: complicated acute illness or injury    Amount and/or Complexity of Data Reviewed  Labs: ordered.  Radiology: ordered.  ECG/medicine tests: ordered.    Risk  OTC drugs.  Prescription drug management.  Decision regarding hospitalization.        Final diagnoses:   Cholecystitis       ED Disposition  ED Disposition       ED Disposition   Decision to Admit    Condition   --    Comment   Level of Care: Med/Surg [1]   Diagnosis: Cholecystitis [767791]   Admitting Physician: KALA GOLDBERG [[id number]]   Isolate for COVID?: No [0]   Certification: I Certify That Inpatient Hospital Services Are Medically Necessary For Greater Than 2 Midnights                 No follow-up provider specified.       Medication List      No changes were made to your prescriptions during this visit.            Yoko Conti MD  12/03/23 0059      Electronically signed by Yoko Conti MD at 12/03/23 0059       Facility-Administered Medications as of 12/4/2023   Medication Dose Route Frequency Provider Last Rate Last Admin    [COMPLETED] aluminum-magnesium hydroxide-simethicone (MAALOX MAX) 400-400-40 MG/5ML suspension 15 mL  15 mL Oral Once Yoko Conti MD    15 mL at 12/02/23 1725    [COMPLETED] aspirin chewable tablet 324 mg  324 mg Oral Once Yoko Conti MD   324 mg at 12/02/23 1724    aspirin EC tablet 81 mg  81 mg Oral Daily Negrito Goldberg MD   81 mg at 12/04/23 0813    atorvastatin (LIPITOR) tablet 80 mg  80 mg Oral Nightly Negrito Goldberg MD   80 mg at 12/03/23 2112    clopidogrel (PLAVIX) tablet 75 mg  75 mg Oral Daily Negrito Goldberg MD   75 mg at 12/04/23 0813    fenofibrate (TRICOR) tablet 145 mg  145 mg Oral Daily Negrito Goldberg MD   145 mg at 12/04/23 0813    [COMPLETED] fentaNYL citrate (PF) (SUBLIMAZE) injection 50 mcg  50 mcg Intravenous Q5 Min PRN HalinaKalina, CRNA   50 mcg at 12/03/23 1110    gabapentin (NEURONTIN) capsule 800 mg  800 mg Oral Q8H Negrito Goldberg MD   800 mg at 12/04/23 0517    [COMPLETED] HYDROmorphone (DILAUDID) injection 1 mg  1 mg Intravenous Once Yoko Conti MD   1 mg at 12/02/23 1942    [COMPLETED] indocyanine green (IC-GREEN) injection 2.5 mg  2.5 mg Intravenous Once Negrito Goldberg MD   2.5 mg at 12/03/23 0930    [COMPLETED] iopamidol (ISOVUE-370) 76 % injection 100 mL  100 mL Intravenous Once in imaging Yoko Conti MD   85 mL at 12/02/23 1836    labetalol (NORMODYNE) tablet 100 mg  100 mg Oral Q12H Negrito Goldberg MD   100 mg at 12/04/23 0813    [COMPLETED] lactated ringers bolus 500 mL  500 mL Intravenous Once Negrito Goldberg MD 1,000 mL/hr at 12/02/23 2253 500 mL at 12/02/23 2253    lactated ringers infusion  100 mL/hr Intravenous Continuous Negrito Goldberg MD   Stopped at 12/04/23 0949    [COMPLETED] Lidocaine Viscous HCl (XYLOCAINE) 2 % solution 15 mL  15 mL Mouth/Throat Once Yoko Conti MD   15 mL at 12/02/23 1725    [COMPLETED] meperidine (DEMEROL) injection 12.5 mg  12.5 mg Intravenous Q5 Min PRN Kalina Meade CRNA   12.5 mg at 12/03/23 1113    morphine injection 2 mg  2 mg Intravenous Q2H PRN Negrito Goldberg MD   2 mg at 12/04/23 0820     [COMPLETED] ondansetron (ZOFRAN) injection 4 mg  4 mg Intravenous Once Yoko Conti MD   4 mg at 12/02/23 1942    ondansetron (ZOFRAN) injection 4 mg  4 mg Intravenous Q6H PRN Negrito Goldberg MD   4 mg at 12/03/23 0955    ondansetron (ZOFRAN) tablet 4 mg  4 mg Oral BID PRN Negrito Goldberg MD        pantoprazole (PROTONIX) EC tablet 40 mg  40 mg Oral Daily Negrito Goldberg MD   40 mg at 12/04/23 0813    [COMPLETED] PB-Hyoscy-Atropine-Scopolamine (DONNATAL) per tablet 32.4 mg  2 tablet Oral Once Yoko Conti MD   32.4 mg at 12/02/23 1724    [COMPLETED] piperacillin-tazobactam (ZOSYN) IVPB 3.375 g in 100 mL NS VTB  3.375 g Intravenous Once Yoko Conti  mL/hr at 12/02/23 1958 3.375 g at 12/02/23 1958    [COMPLETED] sepsis fluid LR bolus 2,313 mL  30 mL/kg Intravenous Once Yoko Conti MD   2,313 mL at 12/02/23 2112    tiotropium (SPIRIVA RESPIMAT) 2.5 mcg/act aerosol solution inhaler  2 puff Inhalation Daily - RT Negrito Goldberg MD   2 puff at 12/04/23 0630    tiZANidine (ZANAFLEX) tablet 4 mg  4 mg Oral BID PRN Negrito Goldberg MD         Orders (all)        Start     Ordered    12/04/23 0847  Discharge patient  Once         12/04/23 0847    12/04/23 0847  Discharge to care of  when patient meets criteria  Once         12/04/23 0847    12/04/23 0339  Diet: Gastrointestinal Diets; Fiber-Restricted, Low Irritant; Texture: Regular Texture (IDDSI 7); Fluid Consistency: Thin (IDDSI 0)  Diet Effective Now         12/04/23 0338    12/04/23 0001  NPO Diet NPO Type: Strict NPO  Diet Effective Midnight,   Status:  Canceled         12/03/23 0915    12/04/23 0000  Wound Care        Comments: OK to shower in 24 hours, do not soak in bath for 1 week    12/04/23 0847    12/04/23 0000  Discharge Follow-up with Specialty: 2 Weeks         12/04/23 0847    12/04/23 0000  traMADol (ULTRAM) 50 MG tablet  Every 8 Hours PRN,   Status:  Discontinued         12/04/23 0847    12/04/23 0000   ibuprofen (ADVIL,MOTRIN) 600 MG tablet  Every 6 Hours PRN,   Status:  Discontinued         12/04/23 0847    12/04/23 0000  acetaminophen (TYLENOL) 325 MG tablet  Every 4 Hours PRN,   Status:  Discontinued         12/04/23 0847    12/04/23 0000  Diet: Regular/House Diet; Regular Texture (IDDSI 7); Thin (IDDSI 0)         12/04/23 0847    12/04/23 0000  ibuprofen (ADVIL,MOTRIN) 600 MG tablet  Every 6 Hours PRN         12/04/23 0856    12/04/23 0000  acetaminophen (TYLENOL) 325 MG tablet  Every 4 Hours PRN         12/04/23 0856    12/04/23 0000  traMADol (ULTRAM) 50 MG tablet  Every 8 Hours PRN         12/04/23 0856    12/03/23 2100  atorvastatin (LIPITOR) tablet 80 mg  Nightly         12/03/23 1139    12/03/23 1400  gabapentin (NEURONTIN) capsule 800 mg  Every 8 Hours Scheduled         12/03/23 1139    12/03/23 1300  aspirin EC tablet 81 mg  Daily         12/03/23 1139    12/03/23 1300  clopidogrel (PLAVIX) tablet 75 mg  Daily         12/03/23 1139    12/03/23 1300  fenofibrate (TRICOR) tablet 145 mg  Daily         12/03/23 1139    12/03/23 1300  labetalol (NORMODYNE) tablet 100 mg  Every 12 Hours Scheduled         12/03/23 1139    12/03/23 1300  pantoprazole (PROTONIX) EC tablet 40 mg  Daily         12/03/23 1139    12/03/23 1230  tiotropium (SPIRIVA RESPIMAT) 2.5 mcg/act aerosol solution inhaler  Daily - RT         12/03/23 1139    12/03/23 1141  Initiate & Follow Hypercapnic Monitoring Guideline for Opioid Administration via EtCO2 and / or SpO2  Continuous        Comments: Follow Hypercapnic Monitoring Guideline As Outlined in Process Instructions (Open Order Report to View Full Instructions)    12/03/23 1141    12/03/23 1141  Opioid Administration - Document EtCO2 and / or SpO2 With Each Set of Vitals & Any Change in Patient Status  Per Order Details        Comments: With Each Set of Vitals & Any Change in Patient Status    12/03/23 1141    12/03/23 1141  Opioid Administration - Notify Provider Hypercapnic  Monitoring  Until Discontinued         12/03/23 1141    12/03/23 1141  Opioid Administration - Continuous Pulse Oximetry (SpO2)  Continuous         12/03/23 1141    12/03/23 1140  Diet: Liquid Diets; Clear Liquid; Fluid Consistency: Thin (IDDSI 0)  Diet Effective Now,   Status:  Canceled         12/03/23 1139    12/03/23 1140  Advance Diet As Tolerated -  Until Discontinued         12/03/23 1139    12/03/23 1139  ondansetron (ZOFRAN) tablet 4 mg  2 Times Daily PRN         12/03/23 1139    12/03/23 1139  tiZANidine (ZANAFLEX) tablet 4 mg  2 Times Daily PRN         12/03/23 1139    12/03/23 1057  Oxygen Therapy- Nasal Cannula; Titrate 1-6 LPM Per SpO2; 90 - 95%  Continuous,   Status:  Canceled         12/03/23 1056    12/03/23 1057  Pulse Oximetry, Continuous  Continuous,   Status:  Canceled         12/03/23 1056    12/03/23 1057  POC Glucose STAT  STAT,   Status:  Canceled        Comments: Notify Anesthesia if blood sugar is less than 80 mg/dL or greater than 180mg/dL      12/03/23 1056    12/03/23 1057  Vital signs every 5 minutes for 15 minutes, every 15 minutes thereafter.  Once,   Status:  Canceled         12/03/23 1056    12/03/23 1057  Call Anesthesiologist for additional IV Fluid bolus for Hypotension/Tachycardia  Until Discontinued,   Status:  Canceled         12/03/23 1056    12/03/23 1057  Notify Anesthesia of Any Acute Changes in Patient Condition  Until Discontinued,   Status:  Canceled         12/03/23 1056    12/03/23 1057  Notify Anesthesia for Unrelieved Pain  Until Discontinued,   Status:  Canceled         12/03/23 1056    12/03/23 1057  Once DC criteria to floor met, follow surgeon's orders.  Until Discontinued,   Status:  Canceled         12/03/23 1056    12/03/23 1057  Discharge patient from PACU when discharge criteria is met.  Until Discontinued,   Status:  Canceled         12/03/23 1056    12/03/23 1056  lactated ringers infusion  Once As Needed,   Status:  Discontinued         12/03/23 1056     12/03/23 1056  oxyCODONE-acetaminophen (PERCOCET) 5-325 MG per tablet 1 tablet  Once As Needed,   Status:  Discontinued         12/03/23 1056    12/03/23 1056  ketorolac (TORADOL) injection 30 mg  Every 6 Hours PRN,   Status:  Discontinued         12/03/23 1056    12/03/23 1056  fentaNYL citrate (PF) (SUBLIMAZE) injection 50 mcg  Every 5 Minutes PRN         12/03/23 1056    12/03/23 1056  ondansetron (ZOFRAN) injection 4 mg  As Needed,   Status:  Discontinued         12/03/23 1056    12/03/23 1056  meperidine (DEMEROL) injection 12.5 mg  Every 5 Minutes PRN         12/03/23 1056    12/03/23 1056  ipratropium-albuterol (DUO-NEB) nebulizer solution 3 mL  Once As Needed,   Status:  Discontinued         12/03/23 1056    12/03/23 1020  TISSUE EXAM, [facility] ([facility])  RELEASE UPON ORDERING         12/03/23 1020    12/03/23 1018  sodium chloride (NS) irrigation solution  As Needed,   Status:  Discontinued         12/03/23 1018    12/03/23 1015  sodium chloride 0.9 % flush 3 mL  Every 12 Hours Scheduled,   Status:  Discontinued         12/03/23 0915    12/03/23 1015  ampicillin-sulbactam (UNASYN) 3 g in sodium chloride 0.9 % 100 mL IVPB-VTB  Once,   Status:  Discontinued         12/03/23 0915    12/03/23 1015  acetaminophen (TYLENOL) tablet 650 mg  Once,   Status:  Discontinued         12/03/23 0915    12/03/23 1015  indocyanine green (IC-GREEN) injection 2.5 mg  Once         12/03/23 0915    12/03/23 0958  STAT Lactic Acid, Reflex  PROCEDURE ONCE         12/03/23 0430    12/03/23 0947  Initiate Observation Status  Once         12/03/23 0946    12/03/23 0916  Insert Peripheral IV  Once,   Status:  Canceled         12/03/23 0915    12/03/23 0916  Saline Lock & Maintain IV Access  Continuous,   Status:  Canceled         12/03/23 0915    12/03/23 0915  sodium chloride 0.9 % flush 10 mL  As Needed,   Status:  Discontinued         12/03/23 0915    12/03/23 0915  sodium chloride 0.9 % infusion 40 mL  As Needed,   Status:   Discontinued         12/03/23 0915    12/03/23 0915  Follow Anesthesia Guidelines / Protocol  Once,   Status:  Canceled         12/03/23 0915    12/03/23 0915  Verify / Perform Chlorhexidine Skin Prep  Once        Comments: Chlorhexidine Skin Wipes and Instructions For All Patients Having A Procedure Requiring an Outward Incision if Not Allergic.  If Allergic, Give Antibacterial Skin Wipes and Instructions.  Do Not Use For Facial Cases or on Any Mucus Membranes.    12/03/23 0915    12/03/23 0915  Verify / Perform Chlorhexidine Skin Prep if Indicated (If Not Already Completed)  Once         12/03/23 0915    12/03/23 0846  Case Request  Once         12/03/23 0846    12/03/23 0846  Follow Anesthesia Guidelines / Protocol  Continuous         12/03/23 0846 12/03/23 0846  Perform Chlorhexidine Skin Prep Night Before and Morning of Procedure  Until Discontinued        Comments: Chlorhexidine Skin Prep and Instructions For All Patients Having A Procedure Requiring an Outward Incision if Not Allergic. If Allergic, Give Antibacterial Skin Wipes and Instructions. Do Not Use For Facial Cases or on Any Mucus Membranes.    12/03/23 0846    12/03/23 0400  Lactic Acid, Plasma  Timed,   Status:  Canceled         12/02/23 2352    12/03/23 0357  STAT Lactic Acid, Reflex  PROCEDURE ONCE         12/03/23 0120    12/03/23 0204  Initiate & Follow Hypercapnic Monitoring Guideline for Opioid Administration via EtCO2 and / or SpO2  Continuous,   Status:  Canceled        Comments: Follow Hypercapnic Monitoring Guideline As Outlined in Process Instructions (Open Order Report to View Full Instructions)    12/03/23 0204 12/03/23 0204  Opioid Administration - Document EtCO2 and / or SpO2 With Each Set of Vitals & Any Change in Patient Status  Per Order Details        Comments: With Each Set of Vitals & Any Change in Patient Status    12/03/23 0204 12/03/23 0204  Opioid Administration - Notify Provider Hypercapnic Monitoring  Until  Discontinued         12/03/23 0204    12/03/23 0204  Opioid Administration - Continuous Pulse Oximetry (SpO2)  Continuous,   Status:  Canceled         12/03/23 0204    12/03/23 0200  piperacillin-tazobactam (ZOSYN) IVPB 3.375 g in 100 mL NS VTB  Every 8 Hours,   Status:  Discontinued         12/02/23 2239 12/03/23 0043  STAT Lactic Acid, Reflex  PROCEDURE ONCE         12/02/23 2204    12/02/23 2330  lactated ringers bolus 500 mL  Once         12/02/23 2239 12/02/23 2330  lactated ringers infusion  Continuous         12/02/23 2239 12/02/23 2330  piperacillin-tazobactam (ZOSYN) IVPB 3.375 g in 100 mL NS VTB  Once,   Status:  Discontinued         12/02/23 2239 12/02/23 2254  STAT Lactic Acid, Reflex  PROCEDURE ONCE         12/02/23 2025 12/02/23 2235  ondansetron (ZOFRAN) injection 4 mg  Every 6 Hours PRN         12/02/23 2239 12/02/23 2234  morphine injection 2 mg  Every 2 Hours PRN         12/02/23 2239 12/02/23 2224  NPO Diet NPO Type: Strict NPO  Diet Effective Now,   Status:  Canceled         12/02/23 2239 12/02/23 2043  sepsis fluid LR bolus 2,313 mL  Once         12/02/23 2027 12/02/23 1951  Inpatient Admission  Once,   Status:  Canceled         12/02/23 1951 12/02/23 1950  piperacillin-tazobactam (ZOSYN) IVPB 3.375 g in 100 mL NS VTB  Once         12/02/23 1934 12/02/23 1950  Surgery (on-call MD unless specified)  Once        Specialty:  General Surgery  Provider:  (Not yet assigned)    12/02/23 1949 12/02/23 1938  HYDROmorphone (DILAUDID) injection 1 mg  Once         12/02/23 1922 12/02/23 1938  ondansetron (ZOFRAN) injection 4 mg  Once         12/02/23 1922 12/02/23 1935  Lactic Acid, Plasma  Once         12/02/23 1934 12/02/23 1935  Blood Culture - Blood, Arm, Left  Once        See Hyperspace for full Linked Orders Report.    12/02/23 1934 12/02/23 1935  Blood Culture - Blood, Arm, Right  Once        See Hyperspace for full Linked Orders Report.    12/02/23  1934    12/02/23 1922  US Gallbladder  1 Time Imaging         12/02/23 1921    12/02/23 1851  iopamidol (ISOVUE-370) 76 % injection 100 mL  Once in Imaging         12/02/23 1835    12/02/23 1736  CT Angiogram Chest  1 Time Imaging         12/02/23 1735    12/02/23 1735  CT Chest With Contrast Diagnostic  1 Time Imaging,   Status:  Canceled         12/02/23 1734    12/02/23 1735  CT Abdomen Pelvis With Contrast  1 Time Imaging         12/02/23 1734    12/02/23 1658  High Sensitivity Troponin T 2Hr  PROCEDURE ONCE         12/02/23 1550    12/02/23 1650  aluminum-magnesium hydroxide-simethicone (MAALOX MAX) 400-400-40 MG/5ML suspension 15 mL  Once         12/02/23 1634    12/02/23 1650  Lidocaine Viscous HCl (XYLOCAINE) 2 % solution 15 mL  Once         12/02/23 1634    12/02/23 1650  PB-Hyoscy-Atropine-Scopolamine (DONNATAL) per tablet 32.4 mg  Once         12/02/23 1634    12/02/23 1646  aspirin chewable tablet 324 mg  Once         12/02/23 1630    12/02/23 1631  ECG 12 Lead Chest Pain  STAT         12/02/23 1630    12/02/23 1526  CBC Auto Differential  Once         12/02/23 1525    12/02/23 1516  Comprehensive Metabolic Panel  Once         12/02/23 1515    12/02/23 1516  D-dimer, Quantitative  Once         12/02/23 1515    12/02/23 1516  Lipase  Once         12/02/23 1515    12/02/23 1515  CBC & Differential  Once         12/02/23 1515    12/02/23 1508  CBC & Differential  Once,   Status:  Canceled         12/02/23 1507    12/02/23 1508  Comprehensive Metabolic Panel  Once,   Status:  Canceled         12/02/23 1507    12/02/23 1508  D-dimer, Quantitative  Once,   Status:  Canceled         12/02/23 1507    12/02/23 1508  Lipase  Once,   Status:  Canceled         12/02/23 1507    12/02/23 1508  ECG 12 Lead Chest Pain  Once,   Status:  Canceled         12/02/23 1507    12/02/23 1508  XR Chest 1 View  1 Time Imaging         12/02/23 1507    12/02/23 1508  CBC Auto Differential  PROCEDURE ONCE,   Status:  Canceled          12/02/23 1508    12/02/23 1458  ECG 12 Lead Chest Pain  STAT,   Status:  Canceled         12/02/23 1457    12/02/23 1457  [place] Draw  Once         12/02/23 1456    12/02/23 1457  Green Top (Gel)  PROCEDURE ONCE         12/02/23 1456    12/02/23 1457  Lavender Top  PROCEDURE ONCE         12/02/23 1456    12/02/23 1457  Gold Top - SST  PROCEDURE ONCE         12/02/23 1456    12/02/23 1457  Light Blue Top  PROCEDURE ONCE         12/02/23 1456    12/02/23 1453  ECG 12 Lead Chest Pain  Once         12/02/23 1452    12/02/23 1453  High Sensitivity Troponin T  Once         12/02/23 1452                  Physician Progress Notes (all)    No notes of this type exist for this encounter.       Consult Notes (all)    No notes of this type exist for this encounter.          Discharge Summary        Negrito Goldberg MD at 12/04/23 0847            Date of Discharge:  12/4/2023    Discharge Diagnosis: acute cholecystitis    Presenting Problem/History of Present Illness  Active Hospital Problems    Diagnosis  POA    **Cholecystitis [K81.9]  Yes    Acute cholecystitis [K81.0]  Unknown      Resolved Hospital Problems   No resolved problems to display.          Hospital Course  Patient is a 58 y.o. male presented with abdominal pain and findings consistent with acute cholecystitis.  He was admitted and taken to the operating room for cholecystectomy and found to have acute cholecystitis.  Postoperatively the patient did well and was ready for discharge home on postoperative day 1.      Procedures Performed    Procedure(s):  CHOLECYSTECTOMY LAPAROSCOPIC WITH DAVINCI ROBOT  -------------------       Consults:   Consults       Date and Time Order Name Status Description    12/2/2023  7:49 PM Surgery (on-call MD unless specified)              Vital Signs  Temp:  [97.4 °F (36.3 °C)-98.6 °F (37 °C)] 98 °F (36.7 °C)  Heart Rate:  [72-98] 91  Resp:  [14-20] 16  BP: (103-145)/(59-96) 107/62        Discharge Disposition  Home or Self  Care    Discharge Medications     Discharge Medications        New Medications        Instructions Start Date   acetaminophen 325 MG tablet  Commonly known as: TYLENOL   650 mg, Oral, Every 4 Hours PRN      ibuprofen 600 MG tablet  Commonly known as: ADVIL,MOTRIN   600 mg, Oral, Every 6 Hours PRN      traMADol 50 MG tablet  Commonly known as: ULTRAM   50 mg, Oral, Every 8 Hours PRN             Continue These Medications        Instructions Start Date   aspirin 81 MG EC tablet   81 mg, Oral, Daily      atorvastatin 40 MG tablet  Commonly known as: LIPITOR   80 mg, Oral, Nightly      clopidogrel 75 MG tablet  Commonly known as: PLAVIX   75 mg, Oral, Daily      fenofibrate 160 MG tablet   160 mg, Oral, Daily      gabapentin 800 MG tablet  Commonly known as: NEURONTIN   800 mg, Oral, 3 Times Daily      labetalol 100 MG tablet  Commonly known as: NORMODYNE   100 mg, Oral, Every 12 Hours Scheduled      ondansetron 4 MG tablet  Commonly known as: ZOFRAN   4 mg, Oral, 2 Times Daily PRN      pantoprazole 40 MG EC tablet  Commonly known as: PROTONIX   40 mg, Oral, Daily      Spiriva Respimat 2.5 MCG/ACT aerosol solution inhaler  Generic drug: tiotropium bromide monohydrate   2 puffs, Inhalation, Daily - RT      tiZANidine 4 MG tablet  Commonly known as: ZANAFLEX   4 mg, Oral, 2 Times Daily PRN               Discharge Diet:   Diet Instructions       Diet: Regular/House Diet; Regular Texture (IDDSI 7); Thin (IDDSI 0)      Discharge Diet: Regular/House Diet    Texture: Regular Texture (IDDSI 7)    Fluid Consistency: Thin (IDDSI 0)            Activity at Discharge:     Follow-up Appointments  No future appointments.  Additional Instructions for the Follow-ups that You Need to Schedule       Discharge Follow-up with Specialty: 2 Weeks   As directed      Follow Up: 2 Weeks                Test Results Pending at Discharge  Pending Labs       Order Current Status    TISSUE EXAM, [facility] ([facility]) In process    Blood Culture -  Blood, Arm, Left Preliminary result    Blood Culture - Blood, Arm, Right Preliminary result             Negrito Goldberg MD  12/04/23  08:47 EST    Time: Discharge 15 min            Electronically signed by Negrito Goldberg MD at 12/04/23 0848       Discharge Order (From admission, onward)       Start     Ordered    12/04/23 0847  Discharge patient  Once        Expected Discharge Date: 12/04/23   Discharge Disposition: Home or Self Care   Physician of Record for Attribution - Please select from Treatment Team: NEGRITO GOLDBERG [[id number]]   Review needed by CMO to determine Physician of Record: No      Question Answer Comment   Physician of Record for Attribution - Please select from Treatment Team NEGRITO GOLDBERG    Review needed by CMO to determine Physician of Record No        12/04/23 0863

## 2023-12-04 NOTE — PLAN OF CARE
Goal Outcome Evaluation:              Outcome Evaluation: Patient resting in bed at this time. Patient c/o pain x1, PRN medication given per MAR. Patient tolerating solid foods. No other complaints or acute changes at this time. Will continue plan of care.

## 2023-12-04 NOTE — DISCHARGE SUMMARY
Date of Discharge:  12/4/2023    Discharge Diagnosis: acute cholecystitis    Presenting Problem/History of Present Illness  Active Hospital Problems    Diagnosis  POA    **Cholecystitis [K81.9]  Yes    Acute cholecystitis [K81.0]  Unknown      Resolved Hospital Problems   No resolved problems to display.          Hospital Course  Patient is a 58 y.o. male presented with abdominal pain and findings consistent with acute cholecystitis.  He was admitted and taken to the operating room for cholecystectomy and found to have acute cholecystitis.  Postoperatively the patient did well and was ready for discharge home on postoperative day 1.      Procedures Performed    Procedure(s):  CHOLECYSTECTOMY LAPAROSCOPIC WITH DAVINCI ROBOT  -------------------       Consults:   Consults       Date and Time Order Name Status Description    12/2/2023  7:49 PM Surgery (on-call MD unless specified)              Vital Signs  Temp:  [97.4 °F (36.3 °C)-98.6 °F (37 °C)] 98 °F (36.7 °C)  Heart Rate:  [72-98] 91  Resp:  [14-20] 16  BP: (103-145)/(59-96) 107/62        Discharge Disposition  Home or Self Care    Discharge Medications     Discharge Medications        New Medications        Instructions Start Date   acetaminophen 325 MG tablet  Commonly known as: TYLENOL   650 mg, Oral, Every 4 Hours PRN      ibuprofen 600 MG tablet  Commonly known as: ADVIL,MOTRIN   600 mg, Oral, Every 6 Hours PRN      traMADol 50 MG tablet  Commonly known as: ULTRAM   50 mg, Oral, Every 8 Hours PRN             Continue These Medications        Instructions Start Date   aspirin 81 MG EC tablet   81 mg, Oral, Daily      atorvastatin 40 MG tablet  Commonly known as: LIPITOR   80 mg, Oral, Nightly      clopidogrel 75 MG tablet  Commonly known as: PLAVIX   75 mg, Oral, Daily      fenofibrate 160 MG tablet   160 mg, Oral, Daily      gabapentin 800 MG tablet  Commonly known as: NEURONTIN   800 mg, Oral, 3 Times Daily      labetalol 100 MG tablet  Commonly known as:  NORMODYNE   100 mg, Oral, Every 12 Hours Scheduled      ondansetron 4 MG tablet  Commonly known as: ZOFRAN   4 mg, Oral, 2 Times Daily PRN      pantoprazole 40 MG EC tablet  Commonly known as: PROTONIX   40 mg, Oral, Daily      Spiriva Respimat 2.5 MCG/ACT aerosol solution inhaler  Generic drug: tiotropium bromide monohydrate   2 puffs, Inhalation, Daily - RT      tiZANidine 4 MG tablet  Commonly known as: ZANAFLEX   4 mg, Oral, 2 Times Daily PRN               Discharge Diet:   Diet Instructions       Diet: Regular/House Diet; Regular Texture (IDDSI 7); Thin (IDDSI 0)      Discharge Diet: Regular/House Diet    Texture: Regular Texture (IDDSI 7)    Fluid Consistency: Thin (IDDSI 0)            Activity at Discharge:     Follow-up Appointments  No future appointments.  Additional Instructions for the Follow-ups that You Need to Schedule       Discharge Follow-up with Specialty: 2 Weeks   As directed      Follow Up: 2 Weeks                Test Results Pending at Discharge  Pending Labs       Order Current Status    TISSUE EXAM, [facility] ([facility]) In process    Blood Culture - Blood, Arm, Left Preliminary result    Blood Culture - Blood, Arm, Right Preliminary result             Negrito Goldberg MD  12/04/23  08:47 EST    Time: Discharge 15 min

## 2023-12-06 LAB — REF LAB TEST METHOD: NORMAL

## 2023-12-07 LAB
BACTERIA SPEC AEROBE CULT: NORMAL
BACTERIA SPEC AEROBE CULT: NORMAL

## 2024-01-07 ENCOUNTER — APPOINTMENT (OUTPATIENT)
Dept: GENERAL RADIOLOGY | Facility: HOSPITAL | Age: 59
End: 2024-01-07
Payer: MEDICARE

## 2024-01-07 ENCOUNTER — HOSPITAL ENCOUNTER (EMERGENCY)
Facility: HOSPITAL | Age: 59
Discharge: HOME | End: 2024-01-07
Attending: STUDENT IN AN ORGANIZED HEALTH CARE EDUCATION/TRAINING PROGRAM | Admitting: STUDENT IN AN ORGANIZED HEALTH CARE EDUCATION/TRAINING PROGRAM
Payer: MEDICARE

## 2024-01-07 VITALS
HEIGHT: 66 IN | RESPIRATION RATE: 20 BRPM | TEMPERATURE: 97.6 F | HEART RATE: 66 BPM | WEIGHT: 170 LBS | OXYGEN SATURATION: 96 % | BODY MASS INDEX: 27.32 KG/M2 | DIASTOLIC BLOOD PRESSURE: 81 MMHG | SYSTOLIC BLOOD PRESSURE: 151 MMHG

## 2024-01-07 DIAGNOSIS — I10 UNCONTROLLED HYPERTENSION: Primary | ICD-10-CM

## 2024-01-07 LAB
ALBUMIN SERPL-MCNC: 3.6 G/DL (ref 3.5–5.2)
ALBUMIN/GLOB SERPL: 1.3 G/DL
ALP SERPL-CCNC: 95 U/L (ref 39–117)
ALT SERPL W P-5'-P-CCNC: 23 U/L (ref 1–41)
ANION GAP SERPL CALCULATED.3IONS-SCNC: 8 MMOL/L (ref 5–15)
APTT PPP: 36.6 SECONDS (ref 26.5–34.5)
AST SERPL-CCNC: 15 U/L (ref 1–40)
BASOPHILS # BLD AUTO: 0.13 10*3/MM3 (ref 0–0.2)
BASOPHILS NFR BLD AUTO: 1.4 % (ref 0–1.5)
BILIRUB SERPL-MCNC: 0.4 MG/DL (ref 0–1.2)
BILIRUB UR QL STRIP: NEGATIVE
BUN SERPL-MCNC: 10 MG/DL (ref 8–23)
BUN/CREAT SERPL: 10 (ref 7–25)
CALCIUM SPEC-SCNC: 9.6 MG/DL (ref 8.6–10.5)
CHLORIDE SERPL-SCNC: 107 MMOL/L (ref 98–107)
CLARITY UR: CLEAR
CO2 SERPL-SCNC: 27 MMOL/L (ref 22–29)
COLOR UR: YELLOW
CREAT SERPL-MCNC: 1 MG/DL (ref 0.76–1.27)
DEPRECATED RDW RBC AUTO: 40.1 FL (ref 37–54)
EGFRCR SERPLBLD CKD-EPI 2021: 86.2 ML/MIN/1.73
EOSINOPHIL # BLD AUTO: 0.47 10*3/MM3 (ref 0–0.4)
EOSINOPHIL NFR BLD AUTO: 5 % (ref 0.3–6.2)
ERYTHROCYTE [DISTWIDTH] IN BLOOD BY AUTOMATED COUNT: 13 % (ref 12.3–15.4)
GEN 5 2HR TROPONIN T REFLEX: 8 NG/L
GLOBULIN UR ELPH-MCNC: 2.8 GM/DL
GLUCOSE SERPL-MCNC: 100 MG/DL (ref 65–99)
GLUCOSE UR STRIP-MCNC: NEGATIVE MG/DL
HCT VFR BLD AUTO: 39.3 % (ref 37.5–51)
HGB BLD-MCNC: 12.8 G/DL (ref 13–17.7)
HGB UR QL STRIP.AUTO: NEGATIVE
HOLD SPECIMEN: NORMAL
HOLD SPECIMEN: NORMAL
IMM GRANULOCYTES # BLD AUTO: 0.13 10*3/MM3 (ref 0–0.05)
IMM GRANULOCYTES NFR BLD AUTO: 1.4 % (ref 0–0.5)
INR PPP: 0.92 (ref 0.9–1.1)
KETONES UR QL STRIP: NEGATIVE
LEUKOCYTE ESTERASE UR QL STRIP.AUTO: NEGATIVE
LYMPHOCYTES # BLD AUTO: 2.44 10*3/MM3 (ref 0.7–3.1)
LYMPHOCYTES NFR BLD AUTO: 26 % (ref 19.6–45.3)
MAGNESIUM SERPL-MCNC: 1.9 MG/DL (ref 1.6–2.4)
MCH RBC QN AUTO: 27.7 PG (ref 26.6–33)
MCHC RBC AUTO-ENTMCNC: 32.6 G/DL (ref 31.5–35.7)
MCV RBC AUTO: 85.1 FL (ref 79–97)
MONOCYTES # BLD AUTO: 0.81 10*3/MM3 (ref 0.1–0.9)
MONOCYTES NFR BLD AUTO: 8.6 % (ref 5–12)
NEUTROPHILS NFR BLD AUTO: 5.39 10*3/MM3 (ref 1.7–7)
NEUTROPHILS NFR BLD AUTO: 57.6 % (ref 42.7–76)
NITRITE UR QL STRIP: NEGATIVE
NRBC BLD AUTO-RTO: 0 /100 WBC (ref 0–0.2)
PH UR STRIP.AUTO: 8 [PH] (ref 5–8)
PLATELET # BLD AUTO: 354 10*3/MM3 (ref 140–450)
PMV BLD AUTO: 8.8 FL (ref 6–12)
POTASSIUM SERPL-SCNC: 4 MMOL/L (ref 3.5–5.2)
PROT SERPL-MCNC: 6.4 G/DL (ref 6–8.5)
PROT UR QL STRIP: NEGATIVE
PROTHROMBIN TIME: 12.9 SECONDS (ref 12.1–14.7)
QT INTERVAL: 392 MS
QTC INTERVAL: 425 MS
RBC # BLD AUTO: 4.62 10*6/MM3 (ref 4.14–5.8)
SODIUM SERPL-SCNC: 142 MMOL/L (ref 136–145)
SP GR UR STRIP: 1.01 (ref 1–1.03)
TROPONIN T DELTA: 0 NG/L
TROPONIN T SERPL HS-MCNC: 8 NG/L
UROBILINOGEN UR QL STRIP: NORMAL
WBC NRBC COR # BLD AUTO: 9.37 10*3/MM3 (ref 3.4–10.8)
WHOLE BLOOD HOLD COAG: NORMAL
WHOLE BLOOD HOLD SPECIMEN: NORMAL

## 2024-01-07 PROCEDURE — 71045 X-RAY EXAM CHEST 1 VIEW: CPT | Performed by: RADIOLOGY

## 2024-01-07 PROCEDURE — 85025 COMPLETE CBC W/AUTO DIFF WBC: CPT | Performed by: STUDENT IN AN ORGANIZED HEALTH CARE EDUCATION/TRAINING PROGRAM

## 2024-01-07 PROCEDURE — 81003 URINALYSIS AUTO W/O SCOPE: CPT | Performed by: PHYSICIAN ASSISTANT

## 2024-01-07 PROCEDURE — 85730 THROMBOPLASTIN TIME PARTIAL: CPT | Performed by: PHYSICIAN ASSISTANT

## 2024-01-07 PROCEDURE — 85610 PROTHROMBIN TIME: CPT | Performed by: PHYSICIAN ASSISTANT

## 2024-01-07 PROCEDURE — 96374 THER/PROPH/DIAG INJ IV PUSH: CPT

## 2024-01-07 PROCEDURE — 93005 ELECTROCARDIOGRAM TRACING: CPT | Performed by: STUDENT IN AN ORGANIZED HEALTH CARE EDUCATION/TRAINING PROGRAM

## 2024-01-07 PROCEDURE — 80053 COMPREHEN METABOLIC PANEL: CPT | Performed by: STUDENT IN AN ORGANIZED HEALTH CARE EDUCATION/TRAINING PROGRAM

## 2024-01-07 PROCEDURE — 99284 EMERGENCY DEPT VISIT MOD MDM: CPT

## 2024-01-07 PROCEDURE — 25010000002 LABETALOL 5 MG/ML SOLUTION: Performed by: PHYSICIAN ASSISTANT

## 2024-01-07 PROCEDURE — 84484 ASSAY OF TROPONIN QUANT: CPT | Performed by: STUDENT IN AN ORGANIZED HEALTH CARE EDUCATION/TRAINING PROGRAM

## 2024-01-07 PROCEDURE — 71045 X-RAY EXAM CHEST 1 VIEW: CPT

## 2024-01-07 PROCEDURE — 83735 ASSAY OF MAGNESIUM: CPT | Performed by: PHYSICIAN ASSISTANT

## 2024-01-07 PROCEDURE — 36415 COLL VENOUS BLD VENIPUNCTURE: CPT

## 2024-01-07 RX ORDER — SODIUM CHLORIDE 0.9 % (FLUSH) 0.9 %
10 SYRINGE (ML) INJECTION AS NEEDED
Status: DISCONTINUED | OUTPATIENT
Start: 2024-01-07 | End: 2024-01-07 | Stop reason: HOSPADM

## 2024-01-07 RX ORDER — LABETALOL 100 MG/1
100 TABLET, FILM COATED ORAL EVERY 12 HOURS SCHEDULED
Qty: 60 TABLET | Refills: 0 | Status: SHIPPED | OUTPATIENT
Start: 2024-01-07 | End: 2024-01-07 | Stop reason: SDUPTHER

## 2024-01-07 RX ORDER — ASPIRIN 81 MG/1
324 TABLET, CHEWABLE ORAL ONCE
Status: DISCONTINUED | OUTPATIENT
Start: 2024-01-07 | End: 2024-01-07

## 2024-01-07 RX ORDER — LABETALOL HYDROCHLORIDE 5 MG/ML
10 INJECTION, SOLUTION INTRAVENOUS ONCE
Status: COMPLETED | OUTPATIENT
Start: 2024-01-07 | End: 2024-01-07

## 2024-01-07 RX ORDER — ASPIRIN 81 MG/1
243 TABLET, CHEWABLE ORAL ONCE
Status: COMPLETED | OUTPATIENT
Start: 2024-01-07 | End: 2024-01-07

## 2024-01-07 RX ORDER — LABETALOL 100 MG/1
100 TABLET, FILM COATED ORAL EVERY 12 HOURS SCHEDULED
Qty: 60 TABLET | Refills: 0 | Status: SHIPPED | OUTPATIENT
Start: 2024-01-07

## 2024-01-07 RX ADMIN — ASPIRIN 243 MG: 81 TABLET, CHEWABLE ORAL at 15:26

## 2024-01-07 RX ADMIN — LABETALOL HYDROCHLORIDE 10 MG: 5 INJECTION, SOLUTION INTRAVENOUS at 15:26

## 2024-01-07 NOTE — DISCHARGE INSTRUCTIONS
I have written you a prescription for labetalol 100 mg to take twice a day.  Please have them start you on this as soon as possible.  Follow-up with your family doctor in the office at the next available appointment.  Return to the ED at any time if symptoms change or worsen.

## 2024-01-08 NOTE — ED PROVIDER NOTES
Subjective   History of Present Illness  58-year-old male who presents to the ED today from the [facility] for hypertension.  He states that [facility] days and they have substituted his blood pressure medicine from his typical blood pressure medication.  He states prior to going to [facility] he was on labetalol 100 mg twice a day.  Per the nursing staff at the jail center, he is currently on clonidine 0.1 mg in the morning for his blood pressure.  He is not currently on any other antihypertensives while at the jail center.  He reports that his blood pressure has been running excessively high for the last week.  It was 230/120 today.  He was given clonidine 0.1 mg and aspirin 81 mg prior to leaving the jail center.  He states he has been having intermittent chest tightness for the last week as well as a headache.  He states he occasionally feels short of breath but denies any nausea or vomiting.  He denies any change in his vision.  He denies any abdominal pain.  He denies any difficulty urinating.  He states he also has a history of COPD and had a stroke in July.  He states he is also prescribed atorvastatin, Plavix, Protonix, Zanaflex, aspirin, Flonase and Zyrtec.    History provided by:  Patient  Hypertension  Severity:  Severe  Onset quality:  Gradual  Duration:  1 week  Timing:  Constant  Progression:  Worsening  Chronicity:  New  Notable PTA blood pressures:  230/120  Relieved by:  Nothing  Worsened by:  Nothing  Associated symptoms: chest pain, headaches and shortness of breath    Associated symptoms: no abdominal pain, no anxiety, no blurred vision, no confusion, no dizziness, no ear pain, no epistaxis, no fatigue, no fever, no hematuria, no nausea, no neck pain, no palpitations, no peripheral edema, no syncope, no tinnitus, not vomiting and no weakness        Review of Systems   Constitutional: Negative.  Negative for fatigue and fever.    HENT: Negative.  Negative for ear pain, nosebleeds and tinnitus.    Eyes: Negative.  Negative for blurred vision.   Respiratory: Negative.  Positive for shortness of breath.    Cardiovascular:  Positive for chest pain. Negative for palpitations and syncope.   Gastrointestinal:  Negative for abdominal pain, nausea and vomiting.   Genitourinary: Negative.  Negative for hematuria.   Musculoskeletal:  Negative for neck pain.   Skin: Negative.    Neurological:  Positive for headaches. Negative for dizziness and weakness.   Psychiatric/Behavioral:  Negative for confusion. The patient is not nervous/anxious.    All other systems reviewed and are negative.      Past Medical History:   Diagnosis Date    Arthritis     Elevated cholesterol     GERD (gastroesophageal reflux disease)     Hypertension     Right-sided lacunar infarction 8/6/2023       No Known Allergies    Past Surgical History:   Procedure Laterality Date    CHOLECYSTECTOMY N/A 12/3/2023    Procedure: CHOLECYSTECTOMY LAPAROSCOPIC WITH DAVINCI ROBOT;  Surgeon: Negrito Goldberg MD;  Location: [facility];  Service: General;  Laterality: N/A;    COLONOSCOPY      COLONOSCOPY N/A 8/16/2018    Procedure: COLONOSCOPY;  Surgeon: Negrito Goldberg MD;  Location: [facility] OR;  Service: Gastroenterology    ENDOSCOPY N/A 8/16/2018    Procedure: ESOPHAGOGASTRODUODENOSCOPY WITH ANESTHESIA;  Surgeon: Negrito Goldberg MD;  Location: [facility] OR;  Service: Gastroenterology    NECK SURGERY      VASECTOMY  1993       Family History   Problem Relation Age of Onset    Cancer Sister         breast    Hypertension Other     Heart disease Neg Hx        Social History     Socioeconomic History    Marital status:    Tobacco Use    Smoking status: Every Day     Packs/day: 0.25     Years: 30.00     Additional pack years: 0.00     Total pack years: 7.50     Types: Cigarettes    Smokeless tobacco: Never    Tobacco comments:     states 2 cigarettes a day   Vaping Use    Vaping  Use: Every day    Substances: Nicotine    Devices: Disposable   Substance and Sexual Activity    Alcohol use: No    Drug use: No    Sexual activity: Defer     Birth control/protection: None           Objective   Physical Exam  Vitals and nursing note reviewed.   Constitutional:       General: He is not in acute distress.     Appearance: He is well-developed. He is not diaphoretic.   HENT:      Head: Normocephalic and atraumatic.   Eyes:      Extraocular Movements: Extraocular movements intact.      Pupils: Pupils are equal, round, and reactive to light.   Neck:      Vascular: No JVD.      Trachea: No tracheal deviation.   Cardiovascular:      Rate and Rhythm: Normal rate and regular rhythm.      Heart sounds: Normal heart sounds.   Pulmonary:      Effort: Pulmonary effort is normal.      Breath sounds: Normal breath sounds.   Chest:      Chest wall: No tenderness.   Abdominal:      General: Bowel sounds are normal.      Palpations: Abdomen is soft.      Tenderness: There is no abdominal tenderness.   Musculoskeletal:         General: Normal range of motion.      Cervical back: Normal range of motion and neck supple.      Right lower leg: No edema.      Left lower leg: No edema.   Lymphadenopathy:      Cervical: No cervical adenopathy.   Skin:     General: Skin is warm and dry.      Capillary Refill: Capillary refill takes less than 2 seconds.   Neurological:      General: No focal deficit present.      Mental Status: He is alert and oriented to person, place, and time.   Psychiatric:         Mood and Affect: Mood normal.         Procedures       Results for orders placed or performed during the hospital encounter of 01/07/24   Comprehensive Metabolic Panel    Specimen: Arm, Left; Blood   Result Value Ref Range    Glucose 100 (H) 65 - 99 mg/dL    BUN 10 8 - 23 mg/dL    Creatinine 1.00 0.76 - 1.27 mg/dL    Sodium 142 136 - 145 mmol/L    Potassium 4.0 3.5 - 5.2 mmol/L    Chloride 107 98 - 107 mmol/L    CO2 27.0 22.0 -  29.0 mmol/L    Calcium 9.6 8.6 - 10.5 mg/dL    Total Protein 6.4 6.0 - 8.5 g/dL    Albumin 3.6 3.5 - 5.2 g/dL    ALT (SGPT) 23 1 - 41 U/L    AST (SGOT) 15 1 - 40 U/L    Alkaline Phosphatase 95 39 - 117 U/L    Total Bilirubin 0.4 0.0 - 1.2 mg/dL    Globulin 2.8 gm/dL    A/G Ratio 1.3 g/dL    BUN/Creatinine Ratio 10.0 7.0 - 25.0    Anion Gap 8.0 5.0 - 15.0 mmol/L    eGFR 86.2 >60.0 mL/min/1.73   High Sensitivity Troponin T    Specimen: Arm, Left; Blood   Result Value Ref Range    HS Troponin T 8 <22 ng/L   CBC Auto Differential    Specimen: Arm, Left; Blood   Result Value Ref Range    WBC 9.37 3.40 - 10.80 10*3/mm3    RBC 4.62 4.14 - 5.80 10*6/mm3    Hemoglobin 12.8 (L) 13.0 - 17.7 g/dL    Hematocrit 39.3 37.5 - 51.0 %    MCV 85.1 79.0 - 97.0 fL    MCH 27.7 26.6 - 33.0 pg    MCHC 32.6 31.5 - 35.7 g/dL    RDW 13.0 12.3 - 15.4 %    RDW-SD 40.1 37.0 - 54.0 fl    MPV 8.8 6.0 - 12.0 fL    Platelets 354 140 - 450 10*3/mm3    Neutrophil % 57.6 42.7 - 76.0 %    Lymphocyte % 26.0 19.6 - 45.3 %    Monocyte % 8.6 5.0 - 12.0 %    Eosinophil % 5.0 0.3 - 6.2 %    Basophil % 1.4 0.0 - 1.5 %    Immature Grans % 1.4 (H) 0.0 - 0.5 %    Neutrophils, Absolute 5.39 1.70 - 7.00 10*3/mm3    Lymphocytes, Absolute 2.44 0.70 - 3.10 10*3/mm3    Monocytes, Absolute 0.81 0.10 - 0.90 10*3/mm3    Eosinophils, Absolute 0.47 (H) 0.00 - 0.40 10*3/mm3    Basophils, Absolute 0.13 0.00 - 0.20 10*3/mm3    Immature Grans, Absolute 0.13 (H) 0.00 - 0.05 10*3/mm3    nRBC 0.0 0.0 - 0.2 /100 WBC   Protime-INR    Specimen: Arm, Left; Blood   Result Value Ref Range    Protime 12.9 12.1 - 14.7 Seconds    INR 0.92 0.90 - 1.10   aPTT    Specimen: Arm, Left; Blood   Result Value Ref Range    PTT 36.6 (H) 26.5 - 34.5 seconds   Urinalysis With Microscopic If Indicated (No Culture) - Urine, Clean Catch    Specimen: Urine, Clean Catch   Result Value Ref Range    Color, UA Yellow Yellow, Straw    Appearance, UA Clear Clear    pH, UA 8.0 5.0 - 8.0    Specific Gravity, UA  1.008 1.005 - 1.030    Glucose, UA Negative Negative    Ketones, UA Negative Negative    Bilirubin, UA Negative Negative    Blood, UA Negative Negative    Protein, UA Negative Negative    Leuk Esterase, UA Negative Negative    Nitrite, UA Negative Negative    Urobilinogen, UA 1.0 E.U./dL 0.2 - 1.0 E.U./dL   Magnesium    Specimen: Arm, Left; Blood   Result Value Ref Range    Magnesium 1.9 1.6 - 2.4 mg/dL   High Sensitivity Troponin T 2Hr    Specimen: Arm, Left; Blood   Result Value Ref Range    HS Troponin T 8 <22 ng/L    Troponin T Delta 0 >=-4 - <+4 ng/L   ECG 12 Lead Chest Pain   Result Value Ref Range    QT Interval 392 ms    QTC Interval 425 ms   Green Top (Gel)   Result Value Ref Range    Extra Tube Hold for add-ons.    Lavender Top   Result Value Ref Range    Extra Tube hold for add-on    Gold Top - SST   Result Value Ref Range    Extra Tube Hold for add-ons.    Light Blue Top   Result Value Ref Range    Extra Tube Hold for add-ons.           ED Course  ED Course as of 01/07/24 1913   Sun Jan 07, 2024   1434 ECG 12 Lead Chest Pain  Sinus rhythm rate 71  cures 80 QTc 425 no acute ST elevation.  Electronically signed by Chhaya Samuels DO, 01/07/24, 4:30 PM EST.   [LK]   1547 BP has improved to 155/92 [AH]   1609 XR Chest 1 View  Findings:     No pneumonia or acute process seen in the chest.  Normal heart size and mediastinal contours.  Trachea is in midline position.  No edema or effusion is seen.  There is no evidence of pneumothorax.     IMPRESSION:     No evidence of acute disease in the chest.   [AH]      ED Course User Index  [AH] Avelina Green, PA  [LK] Chhaya Samuels DO                                             Medical Decision Making  58-year-old male who presents to the ED today from the [facility] for hypertension.  He was hypertensive upon arrival to the ED.  He was given an IV dose of labetalol in the ED and his blood pressure improved quite a bit.  He reports feeling much  better.  EKG was unremarkable.  Serial troponins were negative.  Chest x-ray was unremarkable.  He will be able to be discharged back to the longterm center with a prescription for his normal dose of labetalol.  He is to follow-up as an outpatient and will return to the ED if symptoms change or worsen.    Problems Addressed:  Uncontrolled hypertension: complicated acute illness or injury    Amount and/or Complexity of Data Reviewed  Labs: ordered.  Radiology: ordered. Decision-making details documented in ED Course.  ECG/medicine tests: ordered. Decision-making details documented in ED Course.    Risk  OTC drugs.  Prescription drug management.        Final diagnoses:   Uncontrolled hypertension       ED Disposition  ED Disposition       ED Disposition   Discharge    Condition   Stable    Comment   --               Nancy Majano, APRN  [address]  [place] [address]  [phone]    Schedule an appointment as soon as possible for a visit in 2 days           Where to Get Your Medications        You can get these medications from any pharmacy    Bring a paper prescription for each of these medications  labetalol 100 MG tablet          Medication List      No changes were made to your prescriptions during this visit.            Avelina Green PA  01/07/24 1913

## 2024-05-17 ENCOUNTER — LAB (OUTPATIENT)
Dept: LAB | Facility: HOSPITAL | Age: 59
End: 2024-05-17
Payer: MEDICARE

## 2024-05-17 ENCOUNTER — TRANSCRIBE ORDERS (OUTPATIENT)
Dept: ADMINISTRATIVE | Facility: HOSPITAL | Age: 59
End: 2024-05-17
Payer: MEDICARE

## 2024-05-17 DIAGNOSIS — E55.9 VITAMIN D DEFICIENCY: ICD-10-CM

## 2024-05-17 DIAGNOSIS — I10 ESSENTIAL HYPERTENSION, MALIGNANT: ICD-10-CM

## 2024-05-17 DIAGNOSIS — E78.5 DYSLIPIDEMIA: ICD-10-CM

## 2024-05-17 DIAGNOSIS — E78.5 HYPERLIPIDEMIA, UNSPECIFIED HYPERLIPIDEMIA TYPE: ICD-10-CM

## 2024-05-17 DIAGNOSIS — E87.6 HYPOKALEMIA: ICD-10-CM

## 2024-05-17 DIAGNOSIS — N18.31 CHRONIC KIDNEY DISEASE, STAGE 3A: ICD-10-CM

## 2024-05-17 DIAGNOSIS — E55.9 VITAMIN D DEFICIENCY: Primary | ICD-10-CM

## 2024-05-17 DIAGNOSIS — Z12.5 SCREENING FOR PROSTATE CANCER: Primary | ICD-10-CM

## 2024-05-17 DIAGNOSIS — Z12.5 SCREENING FOR PROSTATE CANCER: ICD-10-CM

## 2024-05-17 LAB
BASOPHILS # BLD AUTO: 0.1 10*3/MM3 (ref 0–0.2)
BASOPHILS NFR BLD AUTO: 1.4 % (ref 0–1.5)
DEPRECATED RDW RBC AUTO: 45.2 FL (ref 37–54)
EOSINOPHIL # BLD AUTO: 0.25 10*3/MM3 (ref 0–0.4)
EOSINOPHIL NFR BLD AUTO: 3.5 % (ref 0.3–6.2)
ERYTHROCYTE [DISTWIDTH] IN BLOOD BY AUTOMATED COUNT: 14.1 % (ref 12.3–15.4)
HCT VFR BLD AUTO: 40.3 % (ref 37.5–51)
HGB BLD-MCNC: 12.9 G/DL (ref 13–17.7)
IMM GRANULOCYTES # BLD AUTO: 0.06 10*3/MM3 (ref 0–0.05)
IMM GRANULOCYTES NFR BLD AUTO: 0.8 % (ref 0–0.5)
LYMPHOCYTES # BLD AUTO: 2.13 10*3/MM3 (ref 0.7–3.1)
LYMPHOCYTES NFR BLD AUTO: 30.1 % (ref 19.6–45.3)
MCH RBC QN AUTO: 28.3 PG (ref 26.6–33)
MCHC RBC AUTO-ENTMCNC: 32 G/DL (ref 31.5–35.7)
MCV RBC AUTO: 88.4 FL (ref 79–97)
MONOCYTES # BLD AUTO: 0.64 10*3/MM3 (ref 0.1–0.9)
MONOCYTES NFR BLD AUTO: 9 % (ref 5–12)
NEUTROPHILS NFR BLD AUTO: 3.9 10*3/MM3 (ref 1.7–7)
NEUTROPHILS NFR BLD AUTO: 55.2 % (ref 42.7–76)
NRBC BLD AUTO-RTO: 0 /100 WBC (ref 0–0.2)
PLATELET # BLD AUTO: 535 10*3/MM3 (ref 140–450)
PMV BLD AUTO: 9.7 FL (ref 6–12)
RBC # BLD AUTO: 4.56 10*6/MM3 (ref 4.14–5.8)
WBC NRBC COR # BLD AUTO: 7.08 10*3/MM3 (ref 3.4–10.8)

## 2024-05-17 PROCEDURE — G0103 PSA SCREENING: HCPCS

## 2024-05-17 PROCEDURE — 85025 COMPLETE CBC W/AUTO DIFF WBC: CPT

## 2024-05-17 PROCEDURE — 83970 ASSAY OF PARATHORMONE: CPT

## 2024-05-17 PROCEDURE — 84100 ASSAY OF PHOSPHORUS: CPT

## 2024-05-17 PROCEDURE — 80053 COMPREHEN METABOLIC PANEL: CPT

## 2024-05-17 PROCEDURE — 80061 LIPID PANEL: CPT

## 2024-05-17 PROCEDURE — 84443 ASSAY THYROID STIM HORMONE: CPT

## 2024-05-17 PROCEDURE — 81001 URINALYSIS AUTO W/SCOPE: CPT

## 2024-05-17 PROCEDURE — 36415 COLL VENOUS BLD VENIPUNCTURE: CPT

## 2024-05-17 PROCEDURE — 83036 HEMOGLOBIN GLYCOSYLATED A1C: CPT

## 2024-05-17 PROCEDURE — 82570 ASSAY OF URINE CREATININE: CPT

## 2024-05-17 PROCEDURE — 84156 ASSAY OF PROTEIN URINE: CPT

## 2024-05-17 PROCEDURE — 82306 VITAMIN D 25 HYDROXY: CPT

## 2024-05-17 PROCEDURE — 82550 ASSAY OF CK (CPK): CPT

## 2024-05-17 PROCEDURE — 83735 ASSAY OF MAGNESIUM: CPT

## 2024-05-18 LAB
25(OH)D3 SERPL-MCNC: 32.7 NG/ML (ref 30–100)
ALBUMIN SERPL-MCNC: 4.2 G/DL (ref 3.5–5.2)
ALBUMIN/GLOB SERPL: 1.4 G/DL
ALP SERPL-CCNC: 65 U/L (ref 39–117)
ALT SERPL W P-5'-P-CCNC: 12 U/L (ref 1–41)
ANION GAP SERPL CALCULATED.3IONS-SCNC: 9.1 MMOL/L (ref 5–15)
AST SERPL-CCNC: 14 U/L (ref 1–40)
BACTERIA UR QL AUTO: ABNORMAL /HPF
BILIRUB SERPL-MCNC: 0.3 MG/DL (ref 0–1.2)
BILIRUB UR QL STRIP: NEGATIVE
BUN SERPL-MCNC: 11 MG/DL (ref 8–23)
BUN/CREAT SERPL: 7.9 (ref 7–25)
CALCIUM SPEC-SCNC: 9.9 MG/DL (ref 8.6–10.5)
CHLORIDE SERPL-SCNC: 105 MMOL/L (ref 98–107)
CHOLEST SERPL-MCNC: 192 MG/DL (ref 0–200)
CK SERPL-CCNC: 70 U/L (ref 20–200)
CLARITY UR: CLEAR
CO2 SERPL-SCNC: 24.9 MMOL/L (ref 22–29)
COLOR UR: YELLOW
CREAT SERPL-MCNC: 1.4 MG/DL (ref 0.76–1.27)
CREAT UR-MCNC: 76.2 MG/DL
EGFRCR SERPLBLD CKD-EPI 2021: 57.5 ML/MIN/1.73
GLOBULIN UR ELPH-MCNC: 3 GM/DL
GLUCOSE SERPL-MCNC: 95 MG/DL (ref 65–99)
GLUCOSE UR STRIP-MCNC: NEGATIVE MG/DL
HBA1C MFR BLD: 5.5 % (ref 4.8–5.6)
HDLC SERPL-MCNC: 39 MG/DL (ref 40–60)
HGB UR QL STRIP.AUTO: NEGATIVE
HYALINE CASTS UR QL AUTO: ABNORMAL /LPF
KETONES UR QL STRIP: NEGATIVE
LDLC SERPL CALC-MCNC: 132 MG/DL (ref 0–100)
LDLC/HDLC SERPL: 3.33 {RATIO}
LEUKOCYTE ESTERASE UR QL STRIP.AUTO: ABNORMAL
MAGNESIUM SERPL-MCNC: 2.3 MG/DL (ref 1.6–2.4)
NITRITE UR QL STRIP: NEGATIVE
PH UR STRIP.AUTO: 6.5 [PH] (ref 5–8)
PHOSPHATE SERPL-MCNC: 1.8 MG/DL (ref 2.5–4.5)
POTASSIUM SERPL-SCNC: 3.8 MMOL/L (ref 3.5–5.2)
PROT ?TM UR-MCNC: 8.5 MG/DL
PROT SERPL-MCNC: 7.2 G/DL (ref 6–8.5)
PROT UR QL STRIP: NEGATIVE
PROT/CREAT UR: 111.5 MG/G CREA (ref 0–200)
PSA SERPL-MCNC: 7.45 NG/ML (ref 0–4)
PTH-INTACT SERPL-MCNC: 13.6 PG/ML (ref 15–65)
RBC # UR STRIP: ABNORMAL /HPF
REF LAB TEST METHOD: ABNORMAL
SODIUM SERPL-SCNC: 139 MMOL/L (ref 136–145)
SP GR UR STRIP: 1.01 (ref 1–1.03)
SQUAMOUS #/AREA URNS HPF: ABNORMAL /HPF
TRIGL SERPL-MCNC: 115 MG/DL (ref 0–150)
TSH SERPL DL<=0.05 MIU/L-ACNC: 1.23 UIU/ML (ref 0.27–4.2)
UROBILINOGEN UR QL STRIP: ABNORMAL
VLDLC SERPL-MCNC: 21 MG/DL (ref 5–40)
WBC # UR STRIP: ABNORMAL /HPF

## 2024-06-24 ENCOUNTER — OFFICE VISIT (OUTPATIENT)
Dept: UROLOGY | Facility: CLINIC | Age: 59
End: 2024-06-24
Payer: MEDICARE

## 2024-06-24 VITALS
DIASTOLIC BLOOD PRESSURE: 102 MMHG | WEIGHT: 178 LBS | HEART RATE: 99 BPM | BODY MASS INDEX: 28.61 KG/M2 | HEIGHT: 66 IN | SYSTOLIC BLOOD PRESSURE: 197 MMHG

## 2024-06-24 DIAGNOSIS — R35.0 BENIGN PROSTATIC HYPERPLASIA WITH URINARY FREQUENCY: ICD-10-CM

## 2024-06-24 DIAGNOSIS — R39.16 BENIGN PROSTATIC HYPERPLASIA (BPH) WITH STRAINING ON URINATION: ICD-10-CM

## 2024-06-24 DIAGNOSIS — Z12.5 PROSTATE CANCER SCREENING: ICD-10-CM

## 2024-06-24 DIAGNOSIS — N40.1 BENIGN PROSTATIC HYPERPLASIA WITH URINARY FREQUENCY: ICD-10-CM

## 2024-06-24 DIAGNOSIS — R97.20 BPH WITH ELEVATED PSA: Primary | ICD-10-CM

## 2024-06-24 DIAGNOSIS — N40.1 BPH WITH OBSTRUCTION/LOWER URINARY TRACT SYMPTOMS: ICD-10-CM

## 2024-06-24 DIAGNOSIS — N40.1 BENIGN PROSTATIC HYPERPLASIA (BPH) WITH STRAINING ON URINATION: ICD-10-CM

## 2024-06-24 DIAGNOSIS — N40.0 BPH WITH ELEVATED PSA: Primary | ICD-10-CM

## 2024-06-24 DIAGNOSIS — N13.8 BPH WITH OBSTRUCTION/LOWER URINARY TRACT SYMPTOMS: ICD-10-CM

## 2024-06-24 PROCEDURE — 84153 ASSAY OF PSA TOTAL: CPT | Performed by: NURSE PRACTITIONER

## 2024-06-24 PROCEDURE — 84152 ASSAY OF PSA COMPLEXED: CPT | Performed by: NURSE PRACTITIONER

## 2024-06-24 PROCEDURE — 3080F DIAST BP >= 90 MM HG: CPT | Performed by: NURSE PRACTITIONER

## 2024-06-24 PROCEDURE — 1159F MED LIST DOCD IN RCRD: CPT | Performed by: NURSE PRACTITIONER

## 2024-06-24 PROCEDURE — 1160F RVW MEDS BY RX/DR IN RCRD: CPT | Performed by: NURSE PRACTITIONER

## 2024-06-24 PROCEDURE — 84154 ASSAY OF PSA FREE: CPT | Performed by: NURSE PRACTITIONER

## 2024-06-24 PROCEDURE — 36415 COLL VENOUS BLD VENIPUNCTURE: CPT | Performed by: NURSE PRACTITIONER

## 2024-06-24 PROCEDURE — 3077F SYST BP >= 140 MM HG: CPT | Performed by: NURSE PRACTITIONER

## 2024-06-24 PROCEDURE — 99204 OFFICE O/P NEW MOD 45 MIN: CPT | Performed by: NURSE PRACTITIONER

## 2024-06-24 RX ORDER — TAMSULOSIN HYDROCHLORIDE 0.4 MG/1
1 CAPSULE ORAL DAILY
Qty: 30 CAPSULE | Refills: 5 | Status: SHIPPED | OUTPATIENT
Start: 2024-06-24

## 2024-06-24 RX ORDER — CLONIDINE HYDROCHLORIDE 0.1 MG/1
0.1 TABLET ORAL DAILY
COMMUNITY

## 2024-06-24 RX ORDER — LORATADINE 10 MG/1
10 CAPSULE, LIQUID FILLED ORAL
COMMUNITY

## 2024-06-24 NOTE — PROGRESS NOTES
Chief Complaint  BPH WITH ELVATED PSA (NEW PATIENT WITH BPH WITH LUTS/PSA 7.45)    Agustina Bobo presents to [facility] for BPH WITH ELEVATED PSA/ 7.45/LUTS  History of Present Illness   History of Present Illness  The patient is a pleasant 59-year-old male who presents to clinic today for evaluation as a new patient consult. The patient has been referred to clinic by his PCP with concerns of BPH with elevated PSA. On initial evaluation in clinic today, his PSA is 7.45 and that was completed on 05/17/2024. The patient is unaware about any prior PSA results.     He reports BPH with lower urinary tract symptoms, most bothersome of which has been urinary frequency, urgency, hesitancy, constant feeling of incomplete bladder emptying. He is currently not taking any medications for his prostate, his IPSS score is 14.     OVERALL, The patient reports that he was referred to the clinic due to an elevated PSA level. He experiences occasional urinary hesitancy, but denies any dysuria. He also reports occasional urinary frequency, but denies nocturia. He often feels incomplete bladder emptying, necessitating a slow stream and subsequent urination. He also reports post-void dribbling.     He denies any perineal pain or any feeling of sitting on a chair most consistent with prostatitis, but reports chronic lower back pain, which he rates as an 8 out of 10 upon standing. The pain has remained consistent over the past 6 months. He denies any hematuria or history of prostate infection or prostatitis. He denies any flank pain, denies any CVA tenderness, denies any history of kidney stones.    Patient denies any family history of prostate cancer.  He denies any known exposure to pesticides or toxin origins, or agent orange -[employer]. He has not undergone a prostate examination in the past.   He smokes about a pack a day. He has been smoking for over 50 years.    "He denies any family history of prostate cancer or bladder cancer.    Active Ambulatory Problems     Diagnosis Date Noted    Pharyngoesophageal dysphagia 08/07/2018    Other constipation 08/07/2018    Gastroesophageal reflux disease 08/07/2018    History of colon polyps 08/07/2018    Palpitations 08/22/2018    Essential hypertension 08/22/2018    Chest pain 08/22/2018    Dyspnea 08/22/2018    Right-sided lacunar infarction 08/06/2023    Cholecystitis 12/02/2023    Acute cholecystitis 12/02/2023    Benign prostatic hyperplasia (BPH) with straining on urination 06/24/2024    Prostate cancer screening 06/27/2024     Resolved Ambulatory Problems     Diagnosis Date Noted    Bacteremia 05/18/2018    Transient neurological symptoms 08/06/2023     Past Medical History:   Diagnosis Date    Arthritis     Elevated cholesterol     GERD (gastroesophageal reflux disease)     Hypertension       Objective   Vital Signs:   BP (!) 197/102 Comment: pt hasnt had BP medication today .  Pulse 99   Ht 167.6 cm (65.98\")   Wt 80.7 kg (178 lb)   BMI 28.74 kg/m²       ROS:   Review of Systems   Constitutional:  Positive for activity change, appetite change, fatigue and unexpected weight gain. Negative for chills, diaphoresis, fever and unexpected weight loss.   HENT:  Negative for congestion, ear discharge, ear pain, nosebleeds, rhinorrhea, sinus pressure and sore throat.    Eyes: Negative.  Negative for blurred vision, double vision, photophobia, pain, redness and visual disturbance.   Respiratory:  Positive for shortness of breath and stridor. Negative for apnea, cough, chest tightness and wheezing.    Cardiovascular:  Negative for chest pain, palpitations and leg swelling.   Gastrointestinal:  Positive for abdominal distention, abdominal pain and nausea. Negative for constipation, diarrhea and vomiting.   Endocrine: Negative.  Negative for polydipsia, polyphagia and polyuria.   Genitourinary:  Positive for frequency and urgency. " Negative for decreased urine volume, difficulty urinating, discharge, dysuria, flank pain, genital sores, hematuria, penile pain, penile swelling, scrotal swelling, testicular pain and urinary incontinence.   Musculoskeletal:  Positive for back pain. Negative for arthralgias and joint swelling.   Skin:  Positive for dry skin and pallor. Negative for rash and wound.   Allergic/Immunologic: Negative.    Neurological:  Negative for dizziness, tremors, syncope, weakness, light-headedness, headache, memory problem and confusion.   Hematological: Negative.    Psychiatric/Behavioral:  Positive for sleep disturbance and stress. Negative for agitation, behavioral problems and decreased concentration.         Physical Exam  Constitutional:       General: He is in acute distress.      Appearance: He is well-developed. He is obese. He is ill-appearing.   HENT:      Head: Normocephalic and atraumatic.      Right Ear: External ear normal.      Left Ear: External ear normal.   Eyes:      General:         Right eye: No discharge.         Left eye: No discharge.      Conjunctiva/sclera: Conjunctivae normal.      Pupils: Pupils are equal, round, and reactive to light.   Neck:      Thyroid: No thyromegaly.      Trachea: No tracheal deviation.   Cardiovascular:      Rate and Rhythm: Normal rate and regular rhythm.      Heart sounds: No murmur heard.     No friction rub.   Pulmonary:      Effort: Pulmonary effort is normal. No respiratory distress.      Breath sounds: Normal breath sounds. No stridor.   Abdominal:      General: Bowel sounds are normal. There is distension.      Palpations: Abdomen is soft.      Tenderness: There is abdominal tenderness. There is no guarding.   Genitourinary:     Penis: Normal and uncircumcised. No tenderness or discharge.       Testes: Normal.      Rectum: Normal. Guaiac result negative.      Comments: WILIAN negative for nodules, He has good rectal tone, and ENLARGED BUT smooth VERY TENDER/FIRM prostate.  There is no nodularity or any suspicious rectal abnormalities. Otherwise normal external genitalia. Bilateral descended testes without any masses, left slightly swollen and hanging lower than the right testicles. There are no inguinal hernias or abnormalities noted.      Musculoskeletal:         General: Tenderness present. No deformity. Normal range of motion.      Cervical back: Normal range of motion and neck supple.   Skin:     General: Skin is warm and dry.      Capillary Refill: Capillary refill takes less than 2 seconds.      Coloration: Skin is not pale.   Neurological:      Mental Status: He is alert and oriented to person, place, and time.      Cranial Nerves: No cranial nerve deficit.      Motor: Weakness present.      Coordination: Coordination normal.   Psychiatric:         Behavior: Behavior normal.         Thought Content: Thought content normal.         Judgment: Judgment normal.        Physical Exam    Result Review :     UA          1/7/2024    16:56 5/17/2024    14:32   Urinalysis   Squamous Epithelial Cells, UA  0-2    Specific Gravity, UA 1.008  1.012    Ketones, UA Negative  Negative    Blood, UA Negative  Negative    Leukocytes, UA Negative  Moderate (2+)    Nitrite, UA Negative  Negative    RBC, UA  0-2    WBC, UA  21-50    Bacteria, UA  3+        PSA          5/17/2024    14:32 6/24/2024    14:24   PSA   PSA 7.450  4.7             Assessment and Plan    Problem List Items Addressed This Visit          Genitourinary and Reproductive     Benign prostatic hyperplasia (BPH) with straining on urination - Primary    Relevant Medications    tamsulosin (FLOMAX) 0.4 MG capsule 24 hr capsule    Prostate cancer screening    Relevant Orders    PSA Total+% Free (Serial) (Completed)    MRI Prostate With & Without Contrast       Assessment & Plan      ASSESSMENT                                    BPH WITH ELEVATED PSA 7.45/LUTS-FREQ/HESITANCY  MR BEBA NOGUERA IS a pleasant 59-year-old male who presents to  clinic today for evaluation with concerns of BPH with elevated PSA. On initial evaluation in clinic today, his PSA is 7.45 and that was completed on 05/17/2024. The patient is unaware about any prior PSA results. He reports BPH with lower urinary tract symptoms, most bothersome of which has been urinary frequency, urgency, hesitancy, constant feeling of incomplete bladder emptying. He is currently not taking any medications for his prostate, his IPSS score is 14.     BPH: WE Discussed the pathophysiology of BPH and obstruction. We discussed the static and dynamic effects of BPH as well as using 5 alpha reductase inhibitors versus alpha blockade.  We discussed the indications for transurethral surgery as well.  And/ or other therapeutic options available including all of the newer techniques.  He has  real symptomatology referable to his prostate other than moderate enlargement.  Recommending we proceed with extensive workup if his repeat PSA/free and total percentages become concerning.  I am also recommending an alpha blocker tamsulosin 0.4 mg capsule daily.  His PVR is greater than 250 cc, he has significant incomplete bladder emptying, although asymptomatic.    Elevated PSA :Pt has been referred to us with a PSA of 7.4, He has a fairly enlarged enlarged/firm prostate with only minor urinary symptoms of nocturia 3-4 times depending on his fluid intake or how late he consumes any beverage prior to bedtime.We discussed the diagnosis of Elevated Prostate-Specific antigen (PSA).  I explained the pathophysiology of PSA.  It is a serine protease. It's  function in the male reproductive tract is to facilitate the liquefaction of semen.  It is for this reason the body does not want it freely floating in the serum and why it is typically bound tightly to albumin.     We discussed why we most typically will use both a PSA free and Total to determine the need for more aggressive therapy. I discussed the normal range, and how  it varies with age groups and descent.  Additionally, I explained to him typically PSA was in the range of 1-4 but more recently has been downgraded to something less than 2 or even approaching 1.      I discussed the risk of family history particularly the fact that the average male has a 14% risk of prostate cancer and that in the face of a positive diagnosis in a father it will tablet and any other first-generation relative continued tablet insofar that a father and brother with prostate cancer will produce almost a 50% risk of prostate cancer.  I discussed the use of the temporal use of PSA as the best option for monitoring.  We also discussed the fact that an elevated PSA is an isolated event does not mean that this is prostate cancer and should not engender worry in this regard.     FINALLY, I also discussed other things that can elevate PSA including constipation, prostatitis, infection, recent intercourse etc, as well as the risks and benefits associated with this.  Also discussed the fact that this is a dilutional test and as a consequence of such, will occasionally produce slight variations on a single specimen.                                             PLAN    MRI OF PROSTATE WITH/WITHOUT CONTRAST-ELEVATED PSA    Patient will be scheduled for prostate ultrasound-guided biopsy -pending results for definitive plan of care    Further discussed the risks and benefits of a prostate biopsy as well if indicated.      His PSA was repeated today: Free and total PSA pending to complete the work-up    Did send urine out for MDX testing for prostate cancer    I will call patient with results and definitive plan of care.    Discussed starting Flomax 0.4 mg as prescribed for BPH/LUTS.    Patient Agreeable plan of care.      Elevated Prostate-Specific Antigen (PSA).SUMMARY    The patient is advised to undergo a free and total PSA test, followed by a digital rectal exam.  We had an MDX testing, additionally, an MRI of  the prostate has been ordered.    Follow-up  The patient is scheduled for a follow-up visit in the clinic in 1 month, or earlier if necessary.  Patient reports that he is not currently experiencing any symptoms of urinary incontinence.      BMI is >= 25 and <30. (Overweight) The following options were offered after discussion;: weight loss educational material (shared in after visit summary), exercise counseling/recommendations, and nutrition counseling/recommendations      RADIOLOGY (CT AND/OR KUB):    CT Abdomen and Pelvis: No results found for this or any previous visit.     CT Stone Protocol: No results found for this or any previous visit.     KUB: No results found for this or any previous visit.       [unfilled]  LABS (3 MONTHS):    Office Visit on 06/24/2024   Component Date Value Ref Range Status    PSA 06/24/2024 4.7 (H)  0.0 - 4.0 ng/mL Final    Roche ECLIA methodology.  According to the American Urological Association, Serum PSA should  decrease and remain at undetectable levels after radical  prostatectomy. The AUA defines biochemical recurrence as an initial  PSA value 0.2 ng/mL or greater followed by a subsequent confirmatory  PSA value 0.2 ng/mL or greater.  Values obtained with different assay methods or kits cannot be used  interchangeably. Results cannot be interpreted as absolute evidence  of the presence or absence of malignant disease.    PSA, Free 06/24/2024 0.32  N/A ng/mL Final    Roche ECLIA methodology.    PSA, Free % 06/24/2024 6.8  % Final    The table below lists the probability of prostate cancer for  men with non-suspicious WILIAN results and total PSA between  4 and 10 ng/mL, by patient age (Nayan et al, ISATU 1998,  279:1542).                    % Free PSA       50-64 yr        65-75 yr                    0.00-10.00%        56%             55%                   10.01-15.00%        24%             35%                   15.01-20.00%        17%             23%                    20.01-25.00%        10%             20%                        >25.00%         5%              9%  Please note:  Nayan et al did not make specific                recommendations regarding the use of                percent free PSA for any other population                of men.   Lab on 05/17/2024   Component Date Value Ref Range Status    TSH 05/17/2024 1.230  0.270 - 4.200 uIU/mL Final    Total Cholesterol 05/17/2024 192  0 - 200 mg/dL Final    Triglycerides 05/17/2024 115  0 - 150 mg/dL Final    HDL Cholesterol 05/17/2024 39 (L)  40 - 60 mg/dL Final    LDL Cholesterol  05/17/2024 132 (H)  0 - 100 mg/dL Final    VLDL Cholesterol 05/17/2024 21  5 - 40 mg/dL Final    LDL/HDL Ratio 05/17/2024 3.33   Final    Hemoglobin A1C 05/17/2024 5.50  4.80 - 5.60 % Final    PSA 05/17/2024 7.450 (H)  0.000 - 4.000 ng/mL Final    Magnesium 05/17/2024 2.3  1.6 - 2.4 mg/dL Final    Creatine Kinase 05/17/2024 70  20 - 200 U/L Final    Color, UA 05/17/2024 Yellow  Yellow, Straw Final    Appearance, UA 05/17/2024 Clear  Clear Final    pH, UA 05/17/2024 6.5  5.0 - 8.0 Final    Specific Gravity, UA 05/17/2024 1.012  1.005 - 1.030 Final    Glucose, UA 05/17/2024 Negative  Negative Final    Ketones, UA 05/17/2024 Negative  Negative Final    Bilirubin, UA 05/17/2024 Negative  Negative Final    Blood, UA 05/17/2024 Negative  Negative Final    Protein, UA 05/17/2024 Negative  Negative Final    Leuk Esterase, UA 05/17/2024 Moderate (2+) (A)  Negative Final    Nitrite, UA 05/17/2024 Negative  Negative Final    Urobilinogen, UA 05/17/2024 0.2 E.U./dL  0.2 - 1.0 E.U./dL Final    Protein/Creatinine Ratio, Urine 05/17/2024 111.5  0.0 - 200.0 mg/G Crea Final    Creatinine, Urine 05/17/2024 76.2  mg/dL Final    Total Protein, Urine 05/17/2024 8.5  mg/dL Final    Glucose 05/17/2024 95  65 - 99 mg/dL Final    BUN 05/17/2024 11  8 - 23 mg/dL Final    Creatinine 05/17/2024 1.40 (H)  0.76 - 1.27 mg/dL Final    Sodium 05/17/2024 139  136 - 145 mmol/L  Final    Potassium 05/17/2024 3.8  3.5 - 5.2 mmol/L Final    Chloride 05/17/2024 105  98 - 107 mmol/L Final    CO2 05/17/2024 24.9  22.0 - 29.0 mmol/L Final    Calcium 05/17/2024 9.9  8.6 - 10.5 mg/dL Final    Total Protein 05/17/2024 7.2  6.0 - 8.5 g/dL Final    Albumin 05/17/2024 4.2  3.5 - 5.2 g/dL Final    ALT (SGPT) 05/17/2024 12  1 - 41 U/L Final    AST (SGOT) 05/17/2024 14  1 - 40 U/L Final    Alkaline Phosphatase 05/17/2024 65  39 - 117 U/L Final    Total Bilirubin 05/17/2024 0.3  0.0 - 1.2 mg/dL Final    Globulin 05/17/2024 3.0  gm/dL Final    A/G Ratio 05/17/2024 1.4  g/dL Final    BUN/Creatinine Ratio 05/17/2024 7.9  7.0 - 25.0 Final    Anion Gap 05/17/2024 9.1  5.0 - 15.0 mmol/L Final    eGFR 05/17/2024 57.5 (L)  >60.0 mL/min/1.73 Final    25 Hydroxy, Vitamin D 05/17/2024 32.7  30.0 - 100.0 ng/ml Final    PTH, Intact 05/17/2024 13.6 (L)  15.0 - 65.0 pg/mL Final    Phosphorus 05/17/2024 1.8 (C)  2.5 - 4.5 mg/dL Final    WBC 05/17/2024 7.08  3.40 - 10.80 10*3/mm3 Final    RBC 05/17/2024 4.56  4.14 - 5.80 10*6/mm3 Final    Hemoglobin 05/17/2024 12.9 (L)  13.0 - 17.7 g/dL Final    Hematocrit 05/17/2024 40.3  37.5 - 51.0 % Final    MCV 05/17/2024 88.4  79.0 - 97.0 fL Final    MCH 05/17/2024 28.3  26.6 - 33.0 pg Final    MCHC 05/17/2024 32.0  31.5 - 35.7 g/dL Final    RDW 05/17/2024 14.1  12.3 - 15.4 % Final    RDW-SD 05/17/2024 45.2  37.0 - 54.0 fl Final    MPV 05/17/2024 9.7  6.0 - 12.0 fL Final    Platelets 05/17/2024 535 (H)  140 - 450 10*3/mm3 Final    Neutrophil % 05/17/2024 55.2  42.7 - 76.0 % Final    Lymphocyte % 05/17/2024 30.1  19.6 - 45.3 % Final    Monocyte % 05/17/2024 9.0  5.0 - 12.0 % Final    Eosinophil % 05/17/2024 3.5  0.3 - 6.2 % Final    Basophil % 05/17/2024 1.4  0.0 - 1.5 % Final    Immature Grans % 05/17/2024 0.8 (H)  0.0 - 0.5 % Final    Neutrophils, Absolute 05/17/2024 3.90  1.70 - 7.00 10*3/mm3 Final    Lymphocytes, Absolute 05/17/2024 2.13  0.70 - 3.10 10*3/mm3 Final    Monocytes,  Absolute 05/17/2024 0.64  0.10 - 0.90 10*3/mm3 Final    Eosinophils, Absolute 05/17/2024 0.25  0.00 - 0.40 10*3/mm3 Final    Basophils, Absolute 05/17/2024 0.10  0.00 - 0.20 10*3/mm3 Final    Immature Grans, Absolute 05/17/2024 0.06 (H)  0.00 - 0.05 10*3/mm3 Final    nRBC 05/17/2024 0.0  0.0 - 0.2 /100 WBC Final    RBC, UA 05/17/2024 0-2  None Seen, 0-2 /HPF Final    WBC, UA 05/17/2024 21-50 (A)  None Seen, 0-2 /HPF Final    Bacteria, UA 05/17/2024 3+ (A)  None Seen /HPF Final    Squamous Epithelial Cells, UA 05/17/2024 0-2  None Seen, 0-2 /HPF Final    Hyaline Casts, UA 05/17/2024 None Seen  None Seen /LPF Final    Methodology 05/17/2024 Automated Microscopy   Final        IPSS Questionnaire (AUA-7):  Over the past month…    1)  How often have you had a sensation of not emptying your bladder completely after you finish urinating?  5 - Almost always   2)  How often have you had to urinate again less than two hours after you finished urinating? 0 - Not at all   3)  How often have you found you stopped and started again several times when you urinated?  3 - About half the time   4) How difficult have you found it to postpone urination?  0 - Not at all   5) How often have you had a weak urinary stream?  3 - About half the time   6) How often have you had to push or strain to begin urination?  3 - About half the time   7) How many times did you most typically get up to urinate from the time you went to bed until the time you got up in the morning?  0 - None   Total Score:  14       Follow Up   Return in about 1 month (around 7/24/2024) for Next scheduled follow up, FOR BPH WITH LUTS/ ELEVATED PSA/DISCUSS PROSTATE BIOPSY.    Patient was given instructions and counseling regarding his condition or for health maintenance advice. Please see specific information pulled into the AVS if appropriate.          This document has been electronically signed by Griselda Cheng-Akwa, APRN   June 27, 2024 14:54 EDT      Dictated  Utilizing Dragon Dictation: Part of this note may be an electronic transcription/translation of spoken language to printed text using the Dragon Dictation System.      Patient or patient representative verbalized consent for the use of Ambient Listening during the visit with  Griselda Cheng-Akwa, APRN for chart documentation. 6/27/2024  14:53 EDT

## 2024-06-26 LAB
PSA FREE MFR SERPL: 6.8 %
PSA FREE SERPL-MCNC: 0.32 NG/ML
PSA SERPL-MCNC: 4.7 NG/ML (ref 0–4)

## 2024-06-27 PROBLEM — N13.8 BPH WITH OBSTRUCTION/LOWER URINARY TRACT SYMPTOMS: Status: ACTIVE | Noted: 2024-06-24

## 2024-06-27 PROBLEM — N40.1 BPH WITH OBSTRUCTION/LOWER URINARY TRACT SYMPTOMS: Status: ACTIVE | Noted: 2024-06-24

## 2024-06-27 PROBLEM — Z12.5 PROSTATE CANCER SCREENING: Status: ACTIVE | Noted: 2024-06-27

## 2024-06-27 PROBLEM — R35.0 BENIGN PROSTATIC HYPERPLASIA WITH URINARY FREQUENCY: Status: ACTIVE | Noted: 2024-06-24

## 2024-06-27 PROBLEM — R39.16 BENIGN PROSTATIC HYPERPLASIA (BPH) WITH STRAINING ON URINATION: Status: ACTIVE | Noted: 2024-06-24

## 2024-06-28 ENCOUNTER — HOSPITAL ENCOUNTER (OUTPATIENT)
Dept: MRI IMAGING | Facility: HOSPITAL | Age: 59
Discharge: HOME | End: 2024-06-28
Admitting: NURSE PRACTITIONER
Payer: COMMERCIAL

## 2024-06-28 LAB — CREAT BLDA-MCNC: 1.6 MG/DL (ref 0.6–1.3)

## 2024-06-28 PROCEDURE — 82565 ASSAY OF CREATININE: CPT

## 2024-06-28 PROCEDURE — A9577 INJ MULTIHANCE: HCPCS | Performed by: NURSE PRACTITIONER

## 2024-06-28 PROCEDURE — 0 GADOBENATE DIMEGLUMINE 529 MG/ML SOLUTION: Performed by: NURSE PRACTITIONER

## 2024-06-28 PROCEDURE — 72197 MRI PELVIS W/O & W/DYE: CPT

## 2024-06-28 RX ADMIN — GADOBENATE DIMEGLUMINE 16 ML: 529 INJECTION, SOLUTION INTRAVENOUS at 21:42

## 2024-07-03 ENCOUNTER — TELEPHONE (OUTPATIENT)
Dept: GASTROENTEROLOGY | Facility: CLINIC | Age: 59
End: 2024-07-03
Payer: MEDICARE

## 2024-07-03 ENCOUNTER — TELEPHONE (OUTPATIENT)
Dept: UROLOGY | Facility: CLINIC | Age: 59
End: 2024-07-03
Payer: MEDICARE

## 2024-07-03 NOTE — TELEPHONE ENCOUNTER
Called patient to check on him for clinic visit and to review PSA results, urine MDX results, and MRI results at this time.  Patient was evaluated on 6/27 for elevated PSA of 7.45.  His repeat PSA on 06/24/2024 is is 4.7, with a 6.8 percentage free PSA which gives him approximately 56% chance of prostate cancer.    We did proceed with MDX urine testing which came back showing a 51% probability of prostate cancer upon biopsy, and only 23% probability of Cony score greater than than 7.    He is MRI results of his prostate was unremarkable with a PI-RADS score of 1 with no region of restricted diffusion noted within the prostate      Plan is to monitor with repeat PSA in 6 months      Left message for patient to call with concerns thank you      FINDINGS:MRI PROSTATE    PROSTATE:  No region of restricted diffusion within the prostate gland  detected consistent with PI-RADS 1 prostate.  The prostate is within  normal limits in size.  No lesion identified.    SEMINAL VESICLES:  Unremarkable as visualized.  Normal signal.  No  lesion identified.    NEUROVASCULAR BUNDLES:  Unremarkable as visualized.    LYMPH NODES:  Unremarkable as visualized.  No enlarged lymph nodes.     IMPRESSION:    No region of restricted diffusion within the prostate gland detected  consistent with PI-RADS 1 prostate      Ref Range & Units 9 d ago 1 mo ago 9 yr ago 10 yr ago   PSA  0.0 - 4.0 ng/mL 4.7 High  7.450 High  R 0.76 0.38   Comment: Roche ECLIA methodology.  According to the American Urological Association, Serum PSA should  decrease and remain at undetectable levels after radical  prostatectomy. The AUA defines biochemical recurrence as an initial  PSA value 0.2 ng/mL or greater followed by a subsequent confirmatory  PSA value 0.2 ng/mL or greater.  Values obtained with different assay methods or kits cannot be used  interchangeably. Results cannot be interpreted as absolute evidence  of the presence or absence of malignant disease.    PSA, Free  N/A ng/mL 0.32      Comment: Roche ECLIA methodology.   PSA, Free %  % 6.8      Comment: The table below lists the probability of prostate cancer for  men with non-suspicious WILIAN results and total PSA between  4 and 10 ng/mL, by patient age (Nayan et al, ISATU 1998,  279:1542).                    % Free PSA       50-64 yr        65-75 yr                    0.00-10.00%        56%             55%                   10.01-15.00%        24%             35%                   15.01-20.00%        17%             23%                   20.01-25.00%        10%             20%                        >25.00%         5%              9%  Please note:  Nayan et al did not make specific                recommendations regarding the use of                percent free PSA for any other population                of men.

## 2024-07-03 NOTE — TELEPHONE ENCOUNTER
I tried to call the pt back the provider had not yet reviewed these but it does look like it did come down a lot

## 2024-07-08 ENCOUNTER — TELEPHONE (OUTPATIENT)
Dept: UROLOGY | Facility: CLINIC | Age: 59
End: 2024-07-08
Payer: MEDICARE

## 2024-07-08 NOTE — TELEPHONE ENCOUNTER
I called the pt  and let him know and made him a 3 mth follow up.    ----- Message from Griselda Cheng-Akwa sent at 7/8/2024  9:10 AM EDT -----  Please call patient back.  I did try to call him last week was unable to reach him.  MRI results are good only showed a PI-RADS 1 score.      Please schedule him a follow-up appointment in 3-6 months to repeat PSA thank you    IMPRESSION:    No region of restricted diffusion within the prostate gland detected  consistent with PI-RADS 1 prostate.

## 2024-08-11 ENCOUNTER — TRANSCRIBE ORDERS (OUTPATIENT)
Dept: ADMINISTRATIVE | Facility: HOSPITAL | Age: 59
End: 2024-08-11
Payer: MEDICARE

## 2024-08-11 ENCOUNTER — HOSPITAL ENCOUNTER (OUTPATIENT)
Dept: GENERAL RADIOLOGY | Facility: HOSPITAL | Age: 59
Discharge: HOME | End: 2024-08-11
Admitting: PAIN MEDICINE
Payer: MEDICARE

## 2024-08-11 DIAGNOSIS — M54.50 LOW BACK PAIN, UNSPECIFIED BACK PAIN LATERALITY, UNSPECIFIED CHRONICITY, UNSPECIFIED WHETHER SCIATICA PRESENT: Primary | ICD-10-CM

## 2024-08-11 PROCEDURE — 72110 X-RAY EXAM L-2 SPINE 4/>VWS: CPT | Performed by: RADIOLOGY

## 2024-08-11 PROCEDURE — 72110 X-RAY EXAM L-2 SPINE 4/>VWS: CPT

## 2024-08-16 ENCOUNTER — TRANSCRIBE ORDERS (OUTPATIENT)
Dept: ADMINISTRATIVE | Facility: HOSPITAL | Age: 59
End: 2024-08-16
Payer: MEDICARE

## 2024-08-16 DIAGNOSIS — M54.16 LUMBAR RADICULOPATHY: Primary | ICD-10-CM

## 2024-09-04 ENCOUNTER — HOSPITAL ENCOUNTER (OUTPATIENT)
Dept: MRI IMAGING | Facility: HOSPITAL | Age: 59
Discharge: HOME | End: 2024-09-04
Admitting: NURSE PRACTITIONER
Payer: MEDICARE

## 2024-09-04 DIAGNOSIS — M54.16 LUMBAR RADICULOPATHY: ICD-10-CM

## 2024-09-04 PROCEDURE — 72148 MRI LUMBAR SPINE W/O DYE: CPT

## 2025-01-22 ENCOUNTER — APPOINTMENT (OUTPATIENT)
Dept: GENERAL RADIOLOGY | Facility: HOSPITAL | Age: 60
End: 2025-01-22
Payer: MEDICARE

## 2025-01-22 ENCOUNTER — HOSPITAL ENCOUNTER (EMERGENCY)
Facility: HOSPITAL | Age: 60
Discharge: HOME | End: 2025-01-23
Payer: MEDICARE

## 2025-01-22 DIAGNOSIS — J18.9 PNEUMONIA DUE TO INFECTIOUS ORGANISM, UNSPECIFIED LATERALITY, UNSPECIFIED PART OF LUNG: Primary | ICD-10-CM

## 2025-01-22 LAB
ALBUMIN SERPL-MCNC: 4.2 G/DL (ref 3.5–5.2)
ALBUMIN/GLOB SERPL: 1.4 G/DL
ALP SERPL-CCNC: 84 U/L (ref 39–117)
ALT SERPL W P-5'-P-CCNC: 20 U/L (ref 1–41)
ANION GAP SERPL CALCULATED.3IONS-SCNC: 12.2 MMOL/L (ref 5–15)
APTT PPP: 35 SECONDS (ref 24.5–35.9)
AST SERPL-CCNC: 15 U/L (ref 1–40)
B PARAPERT DNA SPEC QL NAA+PROBE: NOT DETECTED
B PERT DNA SPEC QL NAA+PROBE: NOT DETECTED
BASOPHILS # BLD AUTO: 0.11 10*3/MM3 (ref 0–0.2)
BASOPHILS NFR BLD AUTO: 0.8 % (ref 0–1.5)
BILIRUB SERPL-MCNC: 0.2 MG/DL (ref 0–1.2)
BUN SERPL-MCNC: 9 MG/DL (ref 8–23)
BUN/CREAT SERPL: 8.6 (ref 7–25)
C PNEUM DNA NPH QL NAA+NON-PROBE: NOT DETECTED
CALCIUM SPEC-SCNC: 9.5 MG/DL (ref 8.6–10.5)
CHLORIDE SERPL-SCNC: 103 MMOL/L (ref 98–107)
CO2 SERPL-SCNC: 26.8 MMOL/L (ref 22–29)
CREAT SERPL-MCNC: 1.05 MG/DL (ref 0.76–1.27)
CRP SERPL-MCNC: 0.53 MG/DL (ref 0–0.5)
D DIMER PPP FEU-MCNC: 0.29 MCGFEU/ML (ref 0–0.61)
D-LACTATE SERPL-SCNC: 2 MMOL/L (ref 0.5–2)
DEPRECATED RDW RBC AUTO: 43.2 FL (ref 37–54)
EGFRCR SERPLBLD CKD-EPI 2021: 80.8 ML/MIN/1.73
EOSINOPHIL # BLD AUTO: 0.48 10*3/MM3 (ref 0–0.4)
EOSINOPHIL NFR BLD AUTO: 3.4 % (ref 0.3–6.2)
ERYTHROCYTE [DISTWIDTH] IN BLOOD BY AUTOMATED COUNT: 14 % (ref 12.3–15.4)
FLUAV SUBTYP SPEC NAA+PROBE: NOT DETECTED
FLUBV RNA ISLT QL NAA+PROBE: NOT DETECTED
GEN 5 1HR TROPONIN T REFLEX: 9 NG/L
GLOBULIN UR ELPH-MCNC: 2.9 GM/DL
GLUCOSE SERPL-MCNC: 131 MG/DL (ref 65–99)
HADV DNA SPEC NAA+PROBE: NOT DETECTED
HCOV 229E RNA SPEC QL NAA+PROBE: NOT DETECTED
HCOV HKU1 RNA SPEC QL NAA+PROBE: NOT DETECTED
HCOV NL63 RNA SPEC QL NAA+PROBE: NOT DETECTED
HCOV OC43 RNA SPEC QL NAA+PROBE: NOT DETECTED
HCT VFR BLD AUTO: 39.1 % (ref 37.5–51)
HGB BLD-MCNC: 13.2 G/DL (ref 13–17.7)
HMPV RNA NPH QL NAA+NON-PROBE: NOT DETECTED
HOLD SPECIMEN: NORMAL
HOLD SPECIMEN: NORMAL
HPIV1 RNA ISLT QL NAA+PROBE: NOT DETECTED
HPIV2 RNA SPEC QL NAA+PROBE: NOT DETECTED
HPIV3 RNA NPH QL NAA+PROBE: NOT DETECTED
HPIV4 P GENE NPH QL NAA+PROBE: NOT DETECTED
IMM GRANULOCYTES # BLD AUTO: 0.15 10*3/MM3 (ref 0–0.05)
IMM GRANULOCYTES NFR BLD AUTO: 1.1 % (ref 0–0.5)
INR PPP: 0.91 (ref 0.9–1.1)
LYMPHOCYTES # BLD AUTO: 2.15 10*3/MM3 (ref 0.7–3.1)
LYMPHOCYTES NFR BLD AUTO: 15.4 % (ref 19.6–45.3)
M PNEUMO IGG SER IA-ACNC: NOT DETECTED
MCH RBC QN AUTO: 28.6 PG (ref 26.6–33)
MCHC RBC AUTO-ENTMCNC: 33.8 G/DL (ref 31.5–35.7)
MCV RBC AUTO: 84.6 FL (ref 79–97)
MONOCYTES # BLD AUTO: 0.82 10*3/MM3 (ref 0.1–0.9)
MONOCYTES NFR BLD AUTO: 5.9 % (ref 5–12)
NEUTROPHILS NFR BLD AUTO: 10.27 10*3/MM3 (ref 1.7–7)
NEUTROPHILS NFR BLD AUTO: 73.4 % (ref 42.7–76)
NRBC BLD AUTO-RTO: 0 /100 WBC (ref 0–0.2)
NT-PROBNP SERPL-MCNC: 599.4 PG/ML (ref 0–900)
PLATELET # BLD AUTO: 391 10*3/MM3 (ref 140–450)
PMV BLD AUTO: 8.9 FL (ref 6–12)
POTASSIUM SERPL-SCNC: 3.2 MMOL/L (ref 3.5–5.2)
PROCALCITONIN SERPL-MCNC: 0.06 NG/ML (ref 0–0.25)
PROT SERPL-MCNC: 7.1 G/DL (ref 6–8.5)
PROTHROMBIN TIME: 12.3 SECONDS (ref 11.6–15.1)
RBC # BLD AUTO: 4.62 10*6/MM3 (ref 4.14–5.8)
RHINOVIRUS RNA SPEC NAA+PROBE: NOT DETECTED
RSV RNA NPH QL NAA+NON-PROBE: NOT DETECTED
SARS-COV-2 RNA RESP QL NAA+PROBE: NOT DETECTED
SODIUM SERPL-SCNC: 142 MMOL/L (ref 136–145)
TROPONIN T NUMERIC DELTA: -2 NG/L
TROPONIN T SERPL HS-MCNC: 11 NG/L
WBC NRBC COR # BLD AUTO: 13.98 10*3/MM3 (ref 3.4–10.8)
WHOLE BLOOD HOLD COAG: NORMAL
WHOLE BLOOD HOLD SPECIMEN: NORMAL

## 2025-01-22 PROCEDURE — 83605 ASSAY OF LACTIC ACID: CPT

## 2025-01-22 PROCEDURE — 84484 ASSAY OF TROPONIN QUANT: CPT

## 2025-01-22 PROCEDURE — 80053 COMPREHEN METABOLIC PANEL: CPT

## 2025-01-22 PROCEDURE — 85379 FIBRIN DEGRADATION QUANT: CPT

## 2025-01-22 PROCEDURE — 83880 ASSAY OF NATRIURETIC PEPTIDE: CPT

## 2025-01-22 PROCEDURE — 99284 EMERGENCY DEPT VISIT MOD MDM: CPT

## 2025-01-22 PROCEDURE — 71046 X-RAY EXAM CHEST 2 VIEWS: CPT

## 2025-01-22 PROCEDURE — 86140 C-REACTIVE PROTEIN: CPT

## 2025-01-22 PROCEDURE — 84145 PROCALCITONIN (PCT): CPT

## 2025-01-22 PROCEDURE — 36415 COLL VENOUS BLD VENIPUNCTURE: CPT

## 2025-01-22 PROCEDURE — 85610 PROTHROMBIN TIME: CPT

## 2025-01-22 PROCEDURE — 85025 COMPLETE CBC W/AUTO DIFF WBC: CPT

## 2025-01-22 PROCEDURE — 85730 THROMBOPLASTIN TIME PARTIAL: CPT

## 2025-01-22 PROCEDURE — 93010 ELECTROCARDIOGRAM REPORT: CPT | Performed by: INTERNAL MEDICINE

## 2025-01-22 PROCEDURE — 87040 BLOOD CULTURE FOR BACTERIA: CPT

## 2025-01-22 PROCEDURE — 93005 ELECTROCARDIOGRAM TRACING: CPT

## 2025-01-22 PROCEDURE — 0202U NFCT DS 22 TRGT SARS-COV-2: CPT

## 2025-01-22 PROCEDURE — 71046 X-RAY EXAM CHEST 2 VIEWS: CPT | Performed by: RADIOLOGY

## 2025-01-22 RX ORDER — ACETAMINOPHEN 500 MG
1000 TABLET ORAL ONCE
Status: COMPLETED | OUTPATIENT
Start: 2025-01-22 | End: 2025-01-22

## 2025-01-22 RX ORDER — CEFDINIR 300 MG/1
300 CAPSULE ORAL ONCE
Status: COMPLETED | OUTPATIENT
Start: 2025-01-22 | End: 2025-01-22

## 2025-01-22 RX ORDER — SODIUM CHLORIDE 0.9 % (FLUSH) 0.9 %
10 SYRINGE (ML) INJECTION AS NEEDED
Status: DISCONTINUED | OUTPATIENT
Start: 2025-01-22 | End: 2025-01-23 | Stop reason: HOSPADM

## 2025-01-22 RX ORDER — DOXYCYCLINE 100 MG/1
100 CAPSULE ORAL ONCE
Status: COMPLETED | OUTPATIENT
Start: 2025-01-22 | End: 2025-01-22

## 2025-01-22 RX ADMIN — DOXYCYCLINE 100 MG: 100 CAPSULE ORAL at 23:38

## 2025-01-22 RX ADMIN — CEFDINIR 300 MG: 300 CAPSULE ORAL at 23:38

## 2025-01-22 RX ADMIN — ACETAMINOPHEN 1000 MG: 500 TABLET, FILM COATED ORAL at 22:35

## 2025-01-23 VITALS
DIASTOLIC BLOOD PRESSURE: 98 MMHG | TEMPERATURE: 98.9 F | HEART RATE: 91 BPM | RESPIRATION RATE: 22 BRPM | HEIGHT: 66 IN | WEIGHT: 189 LBS | SYSTOLIC BLOOD PRESSURE: 164 MMHG | BODY MASS INDEX: 30.37 KG/M2 | OXYGEN SATURATION: 99 %

## 2025-01-23 LAB
QT INTERVAL: 380 MS
QTC INTERVAL: 467 MS

## 2025-01-23 RX ORDER — CEFDINIR 300 MG/1
300 CAPSULE ORAL 2 TIMES DAILY
Qty: 14 CAPSULE | Refills: 0 | Status: SHIPPED | OUTPATIENT
Start: 2025-01-23 | End: 2025-01-30

## 2025-01-23 RX ORDER — DOXYCYCLINE HYCLATE 100 MG/1
100 TABLET, DELAYED RELEASE ORAL 2 TIMES DAILY
Qty: 14 TABLET | Refills: 0 | Status: SHIPPED | OUTPATIENT
Start: 2025-01-23 | End: 2025-01-30

## 2025-01-23 NOTE — ED PROVIDER NOTES
Subjective   History of Present Illness  59-year-old male with a history of GERD, hypertension 5 days of fatigue and cough.  Denies fever shortness of breath vomiting chest pains      Review of Systems   Constitutional:  Positive for fatigue. Negative for fever.   HENT: Negative.     Respiratory:  Positive for cough. Negative for apnea, choking, chest tightness, shortness of breath, wheezing and stridor.    Cardiovascular: Negative.  Negative for chest pain, palpitations and leg swelling.   Gastrointestinal: Negative.  Negative for abdominal distention, abdominal pain, anal bleeding, blood in stool, constipation and diarrhea.   Endocrine: Negative.    Genitourinary: Negative.  Negative for dysuria.   Musculoskeletal:  Positive for arthralgias.   Skin: Negative.    Neurological: Negative.    Psychiatric/Behavioral: Negative.     All other systems reviewed and are negative.      Past Medical History:   Diagnosis Date    Arthritis     Elevated cholesterol     GERD (gastroesophageal reflux disease)     Hypertension     Right-sided lacunar infarction 8/6/2023       No Known Allergies    Past Surgical History:   Procedure Laterality Date    CHOLECYSTECTOMY N/A 12/3/2023    Procedure: CHOLECYSTECTOMY LAPAROSCOPIC WITH DAVINCI ROBOT;  Surgeon: Negrito Goldberg MD;  Location: [facility];  Service: General;  Laterality: N/A;    COLONOSCOPY      COLONOSCOPY N/A 8/16/2018    Procedure: COLONOSCOPY;  Surgeon: Negrito Goldberg MD;  Location: [facility] OR;  Service: Gastroenterology    ENDOSCOPY N/A 8/16/2018    Procedure: ESOPHAGOGASTRODUODENOSCOPY WITH ANESTHESIA;  Surgeon: Negrito Goldberg MD;  Location: [facility];  Service: Gastroenterology    NECK SURGERY      VASECTOMY  1993       Family History   Problem Relation Age of Onset    Diabetes Father     Hypertension Mother     Cancer Sister         breast    Hypertension Other     Heart disease Neg Hx        Social History     Socioeconomic History    Marital status:     Tobacco Use    Smoking status: Every Day     Current packs/day: 0.25     Average packs/day: 0.3 packs/day for 30.0 years (7.5 ttl pk-yrs)     Types: Cigarettes    Smokeless tobacco: Never    Tobacco comments:     states 2 cigarettes a day   Vaping Use    Vaping status: Former    Substances: Nicotine    Devices: Disposable   Substance and Sexual Activity    Alcohol use: No    Drug use: No    Sexual activity: Defer     Birth control/protection: None           Objective   Physical Exam  Vitals and nursing note reviewed.   Constitutional:       General: He is not in acute distress.     Appearance: He is well-developed. He is not ill-appearing, toxic-appearing or diaphoretic.   HENT:      Head: Normocephalic and atraumatic.      Right Ear: External ear normal.      Left Ear: External ear normal.      Nose: Nose normal.   Eyes:      Conjunctiva/sclera: Conjunctivae normal.      Pupils: Pupils are equal, round, and reactive to light.   Neck:      Vascular: No JVD.      Trachea: No tracheal deviation.   Cardiovascular:      Rate and Rhythm: Normal rate and regular rhythm.      Heart sounds: Normal heart sounds. No murmur heard.  Pulmonary:      Effort: Pulmonary effort is normal. No respiratory distress.      Breath sounds: Normal breath sounds. No wheezing.   Abdominal:      General: Bowel sounds are normal.      Palpations: Abdomen is soft.      Tenderness: There is no abdominal tenderness.   Musculoskeletal:         General: No deformity. Normal range of motion.      Cervical back: Normal range of motion and neck supple.   Skin:     General: Skin is warm and dry.      Coloration: Skin is not pale.      Findings: No erythema or rash.   Neurological:      Mental Status: He is alert and oriented to person, place, and time.      Cranial Nerves: No cranial nerve deficit.   Psychiatric:         Behavior: Behavior normal.         Thought Content: Thought content normal.         Procedures           ED Course  ED  Course as of 01/23/25 0011   Wed Jan 22, 2025 2118 EKG interpretation normal sinus rhythm 91 bpm QTc is 467 nonspecific ST changes  Electronically signed by Wallace Ho DO, 01/22/25, 9:18 PM EST.   [GF]      ED Course User Index  [GF] Wallace Ho DO                                                       Medical Decision Making  Patient's bedside examination is unremarkable.  X-ray is concerning for possible pneumonia.  Discussed antibiotic therapy in the outpatient setting at this time.  Patient is agreeable to this plan of care.  We discussed ER precautions.  All questions answered.    Problems Addressed:  Pneumonia due to infectious organism, unspecified laterality, unspecified part of lung: complicated acute illness or injury    Amount and/or Complexity of Data Reviewed  Labs: ordered.  Radiology: ordered.  ECG/medicine tests: ordered.    Risk  OTC drugs.  Prescription drug management.        Final diagnoses:   Pneumonia due to infectious organism, unspecified laterality, unspecified part of lung       ED Disposition  ED Disposition       ED Disposition   Discharge    Condition   Stable    Comment   --               Ryanne Lang, APRN  [address]  [place] [id number] [phone]    Schedule an appointment as soon as possible for a visit   If symptoms worsen    [facility] EMERGENCY DEPARTMENT  [address] [place] [id number] [phone]  Go to   If symptoms worsen         Medication List        New Prescriptions      cefdinir 300 MG capsule  Commonly known as: OMNICEF  Take 1 capsule by mouth 2 (Two) Times a Day for 7 days.     doxycycline 100 MG enteric coated tablet  Commonly known as: DORYX  Take 1 tablet by mouth 2 (Two) Times a Day for 7 days.               Where to Get Your Medications        These medications were sent to [email or web address] - [place], [place] - [address] [place] [address] [phone] [facility] [phone] [address] [address]       Phone: [phone]   cefdinir 300 MG capsule  doxycycline 100 MG enteric coated tablet            Wallace Ho,   01/23/25 0013

## 2025-01-23 NOTE — DISCHARGE INSTRUCTIONS
I have sent you antibiotics to your pharmacy.  Please follow-up with your primary care physician and take the antibiotics as prescribed.  If your symptoms worsen please return to the ER.

## 2025-01-23 NOTE — ED NOTES
MEDICAL SCREENING:    Reason for Visit: SOA    Patient initially seen in triage.  The patient was advised further evaluation and diagnostic testing will be needed, some of the treatment and testing will be initiated in the lobby in order to begin the process.  The patient will be returned to the waiting area for the time being and possibly be re-assessed by a subsequent ED provider.  The patient will be brought back to the treatment area in as timely manner as possible.     Lily Flores, APRN  01/22/25 2124

## 2025-01-27 LAB
BACTERIA SPEC AEROBE CULT: NORMAL
BACTERIA SPEC AEROBE CULT: NORMAL

## (undated) DEVICE — INSUFFLATION NEEDLE TO ESTABLISH PNEUMOPERITONEUM.: Brand: INSUFFLATION NEEDLE

## (undated) DEVICE — TISSUE RETRIEVAL SYSTEM: Brand: INZII RETRIEVAL SYSTEM

## (undated) DEVICE — DRAPE,UTILTY,TAPE,15X26, 4EA/PK: Brand: MEDLINE

## (undated) DEVICE — SUCTION CANISTER, 1500CC, RIGID: Brand: DEROYAL

## (undated) DEVICE — COVER,MAYO STAND,STERILE: Brand: MEDLINE

## (undated) DEVICE — PERMANENT CAUTERY HOOK: Brand: ENDOWRIST

## (undated) DEVICE — SUT VIC 0/0 UR6 27IN DYED J603H

## (undated) DEVICE — GLV SURG PREMIERPRO MIC LTX PF SZ7.5 BRN

## (undated) DEVICE — CVR DISP HUG U VAC STEEP TREND

## (undated) DEVICE — UNDERGLV SURG BIOGEL INDICAT PF 8 GRN

## (undated) DEVICE — SINGLE PORT MANIFOLD: Brand: NEPTUNE 2

## (undated) DEVICE — PCH SURG STRL INST SLF/SEAL 7.5X13IN

## (undated) DEVICE — APPL HEMO SURG ARISTA/AH/FLEXITIP XL 38CM

## (undated) DEVICE — SUREFIT, DUAL DISPERSIVE ELECTRODE, CONTACT QUALITY MONITOR: Brand: SUREFIT

## (undated) DEVICE — 2, DISPOSABLE SUCTION/IRRIGATOR WITH DISPOSABLE TIP: Brand: STRYKEFLOW

## (undated) DEVICE — LARGE HEM-O-LOK CLIP APPLIER: Brand: ENDOWRIST

## (undated) DEVICE — PK LAP GEN 70

## (undated) DEVICE — CANNULA SEAL

## (undated) DEVICE — 40595 XL TRENDELENBURG POSITIONING KIT: Brand: 40595 XL TRENDELENBURG POSITIONING KIT

## (undated) DEVICE — CONN Y IRR DISP 1P/U

## (undated) DEVICE — THE BITE BLOCK MAXI, LATEX FREE STRAP IS USED TO PROTECT THE ENDOSCOPE INSERTION TUBE FROM BEING BITTEN BY THE PATIENT.

## (undated) DEVICE — Device: Brand: DEFENDO AIR/WATER/SUCTION AND BIOPSY VALVE

## (undated) DEVICE — TBG STRL INST 4IN 100FT

## (undated) DEVICE — SUT MNCRYL 4/0 PS2 18 IN

## (undated) DEVICE — SNAR POLYP CAPTIFLX MICRO OVL 13MM 240CM

## (undated) DEVICE — Device: Brand: ENDOGATOR

## (undated) DEVICE — CADIERE FORCEPS: Brand: ENDOWRIST

## (undated) DEVICE — TUBING, SUCTION, 1/4" X 20', STRAIGHT: Brand: MEDLINE INDUSTRIES, INC.

## (undated) DEVICE — ARM DRAPE

## (undated) DEVICE — GOWN,REINF,POLY,ECL,PP SLV,XL: Brand: MEDLINE

## (undated) DEVICE — FRCP BX RADJAW4 NDL 2.8 240CM LG OG BX40

## (undated) DEVICE — Device

## (undated) DEVICE — THE SINGLE USE ETRAP – POLYP TRAP IS USED FOR SUCTION RETRIEVAL OF ENDOSCOPICALLY REMOVED POLYPS.: Brand: ETRAP

## (undated) DEVICE — PAD POSTN ARMBND 1P/U

## (undated) DEVICE — BLADELESS OBTURATOR: Brand: WECK VISTA

## (undated) DEVICE — SYR LUERLOK 30CC